# Patient Record
Sex: FEMALE | Race: BLACK OR AFRICAN AMERICAN | NOT HISPANIC OR LATINO | Employment: FULL TIME | URBAN - METROPOLITAN AREA
[De-identification: names, ages, dates, MRNs, and addresses within clinical notes are randomized per-mention and may not be internally consistent; named-entity substitution may affect disease eponyms.]

---

## 2017-01-04 ENCOUNTER — TELEPHONE (OUTPATIENT)
Dept: INFECTIOUS DISEASES | Facility: CLINIC | Age: 37
End: 2017-01-04

## 2017-01-05 ENCOUNTER — OFFICE VISIT (OUTPATIENT)
Dept: INFECTIOUS DISEASES | Facility: CLINIC | Age: 37
End: 2017-01-05
Attending: INTERNAL MEDICINE

## 2017-01-05 VITALS
OXYGEN SATURATION: 100 % | TEMPERATURE: 98 F | HEIGHT: 61 IN | WEIGHT: 102.5 LBS | DIASTOLIC BLOOD PRESSURE: 83 MMHG | BODY MASS INDEX: 19.35 KG/M2 | SYSTOLIC BLOOD PRESSURE: 131 MMHG | HEART RATE: 101 BPM

## 2017-01-05 DIAGNOSIS — F41.9 ANXIETY: ICD-10-CM

## 2017-01-05 DIAGNOSIS — B20 HUMAN IMMUNODEFICIENCY VIRUS (HIV) DISEASE (H): Primary | ICD-10-CM

## 2017-01-05 DIAGNOSIS — Z22.7 LTBI (LATENT TUBERCULOSIS INFECTION): ICD-10-CM

## 2017-01-05 DIAGNOSIS — B20 HUMAN IMMUNODEFICIENCY VIRUS (HIV) DISEASE (H): ICD-10-CM

## 2017-01-05 LAB
ALBUMIN SERPL-MCNC: 3.7 G/DL (ref 3.4–5)
ALP SERPL-CCNC: 82 U/L (ref 40–150)
ALT SERPL W P-5'-P-CCNC: 32 U/L (ref 0–50)
ANION GAP SERPL CALCULATED.3IONS-SCNC: 8 MMOL/L (ref 3–14)
AST SERPL W P-5'-P-CCNC: 31 U/L (ref 0–45)
BILIRUB SERPL-MCNC: 0.2 MG/DL (ref 0.2–1.3)
BUN SERPL-MCNC: 10 MG/DL (ref 7–30)
CALCIUM SERPL-MCNC: 9 MG/DL (ref 8.5–10.1)
CD3 CELLS # BLD: 1880 CELLS/UL (ref 603–2990)
CD3 CELLS NFR BLD: 71 % (ref 49–84)
CD3+CD4+ CELLS # BLD: 648 CELLS/UL (ref 441–2156)
CD3+CD4+ CELLS NFR BLD: 24 % (ref 28–63)
CD3+CD4+ CELLS/CD3+CD8+ CLL BLD: 0.55 % (ref 1.4–2.6)
CD3+CD8+ CELLS # BLD: 1161 CELLS/UL (ref 125–1312)
CD3+CD8+ CELLS NFR BLD: 44 % (ref 10–40)
CHLORIDE SERPL-SCNC: 105 MMOL/L (ref 94–109)
CO2 SERPL-SCNC: 26 MMOL/L (ref 20–32)
CREAT SERPL-MCNC: 0.73 MG/DL (ref 0.52–1.04)
ERYTHROCYTE [DISTWIDTH] IN BLOOD BY AUTOMATED COUNT: 14.6 % (ref 10–15)
GFR SERPL CREATININE-BSD FRML MDRD: ABNORMAL ML/MIN/1.7M2
GLUCOSE SERPL-MCNC: 103 MG/DL (ref 70–99)
HCT VFR BLD AUTO: 35.9 % (ref 35–47)
HGB BLD-MCNC: 11.4 G/DL (ref 11.7–15.7)
IFC SPECIMEN: ABNORMAL
IMMUNODEFICIENCY MARKERS SPEC-IMP: ABNORMAL
MCH RBC QN AUTO: 23.3 PG (ref 26.5–33)
MCHC RBC AUTO-ENTMCNC: 31.8 G/DL (ref 31.5–36.5)
MCV RBC AUTO: 73 FL (ref 78–100)
PLATELET # BLD AUTO: 298 10E9/L (ref 150–450)
POTASSIUM SERPL-SCNC: 4 MMOL/L (ref 3.4–5.3)
PROT SERPL-MCNC: 8.4 G/DL (ref 6.8–8.8)
RBC # BLD AUTO: 4.9 10E12/L (ref 3.8–5.2)
SODIUM SERPL-SCNC: 139 MMOL/L (ref 133–144)
WBC # BLD AUTO: 5.1 10E9/L (ref 4–11)

## 2017-01-05 PROCEDURE — 80053 COMPREHEN METABOLIC PANEL: CPT | Performed by: INTERNAL MEDICINE

## 2017-01-05 PROCEDURE — 86360 T CELL ABSOLUTE COUNT/RATIO: CPT | Performed by: INTERNAL MEDICINE

## 2017-01-05 PROCEDURE — 99212 OFFICE O/P EST SF 10 MIN: CPT | Mod: ZF

## 2017-01-05 PROCEDURE — 85027 COMPLETE CBC AUTOMATED: CPT | Performed by: INTERNAL MEDICINE

## 2017-01-05 PROCEDURE — 87536 HIV-1 QUANT&REVRSE TRNSCRPJ: CPT | Performed by: INTERNAL MEDICINE

## 2017-01-05 PROCEDURE — 36415 COLL VENOUS BLD VENIPUNCTURE: CPT | Performed by: INTERNAL MEDICINE

## 2017-01-05 PROCEDURE — 86359 T CELLS TOTAL COUNT: CPT | Performed by: INTERNAL MEDICINE

## 2017-01-05 ASSESSMENT — PAIN SCALES - GENERAL: PAINLEVEL: NO PAIN (0)

## 2017-01-05 NOTE — PROGRESS NOTES
[facility] - Progress Note  Dr. Claudia Cosme, [facility], [facility], [address] [place] [address], Sixth Floor, [address]  [place] [address]  Patient:  Marina Contreras, Date of birth 1980, Medical record number [id number]  Date of Visit:  Jan 5, 2017         Assessment and Recommendations:     Human immunodeficiency virus (HIV) disease (H)  Will check her safety and surrogate markers today.  We discussed ARV options going forward not that she is no longer pregnant.  She would prefer to be on a once a day regimen, but she is willing to take more than one pill.  We decided on a regiment of dolutegravir and tenofovir alafenamide/emtricitibine.  She understands that these drug are not approved yet in pregnancy, but they are very similar to her current regimen.  I will have her viral load checked in 1-2 months.  We will be doing a blood draw because of LTBI treatment and we will do it at that time.     LTBI (latent tuberculosis infection)  Ms. Ben Mock is interested in LTBI therapy.  I think that she is fine for INH therapy and a rifampin/rifabutin based regimen would interact with her ARVs.   We will contact the [facility] about have her pills covered.  She should be on Isoniazid 200mg orally once a day and Pyridoxine 25mg orally once a day.     Anxiety  We will coordinate a counselor for her.  She was encouraged to contact us with any issues.  I encouraged her to try to prioritize her own health as well as her daughters in this early phase of motherhood - setting up a schedule so that she can have regular meals and prioritizing sleep.     I spent more than 40 minutes today in face to face time managing the care of Marina Mock.  Over 50% of my time on the unit was spent counseling the patient and/or coordinating care regarding services listed in this note.    Claudia Cosme MD  Division of  "Infectious Diseases and International Medicine  [phone]         History of Infectious Disease Illness:   Ms. Gray is a 36 year old woman who I am following for HIV infection. She was diagnosed in pregnancy and delivered in September 2016.  She has been doing well since then.  The baby is negative so far and healthy.  She has one test left to take.  She is not breastfeeding.  She continues to take her ARVs without an issues. No diarrhea, nausea or vomiting.  No rashes.  At present she is on Truvada and Raltegravir.  She does not plan on getting pregnant again at this point, but she is not on hormonal contraception at present. Today we discussed LTBI treatment.  We had discussed this during her pregnancy, but we elected that in the setting of her hyperemesis gravidarum, the new ARV start and her CD4 count to address this after she delivered.  She reports no known exposure to TB.  She has a history of BCG.  She had a positive Quantiferon gold through [facility].  She is interested in getting treatment for LTBI.  She denies any sweats, weight loss (except with delivery).  No cough. No fevers.  She did have a negative chest X ray at [facility].  Of note, her  was recently on PrEP, but stopped taking it and she is concerned about this.  She reports a lot of ongoing stress about her diagnosis and she has sleep issues relating to this.  She also reports that she has not been eating well, but on further questioning, it sounds like she has a normal appetite and feels fine when eating.  She just has not been prioritizing eating as she is adjust to life with a young baby.  She denies suicidal ideation and she reports that she sometimes does feel \"depressed,\" but she does not feel so sad that she worries that she is at risk of harming herself or others.  I had Arcenio Wilson join us for part of this conversation to discuss mental health resources and she is interested in counseling which Arcenio will help arrange. "         Past Medical and Surgical History:     Past Medical History   Diagnosis Date     Miscarriage      Malaria      TB (pulmonary tuberculosis)      latent       Past Surgical History   Procedure Laterality Date     No history of surgery             Family History:     Family History   Problem Relation Age of Onset     Family History Negative       Hypertension Father      Hypertension Other      CANCER No family hx of      DIABETES No family hx of      Macular Degeneration No family hx of      Retinal detachment No family hx of            Social History:     Social History   Substance Use Topics     Smoking status: Never Smoker      Smokeless tobacco: Never Used     Alcohol Use: No     Social History     Social History Narrative          Review of Systems:   CONSTITUTIONAL:  No fevers or chills  EYES: negative for icterus  RESPIRATORY:  negative for cough, sputum or dyspnea  CARDIOVASCULAR:  negative for chest pain, palpitations  GASTROINTESTINAL:   Negative for nausea, diarrhea or constipation  GENITOURINARY:  Positive for dysuria  INTEGUMENT:  negative for rash or pruritus  NEURO:  Negative for headache         Current Medications:     Current Outpatient Prescriptions   Medication Sig Dispense Refill     acetaminophen (TYLENOL) 325 MG tablet Take 2 tablets (650 mg) by mouth every 4 hours as needed for mild pain or fever (greater than or equal to 38  C /100.4  F (oral) or 38.5  C/ 101.4  F (core).) 30 tablet 0     ibuprofen (ADVIL,MOTRIN) 400 MG tablet Take 1-2 tablets (400-800 mg) by mouth every 6 hours as needed for other (cramping) 30 tablet 0     senna-docusate (SENOKOT-S;PERICOLACE) 8.6-50 MG per tablet Take 1-2 tablets by mouth 2 times daily as needed for constipation 30 tablet 1     ferrous sulfate (IRON) 325 (65 FE) MG tablet Take 1 tablet (325 mg) by mouth daily (with breakfast) 30 tablet 2     blood glucose monitoring (TRU CONTOUR NEXT) test strip Use to test blood sugar 4 times daily or as  "directed.  Ok to substitute alternative if insurance prefers. 100 each 11     blood glucose monitoring (TRU MICROLET) lancets Use to test blood sugar 4 times daily or as directed.  Ok to substitute alternative if insurance prefers. 1 Box prn     acetone, Urine, test STRP 1 strip by In Vitro route daily 25 each 1     raltegravir (ISENTRESS) 400 MG tablet Take 1 tablet (400 mg) by mouth 2 times daily 60 tablet 11     emtricitabine-tenofovir (TRUVADA) 200-300 MG per tablet Take 1 tablet by mouth daily 30 tablet 11          Immunization History:     Immunization History   Administered Date(s) Administered     TDAP (BOOSTRIX AGES 10-64) 08/15/2016            Allergies:   No Known Allergies         Physical Exam:   Vital signs:  /83 mmHg  Pulse 101  Temp(Src) 98  F (36.7  C) (Oral)  Ht 1.549 m (5' 1\")  Wt 46.494 kg (102 lb 8 oz)  BMI 19.38 kg/m2  SpO2 100%    Physical Examination:  GENERAL:  well-developed, well-nourished.  HEENT:  Head is normocephalic, atraumatic   EYES:  Eyes have anicteric sclerae without conjunctival injection   LUNGS:  Clear to auscultation bilateral.   CARDIOVASCULAR:  Regular rate and rhythm with no murmurs, gallops or rubs.  ABDOMEN:  Normal bowel sounds, soft, nontender.   SKIN:  No acute rashes.    NEUROLOGIC:  Grossly nonfocal. Active x4 extremities         Laboratory Data:     Metabolic Studies       Recent Labs   Lab Test  09/21/16   1904  08/25/16   1127  06/30/16   1203  03/31/16   1027  03/03/16   1226   NA   --   136  133  135  136   POTASSIUM   --   3.8  4.0  3.8  3.5   CHLORIDE   --   105  104  102  103   CO2   --   21  23  19*  26   ANIONGAP   --   10  6  13  8   BUN   --   6*  6*  3*  6*   CR  0.73  0.50*  0.52  0.53  0.49*   GFRESTIMATED  >90  Non  GFR Calc    >90  Non  GFR Calc    >90  Non  GFR Calc    >90  Non  GFR Calc    >90  Non  GFR Calc     GLC   --   83  137*  86  86   JANICE   --   " 9.5  9.2  10.0  9.0       Hepatic Studies    Recent Labs   Lab Test  09/21/16   1904  08/25/16   1127  06/30/16   1203  03/31/16   1027  03/03/16   1226   BILITOTAL   --   0.2  0.2  0.2  0.2   ALKPHOS   --   92  64  56  54   ALBUMIN   --   3.0*  3.0*  3.1*  3.6   AST  22  15  21  26  20   ALT  13  16  20  27  32       Hematology Studies      Recent Labs   Lab Test  09/21/16   1904  09/19/16   1747  08/25/16   1127  06/30/16   1203  05/12/16   1224  03/31/16   1027  03/03/16   1226  08/14/15   2031   WBC  14.6*  5.6  5.0  5.6  6.5  6.2  8.2  5.7   ANEU   --   3.0   --    --    --    --   4.7  3.4   ALYM   --   2.0   --    --    --    --   2.7  1.8   CHUCKY   --   0.6   --    --    --    --   0.7  0.5   AEOS   --   0.1   --    --    --    --   0.0  0.0   HGB  9.3*  10.7*  9.9*  9.2*  10.0*  10.9*  10.7*  10.2*   HCT  28.7*  32.1*  30.7*  27.9*  30.8*  34.2*  34.4*  30.1*   PLT  174  181  233  295  319  308  308  251       Hepatitis B Testing     Recent Labs   Lab Test  03/03/16   1226   HBCAB  Nonreactive   HEPBANG  Nonreactive

## 2017-01-05 NOTE — NURSING NOTE
"Chief Complaint   Patient presents with     RECHECK     follow up B20       Initial /83 mmHg  Pulse 101  Temp(Src) 98  F (36.7  C) (Oral)  Ht 1.549 m (5' 1\")  Wt 46.494 kg (102 lb 8 oz)  BMI 19.38 kg/m2  SpO2 100% Estimated body mass index is 19.38 kg/(m^2) as calculated from the following:    Height as of this encounter: 1.549 m (5' 1\").    Weight as of this encounter: 46.494 kg (102 lb 8 oz).  BP completed using cuff size: anjali Oreilly CMA      "

## 2017-01-05 NOTE — Clinical Note
1/5/2017       RE: Marina Mock  [address]  [facility] [address]     Dear Colleague,    Thank you for referring your patient, Marina Mock, to the [facility] AND INFECTIOUS DISEASES at [facility]. Please see a copy of my visit note below.    [facility] - Progress Note  Dr. Claudia Cosme, [facility], [facility], [address] [place] [address], Sixth Floor, [address]  [place] [address]  Patient:  Marina Contreras, Date of birth 1980, Medical record number [id number]  Date of Visit:  Jan 5, 2017         Assessment and Recommendations:     Human immunodeficiency virus (HIV) disease (H)  Will check her safety and surrogate markers today.  We discussed ARV options going forward not that she is no longer pregnant.  She would prefer to be on a once a day regimen, but she is willing to take more than one pill.  We decided on a regiment of dolutegravir and tenofovir alafenamide/emtricitibine.  She understands that these drug are not approved yet in pregnancy, but they are very similar to her current regimen.  I will have her viral load checked in 1-2 months.  We will be doing a blood draw because of LTBI treatment and we will do it at that time.     LTBI (latent tuberculosis infection)  Ms. Ben Mock is interested in LTBI therapy.  I think that she is fine for INH therapy and a rifampin/rifabutin based regimen would interact with her ARVs.   We will contact the [facility] about have her pills covered.  She should be on Isoniazid 200mg orally once a day and Pyridoxine 25mg orally once a day.     Anxiety  We will coordinate a counselor for her.  She was encouraged to contact us with any issues.  I encouraged her to try to prioritize her own health as well as her daughters in this early phase of motherhood - setting up a schedule so that she  can have regular meals and prioritizing sleep.     I spent more than 40 minutes today in face to face time managing the care of Marina Mock.  Over 50% of my time on the unit was spent counseling the patient and/or coordinating care regarding services listed in this note.    Claudia Cosme MD  Division of Infectious Diseases and International Medicine  [phone]         History of Infectious Disease Illness:   Ms. Gray is a 36 year old woman who I am following for HIV infection. She was diagnosed in pregnancy and delivered in September 2016.  She has been doing well since then.  The baby is negative so far and healthy.  She has one test left to take.  She is not breastfeeding.  She continues to take her ARVs without an issues. No diarrhea, nausea or vomiting.  No rashes.  At present she is on Truvada and Raltegravir.  She does not plan on getting pregnant again at this point, but she is not on hormonal contraception at present. Today we discussed LTBI treatment.  We had discussed this during her pregnancy, but we elected that in the setting of her hyperemesis gravidarum, the new ARV start and her CD4 count to address this after she delivered.  She reports no known exposure to TB.  She has a history of BCG.  She had a positive Quantiferon gold through [facility].  She is interested in getting treatment for LTBI.  She denies any sweats, weight loss (except with delivery).  No cough. No fevers.  She did have a negative chest X ray at [facility].  Of note, her  was recently on PrEP, but stopped taking it and she is concerned about this.  She reports a lot of ongoing stress about her diagnosis and she has sleep issues relating to this.  She also reports that she has not been eating well, but on further questioning, it sounds like she has a normal appetite and feels fine when eating.  She just has not been prioritizing eating as she is adjust to life with a young baby.  She denies suicidal  "ideation and she reports that she sometimes does feel \"depressed,\" but she does not feel so sad that she worries that she is at risk of harming herself or others.  I had Arcenio Wilson join us for part of this conversation to discuss mental health resources and she is interested in counseling which Arcenio will help arrange.         Past Medical and Surgical History:     Past Medical History   Diagnosis Date     Miscarriage      Malaria      TB (pulmonary tuberculosis)      latent       Past Surgical History   Procedure Laterality Date     No history of surgery             Family History:     Family History   Problem Relation Age of Onset     Family History Negative       Hypertension Father      Hypertension Other      CANCER No family hx of      DIABETES No family hx of      Macular Degeneration No family hx of      Retinal detachment No family hx of            Social History:     Social History   Substance Use Topics     Smoking status: Never Smoker      Smokeless tobacco: Never Used     Alcohol Use: No     Social History     Social History Narrative          Review of Systems:   CONSTITUTIONAL:  No fevers or chills  EYES: negative for icterus  RESPIRATORY:  negative for cough, sputum or dyspnea  CARDIOVASCULAR:  negative for chest pain, palpitations  GASTROINTESTINAL:   Negative for nausea, diarrhea or constipation  GENITOURINARY:  Positive for dysuria  INTEGUMENT:  negative for rash or pruritus  NEURO:  Negative for headache         Current Medications:     Current Outpatient Prescriptions   Medication Sig Dispense Refill     acetaminophen (TYLENOL) 325 MG tablet Take 2 tablets (650 mg) by mouth every 4 hours as needed for mild pain or fever (greater than or equal to 38  C /100.4  F (oral) or 38.5  C/ 101.4  F (core).) 30 tablet 0     ibuprofen (ADVIL,MOTRIN) 400 MG tablet Take 1-2 tablets (400-800 mg) by mouth every 6 hours as needed for other (cramping) 30 tablet 0     senna-docusate (SENOKOT-S;PERICOLACE) " "8.6-50 MG per tablet Take 1-2 tablets by mouth 2 times daily as needed for constipation 30 tablet 1     ferrous sulfate (IRON) 325 (65 FE) MG tablet Take 1 tablet (325 mg) by mouth daily (with breakfast) 30 tablet 2     blood glucose monitoring (TRU CONTOUR NEXT) test strip Use to test blood sugar 4 times daily or as directed.  Ok to substitute alternative if insurance prefers. 100 each 11     blood glucose monitoring (TRU MICROLET) lancets Use to test blood sugar 4 times daily or as directed.  Ok to substitute alternative if insurance prefers. 1 Box prn     acetone, Urine, test STRP 1 strip by In Vitro route daily 25 each 1     raltegravir (ISENTRESS) 400 MG tablet Take 1 tablet (400 mg) by mouth 2 times daily 60 tablet 11     emtricitabine-tenofovir (TRUVADA) 200-300 MG per tablet Take 1 tablet by mouth daily 30 tablet 11          Immunization History:     Immunization History   Administered Date(s) Administered     TDAP (BOOSTRIX AGES 10-64) 08/15/2016            Allergies:   No Known Allergies         Physical Exam:   Vital signs:  /83 mmHg  Pulse 101  Temp(Src) 98  F (36.7  C) (Oral)  Ht 1.549 m (5' 1\")  Wt 46.494 kg (102 lb 8 oz)  BMI 19.38 kg/m2  SpO2 100%    Physical Examination:  GENERAL:  well-developed, well-nourished.  HEENT:  Head is normocephalic, atraumatic   EYES:  Eyes have anicteric sclerae without conjunctival injection   LUNGS:  Clear to auscultation bilateral.   CARDIOVASCULAR:  Regular rate and rhythm with no murmurs, gallops or rubs.  ABDOMEN:  Normal bowel sounds, soft, nontender.   SKIN:  No acute rashes.    NEUROLOGIC:  Grossly nonfocal. Active x4 extremities         Laboratory Data:     Metabolic Studies       Recent Labs   Lab Test  09/21/16   1904  08/25/16   1127  06/30/16   1203  03/31/16   1027  03/03/16   1226   NA   --   136  133  135  136   POTASSIUM   --   3.8  4.0  3.8  3.5   CHLORIDE   --   105  104  102  103   CO2   --   21  23  19*  26   ANIONGAP   --   10  6  " 13  8   BUN   --   6*  6*  3*  6*   CR  0.73  0.50*  0.52  0.53  0.49*   GFRESTIMATED  >90  Non  GFR Calc    >90  Non  GFR Calc    >90  Non  GFR Calc    >90  Non  GFR Calc    >90  Non  GFR Calc     GLC   --   83  137*  86  86   JANICE   --   9.5  9.2  10.0  9.0       Hepatic Studies    Recent Labs   Lab Test  09/21/16   1904  08/25/16   1127  06/30/16   1203  03/31/16   1027  03/03/16   1226   BILITOTAL   --   0.2  0.2  0.2  0.2   ALKPHOS   --   92  64  56  54   ALBUMIN   --   3.0*  3.0*  3.1*  3.6   AST  22  15  21  26  20   ALT  13  16  20  27  32       Hematology Studies      Recent Labs   Lab Test  09/21/16   1904  09/19/16   1747  08/25/16   1127  06/30/16   1203  05/12/16   1224  03/31/16   1027  03/03/16   1226  08/14/15   2031   WBC  14.6*  5.6  5.0  5.6  6.5  6.2  8.2  5.7   ANEU   --   3.0   --    --    --    --   4.7  3.4   ALYM   --   2.0   --    --    --    --   2.7  1.8   CHUCKY   --   0.6   --    --    --    --   0.7  0.5   AEOS   --   0.1   --    --    --    --   0.0  0.0   HGB  9.3*  10.7*  9.9*  9.2*  10.0*  10.9*  10.7*  10.2*   HCT  28.7*  32.1*  30.7*  27.9*  30.8*  34.2*  34.4*  30.1*   PLT  174  181  233  295  319  308  308  251       Hepatitis B Testing     Recent Labs   Lab Test  03/03/16 1226   HBCAB  Nonreactive   HEPBANG  Nonreactive           Again, thank you for allowing me to participate in the care of your patient.      Sincerely,    Claudia Cosme MD

## 2017-01-05 NOTE — MR AVS SNAPSHOT
After Visit Summary   1/5/2017    Marina Mock    MRN: [id number]           Patient Information     Date Of Birth          1980        Visit Information        Provider Department      1/5/2017 8:00 AM Claudia Cosme MD [facility] and Infectious Diseases        Today's Diagnoses     Human immunodeficiency virus (HIV) disease (H)    -  1        Follow-ups after your visit        Your next 10 appointments already scheduled     Jan 05, 2017 10:45 AM   Lab with  LAB   [facility] ([facility])    [address] [place] [address]  1st Floor  [facility] [address]   [phone]            Feb 09, 2017 10:30 AM   (Arrive by 10:15 AM)   Return Visit with Claudia Cosme MD   [facility] ([facility])    [address] [place] [address]  3rd [facility] [id number] [phone]              Future tests that were ordered for you today     Open Future Orders        Priority Expected Expires Ordered    CBC with platelets Routine  1/5/2018 1/5/2017    Comprehensive metabolic panel Routine  1/5/2018 1/5/2017    T cell subset profile Routine  1/5/2018 1/5/2017    HIV-1 RNA quantitative Routine  1/5/2018 1/5/2017            Who to contact     If you have questions or need follow up information about today's clinic visit or your schedule please contact [facility] AND INFECTIOUS DISEASES directly at [phone].  Normal or non-critical lab and imaging results will be communicated to you by MyChart, letter or phone within 4 business days after the clinic has received the results. If you do not hear from us within 7 days, please contact the clinic through MyChart or phone. If you have a critical or abnormal lab result, we will notify you by phone as soon as possible.  Submit refill requests through [email or web address] or call your pharmacy and they will forward the refill request  "to us. Please allow 3 business days for your refill to be completed.          Additional Information About Your Visit        [email or web address] Information     WhoGot[employer]uff gives you secure access to your electronic health record. If you see a primary care provider, you can also send messages to your care team and make appointments. If you have questions, please call your primary care clinic.  If you do not have a primary care provider, please call [phone] and they will assist you.        Care EveryWhere ID     This is your Care EveryWhere ID. This could be used by other organizations to access your [facility] medical records  [id number]        Your Vitals Were     Pulse Temperature Height BMI (Body Mass Index) Pulse Oximetry       101 98  F (36.7  C) (Oral) 1.549 m (5' 1\") 19.38 kg/m2 100%        Blood Pressure from Last 3 Encounters:   01/05/17 131/83   09/23/16 145/89   09/12/16 129/79    Weight from Last 3 Encounters:   01/05/17 46.494 kg (102 lb 8 oz)   09/19/16 54.885 kg (121 lb)   09/12/16 54.931 kg (121 lb 1.6 oz)                 Today's Medication Changes          These changes are accurate as of: 1/5/17 10:37 AM.  If you have any questions, ask your nurse or doctor.               Start taking these medicines.        Dose/Directions    dolutegravir 50 MG tablet   Commonly known as:  TIVICAY   Used for:  Human immunodeficiency virus (HIV) disease (H)   Started by:  Claudia Cosme MD        Dose:  50 mg   Take 1 tablet (50 mg) by mouth daily   Quantity:  30 tablet   Refills:  11       emtricitabine-tenofovir -25 MG per tablet   Commonly known as:  DESCOVY   Used for:  Human immunodeficiency virus (HIV) disease (H)   Started by:  Claudia Cosme MD        Dose:  1 tablet   Take 1 tablet by mouth daily   Quantity:  30 tablet   Refills:  11         Stop taking these medicines if you haven't already. Please contact your care team if you have questions.     acetaminophen 325 MG tablet "   Commonly known as:  TYLENOL   Stopped by:  Claudia Cosme MD           emtricitabine-tenofovir 200-300 MG per tablet   Commonly known as:  TRUVADA   Stopped by:  Claudia Cosme MD           ibuprofen 400 MG tablet   Commonly known as:  ADVIL/MOTRIN   Stopped by:  Claudia Cosme MD           raltegravir 400 MG tablet   Commonly known as:  ISENTRESS   Stopped by:  Claudia Cosme MD           senna-docusate 8.6-50 MG per tablet   Commonly known as:  SENOKOT-S;PERICOLACE   Stopped by:  Claudia Cosme MD                Where to get your medicines      These medications were sent to [facility] [address] [place] [address] [address]  [address] [place] [address] [address] [place] [id number]    Hours:  TRANSPLANT PHONE NUMBER [phone] Phone:  [phone]    - dolutegravir 50 MG tablet  - emtricitabine-tenofovir -25 MG per tablet             Primary Care Provider Office Phone # Fax #    Clinic [facility] [phone]       [address] [facility] [address]        Thank you!     Thank you for choosing [facility] AND INFECTIOUS DISEASES  for your care. Our goal is always to provide you with excellent care. Hearing back from our patients is one way we can continue to improve our services. Please take a few minutes to complete the written survey that you may receive in the mail after your visit with us. Thank you!             Your Updated Medication List - Protect others around you: Learn how to safely use, store and throw away your medicines at www.disposemymeds.org.          This list is accurate as of: 1/5/17 10:37 AM.  Always use your most recent med list.                   Brand Name Dispense Instructions for use    acetone (Urine) test Strp     25 each    1 strip by In Vitro route daily       blood glucose monitoring lancets     1 Box    Use to test blood sugar 4 times daily  or as directed.  Ok to substitute alternative if insurance prefers.       blood glucose monitoring test strip    TRU CONTOUR NEXT    100 each    Use to test blood sugar 4 times daily or as directed.  Ok to substitute alternative if insurance prefers.       dolutegravir 50 MG tablet    TIVICAY    30 tablet    Take 1 tablet (50 mg) by mouth daily       emtricitabine-tenofovir -25 MG per tablet    DESCOVY    30 tablet    Take 1 tablet by mouth daily       ferrous sulfate 325 (65 FE) MG tablet    IRON    30 tablet    Take 1 tablet (325 mg) by mouth daily (with breakfast)

## 2017-01-06 PROBLEM — F41.9 ANXIETY: Status: ACTIVE | Noted: 2017-01-06

## 2017-01-06 LAB
HIV1 RNA # PLAS NAA DL=20: NORMAL {COPIES}/ML
HIV1 RNA SERPL NAA+PROBE-LOG#: NORMAL {LOG_COPIES}/ML

## 2017-01-07 NOTE — ASSESSMENT & PLAN NOTE
Ms. Ben Thompsonangeli is interested in LTBI therapy.  I think that she is fine for INH therapy and a rifampin/rifabutin based regimen would interact with her ARVs.   We will contact the [facility] about have her pills covered.  She should be on Isoniazid 200mg orally once a day and Pyridoxine 25mg orally once a day.

## 2017-01-07 NOTE — ASSESSMENT & PLAN NOTE
Will check her safety and surrogate markers today.  We discussed ARV options going forward not that she is no longer pregnant.  She would prefer to be on a once a day regimen, but she is willing to take more than one pill.  We decided on a regiment of dolutegravir and tenofovir alafenamide/emtricitibine.  She understands that these drug are not approved yet in pregnancy, but they are very similar to her current regimen.  I will have her viral load checked in 1-2 months.  We will be doing a blood draw because of LTBI treatment and we will do it at that time.

## 2017-01-07 NOTE — ASSESSMENT & PLAN NOTE
We will coordinate a counselor for her.  She was encouraged to contact us with any issues.  I encouraged her to try to prioritize her own health as well as her daughters in this early phase of motherhood - setting up a schedule so that she can have regular meals and prioritizing sleep.

## 2017-01-12 DIAGNOSIS — Z22.7 LATENT TUBERCULOSIS INFECTION: Primary | ICD-10-CM

## 2017-01-12 RX ORDER — ISONIAZID 100 MG/1
200 TABLET ORAL DAILY
Qty: 90 TABLET | Refills: 8 | Status: SHIPPED | OUTPATIENT
Start: 2017-01-12 | End: 2017-03-16

## 2017-01-12 RX ORDER — PYRIDOXINE HCL (VITAMIN B6) 25 MG
25 TABLET ORAL DAILY
Qty: 30 TABLET | Refills: 8 | Status: SHIPPED | OUTPATIENT
Start: 2017-01-12 | End: 2017-03-16

## 2017-01-12 NOTE — TELEPHONE ENCOUNTER
Forms completed for TB treatment and Rx signed by Dr Cosme.  Faxed to [facility] and all forms placed it treatment folder. Yaneth Henderson RN...01/12/2017....11:29 AM

## 2017-02-08 ENCOUNTER — TELEPHONE (OUTPATIENT)
Dept: INFECTIOUS DISEASES | Facility: CLINIC | Age: 37
End: 2017-02-08

## 2017-03-15 ENCOUNTER — TELEPHONE (OUTPATIENT)
Dept: INFECTIOUS DISEASES | Facility: CLINIC | Age: 37
End: 2017-03-15

## 2017-03-16 ENCOUNTER — OFFICE VISIT (OUTPATIENT)
Dept: INFECTIOUS DISEASES | Facility: CLINIC | Age: 37
End: 2017-03-16
Attending: INTERNAL MEDICINE

## 2017-03-16 VITALS
HEIGHT: 61 IN | TEMPERATURE: 98.3 F | WEIGHT: 99.6 LBS | SYSTOLIC BLOOD PRESSURE: 119 MMHG | BODY MASS INDEX: 18.81 KG/M2 | DIASTOLIC BLOOD PRESSURE: 78 MMHG | HEART RATE: 71 BPM

## 2017-03-16 DIAGNOSIS — Z22.7 LTBI (LATENT TUBERCULOSIS INFECTION): ICD-10-CM

## 2017-03-16 DIAGNOSIS — B20 HUMAN IMMUNODEFICIENCY VIRUS (HIV) DISEASE (H): ICD-10-CM

## 2017-03-16 DIAGNOSIS — R07.82 INTERCOSTAL PAIN: ICD-10-CM

## 2017-03-16 DIAGNOSIS — B20 HUMAN IMMUNODEFICIENCY VIRUS (HIV) DISEASE (H): Primary | ICD-10-CM

## 2017-03-16 LAB
ALBUMIN SERPL-MCNC: 3.8 G/DL (ref 3.4–5)
ALP SERPL-CCNC: 72 U/L (ref 40–150)
ALT SERPL W P-5'-P-CCNC: 24 U/L (ref 0–50)
ANION GAP SERPL CALCULATED.3IONS-SCNC: 8 MMOL/L (ref 3–14)
AST SERPL W P-5'-P-CCNC: 22 U/L (ref 0–45)
BILIRUB SERPL-MCNC: 0.2 MG/DL (ref 0.2–1.3)
BUN SERPL-MCNC: 7 MG/DL (ref 7–30)
CALCIUM SERPL-MCNC: 8.8 MG/DL (ref 8.5–10.1)
CHLORIDE SERPL-SCNC: 106 MMOL/L (ref 94–109)
CO2 SERPL-SCNC: 27 MMOL/L (ref 20–32)
CREAT SERPL-MCNC: 0.73 MG/DL (ref 0.52–1.04)
ERYTHROCYTE [DISTWIDTH] IN BLOOD BY AUTOMATED COUNT: 16.3 % (ref 10–15)
FERRITIN SERPL-MCNC: 36 NG/ML (ref 12–150)
GFR SERPL CREATININE-BSD FRML MDRD: NORMAL ML/MIN/1.7M2
GLUCOSE SERPL-MCNC: 82 MG/DL (ref 70–99)
HCT VFR BLD AUTO: 32.2 % (ref 35–47)
HGB BLD-MCNC: 10.3 G/DL (ref 11.7–15.7)
MCH RBC QN AUTO: 23.6 PG (ref 26.5–33)
MCHC RBC AUTO-ENTMCNC: 32 G/DL (ref 31.5–36.5)
MCV RBC AUTO: 74 FL (ref 78–100)
PLATELET # BLD AUTO: 247 10E9/L (ref 150–450)
POTASSIUM SERPL-SCNC: 3.9 MMOL/L (ref 3.4–5.3)
PROT SERPL-MCNC: 8.7 G/DL (ref 6.8–8.8)
RBC # BLD AUTO: 4.37 10E12/L (ref 3.8–5.2)
SODIUM SERPL-SCNC: 141 MMOL/L (ref 133–144)
WBC # BLD AUTO: 4.3 10E9/L (ref 4–11)

## 2017-03-16 PROCEDURE — 99212 OFFICE O/P EST SF 10 MIN: CPT | Mod: 25,ZF

## 2017-03-16 PROCEDURE — 80053 COMPREHEN METABOLIC PANEL: CPT | Performed by: INTERNAL MEDICINE

## 2017-03-16 PROCEDURE — 25000128 H RX IP 250 OP 636: Mod: ZF

## 2017-03-16 PROCEDURE — 90670 PCV13 VACCINE IM: CPT | Mod: ZF

## 2017-03-16 PROCEDURE — 36415 COLL VENOUS BLD VENIPUNCTURE: CPT | Performed by: INTERNAL MEDICINE

## 2017-03-16 PROCEDURE — 85027 COMPLETE CBC AUTOMATED: CPT | Performed by: INTERNAL MEDICINE

## 2017-03-16 PROCEDURE — 82728 ASSAY OF FERRITIN: CPT | Performed by: INTERNAL MEDICINE

## 2017-03-16 PROCEDURE — 87536 HIV-1 QUANT&REVRSE TRNSCRPJ: CPT | Performed by: INTERNAL MEDICINE

## 2017-03-16 PROCEDURE — G0009 ADMIN PNEUMOCOCCAL VACCINE: HCPCS | Mod: ZF

## 2017-03-16 ASSESSMENT — PAIN SCALES - GENERAL: PAINLEVEL: NO PAIN (0)

## 2017-03-16 NOTE — LETTER
3/16/2017     RE: Marina Mock  [address]  [place] [facility] [facility] [address]     Dear Colleague,    Thank you for referring your patient, Marina Mock, to the [facility] AND INFECTIOUS DISEASES at [facility]. Please see a copy of my visit note below.    [facility] - Progress Note  Dr. Claudia Cosme, [facility], [facility], [address] [place] [address], Sixth Floor, Clinic [address]  [place] [address]  Patient:  Marina Contreras, Date of birth 1980, Medical record number [id number]  Date of Visit:  Mar 16, 2017         Assessment and Recommendations:     Human immunodeficiency virus (HIV) disease (H)  Continue Truvada and Dolutegravir.  Will discuss at her next appointment her longer terms plans for pregnancy and will make a decision at that point as to whether we should continue Truvada or switch to Descovy. I will recheck her viral load today since we have changed her regimen from Raltegravir to Dolutegravir. Follow up in 3 months.     LTBI (latent tuberculosis infection)  Medications have not come through [facility].  We will refax for those and hopefully will be able to start on this regimen soon.     Chest Pain  Her pain sounds most consistent with costochondritis.  I advised that she do a short trial of scheduled ibuprofen followed by as needed use.  She should contact us if the pain does not improve.    Claudia Cosme MD  Division of Infectious Diseases and International Medicine  [phone]         History of Infectious Disease Illness:   Ms. Gray is a 36 year old woman who I am following for HIV infection. She was diagnosed with HIV in pregnancy and delivered in September 2016.  Since that time, she has switched from Truvada and Raltegravir to Truvada and Dolutegravir for greater ease of dosing.  She has been tolerating this  well. No significant issues with nausea or vomiting.   She denies fever or chills.  No rashes. At this time she does not have any plans for another child. She continues to take oral iron supplementation.     She did note today that she has some pain in her chest.  It is located primarily over the lateral aspect of the sternum.  She reports that it is worse when she is laying down at night.  It is not associated with shortness of breath and it does not come on with activity.         Past Medical and Surgical History:     Past Medical History   Diagnosis Date     Malaria      Miscarriage      TB (pulmonary tuberculosis)      latent       Past Surgical History   Procedure Laterality Date     No history of surgery             Family History:     Family History   Problem Relation Age of Onset     Family History Negative       Hypertension Father      Hypertension Other      CANCER No family hx of      DIABETES No family hx of      Macular Degeneration No family hx of      Retinal detachment No family hx of            Social History:     Social History   Substance Use Topics     Smoking status: Never Smoker     Smokeless tobacco: Never Used     Alcohol use No     Social History     Social History Narrative          Review of Systems:   CONSTITUTIONAL:  No fevers or chills  EYES: negative for icterus  RESPIRATORY:  negative for cough, sputum or dyspnea  CARDIOVASCULAR:  Positive for chest pain, negaitve for palpitations  GASTROINTESTINAL:   Negative for nausea, diarrhea or constipation  GENITOURINARY:  Negative for dysuria  INTEGUMENT:  negative for rash or pruritus  NEURO:  Negative for headache         Current Medications:     Current Outpatient Prescriptions   Medication Sig Dispense Refill     emtricitabine-tenofovir AF (DESCOVY) 200-25 MG per tablet Take 1 tablet by mouth daily 30 tablet 11     dolutegravir (TIVICAY) 50 MG tablet Take 1 tablet (50 mg) by mouth daily 30 tablet 11     ferrous sulfate (IRON) 325 (65 FE)  "MG tablet Take 1 tablet (325 mg) by mouth daily (with breakfast) 30 tablet 2          Immunization History:     Immunization History   Administered Date(s) Administered     TDAP (BOOSTRIX AGES 10-64) 08/15/2016            Allergies:   No Known Allergies         Physical Exam:   Vital signs:  /78  Pulse 71  Temp 98.3  F (36.8  C) (Oral)  Ht 1.549 m (5' 1\")  Wt 45.2 kg (99 lb 9.6 oz)  BMI 18.82 kg/m2    Physical Examination:  GENERAL:  well-developed, well-nourished.  HEENT:  Head is normocephalic, atraumatic   EYES:  Eyes have anicteric sclerae without conjunctival injection   LUNGS:  Clear to auscultation bilateral.   CARDIOVASCULAR:  Regular rate and rhythm with no murmurs, gallops or rubs.  ABDOMEN:  Normal bowel sounds, soft, nontender.   SKIN:  No acute rashes.    NEUROLOGIC:  Grossly nonfocal. Active x4 extremities         Laboratory Data:     Metabolic Studies       Recent Labs   Lab Test  01/05/17 1127 09/21/16 1904 08/25/16 1127 06/30/16   1203  03/31/16   1027  03/03/16   1226   NA  139   --   136  133  135  136   POTASSIUM  4.0   --   3.8  4.0  3.8  3.5   CHLORIDE  105   --   105  104  102  103   CO2  26   --   21  23  19*  26   ANIONGAP  8   --   10  6  13  8   BUN  10   --   6*  6*  3*  6*   CR  0.73  0.73  0.50*  0.52  0.53  0.49*   GFRESTIMATED  >90  Non  GFR Calc    >90  Non  GFR Calc    >90  Non  GFR Calc    >90  Non  GFR Calc    >90  Non  GFR Calc    >90  Non  GFR Calc     GLC  103*   --   83  137*  86  86   JANICE  9.0   --   9.5  9.2  10.0  9.0       Hepatic Studies    Recent Labs   Lab Test  01/05/17 1127 09/21/16 1904 08/25/16 1127 06/30/16   1203  03/31/16   1027  03/03/16   1226   BILITOTAL  0.2   --   0.2  0.2  0.2  0.2   ALKPHOS  82   --   92  64  56  54   ALBUMIN  3.7   --   3.0*  3.0*  3.1*  3.6   AST  31  22  15  21  26  20   ALT  32  13  16  20  27  32       Hematology " Studies      Recent Labs   Lab Test  01/05/17   1127  09/21/16   1904  09/19/16   1747  08/25/16   1127  06/30/16   1203  05/12/16   1224   03/03/16   1226  08/14/15   2031   WBC  5.1  14.6*  5.6  5.0  5.6  6.5   < >  8.2  5.7   ANEU   --    --   3.0   --    --    --    --   4.7  3.4   ALYM   --    --   2.0   --    --    --    --   2.7  1.8   CHUCKY   --    --   0.6   --    --    --    --   0.7  0.5   AEOS   --    --   0.1   --    --    --    --   0.0  0.0   HGB  11.4*  9.3*  10.7*  9.9*  9.2*  10.0*   < >  10.7*  10.2*   HCT  35.9  28.7*  32.1*  30.7*  27.9*  30.8*   < >  34.4*  30.1*   PLT  298  174  181  233  295  319   < >  308  251    < > = values in this interval not displayed.       Hepatitis B Testing     Recent Labs   Lab Test  03/03/16   1226   HBCAB  Nonreactive   HEPBANG  Nonreactive     Imaging:  No results found for this or any previous visit (from the past 744 hour(s)).    Again, thank you for allowing me to participate in the care of your patient.      Sincerely,    Claudia Cosme MD

## 2017-03-16 NOTE — NURSING NOTE
"Chief Complaint   Patient presents with     RECHECK     follow up B20       Initial /78  Pulse 71  Temp 98.3  F (36.8  C) (Oral)  Ht 1.549 m (5' 1\")  Wt 45.2 kg (99 lb 9.6 oz)  BMI 18.82 kg/m2 Estimated body mass index is 18.82 kg/(m^2) as calculated from the following:    Height as of this encounter: 1.549 m (5' 1\").    Weight as of this encounter: 45.2 kg (99 lb 9.6 oz).  Medication Reconciliation: complete  "

## 2017-03-16 NOTE — PATIENT INSTRUCTIONS
For the chest pain, try ibuprofen 400mg orally.  Take 2 times a days for 3 days.  Then take as needed after that for pain.      You can also take acetaminophen as needed for pain as instructed on the bottle.

## 2017-03-16 NOTE — PROGRESS NOTES
[facility] - Progress Note  Dr. Claudia Cosme, [facility], [facility], [address] [place] [address], Sixth Floor, Clinic [address]  [place] [address]  Patient:  Marina Contreras, Date of birth 1980, Medical record number [id number]  Date of Visit:  Mar 16, 2017         Assessment and Recommendations:     Human immunodeficiency virus (HIV) disease (H)  Continue Truvada and Dolutegravir.  Will discuss at her next appointment her longer terms plans for pregnancy and will make a decision at that point as to whether we should continue Truvada or switch to Descovy. I will recheck her viral load today since we have changed her regimen from Raltegravir to Dolutegravir. Follow up in 3 months.     LTBI (latent tuberculosis infection)  Medications have not come through [facility].  We will refax for those and hopefully will be able to start on this regimen soon.     Chest Pain  Her pain sounds most consistent with costochondritis.  I advised that she do a short trial of scheduled ibuprofen followed by as needed use.  She should contact us if the pain does not improve.    Claudia Cosme MD  Division of Infectious Diseases and International Medicine  [phone]         History of Infectious Disease Illness:   Ms. Gray is a 36 year old woman who I am following for HIV infection. She was diagnosed with HIV in pregnancy and delivered in September 2016.  Since that time, she has switched from Truvada and Raltegravir to Truvada and Dolutegravir for greater ease of dosing.  She has been tolerating this well. No significant issues with nausea or vomiting.   She denies fever or chills.  No rashes. At this time she does not have any plans for another child. She continues to take oral iron supplementation.     She did note today that she has some pain in her chest.  It is located primarily over the lateral aspect of the sternum.   She reports that it is worse when she is laying down at night.  It is not associated with shortness of breath and it does not come on with activity.         Past Medical and Surgical History:     Past Medical History   Diagnosis Date     Malaria      Miscarriage      TB (pulmonary tuberculosis)      latent       Past Surgical History   Procedure Laterality Date     No history of surgery             Family History:     Family History   Problem Relation Age of Onset     Family History Negative       Hypertension Father      Hypertension Other      CANCER No family hx of      DIABETES No family hx of      Macular Degeneration No family hx of      Retinal detachment No family hx of            Social History:     Social History   Substance Use Topics     Smoking status: Never Smoker     Smokeless tobacco: Never Used     Alcohol use No     Social History     Social History Narrative          Review of Systems:   CONSTITUTIONAL:  No fevers or chills  EYES: negative for icterus  RESPIRATORY:  negative for cough, sputum or dyspnea  CARDIOVASCULAR:  Positive for chest pain, negaitve for palpitations  GASTROINTESTINAL:   Negative for nausea, diarrhea or constipation  GENITOURINARY:  Negative for dysuria  INTEGUMENT:  negative for rash or pruritus  NEURO:  Negative for headache         Current Medications:     Current Outpatient Prescriptions   Medication Sig Dispense Refill     emtricitabine-tenofovir AF (DESCOVY) 200-25 MG per tablet Take 1 tablet by mouth daily 30 tablet 11     dolutegravir (TIVICAY) 50 MG tablet Take 1 tablet (50 mg) by mouth daily 30 tablet 11     ferrous sulfate (IRON) 325 (65 FE) MG tablet Take 1 tablet (325 mg) by mouth daily (with breakfast) 30 tablet 2          Immunization History:     Immunization History   Administered Date(s) Administered     TDAP (BOOSTRIX AGES 10-64) 08/15/2016            Allergies:   No Known Allergies         Physical Exam:   Vital signs:  /78  Pulse 71  Temp 98.3  F  "(36.8  C) (Oral)  Ht 1.549 m (5' 1\")  Wt 45.2 kg (99 lb 9.6 oz)  BMI 18.82 kg/m2    Physical Examination:  GENERAL:  well-developed, well-nourished.  HEENT:  Head is normocephalic, atraumatic   EYES:  Eyes have anicteric sclerae without conjunctival injection   LUNGS:  Clear to auscultation bilateral.   CARDIOVASCULAR:  Regular rate and rhythm with no murmurs, gallops or rubs.  ABDOMEN:  Normal bowel sounds, soft, nontender.   SKIN:  No acute rashes.    NEUROLOGIC:  Grossly nonfocal. Active x4 extremities         Laboratory Data:     Metabolic Studies       Recent Labs   Lab Test  01/05/17 1127 09/21/16 1904 08/25/16 1127 06/30/16   1203  03/31/16   1027  03/03/16   1226   NA  139   --   136  133  135  136   POTASSIUM  4.0   --   3.8  4.0  3.8  3.5   CHLORIDE  105   --   105  104  102  103   CO2  26   --   21  23  19*  26   ANIONGAP  8   --   10  6  13  8   BUN  10   --   6*  6*  3*  6*   CR  0.73  0.73  0.50*  0.52  0.53  0.49*   GFRESTIMATED  >90  Non  GFR Calc    >90  Non  GFR Calc    >90  Non  GFR Calc    >90  Non  GFR Calc    >90  Non  GFR Calc    >90  Non  GFR Calc     GLC  103*   --   83  137*  86  86   JANICE  9.0   --   9.5  9.2  10.0  9.0       Hepatic Studies    Recent Labs   Lab Test  01/05/17 1127 09/21/16 1904 08/25/16   1127  06/30/16   1203  03/31/16   1027  03/03/16   1226   BILITOTAL  0.2   --   0.2  0.2  0.2  0.2   ALKPHOS  82   --   92  64  56  54   ALBUMIN  3.7   --   3.0*  3.0*  3.1*  3.6   AST  31  22  15  21  26  20   ALT  32  13  16  20  27  32       Hematology Studies      Recent Labs   Lab Test  01/05/17 1127 09/21/16 1904 09/19/16   1747  08/25/16   1127  06/30/16   1203  05/12/16   1224   03/03/16   1226  08/14/15   2031   WBC  5.1  14.6*  5.6  5.0  5.6  6.5   < >  8.2  5.7   ANEU   --    --   3.0   --    --    --    --   4.7  3.4   ALYM   --    --   2.0   --    --    --    " --   2.7  1.8   CHUCKY   --    --   0.6   --    --    --    --   0.7  0.5   AEOS   --    --   0.1   --    --    --    --   0.0  0.0   HGB  11.4*  9.3*  10.7*  9.9*  9.2*  10.0*   < >  10.7*  10.2*   HCT  35.9  28.7*  32.1*  30.7*  27.9*  30.8*   < >  34.4*  30.1*   PLT  298  174  181  233  295  319   < >  308  251    < > = values in this interval not displayed.       Hepatitis B Testing     Recent Labs   Lab Test  03/03/16   1226   HBCAB  Nonreactive   HEPBANG  Nonreactive     Imaging:  No results found for this or any previous visit (from the past 744 hour(s)).

## 2017-03-16 NOTE — MR AVS SNAPSHOT
After Visit Summary   3/16/2017    Marina Mock    MRN: [id number]           Patient Information     Date Of Birth          1980        Visit Information        Provider Department      3/16/2017 10:00 AM Claudia Cosme MD [facility]        Today's Diagnoses     Human immunodeficiency virus (HIV) disease (H)    -  1      Care Instructions    For the chest pain, try ibuprofen 400mg orally.  Take 2 times a days for 3 days.  Then take as needed after that for pain.      You can also take acetaminophen as needed for pain as instructed on the bottle.         Follow-ups after your visit        Your next 10 appointments already scheduled     Mar 16, 2017 11:00 AM CDT   Lab with  LAB   [facility])    [address] [place] [address] [id number] [phone]            Dain 15, 2017 10:30 AM CDT   (Arrive by 10:15 AM)   Return Visit with Claudia Cosme MD   [facility] ([facility])    [address] [place] [id number] [phone]              Future tests that were ordered for you today     Open Future Orders        Priority Expected Expires Ordered    HIV-1 RNA quantitative Routine  3/16/2018 3/16/2017    CBC with platelets Routine  3/16/2018 3/16/2017    Ferritin Routine  3/16/2018 3/16/2017    Comprehensive metabolic panel Routine  3/16/2018 3/16/2017            Who to contact     If you have questions or need follow up information about today's clinic visit or your schedule please contact [facility] INFECTIOUS DISEASES directly at [phone].  Normal or non-critical lab and imaging results will be communicated to you by MyChart, letter or phone within 4 business days after the clinic has received the results. If you do not hear from us within 7 days, please  "contact the clinic through [employer] or phone. If you have a critical or abnormal lab result, we will notify you by phone as soon as possible.  Submit refill requests through [employer] or call your pharmacy and they will forward the refill request to us. Please allow 3 business days for your refill to be completed.          Additional Information About Your Visit        [employer] [employer] Information     [employer] gives you secure access to your electronic health record. If you see a primary care provider, you can also send messages to your care team and make appointments. If you have questions, please call your primary care clinic.  If you do not have a primary care provider, please call [phone] and they will assist you.        Care EveryWhere ID     This is your Care EveryWhere ID. This could be used by other organizations to access your [place] medical records  [id number]        Your Vitals Were     Pulse Temperature Height BMI (Body Mass Index)          71 98.3  F (36.8  C) (Oral) 1.549 m (5' 1\") 18.82 kg/m2         Blood Pressure from Last 3 Encounters:   03/16/17 119/78   01/05/17 131/83   09/23/16 145/89    Weight from Last 3 Encounters:   03/16/17 45.2 kg (99 lb 9.6 oz)   01/05/17 46.5 kg (102 lb 8 oz)   09/19/16 54.9 kg (121 lb)              We Performed the Following     PNEUMOCOCCAL CONJ VACCINE 13 VALENT IM        Primary Care Provider Office Phone # Fax #    [facility] [phone]       [address] [facility] [address]        Thank you!     Thank you for choosing [facility] AND INFECTIOUS DISEASES  for your care. Our goal is always to provide you with excellent care. Hearing back from our patients is one way we can continue to improve our services. Please take a few minutes to complete the written survey that you may receive in the mail after your visit with us. Thank you!             Your Updated Medication List - Protect others around you: Learn how to safely use, " store and throw away your medicines at www.disposemymeds.org.          This list is accurate as of: 3/16/17 10:56 AM.  Always use your most recent med list.                   Brand Name Dispense Instructions for use    dolutegravir 50 MG tablet    TIVICAY    30 tablet    Take 1 tablet (50 mg) by mouth daily       emtricitabine-tenofovir -25 MG per tablet    DESCOVY    30 tablet    Take 1 tablet by mouth daily       ferrous sulfate 325 (65 FE) MG tablet    IRON    30 tablet    Take 1 tablet (325 mg) by mouth daily (with breakfast)

## 2017-03-17 LAB
HIV1 RNA # PLAS NAA DL=20: ABNORMAL {COPIES}/ML
HIV1 RNA SERPL NAA+PROBE-LOG#: <1.3 {LOG_COPIES}/ML

## 2017-03-27 NOTE — ASSESSMENT & PLAN NOTE
Continue Truvada and Dolutegravir.  Will discuss at her next appointment her longer terms plans for pregnancy and will make a decision at that point as to whether we should continue Truvada or switch to Descovy. I will recheck her viral load today since we have changed her regimen from Raltegravir to Dolutegravir. Follow up in 3 months.

## 2017-03-27 NOTE — ASSESSMENT & PLAN NOTE
Medications have not come through MD.  We will refax for those and hopefully will be able to start on this regimen soon.

## 2017-03-30 ENCOUNTER — MYC MEDICAL ADVICE (OUTPATIENT)
Dept: INFECTIOUS DISEASES | Facility: CLINIC | Age: 37
End: 2017-03-30

## 2017-03-30 DIAGNOSIS — Z22.7 LTBI (LATENT TUBERCULOSIS INFECTION): Primary | ICD-10-CM

## 2017-03-30 RX ORDER — ISONIAZID 100 MG/1
250 TABLET ORAL DAILY
Qty: 75 TABLET | Refills: 8 | Status: SHIPPED | OUTPATIENT
Start: 2017-03-30 | End: 2017-06-29

## 2017-03-30 NOTE — NURSING NOTE
Per Sarah at [facility], pt needs another CXR within 3 months of starting LTBI meds. Last CXR on 9/19/16. Per MIGUELITO Roman to put order in for new CXR. Will notify pt.  Liana Ibanez RN

## 2017-03-30 NOTE — NURSING NOTE
Per Dr. Cosme, OK to write new order for isoniazid 250mg. New script faxed to Sarah at [facility] LTBI program.  Liana Ibanez RN

## 2017-03-31 ENCOUNTER — TELEPHONE (OUTPATIENT)
Dept: PHARMACY | Facility: CLINIC | Age: 37
End: 2017-03-31

## 2017-03-31 NOTE — TELEPHONE ENCOUNTER
TUSHAR for pt to call me. (She is coming in on 04/06/17, at 10 am, for an X-Ray. I would like to know if she has time either before or after her X-Ray to review her medications).    Harmony Connor, [facility] Pharmacist.   [phone]

## 2017-04-04 DIAGNOSIS — B20 HUMAN IMMUNODEFICIENCY VIRUS (HIV) DISEASE (H): Primary | ICD-10-CM

## 2017-04-07 ENCOUNTER — TELEPHONE (OUTPATIENT)
Dept: INFECTIOUS DISEASES | Facility: CLINIC | Age: 37
End: 2017-04-07

## 2017-04-07 NOTE — TELEPHONE ENCOUNTER
Pt did not show up for CXR appt yesterday. Called pt to try and set up new appt for x ray, but had to leave message. Reminded her that we need CXR completed in order to get her LTBI meds.  Liana Ibanez RN

## 2017-04-26 ENCOUNTER — TELEPHONE (OUTPATIENT)
Dept: INFECTIOUS DISEASES | Facility: CLINIC | Age: 37
End: 2017-04-26

## 2017-04-26 NOTE — TELEPHONE ENCOUNTER
Social Work:  D/I: Request from Dr Cosme to coordinate with  HIV team re who is following Pt for case mgmt.   Per Juanis Hoff ( team), Pt delivered 19 and child being followed thru [facility]. They have now closed their case with her and she is being followed by a MAP  Fide [phone]. Fide indicates that she was connecting more regularly with the Pt until she started employment 2 months ago. She is trying to help get her back on insurance but it's been difficult due to Pt not getting income verifications in on time. Fide indicated she has had several conversations with Arcenio SALAMANCA re her insurance issues.  She and her husb have stable housing but are looking for an apt in the [place] area to be closer to husb's dtr's Mother who is involved in parenting his child with her.   Pt has access to [employer] transportation to appts while she is uninsured.   P: Will forward above info to Dr Pink. No further SW f/u planned at this time. MAP 's contact info is on the snapshot page.

## 2017-05-15 ENCOUNTER — MYC MEDICAL ADVICE (OUTPATIENT)
Dept: INFECTIOUS DISEASES | Facility: CLINIC | Age: 37
End: 2017-05-15

## 2017-06-14 ENCOUNTER — TELEPHONE (OUTPATIENT)
Dept: INFECTIOUS DISEASES | Facility: CLINIC | Age: 37
End: 2017-06-14

## 2017-06-28 ENCOUNTER — TELEPHONE (OUTPATIENT)
Dept: INFECTIOUS DISEASES | Facility: CLINIC | Age: 37
End: 2017-06-28

## 2017-06-29 ENCOUNTER — OFFICE VISIT (OUTPATIENT)
Dept: PHARMACY | Facility: CLINIC | Age: 37
End: 2017-06-29
Payer: COMMERCIAL

## 2017-06-29 ENCOUNTER — OFFICE VISIT (OUTPATIENT)
Dept: INFECTIOUS DISEASES | Facility: CLINIC | Age: 37
End: 2017-06-29
Attending: INTERNAL MEDICINE
Payer: COMMERCIAL

## 2017-06-29 VITALS
HEIGHT: 61 IN | WEIGHT: 102.4 LBS | BODY MASS INDEX: 19.33 KG/M2 | HEART RATE: 87 BPM | SYSTOLIC BLOOD PRESSURE: 134 MMHG | DIASTOLIC BLOOD PRESSURE: 76 MMHG | TEMPERATURE: 98.2 F

## 2017-06-29 DIAGNOSIS — B20 HUMAN IMMUNODEFICIENCY VIRUS (HIV) DISEASE (H): Primary | ICD-10-CM

## 2017-06-29 DIAGNOSIS — R63.4 LOSS OF WEIGHT: ICD-10-CM

## 2017-06-29 DIAGNOSIS — B20 HIV DISEASE (H): Primary | ICD-10-CM

## 2017-06-29 DIAGNOSIS — Z22.7 LTBI (LATENT TUBERCULOSIS INFECTION): ICD-10-CM

## 2017-06-29 DIAGNOSIS — B20 HIV DISEASE (H): ICD-10-CM

## 2017-06-29 DIAGNOSIS — Z34.90 PREGNANCY: ICD-10-CM

## 2017-06-29 DIAGNOSIS — E63.9 NUTRITIONAL DEFICIENCY: ICD-10-CM

## 2017-06-29 PROCEDURE — 99605 MTMS BY PHARM NP 15 MIN: CPT | Performed by: PHARMACIST

## 2017-06-29 PROCEDURE — 99212 OFFICE O/P EST SF 10 MIN: CPT | Mod: ZF

## 2017-06-29 PROCEDURE — 99607 MTMS BY PHARM ADDL 15 MIN: CPT | Performed by: PHARMACIST

## 2017-06-29 PROCEDURE — 87536 HIV-1 QUANT&REVRSE TRNSCRPJ: CPT | Performed by: INTERNAL MEDICINE

## 2017-06-29 RX ORDER — MULTIPLE VITAMINS W/ MINERALS TAB 9MG-400MCG
1 TAB ORAL DAILY
COMMUNITY
End: 2017-10-12

## 2017-06-29 RX ORDER — PYRIDOXINE HCL (VITAMIN B6) 50 MG
50 TABLET ORAL DAILY
COMMUNITY
Start: 2017-06-29 | End: 2017-10-12 | Stop reason: ALTCHOICE

## 2017-06-29 RX ORDER — LACTOSE-REDUCED FOOD
LIQUID (ML) ORAL
Qty: 9480 ML | Refills: 6 | Status: SHIPPED | OUTPATIENT
Start: 2017-06-29 | End: 2018-04-05

## 2017-06-29 RX ORDER — ISONIAZID 100 MG/1
250 TABLET ORAL DAILY
COMMUNITY
Start: 2017-06-29 | End: 2017-10-12 | Stop reason: ALTCHOICE

## 2017-06-29 ASSESSMENT — PAIN SCALES - GENERAL: PAINLEVEL: NO PAIN (0)

## 2017-06-29 NOTE — LETTER
6/29/2017       RE: Marina Mock  [address]  [place] [address]     Dear Colleague,    Thank you for referring your patient, Marina Mock, to the [facility] INFECTIOUS DISEASES at [facility]. Please see a copy of my visit note below.    [facility] - Progress Note  Dr. Claudia Cosme, [facility], [facility], [address] [place] [address], Sixth Floor, [address]  [place] [address]  Patient:  Marina Contreras, Date of birth 1980, Medical record number [id number]  Date of Visit:  Jun 29, 2017         Assessment and Recommendations:     Human immunodeficiency virus (HIV) disease (H)  Continue Descovy and Dolutegravir. We talked at length today that if she is interested in future pregnancies, she should discuss this with me first so that we can transition her to a regimen that is approved in pregnancy.  She is currently using barrier protection. We also discussed medicine adherence strategies at some length today. She has been having issues getting into a routine. Harmony Connor reviewed strategies and dosing with her today and provided her with a pill box.      LTBI (latent tuberculosis infection)  Today her medications were given to her through the [facility] and she met with our PharmD, Harmony Connor to discuss dosing.  We reviewed baseline symptoms (none). She did have a negative CXR on 4/27/17.    I spent more than 25 minutes today in face to face time managing the care of Marina Mock.  Over 50% of my time on the unit was spent counseling the patient and/or coordinating care regarding services listed in this note.    Claudia Cosme MD  Division of Infectious Diseases and International Medicine  [phone]         History of Infectious Disease Illness:   Ms. Gray is a 37 year old woman who I am following for  HIV infection. She was diagnosed with HIV in pregnancy and delivered in September 2016.  She is currently on Descovy and Dolutegravir.  She has been tolerating this without issues, but she does reports that she has some issues finding a regular time to take it.  She had a gap of a few days because she did not get her refill in time as well.  She has been on it regularly though with just a few missed doses.  Of note, she also is due to start treatment for LTBI today.         Past Medical and Surgical History:     Past Medical History:   Diagnosis Date     Malaria      Miscarriage      TB (pulmonary tuberculosis)     latent       Past Surgical History:   Procedure Laterality Date     NO HISTORY OF SURGERY             Family History:     Family History   Problem Relation Age of Onset     Family History Negative Other      Hypertension Father      Hypertension Other      CANCER No family hx of      DIABETES No family hx of      Macular Degeneration No family hx of      Retinal detachment No family hx of            Social History:     Social History   Substance Use Topics     Smoking status: Never Smoker     Smokeless tobacco: Never Used     Alcohol use No     Social History     Social History Narrative          Review of Systems:   CONSTITUTIONAL:  No fevers or chills  EYES: negative for icterus  ENT: No hearing or vision issues.   RESPIRATORY:  negative for cough, sputum or dyspnea  CARDIOVASCULAR:  Positive for chest pain, negaitve for palpitations  GASTROINTESTINAL:   Negative for nausea, diarrhea or constipation  GENITOURINARY:  Negative for dysuria  INTEGUMENT:  negative for rash or pruritus  NEURO:  Negative for headache         Current Medications:     Current Outpatient Prescriptions   Medication Sig Dispense Refill     multivitamin, therapeutic with minerals (MULTI-VITAMIN) TABS tablet Take 1 tablet by mouth daily       emtricitabine-tenofovir AF (DESCOVY) 200-25 MG per tablet Take 1 tablet by mouth daily 30  "tablet 11     dolutegravir (TIVICAY) 50 MG tablet Take 1 tablet (50 mg) by mouth daily 30 tablet 11     ferrous sulfate (IRON) 325 (65 FE) MG tablet Take 1 tablet (325 mg) by mouth daily (with breakfast) 30 tablet 2          Immunization History:     Immunization History   Administered Date(s) Administered     Pneumococcal (PCV 13) 03/16/2017     TDAP Vaccine (Boostrix) 08/15/2016            Allergies:   No Known Allergies         Physical Exam:   Vital signs:  /76  Pulse 87  Temp 98.2  F (36.8  C) (Oral)  Ht 1.549 m (5' 1\")  Wt 46.4 kg (102 lb 6.4 oz)  BMI 19.35 kg/m2    Physical Examination:  GENERAL:  well-developed, well-nourished.  HEENT:  Head is normocephalic, atraumatic   EYES:  Eyes have anicteric sclerae without conjunctival injection   LUNGS:  Clear to auscultation bilateral.   CARDIOVASCULAR:  Regular rate and rhythm with no murmurs, gallops or rubs.  ABDOMEN:  Normal bowel sounds, soft, nontender.   SKIN:  No acute rashes.    NEUROLOGIC:  Grossly nonfocal. Active x4 extremities         Laboratory Data:     Metabolic Studies       Recent Labs   Lab Test  03/16/17   1137  01/05/17   1127  09/21/16   1904  08/25/16   1127  06/30/16   1203  03/31/16   1027  03/03/16   1226   NA  141  139   --   136  133  135  136   POTASSIUM  3.9  4.0   --   3.8  4.0  3.8  3.5   CHLORIDE  106  105   --   105  104  102  103   CO2  27  26   --   21  23  19*  26   ANIONGAP  8  8   --   10  6  13  8   BUN  7  10   --   6*  6*  3*  6*   CR  0.73  0.73  0.73  0.50*  0.52  0.53  0.49*   GFRESTIMATED  >90  Non  GFR Calc    >90  Non  GFR Calc    >90  Non  GFR Calc    >90  Non  GFR Calc    >90  Non  GFR Calc    >90  Non  GFR Calc    >90  Non  GFR Calc     GLC  82  103*   --   83  137*  86  86   JANICE  8.8  9.0   --   9.5  9.2  10.0  9.0       Hepatic Studies    Recent Labs   Lab Test  03/16/17   1137  01/05/17   " 1127  09/21/16   1904  08/25/16   1127  06/30/16   1203  03/31/16   1027  03/03/16   1226   BILITOTAL  0.2  0.2   --   0.2  0.2  0.2  0.2   ALKPHOS  72  82   --   92  64  56  54   ALBUMIN  3.8  3.7   --   3.0*  3.0*  3.1*  3.6   AST  22  31  22  15  21  26  20   ALT  24  32  13  16  20  27  32       Hematology Studies      Recent Labs   Lab Test  03/16/17   1137  01/05/17   1127  09/21/16   1904  09/19/16   1747  08/25/16   1127  06/30/16   1203   03/03/16   1226  08/14/15   2031   WBC  4.3  5.1  14.6*  5.6  5.0  5.6   < >  8.2  5.7   ANEU   --    --    --   3.0   --    --    --   4.7  3.4   ALYM   --    --    --   2.0   --    --    --   2.7  1.8   CHUCKY   --    --    --   0.6   --    --    --   0.7  0.5   AEOS   --    --    --   0.1   --    --    --   0.0  0.0   HGB  10.3*  11.4*  9.3*  10.7*  9.9*  9.2*   < >  10.7*  10.2*   HCT  32.2*  35.9  28.7*  32.1*  30.7*  27.9*   < >  34.4*  30.1*   PLT  247  298  174  181  233  295   < >  308  251    < > = values in this interval not displayed.       Hepatitis B Testing     Recent Labs   Lab Test  03/03/16   1226   HBCAB  Nonreactive   HEPBANG  Nonreactive     Imaging:  No results found for this or any previous visit (from the past 744 hour(s)).        Again, thank you for allowing me to participate in the care of your patient.      Sincerely,    Claudia Cosme MD

## 2017-06-29 NOTE — PROGRESS NOTES
SUBJECTIVE/OBJECTIVE:                           Marina Mock is a 37 year old female coming in for an initial visit for Medication Therapy Management.  She was referred to me from Dr. Cosme.     Chief Complaint: Wants to know if she can take all medication together.  Personal Healthcare Goals: wants to eat more.     Allergies/ADRs: None  Tobacco: No tobacco use   Alcohol: one in a while will have wine.  Caffeine: no caffeine  Activity: will walk baby.  PMH: Reviewed in Epic    Medication Adherence: Some issues reported.     HIV: On 01/05/17 CD4 was 648 (24%), and on 03/16/17 viral load was <20 copies/ml. Patient reports takes, Descovy (emtricitabine-tenofovir alafenamide 200-25 mg), once daily and Tivicay (dolutegravir 50 mg) once daily. Patient reports tolerates, these well. Reports she sometimes takes in the morning and sometimes at night. Says she usually doesn't miss too many doses but does sometimes.    Supplements: On 03/16/17, hemoglobin was 10.3 g/dl. Pt reports takes Ferrous Sulfate 325 mg, once daily, and takes a Multivitamin with minerals, once per day. Also reports she drinks one or two Ensure per per day, all without difficulty.    Latent TB: Reports will start therapy with Isoniazid 100 mg, 2&1/2 tables once daily, and Vitamin B6 50 mg, once daily.       Current labs include:  BP Readings from Last 3 Encounters:   06/29/17 134/76   03/16/17 119/78   01/05/17 131/83       Liver Function Studies -   Recent Labs   Lab Test  03/16/17   1137   PROTTOTAL  8.7   ALBUMIN  3.8   BILITOTAL  0.2   ALKPHOS  72   AST  22   ALT  24       No results found for: UCRR, MICROL, UMALCR    Last Basic Metabolic Panel:  Lab Results   Component Value Date     03/16/2017      Lab Results   Component Value Date    POTASSIUM 3.9 03/16/2017     Lab Results   Component Value Date    CHLORIDE 106 03/16/2017     Lab Results   Component Value Date    BUN 7 03/16/2017     Lab Results   Component Value Date    CR 0.73  03/16/2017     GFR Estimate   Date Value Ref Range Status   03/16/2017 >90  Non  GFR Calc   >60 mL/min/1.7m2 Final   01/05/2017 >90  Non  GFR Calc   >60 mL/min/1.7m2 Final   09/21/2016 >90  Non  GFR Calc   >60 mL/min/1.7m2 Final     GFR Estimate If Black   Date Value Ref Range Status   03/16/2017 >90   GFR Calc   >60 mL/min/1.7m2 Final   01/05/2017 >90   GFR Calc   >60 mL/min/1.7m2 Final   09/21/2016 >90   GFR Calc   >60 mL/min/1.7m2 Final     No results found for: TSH]    Most Recent Immunizations   Administered Date(s) Administered     Pneumococcal (PCV 13) 03/16/2017     TDAP Vaccine (Boostrix) 08/15/2016       ASSESSMENT:                             Current medications were reviewed today.     Medication Adherence: needs improvement - see below. Pt received a twice daily med box today. Reviewed that the med box is not child resistant, and she is to keep it away from her daughter.     HIV: CD4 appears to be stable, and viral load is, undetectable. Discussed the importance of taking medications about the same time everyday, and the importance of strict medication adherence. Discussed eventhough her labs look stable, missing doses and having late doses may impact these labs. Also, there is are drug interactions between Dolutegravir and Ferrous Sulfate and her Multivitamin.  Pt is instructed to separate Ensure and Dolutegravir by a few hours. Her last Ensure will be at 5 pm and she will take her HIV meds at bedtime.  Per [employer]:  Dolutegravir and prescription or over-the-counter medications containing polyvalent cations (eg, antacids, laxatives, multivitamins or supplements containing oral calcium or oral iron, buffered medications) should not be coadministered. Administer dolutegravir 2 hours before or 6 hours after administration of these medications. However, dolutegravir may be coadministered with supplements  containing calcium or iron if taken together with food (Prod Info TIVICAY  oral tablets, 2014). Pt will start taking HIV meds at bedtime and will take other medication in the morning. Pt was given a twice daily medbox. Pt agreed to this plan.    Supplements: Hemoglobin low. Continue medication and continue to monitor.     Latent TB: Pt will start on treatment tomorrow morning. Reviewed dosing, possible side effects.       PLAN:                            Patient asked to separate medications as follows:    1. Take Multivitamin, Ferrous Sulfate, Vitamin B-6 and Isoniazid, in the morning. Take on an empty stomach at least 1 h before or 2 h after a meal.  If you get an upset stomach, please take your Multivitamin and Ferrous Sulfate in the afternoon, after some food.    2. Descovy and Tivicay at bedtime.    3. Last Ensure at 5 pm.       I spent 30 minutes with this patient today.  I offer these suggestions with the understanding that I don't fully understand Marina's past medical history and the complexity of her health conditions. Marina should make no changes without the approval of her physician. A copy of the visit note was provided to the patient's primary care provider.    Will follow up in 3 weeks with a phone call.    The patient was given a summary of these recommendations as an after visit summary.     Harmony Connor, [facility] Pharmacist.   [phone]

## 2017-06-29 NOTE — PROGRESS NOTES
[facility] - Progress Note  Dr. Claudia Cosme, [facility], [facility], [address] [place] [address], Sixth Floor, Clinic [address]  [place] [address]  Patient:  Marina Contreras, Date of birth 1980, Medical record number [id number]  Date of Visit:  Jun 29, 2017         Assessment and Recommendations:     Human immunodeficiency virus (HIV) disease (H)  Continue Descovy and Dolutegravir. We talked at length today that if she is interested in future pregnancies, she should discuss this with me first so that we can transition her to a regimen that is approved in pregnancy.  She is currently using barrier protection. We also discussed medicine adherence strategies at some length today. She has been having issues getting into a routine. Harmony Connor reviewed strategies and dosing with her today and provided her with a pill box.      LTBI (latent tuberculosis infection)  Today her medications were given to her through the [facility] and she met with our PharmD, Harmony Connor to discuss dosing.  We reviewed baseline symptoms (none). She did have a negative CXR on 4/27/17.    I spent more than 25 minutes today in face to face time managing the care of Marina Mock.  Over 50% of my time on the unit was spent counseling the patient and/or coordinating care regarding services listed in this note.    Claudia Cosme MD  Division of Infectious Diseases and International Medicine  [phone]         History of Infectious Disease Illness:   Ms. Gray is a 37 year old woman who I am following for HIV infection. She was diagnosed with HIV in pregnancy and delivered in September 2016.  She is currently on Descovy and Dolutegravir.  She has been tolerating this without issues, but she does reports that she has some issues finding a regular time to take it.  She had a gap of a few days because she did not get her refill in  time as well.  She has been on it regularly though with just a few missed doses.  Of note, she also is due to start treatment for LTBI today.         Past Medical and Surgical History:     Past Medical History:   Diagnosis Date     Malaria      Miscarriage      TB (pulmonary tuberculosis)     latent       Past Surgical History:   Procedure Laterality Date     NO HISTORY OF SURGERY             Family History:     Family History   Problem Relation Age of Onset     Family History Negative Other      Hypertension Father      Hypertension Other      CANCER No family hx of      DIABETES No family hx of      Macular Degeneration No family hx of      Retinal detachment No family hx of            Social History:     Social History   Substance Use Topics     Smoking status: Never Smoker     Smokeless tobacco: Never Used     Alcohol use No     Social History     Social History Narrative          Review of Systems:   CONSTITUTIONAL:  No fevers or chills  EYES: negative for icterus  ENT: No hearing or vision issues.   RESPIRATORY:  negative for cough, sputum or dyspnea  CARDIOVASCULAR:  Positive for chest pain, negaitve for palpitations  GASTROINTESTINAL:   Negative for nausea, diarrhea or constipation  GENITOURINARY:  Negative for dysuria  INTEGUMENT:  negative for rash or pruritus  NEURO:  Negative for headache         Current Medications:     Current Outpatient Prescriptions   Medication Sig Dispense Refill     multivitamin, therapeutic with minerals (MULTI-VITAMIN) TABS tablet Take 1 tablet by mouth daily       emtricitabine-tenofovir AF (DESCOVY) 200-25 MG per tablet Take 1 tablet by mouth daily 30 tablet 11     dolutegravir (TIVICAY) 50 MG tablet Take 1 tablet (50 mg) by mouth daily 30 tablet 11     ferrous sulfate (IRON) 325 (65 FE) MG tablet Take 1 tablet (325 mg) by mouth daily (with breakfast) 30 tablet 2          Immunization History:     Immunization History   Administered Date(s) Administered     Pneumococcal (PCV  "13) 03/16/2017     TDAP Vaccine (Boostrix) 08/15/2016            Allergies:   No Known Allergies         Physical Exam:   Vital signs:  /76  Pulse 87  Temp 98.2  F (36.8  C) (Oral)  Ht 1.549 m (5' 1\")  Wt 46.4 kg (102 lb 6.4 oz)  BMI 19.35 kg/m2    Physical Examination:  GENERAL:  well-developed, well-nourished.  HEENT:  Head is normocephalic, atraumatic   EYES:  Eyes have anicteric sclerae without conjunctival injection   LUNGS:  Clear to auscultation bilateral.   CARDIOVASCULAR:  Regular rate and rhythm with no murmurs, gallops or rubs.  ABDOMEN:  Normal bowel sounds, soft, nontender.   SKIN:  No acute rashes.    NEUROLOGIC:  Grossly nonfocal. Active x4 extremities         Laboratory Data:     Metabolic Studies       Recent Labs   Lab Test  03/16/17   1137  01/05/17   1127  09/21/16 1904 08/25/16   1127  06/30/16   1203  03/31/16   1027  03/03/16   1226   NA  141  139   --   136  133  135  136   POTASSIUM  3.9  4.0   --   3.8  4.0  3.8  3.5   CHLORIDE  106  105   --   105  104  102  103   CO2  27  26   --   21  23  19*  26   ANIONGAP  8  8   --   10  6  13  8   BUN  7  10   --   6*  6*  3*  6*   CR  0.73  0.73  0.73  0.50*  0.52  0.53  0.49*   GFRESTIMATED  >90  Non  GFR Calc    >90  Non  GFR Calc    >90  Non  GFR Calc    >90  Non  GFR Calc    >90  Non  GFR Calc    >90  Non  GFR Calc    >90  Non  GFR Calc     GLC  82  103*   --   83  137*  86  86   JANICE  8.8  9.0   --   9.5  9.2  10.0  9.0       Hepatic Studies    Recent Labs   Lab Test  03/16/17   1137  01/05/17   1127  09/21/16   1904  08/25/16   1127  06/30/16   1203  03/31/16   1027  03/03/16   1226   BILITOTAL  0.2  0.2   --   0.2  0.2  0.2  0.2   ALKPHOS  72  82   --   92  64  56  54   ALBUMIN  3.8  3.7   --   3.0*  3.0*  3.1*  3.6   AST  22  31  22  15  21  26  20   ALT  24  32  13  16  20  27  32       Hematology Studies      Recent " Labs   Lab Test  03/16/17   1137  01/05/17   1127  09/21/16   1904  09/19/16   1747  08/25/16   1127  06/30/16   1203   03/03/16   1226  08/14/15   2031   WBC  4.3  5.1  14.6*  5.6  5.0  5.6   < >  8.2  5.7   ANEU   --    --    --   3.0   --    --    --   4.7  3.4   ALYM   --    --    --   2.0   --    --    --   2.7  1.8   CHUCKY   --    --    --   0.6   --    --    --   0.7  0.5   AEOS   --    --    --   0.1   --    --    --   0.0  0.0   HGB  10.3*  11.4*  9.3*  10.7*  9.9*  9.2*   < >  10.7*  10.2*   HCT  32.2*  35.9  28.7*  32.1*  30.7*  27.9*   < >  34.4*  30.1*   PLT  247  298  174  181  233  295   < >  308  251    < > = values in this interval not displayed.       Hepatitis B Testing     Recent Labs   Lab Test  03/03/16   1226   HBCAB  Nonreactive   HEPBANG  Nonreactive     Imaging:  No results found for this or any previous visit (from the past 744 hour(s)).

## 2017-06-29 NOTE — NURSING NOTE
"Chief Complaint   Patient presents with     RECHECK     Follow up B20       Initial /76  Pulse 87  Temp 98.2  F (36.8  C) (Oral)  Ht 1.549 m (5' 1\")  Wt 46.4 kg (102 lb 6.4 oz)  BMI 19.35 kg/m2 Estimated body mass index is 19.35 kg/(m^2) as calculated from the following:    Height as of this encounter: 1.549 m (5' 1\").    Weight as of this encounter: 46.4 kg (102 lb 6.4 oz).  Medication Reconciliation: complete  "

## 2017-06-29 NOTE — MR AVS SNAPSHOT
After Visit Summary   6/29/2017    Marina Mock    MRN: [id number]           Patient Information     Date Of Birth          1980        Visit Information        Provider Department      6/29/2017 10:00 AM Harmony Connor, [facility] and Infectious Diseases MTM        Care Instructions    Recommendations from today's MTM visit:                                                    MTM (medication therapy management) is a service provided by a clinical pharmacist designed to help you get the most of out of your medicines.   Today we reviewed what your medicines are for, how to know if they are working, that your medicines are safe and how to make your medicine regimen as easy as possible.     1. Take Multivitamin, Ferrous Sulfate, Vitamin B-6 and Isoniazid, in the morning. Take on an empty stomach at least 1 h before or 2 h after a meal.  If you get an upset stomach, please take your Multivitamin and Ferrous Sulfate in the afternoon, after some food.    2. Descovy and Tivicay at bedtime.    3. The med box is not child resistant, so please keep it where your daughter will not get it.      Next MTM visit: I will call you in two weeks.    To schedule another MTM appointment, please call the clinic directly or you may call the MTM scheduling line at [phone] or toll-free at [phone].     My Clinical Pharmacist's contact information:                                                      It was a pleasure seeing you today!  Please feel free to contact me with any questions or concerns you have.      Harmony Connor, MTM Pharmacist.   [phone]      You may receive a survey about the MTM services you received.  I would appreciate your feedback to help me serve you better in the future. Please fill it out and return it when you can. Your comments will be anonymous.      My healthcare goals:                                                      Eat more healthy foods.                  Follow-ups after your visit        Who to contact     If you have questions or need follow up information about today's clinic visit or your schedule please contact [facility] AND INFECTIOUS DISEASES [facility] directly at No information on file..  Normal or non-critical lab and imaging results will be communicated to you by MyChart, letter or phone within 4 business days after the clinic has received the results. If you do not hear from us within 7 days, please contact the clinic through MyChart or phone. If you have a critical or abnormal lab result, we will notify you by phone as soon as possible.  Submit refill requests through Myrio or call your pharmacy and they will forward the refill request to us. Please allow 3 business days for your refill to be completed.          Additional Information About Your Visit        [employer] Information     Myrio gives you secure access to your electronic health record. If you see a primary care provider, you can also send messages to your care team and make appointments. If you have questions, please call your primary care clinic.  If you do not have a primary care provider, please call [phone] and they will assist you.        Care EveryWhere ID     This is your Care EveryWhere ID. This could be used by other organizations to access your [place] medical records  [id number]         Blood Pressure from Last 3 Encounters:   06/29/17 134/76   03/16/17 119/78   01/05/17 131/83    Weight from Last 3 Encounters:   06/29/17 102 lb 6.4 oz (46.4 kg)   03/16/17 99 lb 9.6 oz (45.2 kg)   01/05/17 102 lb 8 oz (46.5 kg)              Today, you had the following     No orders found for display       Primary Care Provider Office Phone # Fax #    Clinic [facility] [phone]       [address] [facility] [address]        Equal Access to Services     TED YATES AH: aubrey Anne qaybta kaalmada adeegyada,  mele romanflaca chaudhary'aan ah. So [facility] [phone].    ATENCIÓN: Si habla martin, tiene a bird disposición servicios gratuitos de asistencia lingüística. Pam al [phone].    We comply with applicable federal civil rights laws and [place] laws. We do not discriminate on the basis of race, color, national origin, age, disability sex, sexual orientation or gender identity.            Thank you!     Thank you for choosing [facility] AND INFECTIOUS DISEASES [facility]  for your care. Our goal is always to provide you with excellent care. Hearing back from our patients is one way we can continue to improve our services. Please take a few minutes to complete the written survey that you may receive in the mail after your visit with us. Thank you!             Your Updated Medication List - Protect others around you: Learn how to safely use, store and throw away your medicines at www.disposemymeds.org.          This list is accurate as of: 17 10:57 AM.  Always use your most recent med list.                   Brand Name Dispense Instructions for use Diagnosis    dolutegravir 50 MG tablet    TIVICAY    30 tablet    Take 1 tablet (50 mg) by mouth daily    Human immunodeficiency virus (HIV) disease (H)       emtricitabine-tenofovir -25 MG per tablet    DESCOVY    30 tablet    Take 1 tablet by mouth daily    Human immunodeficiency virus (HIV) disease (H)       ferrous sulfate 325 (65 FE) MG tablet    IRON    30 tablet    Take 1 tablet (325 mg) by mouth daily (with breakfast)     (spontaneous vaginal delivery)       Multi-vitamin Tabs tablet      Take 1 tablet by mouth daily

## 2017-06-29 NOTE — PATIENT INSTRUCTIONS
Recommendations from today's MTM visit:                                                    MTM (medication therapy management) is a service provided by a clinical pharmacist designed to help you get the most of out of your medicines.   Today we reviewed what your medicines are for, how to know if they are working, that your medicines are safe and how to make your medicine regimen as easy as possible.     1. Take Multivitamin, Ferrous Sulfate, Vitamin B-6 and Isoniazid, in the morning. Take on an empty stomach at least 1 h before or 2 h after a meal.  If you get an upset stomach, please take your Multivitamin and Ferrous Sulfate in the afternoon, after some food.    2. Descovy and Tivicay at bedtime.    3. The med box is not child resistant, so please keep it where your daughter will not get it.    4. Last Ensure at 5 pm.       Next MTM visit: I will call you in two weeks.    To schedule another MTM appointment, please call the clinic directly or you may call the MTM scheduling line at [phone] or toll-free at [phone].     My Clinical Pharmacist's contact information:                                                      It was a pleasure seeing you today!  Please feel free to contact me with any questions or concerns you have.      Harmony Connor, MTM Pharmacist.   [phone]      You may receive a survey about the MTM services you received.  I would appreciate your feedback to help me serve you better in the future. Please fill it out and return it when you can. Your comments will be anonymous.      My healthcare goals:                                                      Eat more healthy foods.

## 2017-06-29 NOTE — MR AVS SNAPSHOT
After Visit Summary   6/29/2017    Marina Mock    MRN: [id number]           Patient Information     Date Of Birth          1980        Visit Information        Provider Department      6/29/2017 9:30 AM Claudia Cosme MD [facility] and Infectious Diseases         Follow-ups after your visit        Your next 10 appointments already scheduled     Jun 29, 2017 11:15 AM CDT   Lab with  LAB   [facility] ([facility])    [address]  1st Floor  [place] [address] [phone]            Aug 03, 2017  8:30 AM CDT   (Arrive by 8:15 AM)   Return Visit with Claudia Cosme MD   [facility] and Infectious Diseases ([facility])    [address]  3rd Floor  [place] [address] [phone]              Who to contact     If you have questions or need follow up information about today's clinic visit or your schedule please contact [facility] AND INFECTIOUS DISEASES directly at [phone].  Normal or non-critical lab and imaging results will be communicated to you by Now In Storehart, letter or phone within 4 business days after the clinic has received the results. If you do not hear from us within 7 days, please contact the clinic through Now In Store[employer] or phone. If you have a critical or abnormal lab result, we will notify you by phone as soon as possible.  Submit refill requests through PayNearMe or call your pharmacy and they will forward the refill request to us. Please allow 3 business days for your refill to be completed.          Additional Information About Your Visit        Now In Storehart Information     PayNearMe gives you secure access to your electronic health record. If you see a primary care provider, you can also send messages to your care team and make appointments. If you have questions, please call your primary care clinic.  If you do not have a primary  "care provider, please call [phone] and they will assist you.        Care EveryWhere ID     This is your Care EveryWhere ID. This could be used by other organizations to access your [place] medical records  [id number]        Your Vitals Were     Pulse Temperature Height BMI (Body Mass Index)          87 98.2  F (36.8  C) (Oral) 1.549 m (5' 1\") 19.35 kg/m2         Blood Pressure from Last 3 Encounters:   06/29/17 134/76   03/16/17 119/78   01/05/17 131/83    Weight from Last 3 Encounters:   06/29/17 46.4 kg (102 lb 6.4 oz)   03/16/17 45.2 kg (99 lb 9.6 oz)   01/05/17 46.5 kg (102 lb 8 oz)              Today, you had the following     No orders found for display       Primary Care Provider Office Phone # Fax #    Clinic [facility] [phone]       [address] [address]        Equal Access to Services     TED YATES AH: Hadii aad ku hadasho Soomaali, waaxda luqadaha, qaybta kaalmada adeegyada, waxay idiin hayfabin treasure patricio . So [facility] [phone].    ATENCIÓN: Si habla español, tiene a bird disposición servicios gratuitos de asistencia lingüística. [facility] [phone].    We comply with applicable federal civil rights laws and [place] laws. We do not discriminate on the basis of race, color, national origin, age, disability sex, sexual orientation or gender identity.            Thank you!     Thank you for choosing [facility] AND INFECTIOUS DISEASES  for your care. Our goal is always to provide you with excellent care. Hearing back from our patients is one way we can continue to improve our services. Please take a few minutes to complete the written survey that you may receive in the mail after your visit with us. Thank you!             Your Updated Medication List - Protect others around you: Learn how to safely use, store and throw away your medicines at www.disposemymeds.org.          This list is accurate as of: 6/29/17 11:10 AM.  Always use your " most recent med list.                   Brand Name Dispense Instructions for use Diagnosis    dolutegravir 50 MG tablet    TIVICAY    30 tablet    Take 1 tablet (50 mg) by mouth daily    Human immunodeficiency virus (HIV) disease (H)       emtricitabine-tenofovir -25 MG per tablet    DESCOVY    30 tablet    Take 1 tablet by mouth daily    Human immunodeficiency virus (HIV) disease (H)       ferrous sulfate 325 (65 FE) MG tablet    IRON    30 tablet    Take 1 tablet (325 mg) by mouth daily (with breakfast)     (spontaneous vaginal delivery)       Multi-vitamin Tabs tablet      Take 1 tablet by mouth daily

## 2017-07-26 ENCOUNTER — MYC MEDICAL ADVICE (OUTPATIENT)
Dept: INFECTIOUS DISEASES | Facility: CLINIC | Age: 37
End: 2017-07-26

## 2017-08-04 NOTE — TELEPHONE ENCOUNTER
LVM for pt and let her know that TB meds are at the clinic, ready to be picked up.  Liana Ibanez RN

## 2017-08-07 ENCOUNTER — TELEPHONE (OUTPATIENT)
Dept: PHARMACY | Facility: CLINIC | Age: 37
End: 2017-08-07

## 2017-08-07 NOTE — TELEPHONE ENCOUNTER
Clinical Consult:                                                    Marina Mock is a 37 year old female called for a clinical pharmacist consult.     Reason for Consult: HIV/latent TB medication adherence.    Discussion: Marina reports she has been unable to  her medication for TB and HIV. Reports she has been out of medication for about 5 days or so. Says she will be in tomorrow about 4 pm to  all of her medication.    Plan:  1. Reviewed why it is important not to have any lapses in medication therapy.  2. Pt to  medication tomorrow.    I will call pt in one week to check on medication adherence.    Harmony Connor, [facility] Pharmacist.   [phone]

## 2017-08-09 ENCOUNTER — TRANSFERRED RECORDS (OUTPATIENT)
Dept: HEALTH INFORMATION MANAGEMENT | Facility: CLINIC | Age: 37
End: 2017-08-09

## 2017-08-15 ENCOUNTER — TELEPHONE (OUTPATIENT)
Dept: PHARMACY | Facility: CLINIC | Age: 37
End: 2017-08-15

## 2017-08-15 NOTE — TELEPHONE ENCOUNTER
Clinical Consult:                                                    Marina Mock is a 37 year old female called for a clinical pharmacist consult.     Reason for Consult: follow up on medication adherence.    Discussion: Marina reports she received her medications in the mail (Descovy, Tivicay, Pyridoxine and Isoniazid). Reports she is taking them as directed with no known side effect, and reports no missed doses. She reports she knows the pharmacy will call her every month for refills, and if they leave her a message, she knows she has to call them back before they will mail out her mediations.     Plan:  1. Encouraged continued medication adherence.   2. Discussed the importance of continuing with an undetectable viral load.    Pt is asked to call with any questions/concerns.    Harmony Connor, [facility] Pharmacist.   [phone]

## 2017-09-07 ENCOUNTER — TELEPHONE (OUTPATIENT)
Dept: PHARMACY | Facility: CLINIC | Age: 37
End: 2017-09-07

## 2017-09-07 NOTE — TELEPHONE ENCOUNTER
Pt called and left me a voice mail stating she is unable to tolerate Isoniazid. She said it is causing her to feel very nauseated and sick. Says she did not take it yesterday because she had to go to work.    I spoke to Dr. Cosme, who would like to see her. Of note, there is a drug interaction between Rifampin and tenofovir. Combo may decrease tenofovir levels. Dr. Cosme would like pt to come in so we can possibly change her to Rifampin, and also change her HIV meds to avoid any drug interactions. (of note, pt may be actively trying to become pregnant, so we can not use Truimeq).    Called pt and left her a message to call me to review above.    Harmony Connor, [facility] Pharmacist.   [phone]

## 2017-09-08 ENCOUNTER — TELEPHONE (OUTPATIENT)
Dept: PHARMACY | Facility: CLINIC | Age: 37
End: 2017-09-08

## 2017-09-08 NOTE — TELEPHONE ENCOUNTER
Left another message on cell and home phone for pt to call me. (want to discuss TB/HIV meds and coming in to do so).    Harmony Connor, [facility] Pharmacist.   [phone]

## 2017-10-02 ENCOUNTER — TELEPHONE (OUTPATIENT)
Dept: PHARMACY | Facility: CLINIC | Age: 37
End: 2017-10-02

## 2017-10-02 NOTE — TELEPHONE ENCOUNTER
Pt called back and will come in to see Dr. Cosme at 9:30 on Thursday, Oct, 5th.    Harmony Connor, [facility] Pharmacist.   [phone]

## 2017-10-02 NOTE — TELEPHONE ENCOUNTER
LM for pt to call me. She needs to come in to have latent TB meds changed. Also, may need B20 meds changed.    Harmony Connor, [facility] Pharmacist.   [phone]

## 2017-10-10 ENCOUNTER — TELEPHONE (OUTPATIENT)
Dept: PHARMACY | Facility: CLINIC | Age: 37
End: 2017-10-10

## 2017-10-10 NOTE — TELEPHONE ENCOUNTER
"Pt called to report she was unable to come in last Thursday to see Dr. Cosme because she could not get a ride. Pt requesting to see MD this Thursday.     Pt put on md schedule this Thursday at 10:30. I explained to her she is an \"add on\" and she may have to wait when she comes in. She is in agreement.    Harmony Connor, [facility] Pharmacist.   [phone]    "

## 2017-10-12 ENCOUNTER — OFFICE VISIT (OUTPATIENT)
Dept: PHARMACY | Facility: CLINIC | Age: 37
End: 2017-10-12
Payer: COMMERCIAL

## 2017-10-12 ENCOUNTER — OFFICE VISIT (OUTPATIENT)
Dept: INFECTIOUS DISEASES | Facility: CLINIC | Age: 37
End: 2017-10-12
Attending: INTERNAL MEDICINE
Payer: COMMERCIAL

## 2017-10-12 ENCOUNTER — RESULTS ONLY (OUTPATIENT)
Dept: OTHER | Facility: CLINIC | Age: 37
End: 2017-10-12

## 2017-10-12 VITALS
WEIGHT: 110.2 LBS | TEMPERATURE: 98.7 F | DIASTOLIC BLOOD PRESSURE: 84 MMHG | BODY MASS INDEX: 20.81 KG/M2 | HEART RATE: 114 BPM | HEIGHT: 61 IN | SYSTOLIC BLOOD PRESSURE: 128 MMHG

## 2017-10-12 DIAGNOSIS — Z00.00 PREVENTATIVE HEALTH CARE: Primary | ICD-10-CM

## 2017-10-12 DIAGNOSIS — B20 HUMAN IMMUNODEFICIENCY VIRUS (HIV) DISEASE (H): ICD-10-CM

## 2017-10-12 DIAGNOSIS — B20 HIV DISEASE (H): ICD-10-CM

## 2017-10-12 DIAGNOSIS — E63.9 NUTRITIONAL DEFICIENCY: ICD-10-CM

## 2017-10-12 DIAGNOSIS — Z22.7 LTBI (LATENT TUBERCULOSIS INFECTION): Primary | ICD-10-CM

## 2017-10-12 DIAGNOSIS — Z22.7 LTBI (LATENT TUBERCULOSIS INFECTION): ICD-10-CM

## 2017-10-12 PROCEDURE — 99606 MTMS BY PHARM EST 15 MIN: CPT | Performed by: PHARMACIST

## 2017-10-12 PROCEDURE — 90686 IIV4 VACC NO PRSV 0.5 ML IM: CPT | Mod: ZF | Performed by: INTERNAL MEDICINE

## 2017-10-12 PROCEDURE — 81383 HLA II TYPING 1 ALLELE HR: CPT | Performed by: INTERNAL MEDICINE

## 2017-10-12 PROCEDURE — 99607 MTMS BY PHARM ADDL 15 MIN: CPT | Performed by: PHARMACIST

## 2017-10-12 PROCEDURE — 99213 OFFICE O/P EST LOW 20 MIN: CPT | Mod: 25,ZF

## 2017-10-12 PROCEDURE — 25000128 H RX IP 250 OP 636: Mod: ZF | Performed by: INTERNAL MEDICINE

## 2017-10-12 PROCEDURE — 36415 COLL VENOUS BLD VENIPUNCTURE: CPT | Performed by: INTERNAL MEDICINE

## 2017-10-12 PROCEDURE — G0008 ADMIN INFLUENZA VIRUS VAC: HCPCS | Mod: ZF

## 2017-10-12 RX ADMIN — INFLUENZA A VIRUS A/MICHIGAN/45/2015 X-275 (H1N1) ANTIGEN (FORMALDEHYDE INACTIVATED), INFLUENZA A VIRUS A/HONG KONG/4801/2014 X-263B (H3N2) ANTIGEN (FORMALDEHYDE INACTIVATED), INFLUENZA B VIRUS B/PHUKET/3073/2013 ANTIGEN (FORMALDEHYDE INACTIVATED), AND INFLUENZA B VIRUS B/BRISBANE/60/2008 ANTIGEN (FORMALDEHYDE INACTIVATED) 0.5 ML: 15; 15; 15; 15 INJECTION, SUSPENSION INTRAMUSCULAR at 12:24

## 2017-10-12 ASSESSMENT — PAIN SCALES - GENERAL: PAINLEVEL: NO PAIN (0)

## 2017-10-12 NOTE — NURSING NOTE
Marina Mock      1.  Has the patient received the information for the influenza vaccine? YES    2.  Does the patient have any of the following contraindications?     Allergy to eggs? No     Allergic reaction to previous influenza vaccines? No     Any other problems to previous influenza vaccines? No     Paralyzed by Guillain-Turkey syndrome? No     Currently pregnant? NO     Current moderate or severe illness? No     Allergy to contact lens solution? No    3.  The vaccine has been administered in the usual fashion and the patient was instructed to wait 20 minutes before leaving the building in the event of an allergic reaction: YES    Vaccination given by tucker hawkins.  Recorded by Susannah Bales

## 2017-10-12 NOTE — MR AVS SNAPSHOT
After Visit Summary   10/12/2017    Marina Mock    MRN: [id number]           Patient Information     Date Of Birth          1980        Visit Information        Provider Department      10/12/2017 11:30 AM Claudia Cosme MD [facility] and Infectious Diseases        Today's Diagnoses     Preventative health care    -  1    HIV disease (H)           Follow-ups after your visit        Your next 10 appointments already scheduled     Oct 12, 2017 12:45 PM CDT   Lab with  LAB   [facility] ([facility])    [address]  1st Floor  [facility] [address]   [phone]            Nov 30, 2017 10:30 AM CST   (Arrive by 10:15 AM)   Return Visit with Claudia Cosme MD   [facility] and Infectious Diseases ([facility])    [address] [place] [address]  3rd Floor  [facility] [id number] [phone]              Future tests that were ordered for you today     Open Future Orders        Priority Expected Expires Ordered    HLA Single Antigen Allele Type Routine  10/12/2018 10/12/2017            Who to contact     If you have questions or need follow up information about today's clinic visit or your schedule please contact [facility] AND INFECTIOUS DISEASES directly at [phone].  Normal or non-critical lab and imaging results will be communicated to you by MyChart, letter or phone within 4 business days after the clinic has received the results. If you do not hear from us within 7 days, please contact the clinic through MyChart or phone. If you have a critical or abnormal lab result, we will notify you by phone as soon as possible.  Submit refill requests through [employer] or call your pharmacy and they will forward the refill request to us. Please allow 3 business days for your refill to be completed.          Additional Information About Your Visit        [facility]  "Information     [employer] gives you secure access to your electronic health record. If you see a primary care provider, you can also send messages to your care team and make appointments. If you have questions, please call your primary care clinic.  If you do not have a primary care provider, please call [phone] and they will assist you.        Care EveryWhere ID     This is your Care EveryWhere ID. This could be used by other organizations to access your [place] medical records  [id number]        Your Vitals Were     Pulse Temperature Height BMI (Body Mass Index)          114 98.7  F (37.1  C) (Oral) 1.549 m (5' 1\") 20.82 kg/m2         Blood Pressure from Last 3 Encounters:   10/12/17 128/84   06/29/17 134/76   03/16/17 119/78    Weight from Last 3 Encounters:   10/12/17 50 kg (110 lb 3.2 oz)   06/29/17 46.4 kg (102 lb 6.4 oz)   03/16/17 45.2 kg (99 lb 9.6 oz)                 Today's Medication Changes          These changes are accurate as of: 10/12/17 12:24 PM.  If you have any questions, ask your nurse or doctor.               Stop taking these medicines if you haven't already. Please contact your care team if you have questions.     ferrous sulfate 325 (65 FE) MG tablet   Commonly known as:  IRON   Stopped by:  Claudia Cosme MD                    Primary Care Provider Office Phone # Fax #    [facility] [phone]       [address] [facility] [address]        Equal Access to Services     TED YATES AH: Hadii amanda shoemakero Sojero, waaxda luqadaha, qaybta kaalmada ventura, mele ma. So [facility] [phone].    ATENCIÓN: Si habla español, tiene a bird disposición servicios gratuitos de asistencia lingüística. Llame al [phone].    We comply with applicable federal civil rights laws and [place] laws. We do not discriminate on the basis of race, color, national origin, age, disability, sex, sexual orientation, or gender " identity.            Thank you!     Thank you for choosing [facility] AND INFECTIOUS DISEASES  for your care. Our goal is always to provide you with excellent care. Hearing back from our patients is one way we can continue to improve our services. Please take a few minutes to complete the written survey that you may receive in the mail after your visit with us. Thank you!             Your Updated Medication List - Protect others around you: Learn how to safely use, store and throw away your medicines at www.disposemymeds.org.          This list is accurate as of: 10/12/17 12:24 PM.  Always use your most recent med list.                   Brand Name Dispense Instructions for use Diagnosis    dolutegravir 50 MG tablet    TIVICAY    30 tablet    Take 1 tablet (50 mg) by mouth daily    Human immunodeficiency virus (HIV) disease       emtricitabine-tenofovir -25 MG per tablet    DESCOVY    30 tablet    Take 1 tablet by mouth daily    Human immunodeficiency virus (HIV) disease       ENSURE PLUS Liqd     9480 mL    TAKE ONE TO TWO BOTTLES BY MOUTH ONCE EVERY DAY    Loss of weight, Pregnancy       isoniazid 100 MG tablet    NYDRAZID     Take 2.5 tablets (250 mg) by mouth daily        Multi-vitamin Tabs tablet      Take 1 tablet by mouth daily        RA VITAMIN B-6 50 MG tablet   Generic drug:  pyridOXINE      Take 1 tablet (50 mg) by mouth daily

## 2017-10-12 NOTE — MR AVS SNAPSHOT
After Visit Summary   10/12/2017    Marina Mock    MRN: [id number]           Patient Information     Date Of Birth          1980        Visit Information        Provider Department      10/12/2017 11:00 AM Harmony Connor [facility] and Infectious Diseases [facility]        Care Instructions    Recommendations from today's MTM visit:                                                      1) HLA- test today.    2) If HLA is negative, you will start Triumeq in the morning, and take for one week. Then you will start taking Rifampin. You will continue to take Triumeq in the morning and will start Tivicay at bedtime after Rifampin start.    3) Separate Ensure and Triumeq by a few hours. Only drink Ensure in the afternoon when you start Tivicay in the evening.      To schedule another MTM appointment, please call the clinic directly or you may call the MTM scheduling line at [phone] or toll-free at [phone].     My Clinical Pharmacist's contact information:                                                      It was a pleasure seeing you today!  Please feel free to contact me with any questions or concerns you have.      Harmony Connor, [facility] Pharmacist.   [phone]      You may receive a survey about the MTM services you received.  I would appreciate your feedback to help me serve you better in the future. Please fill it out and return it when you can. Your comments will be anonymous.      My healthcare goals:                                                      Stay healthy and have more babies.                Follow-ups after your visit        Your next 10 appointments already scheduled     Nov 30, 2017 10:30 AM CST   (Arrive by 10:15 AM)   Return Visit with Claudia Cosme MD   [facility] and Infectious Diseases ([facility])    [address] [place] [id number] [phone]               Who to contact     If you have questions or need follow up information about today's clinic visit or your schedule please contact [facility] AND INFECTIOUS DISEASES [facility] directly at No information on file..  Normal or non-critical lab and imaging results will be communicated to you by MyChart, letter or phone within 4 business days after the clinic has received the results. If you do not hear from us within 7 days, please contact the clinic through [employer] or phone. If you have a critical or abnormal lab result, we will notify you by phone as soon as possible.  Submit refill requests through [employer] or call your pharmacy and they will forward the refill request to us. Please allow 3 business days for your refill to be completed.          Additional Information About Your Visit        [employer] Information     [employer] gives you secure access to your electronic health record. If you see a primary care provider, you can also send messages to your care team and make appointments. If you have questions, please call your primary care clinic.  If you do not have a primary care provider, please call [phone] and they will assist you.        Care EveryWhere ID     This is your Care EveryWhere ID. This could be used by other organizations to access your [place] medical records  [id number]         Blood Pressure from Last 3 Encounters:   10/12/17 128/84   06/29/17 134/76   03/16/17 119/78    Weight from Last 3 Encounters:   10/12/17 110 lb 3.2 oz (50 kg)   06/29/17 102 lb 6.4 oz (46.4 kg)   03/16/17 99 lb 9.6 oz (45.2 kg)              Today, you had the following     No orders found for display         Today's Medication Changes          These changes are accurate as of: 10/12/17 11:59 PM.  If you have any questions, ask your nurse or doctor.               Stop taking these medicines if you haven't already. Please contact your care team if you have questions.     ferrous sulfate 325 (65 FE) MG tablet    Commonly known as:  IRON   Stopped by:  Claudia Cosme MD           isoniazid 100 MG tablet   Commonly known as:  NYDRAZID   Stopped by:  Harmony Connor RP           RA VITAMIN B-6 50 MG tablet   Generic drug:  pyridOXINE   Stopped by:  Harmony Connor RPH                    Primary Care Provider Office Phone # Fax #    Clinic [facility] [phone]       [address] [facility] [address]        Equal Access to Services     TED YATES AH: Hadii aad ku hadasho Soomaali, waaxda luqadaha, qaybta kaalmada adeegyada, waxay idiin hayaan adeeg kharash la'aan ah. So [facility] [phone].    ATENCIÓN: Si habla español, tiene a bird disposición servicios gratuitos de asistencia lingüística. [facility] [phone].    We comply with applicable federal civil rights laws and [place] laws. We do not discriminate on the basis of race, color, national origin, age, disability, sex, sexual orientation, or gender identity.            Thank you!     Thank you for choosing [facility] AND INFECTIOUS DISEASES [facility]  for your care. Our goal is always to provide you with excellent care. Hearing back from our patients is one way we can continue to improve our services. Please take a few minutes to complete the written survey that you may receive in the mail after your visit with us. Thank you!             Your Updated Medication List - Protect others around you: Learn how to safely use, store and throw away your medicines at www.disposemymeds.org.          This list is accurate as of: 10/12/17 11:59 PM.  Always use your most recent med list.                   Brand Name Dispense Instructions for use Diagnosis    dolutegravir 50 MG tablet    TIVICAY    30 tablet    Take 1 tablet (50 mg) by mouth daily    Human immunodeficiency virus (HIV) disease       emtricitabine-tenofovir -25 MG per tablet    DESCOVY    30 tablet    Take 1 tablet by mouth daily    Human immunodeficiency virus (HIV)  disease       ENSURE PLUS Liqd     9480 mL    TAKE ONE TO TWO BOTTLES BY MOUTH ONCE EVERY DAY    Loss of weight, Pregnancy

## 2017-10-12 NOTE — LETTER
10/12/2017       RE: Marina Mock  [address]  [facility] [address]     Dear Colleague,    Thank you for referring your patient, Marina Mock, to the [facility] AND INFECTIOUS DISEASES at [facility]. Please see a copy of my visit note below.    -    [facility] - Progress Note  Dr. Claudia Cosme, [facility], [facility], [address] [place] [address], Sixth Floor, [address]  [place] [address]  Patient:  Marina Contreras, Date of birth 1980, Medical record number [id number]  Date of Visit:  Oct 12, 2017         Assessment and Recommendations:     LTBI (latent tuberculosis infection)  Ms. Ben Mock has been unable to tolerate Isoniazid secondary to nausea. She continue to not have any pulmonary symptoms or systemic symptoms or lymphadenopathy concerning for active tuberculosis.  She has no signs of tuberculosis on examination. We met with our PharmDHarmony to discuss rifampin interactions with her ART (see plan below).  She had a negative CXR on 4/27/17. We will start Rifampin 10 mg/kg daily × 4 months. We discussed the toxicities of Rifampin therapy included hypersensitivity syndrome and interstitial nephritis. Marina was advises to contact us with flu-like symptoms and/or brusing/bleeding (thrombocytopenia). The patient was also counseled that Rifampin has significant drug interactions. She was advised to contact us and discuss with any other relevant healthcare provider and pharmacist with the addition of any new drugs. I will check baseline LFTs and creatinine today and monthly creatinine thereafter.  I will have the patient follow up with me in 1 month after starting to monitor how she is tolerating therapy.     Human immunodeficiency virus (HIV) disease (H)  Ms. Ben Mock is currently on Descovy and  Dolutegravir. Because she has been unable to tolerate Isoniazid, we will need to do Rifampin instead. However, this will require us to change the dosing of her Dolutegravir to twice a day.  We will plan to change her regimen to Triumeq for now and then Triumeq in the AM and Dolutegravir along in the PM when the Rifampin has started. I counseled her again today that this regimen is not considered safe in pregnancy.  She needs to be using a form of contraception and if she does decide to have a child, she needs to contact us prior to trying to get pregnant.  I also counseled her today that oral contraceptives are not reliable with rifampin.     Preventative Medicine  Cardiovascular Sal -    Lipids - We had deferred during pregnancy.  Will check at her next visit   Blood Pressure - /84   Cancer Screening   Colon - No indication for screening at present   Cervical - Up to date  Immunizations   Hepatitis A - Ab positive 3/2016   Hepatitis B - Surface Ab/Ag and core Ab negative, Will start vaccination series.    Influenza - Will do today   Pneumovax/Prevnar - Had PCV on 3/16/2017, Will administer PSV23 at next visit   Tdap - 8/15/2016  Bone Health - No screening indicated at this time  Renal Health - 8/2016 - no proteinuria, Creatinine has been normal  Sexually Transmitted Infection Risk and Screening   Gonorrhea and Chlamydia - Negative 2/2016   Syphilis - Negative T pallidum Ab 9/2016    Claudia Cosme MD  Division of Infectious Diseases and International Medicine  [phone]         History of Infectious Disease Illness:   Ms. Gray is a 37 year old woman who I am following for HIV infection. She recently had a baby who is healthy and doing well. She has been taking Dolutegravir and Descovy and is doing well on this. She was interested in reducing her pill burden.  We had discussed Triumeq before which she was interested in and she has no current plans for pregnancy.  She and her  are using  condoms for contraception. We have been following her as well for LTBI.  Unfortunately, she had significant nausea and stopped taking it after about a week. She reports that she she feels okay since stopping it.  She denies any fevers or chills, no sweats, no lymphadeopathy/gland swelling.  No cough or shortness of breath.  No weight changes.          Past Medical and Surgical History:     Past Medical History:   Diagnosis Date     Malaria      Miscarriage      TB (pulmonary tuberculosis)     latent       Past Surgical History:   Procedure Laterality Date     NO HISTORY OF SURGERY             Family History:     Family History   Problem Relation Age of Onset     Family History Negative Other      Hypertension Father      Hypertension Other      CANCER No family hx of      DIABETES No family hx of      Macular Degeneration No family hx of      Retinal detachment No family hx of            Social History:     Social History   Substance Use Topics     Smoking status: Never Smoker     Smokeless tobacco: Never Used     Alcohol use No     Social History     Social History Narrative          Review of Systems:   CONSTITUTIONAL:  No fevers or chills  EYES: negative for icterus  ENT: No hearing or vision issues.   RESPIRATORY:  negative for cough, sputum or dyspnea  CARDIOVASCULAR:  Positive for chest pain, negaitve for palpitations  GASTROINTESTINAL:   Negative for nausea, diarrhea or constipation  GENITOURINARY:  Negative for dysuria. No discharge  INTEGUMENT:  negative for rash or pruritus  NEURO:  Negative for headache  HEME: No easy bruising. She has had borderline anemia         Current Medications:     Current Outpatient Prescriptions   Medication Sig Dispense Refill     multivitamin, therapeutic with minerals (MULTI-VITAMIN) TABS tablet Take 1 tablet by mouth daily       Nutritional Supplements (ENSURE PLUS) LIQD TAKE ONE TO TWO BOTTLES BY MOUTH ONCE EVERY DAY 9480 mL 6     pyridOXINE (RA VITAMIN B-6) 50 MG tablet  "Take 1 tablet (50 mg) by mouth daily       isoniazid (NYDRAZID) 100 MG tablet Take 2.5 tablets (250 mg) by mouth daily       emtricitabine-tenofovir AF (DESCOVY) 200-25 MG per tablet Take 1 tablet by mouth daily 30 tablet 11     dolutegravir (TIVICAY) 50 MG tablet Take 1 tablet (50 mg) by mouth daily 30 tablet 11     ferrous sulfate (IRON) 325 (65 FE) MG tablet Take 1 tablet (325 mg) by mouth daily (with breakfast) 30 tablet 2          Immunization History:     Immunization History   Administered Date(s) Administered     Pneumococcal (PCV 13) 03/16/2017     TDAP Vaccine (Boostrix) 08/15/2016            Allergies:   No Known Allergies         Physical Exam:   Vital signs:  /84  Pulse 114  Temp 98.7  F (37.1  C) (Oral)  Ht 1.549 m (5' 1\")  Wt 50 kg (110 lb 3.2 oz)  BMI 20.82 kg/m2  Physical Examination:  GENERAL:  well-developed, well-nourished.  HEENT:  Head is normocephalic, atraumatic   EYES:  Eyes have anicteric sclerae without conjunctival injection   NECK: Supple, No lymphadenopathy  LYMPH: No axillary LAD  LUNGS:  Clear to auscultation bilateral.   CARDIOVASCULAR:  Regular rate and rhythm with no murmurs, gallops or rubs.  ABDOMEN:  Normal bowel sounds, soft, nontender.   SKIN:  No acute rashes.    NEUROLOGIC:  Grossly nonfocal. Active x4 extremities         Laboratory Data:     Metabolic Studies       Recent Labs   Lab Test  03/16/17   1137  01/05/17   1127  09/21/16   1904  08/25/16   1127  06/30/16   1203  03/31/16   1027  03/03/16   1226   NA  141  139   --   136  133  135  136   POTASSIUM  3.9  4.0   --   3.8  4.0  3.8  3.5   CHLORIDE  106  105   --   105  104  102  103   CO2  27  26   --   21  23  19*  26   ANIONGAP  8  8   --   10  6  13  8   BUN  7  10   --   6*  6*  3*  6*   CR  0.73  0.73  0.73  0.50*  0.52  0.53  0.49*   GFRESTIMATED  >90  Non  GFR Calc    >90  Non  GFR Calc    >90  Non  GFR Calc    >90  Non  GFR Calc    " >90  Non  GFR Calc    >90  Non  GFR Calc    >90  Non  GFR Calc     GLC  82  103*   --   83  137*  86  86   JANICE  8.8  9.0   --   9.5  9.2  10.0  9.0       Hepatic Studies    Recent Labs   Lab Test  03/16/17 1137  01/05/17   1127  09/21/16   1904  08/25/16   1127  06/30/16   1203  03/31/16   1027  03/03/16   1226   BILITOTAL  0.2  0.2   --   0.2  0.2  0.2  0.2   ALKPHOS  72  82   --   92  64  56  54   ALBUMIN  3.8  3.7   --   3.0*  3.0*  3.1*  3.6   AST  22  31  22  15  21  26  20   ALT  24  32  13  16  20  27  32       Hematology Studies      Recent Labs   Lab Test  03/16/17 1137 01/05/17 1127 09/21/16 1904 09/19/16   1747  08/25/16 1127 06/30/16   1203   03/03/16   1226  08/14/15   2031   WBC  4.3  5.1  14.6*  5.6  5.0  5.6   < >  8.2  5.7   ANEU   --    --    --   3.0   --    --    --   4.7  3.4   ALYM   --    --    --   2.0   --    --    --   2.7  1.8   CHUCKY   --    --    --   0.6   --    --    --   0.7  0.5   AEOS   --    --    --   0.1   --    --    --   0.0  0.0   HGB  10.3*  11.4*  9.3*  10.7*  9.9*  9.2*   < >  10.7*  10.2*   HCT  32.2*  35.9  28.7*  32.1*  30.7*  27.9*   < >  34.4*  30.1*   PLT  247  298  174  181  233  295   < >  308  251    < > = values in this interval not displayed.       Hepatitis B Testing     Recent Labs   Lab Test  03/03/16   1226   HBCAB  Nonreactive   HEPBANG  Nonreactive     Imaging:  No results found for this or any previous visit (from the past 744 hour(s)).      Sincerely,    Claudia Cosme MD

## 2017-10-12 NOTE — NURSING NOTE
"Chief Complaint   Patient presents with     RECHECK     follow up with B20, tzimmer cma       Initial /84  Pulse 114  Temp 98.7  F (37.1  C) (Oral)  Ht 1.549 m (5' 1\")  Wt 50 kg (110 lb 3.2 oz)  BMI 20.82 kg/m2 Estimated body mass index is 20.82 kg/(m^2) as calculated from the following:    Height as of this encounter: 1.549 m (5' 1\").    Weight as of this encounter: 50 kg (110 lb 3.2 oz).  Medication Reconciliation: complete    "

## 2017-10-13 LAB — HLA SINGLE ANTIGEN ALLELE TYPE: NORMAL

## 2017-10-13 NOTE — PROGRESS NOTES
SUBJECTIVE/OBJECTIVE:                Marina Mock is a 37 year old female coming in for a follow-up visit for Medication Therapy Management.  She was referred to me from Dr. Cosme.     Chief Complaint: Follow up from our visit on 06/29/17.  Reports she stopped taking Isoniazid because she can not tolerate it. She is wondering if there is a different medication.  Personal Healthcare Goals: Stay healthy and have more children.  Tobacco: No tobacco use    Alcohol: once in a while will have wine.    Medication Adherence: no issues reported    Latent TB: Reports could not tolerate taking Isoniazid, because it caused upset stomach and nausea. Reports the nausea got so bad she could not go to work, so she stopped taking it.     HIV: On 01/05/17 CD4 was 648 (24%), and on 06/29/17 viral load was <20 copies/ml. Patient reports takes, Descovy (emticitabine-tenofovir alafenamide 200-25 mg) once daily with Tivicay (dolutregravir 50 mg). Patient reports tolerates, medication very well and does not miss any doses. .    Supplements: Reports drinks Ensure, once to three time daily, when she feels she needs it.      Current labs include:BP Readings from Last 3 Encounters:   10/12/17 128/84   06/29/17 134/76   03/16/17 119/78       Liver Function Studies -   Recent Labs   Lab Test  03/16/17   1137   PROTTOTAL  8.7   ALBUMIN  3.8   BILITOTAL  0.2   ALKPHOS  72   AST  22   ALT  24       No results found for: UCRR, MICROL, UMALCR    Last Basic Metabolic Panel:  Lab Results   Component Value Date     03/16/2017      Lab Results   Component Value Date    POTASSIUM 3.9 03/16/2017     Lab Results   Component Value Date    CHLORIDE 106 03/16/2017     Lab Results   Component Value Date    BUN 7 03/16/2017     Lab Results   Component Value Date    CR 0.73 03/16/2017     GFR Estimate   Date Value Ref Range Status   03/16/2017 >90  Non  GFR Calc   >60 mL/min/1.7m2 Final   01/05/2017 >90  Non African American GFR  Calc   >60 mL/min/1.7m2 Final   09/21/2016 >90  Non  GFR Calc   >60 mL/min/1.7m2 Final     GFR Estimate If Black   Date Value Ref Range Status   03/16/2017 >90   GFR Calc   >60 mL/min/1.7m2 Final   01/05/2017 >90   GFR Calc   >60 mL/min/1.7m2 Final   09/21/2016 >90   GFR Calc   >60 mL/min/1.7m2 Final     No results found for: TSH]    Most Recent Immunizations   Administered Date(s) Administered     Influenza Vaccine IM 3yrs+ 4 Valent IIV4 10/12/2017     Pneumococcal (PCV 13) 03/16/2017     TDAP Vaccine (Boostrix) 08/15/2016       ASSESSMENT:              Current medications were reviewed today as discussed above.        Medication Adherence: no issues identified    TB: Per Dr Carlos, latent TB therapy will be changed to Rifampin. There are drug interactions with current HIV medication, therefore we will change HIV medication to Triumeq. Pt will have to take an additional Tivicay (dolutegravir) 50 mg, daily as Rifampin can decrease its therapeutic level.     HIV: CD4 appears to be stable, and viral load is, undetectable. HLA testing today, If negative, will switch pt to Triumeq once daily in the morning and Tivicay in the evening. Pt will start Triumeq once daily, then will start therapy with Rifampin. As soon as Rifampin in started, she will start taking an additional dose of Tivicay, as Rifampin can decrease its therapeutic level. (ok per Dr. Cosme). Pt is asked to separate Ensure from Tivicay by a few hours.      PLAN:                  1) HLA- test today.    2) If HLA is negative, she will start Triumeq in the morning, and take for one week. Then she will start taking Rifampin. She will continue to take Triumeq in the morning and will start Tivicay at bedtime after Rifampin start.    3) Pt is asked to separate Ensure and Triumeq by a few hours. She is instructed to only drink Ensure in the afternoon when she starts Tivicay in the evening.       I spent 30 minutes with this patient today. I offer these suggestions for consideration by the ID doctor. A copy of the visit note was provided to the patient's ID doctor.     Will follow up in one week to see if HLA is back.    The patient was given a summary of these recommendations as an after visit summary.    Harmony Connor, [facility] Pharmacist.   [phone]

## 2017-10-13 NOTE — PATIENT INSTRUCTIONS
Recommendations from today's MTM visit:                                                      1) HLA- test today.    2) If HLA is negative, you will start Triumeq in the morning, and take for one week. Then you will start taking Rifampin. You will continue to take Triumeq in the morning and will start Tivicay at bedtime after Rifampin start.    3) Separate Ensure and Triumeq by a few hours. Only drink Ensure in the afternoon when you start Tivicay in the evening.      To schedule another MTM appointment, please call the clinic directly or you may call the MTM scheduling line at [phone] or toll-free at [phone].     My Clinical Pharmacist's contact information:                                                      It was a pleasure seeing you today!  Please feel free to contact me with any questions or concerns you have.      Harmony Connor, [facility] Pharmacist.   [phone]      You may receive a survey about the [facility] services you received.  I would appreciate your feedback to help me serve you better in the future. Please fill it out and return it when you can. Your comments will be anonymous.      My healthcare goals:                                                      Stay healthy and have more babies.

## 2017-10-17 LAB
HLA II RESULT: NORMAL
HLA II TEST METHOD: NORMAL

## 2017-10-30 NOTE — PROGRESS NOTES
[facility] - Progress Note  Dr. Claudia Cosme, [facility], [facility], [address] [place] [address], Sixth Floor, [address]  [place] [address]  Patient:  Marina Contreras, Date of birth 1980, Medical record number [id number]  Date of Visit:  Oct 12, 2017         Assessment and Recommendations:     LTBI (latent tuberculosis infection)  Ms. Ben Mock has been unable to tolerate Isoniazid secondary to nausea. She continue to not have any pulmonary symptoms or systemic symptoms or lymphadenopathy concerning for active tuberculosis.  She has no signs of tuberculosis on examination. We met with our PharmD, Harmony Connor to discuss rifampin interactions with her ART (see plan below).  She had a negative CXR on 4/27/17. We will start Rifampin 10 mg/kg daily × 4 months. We discussed the toxicities of Rifampin therapy included hypersensitivity syndrome and interstitial nephritis. Marina was advises to contact us with flu-like symptoms and/or brusing/bleeding (thrombocytopenia). The patient was also counseled that Rifampin has significant drug interactions. She was advised to contact us and discuss with any other relevant healthcare provider and pharmacist with the addition of any new drugs. I will check baseline LFTs and creatinine today and monthly creatinine thereafter.  I will have the patient follow up with me in 1 month after starting to monitor how she is tolerating therapy.     Human immunodeficiency virus (HIV) disease (H)  Ms. Ben Mock is currently on Descovy and Dolutegravir. Because she has been unable to tolerate Isoniazid, we will need to do Rifampin instead. However, this will require us to change the dosing of her Dolutegravir to twice a day.  We will plan to change her regimen to Triumeq for now and then Triumeq in the AM and Dolutegravir along in the PM when the Rifampin has started. I  counseled her again today that this regimen is not considered safe in pregnancy.  She needs to be using a form of contraception and if she does decide to have a child, she needs to contact us prior to trying to get pregnant.  I also counseled her today that oral contraceptives are not reliable with rifampin.     Preventative Medicine  Cardiovascular Sal -    Lipids - We had deferred during pregnancy.  Will check at her next visit   Blood Pressure - /84   Cancer Screening   Colon - No indication for screening at present   Cervical - Up to date  Immunizations   Hepatitis A - Ab positive 3/2016   Hepatitis B - Surface Ab/Ag and core Ab negative, Will start vaccination series.    Influenza - Will do today   Pneumovax/Prevnar - Had PCV on 3/16/2017, Will administer PSV23 at next visit   Tdap - 8/15/2016  Bone Health - No screening indicated at this time  Renal Health - 8/2016 - no proteinuria, Creatinine has been normal  Sexually Transmitted Infection Risk and Screening   Gonorrhea and Chlamydia - Negative 2/2016   Syphilis - Negative T pallidum Ab 9/2016    Claudia Cosme MD  Division of Infectious Diseases and International Medicine  [phone]         History of Infectious Disease Illness:   Ms. Gray is a 37 year old woman who I am following for HIV infection. She recently had a baby who is healthy and doing well. She has been taking Dolutegravir and Descovy and is doing well on this. She was interested in reducing her pill burden.  We had discussed Triumeq before which she was interested in and she has no current plans for pregnancy.  She and her  are using condoms for contraception. We have been following her as well for LTBI.  Unfortunately, she had significant nausea and stopped taking it after about a week. She reports that she she feels okay since stopping it.  She denies any fevers or chills, no sweats, no lymphadeopathy/gland swelling.  No cough or shortness of breath.  No weight  changes.          Past Medical and Surgical History:     Past Medical History:   Diagnosis Date     Malaria      Miscarriage      TB (pulmonary tuberculosis)     latent       Past Surgical History:   Procedure Laterality Date     NO HISTORY OF SURGERY             Family History:     Family History   Problem Relation Age of Onset     Family History Negative Other      Hypertension Father      Hypertension Other      CANCER No family hx of      DIABETES No family hx of      Macular Degeneration No family hx of      Retinal detachment No family hx of            Social History:     Social History   Substance Use Topics     Smoking status: Never Smoker     Smokeless tobacco: Never Used     Alcohol use No     Social History     Social History Narrative          Review of Systems:   CONSTITUTIONAL:  No fevers or chills  EYES: negative for icterus  ENT: No hearing or vision issues.   RESPIRATORY:  negative for cough, sputum or dyspnea  CARDIOVASCULAR:  Positive for chest pain, negaitve for palpitations  GASTROINTESTINAL:   Negative for nausea, diarrhea or constipation  GENITOURINARY:  Negative for dysuria. No discharge  INTEGUMENT:  negative for rash or pruritus  NEURO:  Negative for headache  HEME: No easy bruising. She has had borderline anemia         Current Medications:     Current Outpatient Prescriptions   Medication Sig Dispense Refill     multivitamin, therapeutic with minerals (MULTI-VITAMIN) TABS tablet Take 1 tablet by mouth daily       Nutritional Supplements (ENSURE PLUS) LIQD TAKE ONE TO TWO BOTTLES BY MOUTH ONCE EVERY DAY 9480 mL 6     pyridOXINE (RA VITAMIN B-6) 50 MG tablet Take 1 tablet (50 mg) by mouth daily       isoniazid (NYDRAZID) 100 MG tablet Take 2.5 tablets (250 mg) by mouth daily       emtricitabine-tenofovir AF (DESCOVY) 200-25 MG per tablet Take 1 tablet by mouth daily 30 tablet 11     dolutegravir (TIVICAY) 50 MG tablet Take 1 tablet (50 mg) by mouth daily 30 tablet 11     ferrous sulfate  "(IRON) 325 (65 FE) MG tablet Take 1 tablet (325 mg) by mouth daily (with breakfast) 30 tablet 2          Immunization History:     Immunization History   Administered Date(s) Administered     Pneumococcal (PCV 13) 03/16/2017     TDAP Vaccine (Boostrix) 08/15/2016            Allergies:   No Known Allergies         Physical Exam:   Vital signs:  /84  Pulse 114  Temp 98.7  F (37.1  C) (Oral)  Ht 1.549 m (5' 1\")  Wt 50 kg (110 lb 3.2 oz)  BMI 20.82 kg/m2  Physical Examination:  GENERAL:  well-developed, well-nourished.  HEENT:  Head is normocephalic, atraumatic   EYES:  Eyes have anicteric sclerae without conjunctival injection   NECK: Supple, No lymphadenopathy  LYMPH: No axillary LAD  LUNGS:  Clear to auscultation bilateral.   CARDIOVASCULAR:  Regular rate and rhythm with no murmurs, gallops or rubs.  ABDOMEN:  Normal bowel sounds, soft, nontender.   SKIN:  No acute rashes.    NEUROLOGIC:  Grossly nonfocal. Active x4 extremities         Laboratory Data:     Metabolic Studies       Recent Labs   Lab Test  03/16/17   1137  01/05/17   1127  09/21/16   1904  08/25/16   1127  06/30/16   1203  03/31/16   1027  03/03/16   1226   NA  141  139   --   136  133  135  136   POTASSIUM  3.9  4.0   --   3.8  4.0  3.8  3.5   CHLORIDE  106  105   --   105  104  102  103   CO2  27  26   --   21  23  19*  26   ANIONGAP  8  8   --   10  6  13  8   BUN  7  10   --   6*  6*  3*  6*   CR  0.73  0.73  0.73  0.50*  0.52  0.53  0.49*   GFRESTIMATED  >90  Non  GFR Calc    >90  Non  GFR Calc    >90  Non  GFR Calc    >90  Non  GFR Calc    >90  Non  GFR Calc    >90  Non  GFR Calc    >90  Non  GFR Calc     GLC  82  103*   --   83  137*  86  86   JANICE  8.8  9.0   --   9.5  9.2  10.0  9.0       Hepatic Studies    Recent Labs   Lab Test  03/16/17   1137  01/05/17   1127  09/21/16   1904  08/25/16   1127  06/30/16   1203  03/31/16   " 1027  03/03/16   1226   BILITOTAL  0.2  0.2   --   0.2  0.2  0.2  0.2   ALKPHOS  72  82   --   92  64  56  54   ALBUMIN  3.8  3.7   --   3.0*  3.0*  3.1*  3.6   AST  22  31  22  15  21  26  20   ALT  24  32  13  16  20  27  32       Hematology Studies      Recent Labs   Lab Test  03/16/17   1137  01/05/17   1127  09/21/16   1904  09/19/16   1747  08/25/16   1127  06/30/16   1203   03/03/16   1226  08/14/15   2031   WBC  4.3  5.1  14.6*  5.6  5.0  5.6   < >  8.2  5.7   ANEU   --    --    --   3.0   --    --    --   4.7  3.4   ALYM   --    --    --   2.0   --    --    --   2.7  1.8   CHUCKY   --    --    --   0.6   --    --    --   0.7  0.5   AEOS   --    --    --   0.1   --    --    --   0.0  0.0   HGB  10.3*  11.4*  9.3*  10.7*  9.9*  9.2*   < >  10.7*  10.2*   HCT  32.2*  35.9  28.7*  32.1*  30.7*  27.9*   < >  34.4*  30.1*   PLT  247  298  174  181  233  295   < >  308  251    < > = values in this interval not displayed.       Hepatitis B Testing     Recent Labs   Lab Test  03/03/16   1226   HBCAB  Nonreactive   HEPBANG  Nonreactive     Imaging:  No results found for this or any previous visit (from the past 744 hour(s)).

## 2017-10-31 ENCOUNTER — TELEPHONE (OUTPATIENT)
Dept: PHARMACY | Facility: CLINIC | Age: 37
End: 2017-10-31

## 2017-10-31 NOTE — TELEPHONE ENCOUNTER
Clinical Consult:                                                    Marina Mock is a 37 year old female called for a clinical pharmacist consult.  She was referred to me from Dr. Cosme.     Reason for Consult: Change in B20 medication.    I called Marina to let her know our pharmacy will mail her new HIV medicaiton, Triumeq. She she is instructed to take one pill, once per day in the morning. She was instructed to discontinue her current HIV meds. We reviewed the importance of avoiding pregnancy while on this medication, and also through her LTBI treatment. She agreed to this, although she said she is not using birth control at this time, no condoms. She reports she will talk to her  about birthcontrol. (she seemed reluctant to discuss this further with me. She seemed shy about the subject). We reviewed she will start LTBI treatment soon, and when she does, she will have to take an additional medication (Tivicay) at bedtime. She verbally understood these instructions.    Marina reports she is starting a new job and she needs a copy of her chest X-ray. I will ask about this for her.    Plan:  1. Pharmacy to mail out Triumeq.  2. Marina to call me when she receives Triumeq, so we can review again.  3. Ask if she is able to get a copy of her chest X-ray for her new job.    Harmony Connor, [facility] Pharmacist.   [phone]

## 2017-11-07 ENCOUNTER — TELEPHONE (OUTPATIENT)
Dept: PHARMACY | Facility: CLINIC | Age: 37
End: 2017-11-07

## 2017-11-07 DIAGNOSIS — Z22.7 LTBI (LATENT TUBERCULOSIS INFECTION): Primary | ICD-10-CM

## 2017-11-07 DIAGNOSIS — Z21 HUMAN IMMUNODEFICIENCY VIRUS I INFECTION (H): Primary | ICD-10-CM

## 2017-11-07 NOTE — TELEPHONE ENCOUNTER
Clinical Consult:                                                    Marina Mock is a 37 year old female called for a clinical pharmacist consult.  She was referred to me from Dr. Cosme.     Reason for Consult: Review Rifampin    Discussion: Marina reports she will call the [facility] pharmacy ([phone]) today to give them permission to make and mail out Rifampin suspension. She says she will be able to call them again in 8 days to reorder it, and then she will call them every 14 days to reorder it, (as it only has a shelf life of 14 days). Marina was able to repeat directions for use: keep refrigerated, take 20 ml once daily. When she starts Rifampin, she will also take Tivicay, once daily at bedtime. She will continue to take Triumeq in the morning.     Plan:  1. Reviewed above directions with patient.  2. I will put a reminder on my calendar and will call pt to make sure she is able to call the [facility] pharmacy when needed.   3. Order for Tivicay 50mg, one tablet at bedtime, sent to pharmacy (ok per Dr. Cosme). Pharmacy will mail.     MUADE Mccormick Pharmacist.   [phone]\

## 2017-11-30 ENCOUNTER — TELEPHONE (OUTPATIENT)
Dept: PHARMACY | Facility: CLINIC | Age: 37
End: 2017-11-30

## 2017-11-30 NOTE — TELEPHONE ENCOUNTER
TUSHAR reminding pt about MD/MTM appt tomorrow.    Harmony Connor, [facility] Pharmacist.   [phone]

## 2017-12-21 ENCOUNTER — OFFICE VISIT (OUTPATIENT)
Dept: INFECTIOUS DISEASES | Facility: CLINIC | Age: 37
End: 2017-12-21
Attending: INTERNAL MEDICINE
Payer: COMMERCIAL

## 2017-12-21 VITALS
DIASTOLIC BLOOD PRESSURE: 82 MMHG | OXYGEN SATURATION: 100 % | WEIGHT: 111.3 LBS | TEMPERATURE: 97.5 F | SYSTOLIC BLOOD PRESSURE: 123 MMHG | HEART RATE: 74 BPM | BODY MASS INDEX: 21.01 KG/M2 | HEIGHT: 61 IN

## 2017-12-21 DIAGNOSIS — Z00.00 PREVENTATIVE HEALTH CARE: ICD-10-CM

## 2017-12-21 DIAGNOSIS — D64.9 ANEMIA, UNSPECIFIED TYPE: ICD-10-CM

## 2017-12-21 DIAGNOSIS — Z22.7 LTBI (LATENT TUBERCULOSIS INFECTION): ICD-10-CM

## 2017-12-21 DIAGNOSIS — B20 HUMAN IMMUNODEFICIENCY VIRUS (HIV) DISEASE (H): Primary | ICD-10-CM

## 2017-12-21 DIAGNOSIS — B20 HUMAN IMMUNODEFICIENCY VIRUS (HIV) DISEASE (H): ICD-10-CM

## 2017-12-21 LAB
ALBUMIN SERPL-MCNC: 3.9 G/DL (ref 3.4–5)
ALBUMIN UR-MCNC: NEGATIVE MG/DL
ALP SERPL-CCNC: 58 U/L (ref 40–150)
ALT SERPL W P-5'-P-CCNC: 20 U/L (ref 0–50)
ANION GAP SERPL CALCULATED.3IONS-SCNC: 7 MMOL/L (ref 3–14)
APPEARANCE UR: CLEAR
AST SERPL W P-5'-P-CCNC: 20 U/L (ref 0–45)
BILIRUB SERPL-MCNC: 0.2 MG/DL (ref 0.2–1.3)
BILIRUB UR QL STRIP: NEGATIVE
BUN SERPL-MCNC: 9 MG/DL (ref 7–30)
CALCIUM SERPL-MCNC: 8.7 MG/DL (ref 8.5–10.1)
CD3 CELLS # BLD: 1655 CELLS/UL (ref 603–2990)
CD3 CELLS NFR BLD: 65 % (ref 49–84)
CD3+CD4+ CELLS # BLD: 642 CELLS/UL (ref 441–2156)
CD3+CD4+ CELLS NFR BLD: 25 % (ref 28–63)
CD3+CD4+ CELLS/CD3+CD8+ CLL BLD: 0.66 % (ref 1.4–2.6)
CD3+CD8+ CELLS # BLD: 960 CELLS/UL (ref 125–1312)
CD3+CD8+ CELLS NFR BLD: 38 % (ref 10–40)
CHLORIDE SERPL-SCNC: 106 MMOL/L (ref 94–109)
CHOLEST SERPL-MCNC: 212 MG/DL
CO2 SERPL-SCNC: 26 MMOL/L (ref 20–32)
COLOR UR AUTO: YELLOW
CREAT SERPL-MCNC: 0.79 MG/DL (ref 0.52–1.04)
ERYTHROCYTE [DISTWIDTH] IN BLOOD BY AUTOMATED COUNT: 16.7 % (ref 10–15)
FERRITIN SERPL-MCNC: 9 NG/ML (ref 12–150)
GFR SERPL CREATININE-BSD FRML MDRD: 83 ML/MIN/1.7M2
GLUCOSE SERPL-MCNC: 96 MG/DL (ref 70–99)
GLUCOSE UR STRIP-MCNC: NEGATIVE MG/DL
HBA1C MFR BLD: 5.9 % (ref 4.3–6)
HCT VFR BLD AUTO: 32.6 % (ref 35–47)
HDLC SERPL-MCNC: 51 MG/DL
HGB BLD-MCNC: 10.1 G/DL (ref 11.7–15.7)
HGB UR QL STRIP: NEGATIVE
IFC SPECIMEN: ABNORMAL
KETONES UR STRIP-MCNC: NEGATIVE MG/DL
LDLC SERPL CALC-MCNC: 139 MG/DL
LEUKOCYTE ESTERASE UR QL STRIP: ABNORMAL
MCH RBC QN AUTO: 22.3 PG (ref 26.5–33)
MCHC RBC AUTO-ENTMCNC: 31 G/DL (ref 31.5–36.5)
MCV RBC AUTO: 72 FL (ref 78–100)
MUCOUS THREADS #/AREA URNS LPF: PRESENT /LPF
NITRATE UR QL: NEGATIVE
NONHDLC SERPL-MCNC: 162 MG/DL
PH UR STRIP: 5 PH (ref 5–7)
PLATELET # BLD AUTO: 345 10E9/L (ref 150–450)
POTASSIUM SERPL-SCNC: 4.2 MMOL/L (ref 3.4–5.3)
PROT SERPL-MCNC: 8.6 G/DL (ref 6.8–8.8)
RBC # BLD AUTO: 4.52 10E12/L (ref 3.8–5.2)
RBC #/AREA URNS AUTO: <1 /HPF (ref 0–2)
SODIUM SERPL-SCNC: 138 MMOL/L (ref 133–144)
SOURCE: ABNORMAL
SP GR UR STRIP: 1.01 (ref 1–1.03)
SQUAMOUS #/AREA URNS AUTO: 1 /HPF (ref 0–1)
TRIGL SERPL-MCNC: 111 MG/DL
UROBILINOGEN UR STRIP-MCNC: 0 MG/DL (ref 0–2)
WBC # BLD AUTO: 4.2 10E9/L (ref 4–11)
WBC #/AREA URNS AUTO: 1 /HPF (ref 0–2)

## 2017-12-21 PROCEDURE — 83036 HEMOGLOBIN GLYCOSYLATED A1C: CPT | Performed by: INTERNAL MEDICINE

## 2017-12-21 PROCEDURE — 87591 N.GONORRHOEAE DNA AMP PROB: CPT | Performed by: INTERNAL MEDICINE

## 2017-12-21 PROCEDURE — 99213 OFFICE O/P EST LOW 20 MIN: CPT | Mod: ZF

## 2017-12-21 PROCEDURE — 80061 LIPID PANEL: CPT | Performed by: INTERNAL MEDICINE

## 2017-12-21 PROCEDURE — 82728 ASSAY OF FERRITIN: CPT | Performed by: INTERNAL MEDICINE

## 2017-12-21 PROCEDURE — 81001 URINALYSIS AUTO W/SCOPE: CPT | Performed by: INTERNAL MEDICINE

## 2017-12-21 PROCEDURE — 85027 COMPLETE CBC AUTOMATED: CPT | Performed by: INTERNAL MEDICINE

## 2017-12-21 PROCEDURE — 80053 COMPREHEN METABOLIC PANEL: CPT | Performed by: INTERNAL MEDICINE

## 2017-12-21 PROCEDURE — 86360 T CELL ABSOLUTE COUNT/RATIO: CPT | Performed by: INTERNAL MEDICINE

## 2017-12-21 PROCEDURE — 87536 HIV-1 QUANT&REVRSE TRNSCRPJ: CPT | Performed by: INTERNAL MEDICINE

## 2017-12-21 PROCEDURE — 86780 TREPONEMA PALLIDUM: CPT | Performed by: INTERNAL MEDICINE

## 2017-12-21 PROCEDURE — 84238 ASSAY NONENDOCRINE RECEPTOR: CPT | Performed by: INTERNAL MEDICINE

## 2017-12-21 PROCEDURE — 87491 CHLMYD TRACH DNA AMP PROBE: CPT | Performed by: INTERNAL MEDICINE

## 2017-12-21 PROCEDURE — 86359 T CELLS TOTAL COUNT: CPT | Performed by: INTERNAL MEDICINE

## 2017-12-21 PROCEDURE — 36415 COLL VENOUS BLD VENIPUNCTURE: CPT | Performed by: INTERNAL MEDICINE

## 2017-12-21 ASSESSMENT — PAIN SCALES - GENERAL: PAINLEVEL: NO PAIN (0)

## 2017-12-21 NOTE — LETTER
"12/21/2017      RE: Marina Mock  [address]  [facility] [address]         Infectious Disease Clinic  HIV Follow Up Visit    Chief Complaint:  RECHECK ( follow up HIV)    HPI:  Marina Mock is a 37 year old female with history of HIV and latent TB that presents for routine follow-up.  Patient last seen in October 2017.  At that visit, her isoniazid was switched to rifampin for treatment of latent TB as the patient reported intolerable GI side effects related to isoniazid.  She was also started on Triumeq, with plans to continue on Tivicay in the evenings with rifampin.  Patient reports that she obtained a prescription for rifampin approximately 2 weeks after her visit in October, and took the medication for only 4-5 days.  She reports that she felt \"disorganized\", fatigued, and had poor p.o. intake while on rifampin.  She is discouraged because she feels that she cannot tolerate any TB medications.  She reports strict compliance with her Triumeq therapy.  She denies any missed doses.  The only noticeable side effect she reports his nausea.  She has not been taking Tivicay, as it was her understanding that she should take this with rifampin, which she has not been taking.  It was reiterated that this is correct.  Patient reports overall feeling well since her last visit.  She denies fevers, chills, night sweats, weight changes.  No shortness of breath, cough, hemoptysis, pleuritic pain, or chest pain.  She denies vomiting, abdominal pain, dysphagia/odynophagia, oral ulcers, or rashes.  She understands the risks of pregnancy while on Triumeq, and has not engaged in any sexual activity.  He has no other complaints or concerns today.    Of note, patient reports that she had a recent blood TB test 1 month ago that was negative.  We do not have records of this in our system.    ROS: Complete 12-point ROS is negative except as noted above.    Past Medical History:  Past Medical History: "   Diagnosis Date     Malaria      Miscarriage      TB (pulmonary tuberculosis)     latent       Social History:  Social History     Social History     Marital status:      Spouse name: Chris Roldan     Number of children: N/A     Years of education: N/A     Occupational History     Not on file.     Social History Main Topics     Smoking status: Never Smoker     Smokeless tobacco: Never Used     Alcohol use No     Drug use: No     Sexual activity: Yes     Partners: Male     Other Topics Concern     Not on file     Social History Narrative       Family Medical History:  Reviewed and unchanged from prior.    Allergies:   No Known Allergies    Medications:  Current Outpatient Prescriptions   Medication Sig Dispense Refill     COMPOUNDED NON-CONTROLLED SUBSTANCE (CMPD RX) - PHARMACY TO MIX COMPOUNDED MEDICATION Rifampin 25mg/ml suspension, 500 mg (20ml), once daily 600 mL 3     dolutegravir (TIVICAY) 50 MG tablet Take 1 tablet (50 mg) by mouth At Bedtime 30 tablet 5     abacavir-dolutegravir-LamiVUDine (TRIUMEQ) 600- MG per tablet Take 1 tablet by mouth daily 30 tablet 11     Nutritional Supplements (ENSURE PLUS) LIQD TAKE ONE TO TWO BOTTLES BY MOUTH ONCE EVERY DAY 9480 mL 6       Immunizations:  Immunization History   Administered Date(s) Administered     Influenza Vaccine IM 3yrs+ 4 Valent IIV4 10/12/2017     Pneumo Conj 13-V (2010&after) 03/16/2017     TDAP Vaccine (Boostrix) 08/15/2016       Exam:  B/P: 123/82, T: 97.5, P: 74, R: Data Unavailable, Weight:   Wt Readings from Last 2 Encounters:   12/21/17 50.5 kg (111 lb 4.8 oz)   10/12/17 50 kg (110 lb 3.2 oz)   General: Petite female in no acute distress, nontoxic appearing, pleasant and cooperative.  She is well groomed and dressed.  HEENT: Normocephalic, atraumatic.  Moist mucous membranes.  No oral ulcers or thrush.  Sclera anicteric.  Neck: Supple, no cervical lymphadenopathy.  Cardiac: Regular rate and rhythm, normal S1 and S2, no  murmurs.  Respiratory: Nonlabored, clear to auscultation bilaterally.  Abdomen: Soft, nontender, nondistended, bowel sounds present.  : Deferred.  Extremities: Warm and well perfused.  No edema.  Skin: No obvious rash or jaundice.  Neuro: Alert and interacting appropriately, moving all extremities spontaneously.  Psych: Appropriate affect.      Labs:  T Cell Subset:  Absolute CD4   Date Value Ref Range Status   01/05/2017 648 441 - 2156 cells/uL Final     CD4 Alton T   Date Value Ref Range Status   01/05/2017 24 (L) 28 - 63 % Final     CD8 Suppressor T   Date Value Ref Range Status   01/05/2017 44 (H) 10 - 40 % Final     CD3 Mature T   Date Value Ref Range Status   01/05/2017 71 49 - 84 % Final     CD4:CD8 Ratio   Date Value Ref Range Status   01/05/2017 0.55 (L) 1.40 - 2.60 Final     WBC   Date Value Ref Range Status   03/16/2017 4.3 4.0 - 11.0 10e9/L Final     % Lymphocytes   Date Value Ref Range Status   09/19/2016 35.9 % Final     Absolute CD3   Date Value Ref Range Status   01/05/2017 1880 603 - 2990 cells/uL Final     Absolute CD8   Date Value Ref Range Status   01/05/2017 1161 125 - 1312 cells/uL Final       HIV-1 RNA Quantitative:  HIV-1 RNA Quant Result   Date Value Ref Range Status   06/29/2017 (A) HIVND [Copies]/mL Final    <20  HIV-1 RNA Detected, less than 20 HIV-1 RNA copies/mL   The NATASHA AmpliPrep/NATASHA TaqMan HIV-1 test is an FDA-approved in vitro nucleic   acid amplification test for the quantitation of HIV-1 RNA in human plasma (EDTA   plasma) using the NATASHA AmpliPrep instrument for automated viral nucleic acid   extraction and the NATASHA TaqMan Analyzer or NATASHA TaqMan for automated Real   Time PCR amplification and detection of the viral nucleic acid target.   Titer results are reported in copies/ml. This assay is intended for use in   conjunction with clinical presentation and other laboratory markers of disease   prognosis and for use as an aid in assessing viral response to  antiretroviral   treatment as measured by changes in plasma HIV-1 RNA levels. This test should   not be used as a donor screening test to confirm the presence of HIV-1   infection.       Assessment and Plan:  Marina Mock is a 37 year old female with history of HIV and latent TB that presents for routine follow-up.     1. HIV: Most recent viral load in June 2017 was less than 20.  Last CD4 count was in January 2017 that showed absolute CD4 count of 648, CD4 percentage of 24%.  She was previously on Descovy and Dolutegravir. This was changed to Triumeq at last visit given.  Patient reports compliance with this regimen.  She denies any missed doses.  Only noticeable side effect is nausea.  Today, we will obtain complete blood count with differential, complete metabolic panel, HIV viral load, CD4 count.  She understands the risks of Triumeq therapy with pregnancy, and and reports that she is currently not sexually active.  Will perform routine STI testing today.    2. Latent TB infection: Previously was on isoniazid therapy, which was discontinued due to GI side effects.  Decision was made to initiate rifampin therapy at last visit in October 2017, but patient reports that she is also not able to tolerate this medication due to poor appetite, fatigue, and feeling disorganized.  She continues to deny any symptoms of active TB infection.    3. Chronic microcytic anemia: Hemoglobin appears to be within the 9-10 range, with MCV in the mid 70s.  Previous iron studies consistent with iron deficiency anemia.  Patient currently not on any iron replacement.  We will recheck iron studies today.    4. Preventative health:  Cardiovascular Sal -                          Lipids - We had deferred during pregnancy.  Will check today                         Blood Pressure - /82    Diabetes screen- we will check hemoglobin A1c today.  Cancer Screening                         Colon - No indication for screening at  present                         Cervical - Up to date  Immunizations                         Hepatitis A - Ab positive 3/2016                         Hepatitis B - Surface Ab/Ag and core Ab negative, Will start vaccination series.                          Influenza - Will do today                         Pneumovax/Prevnar - Had PCV on 3/16/2017, Will administer PSV23 at next visit                         Tdap - 8/15/2016  Bone Health - No screening indicated at this time  Renal Health - 8/2016 - no proteinuria, Creatinine has been normal  Sexually Transmitted Infection Risk and Screening                         Gonorrhea and Chlamydia - Negative 2/2016                         Syphilis - Negative T pallidum Ab 9/2016      Follow up will be in 3 months    Patient seen and discussed with Dr. Cosme, who agrees with assessment and plan above.    Ulices Rankin, DO  PGY-3 Internal Medicine  [phone]    Attestation:  This patient has been seen and evaluated by me, Claudia Bingham MD with the resident. This note reflects my history, examination and assessment. The plan was discussed with the patient. Briefly, Ms. Ben Mock is a patient I am managing with HIV. She is doing well from the standpoint of her HIV, but she has been unable to tolerate INH and now Rifampin therapy for LTBI.  I counseled Ms. Ben Mock about the long term risk of LTBI in the setting of HIV. This includes a very high annual rate conversion to active TB including a lifetime risk near 100%.  I also counseled her on the risk of infection of a young child if she develops active disease.  She currently has a child under the age of 1 year old.  We will have her follow up in a few weeks to reevaluate starting therapy including trying INH again which was initially started when she was pregnant. I will plan to do this in coordination with Haromny Connor who has been helping manage medication side effects and dosing. I have reviewed today's vital  signs, medications, labs and imaging.     Claudia Cosme MD MPH  Infectious Diseases  Pager [phone]

## 2017-12-21 NOTE — NURSING NOTE
"Chief Complaint   Patient presents with     RECHECK      follow up HIV       Initial /82 (BP Location: Right arm, Patient Position: Sitting, Cuff Size: Adult Small)  Pulse 74  Temp 97.5  F (36.4  C) (Oral)  Ht 1.549 m (5' 1\")  Wt 50.5 kg (111 lb 4.8 oz)  SpO2 100%  BMI 21.03 kg/m2 Estimated body mass index is 21.03 kg/(m^2) as calculated from the following:    Height as of this encounter: 1.549 m (5' 1\").    Weight as of this encounter: 50.5 kg (111 lb 4.8 oz).  Medication Reconciliation: complete    "

## 2017-12-21 NOTE — MR AVS SNAPSHOT
After Visit Summary   12/21/2017    Marina Mock    MRN: [id number]           Patient Information     Date Of Birth          1980        Visit Information        Provider Department      12/21/2017 9:30 AM Claudia Cosme MD [facility] and Infectious Diseases        Today's Diagnoses     Human immunodeficiency virus (HIV) disease (H)    -  1    Anemia, unspecified type        Preventative health care           Follow-ups after your visit        Follow-up notes from your care team     Return in about 3 weeks (around 1/11/2018).      Your next 10 appointments already scheduled     Dec 21, 2017 12:15 PM CST   Lab with  LAB   [facility] ([facility])    [address] [place] [address]  1st Floor  [facility] [address]   [phone]            Jan 25, 2018 10:30 AM CST   (Arrive by 10:15 AM)   Return Visit with Claudia Cosme MD   [facility] and Infectious Diseases ([facility])    [address] [place] [address]  3rd Floor  [facility] [id number] [phone]              Future tests that were ordered for you today     Open Future Orders        Priority Expected Expires Ordered    CBC with platelets Routine  12/21/2018 12/21/2017    Comprehensive metabolic panel Routine  12/21/2018 12/21/2017    Lipid Profile Routine  12/21/2018 12/21/2017    HIV-1 RNA quantitative Routine  12/21/2018 12/21/2017    T cell subset profile Routine  12/21/2018 12/21/2017    Anti Treponema Routine  12/21/2018 12/21/2017    Ferritin Routine  12/21/2018 12/21/2017    Soluble Transferrin Receptor ([facility]) Routine  12/21/2018 12/21/2017    Hemoglobin A1c Routine  12/21/2018 12/21/2017    Routine UA with micro - No culture Routine  12/21/2018 12/21/2017            Who to contact     If you have questions or need follow up information about today's clinic visit or your schedule please contact [facility] AND  "INFECTIOUS DISEASES directly at [phone].  Normal or non-critical lab and imaging results will be communicated to you by MyChart, letter or phone within 4 business days after the clinic has received the results. If you do not hear from us within 7 days, please contact the clinic through Inter[employer] or phone. If you have a critical or abnormal lab result, we will notify you by phone as soon as possible.  Submit refill requests through tadoÂ° or call your pharmacy and they will forward the refill request to us. Please allow 3 business days for your refill to be completed.          Additional Information About Your Visit        [employer] Met[employer] Information     tadoÂ° gives you secure access to your electronic health record. If you see a primary care provider, you can also send messages to your care team and make appointments. If you have questions, please call your primary care clinic.  If you do not have a primary care provider, please call [phone] and they will assist you.        Care EveryWhere ID     This is your Care EveryWhere ID. This could be used by other organizations to access your [facility] medical records  [id number]        Your Vitals Were     Pulse Temperature Height Last Period Pulse Oximetry Breastfeeding?    74 97.5  F (36.4  C) (Oral) 1.549 m (5' 1\") 11/13/2017 (Approximate) 100% No    BMI (Body Mass Index)                   21.03 kg/m2            Blood Pressure from Last 3 Encounters:   12/21/17 123/82   10/12/17 128/84   06/29/17 134/76    Weight from Last 3 Encounters:   12/21/17 50.5 kg (111 lb 4.8 oz)   10/12/17 50 kg (110 lb 3.2 oz)   06/29/17 46.4 kg (102 lb 6.4 oz)              We Performed the Following     Chlamydia PCR (set at Rectal) (RND951)     Gonorrhea PCR (set at Rectal) (RCS0346)        Primary Care Provider Office Phone # Fax #    [facility] [phone]       [address] [address]        Equal Access to Services     TED YATES AH: Gumaroii amanda tidwell " rafael Oliveros, wagriseldada luqadaha, qaybta kaalmada abelcheli, mele rejiin hayaaesteban romanflaca tatojordan larissaesteban anil. So [facility] [phone].    ATENCIÓN: Si habla español, tiene a bird disposición servicios gratuitos de asistencia lingüística. Pam al [phone].    We comply with applicable federal civil rights laws and [place] laws. We do not discriminate on the basis of race, color, national origin, age, disability, sex, sexual orientation, or gender identity.            Thank you!     Thank you for choosing [facility] AND INFECTIOUS DISEASES  for your care. Our goal is always to provide you with excellent care. Hearing back from our patients is one way we can continue to improve our services. Please take a few minutes to complete the written survey that you may receive in the mail after your visit with us. Thank you!             Your Updated Medication List - Protect others around you: Learn how to safely use, store and throw away your medicines at www.disposemymeds.org.          This list is accurate as of: 12/21/17 11:57 AM.  Always use your most recent med list.                   Brand Name Dispense Instructions for use Diagnosis    abacavir-dolutegravir-LamiVUDine 600- MG per tablet    TRIUMEQ    30 tablet    Take 1 tablet by mouth daily    HIV disease (H)       COMPOUNDED NON-CONTROLLED SUBSTANCE - PHARMACY TO MIX COMPOUNDED MEDICATION    CMPD RX    600 mL    Rifampin 25mg/ml suspension, 500 mg (20ml), once daily    LTBI (latent tuberculosis infection)       dolutegravir 50 MG tablet    TIVICAY    30 tablet    Take 1 tablet (50 mg) by mouth At Bedtime    Human immunodeficiency virus I infection (H)       ENSURE PLUS Liqd     9480 mL    TAKE ONE TO TWO BOTTLES BY MOUTH ONCE EVERY DAY    Loss of weight, Pregnancy

## 2017-12-21 NOTE — PROGRESS NOTES
"  Infectious Disease Clinic  HIV Follow Up Visit    Chief Complaint:  RECHECK ( follow up HIV)    HPI:  Marina Mock is a 37 year old female with history of HIV and latent TB that presents for routine follow-up.  Patient last seen in October 2017.  At that visit, her isoniazid was switched to rifampin for treatment of latent TB as the patient reported intolerable GI side effects related to isoniazid.  She was also started on Triumeq, with plans to continue on Tivicay in the evenings with rifampin.  Patient reports that she obtained a prescription for rifampin approximately 2 weeks after her visit in October, and took the medication for only 4-5 days.  She reports that she felt \"disorganized\", fatigued, and had poor p.o. intake while on rifampin.  She is discouraged because she feels that she cannot tolerate any TB medications.  She reports strict compliance with her Triumeq therapy.  She denies any missed doses.  The only noticeable side effect she reports his nausea.  She has not been taking Tivicay, as it was her understanding that she should take this with rifampin, which she has not been taking.  It was reiterated that this is correct.  Patient reports overall feeling well since her last visit.  She denies fevers, chills, night sweats, weight changes.  No shortness of breath, cough, hemoptysis, pleuritic pain, or chest pain.  She denies vomiting, abdominal pain, dysphagia/odynophagia, oral ulcers, or rashes.  She understands the risks of pregnancy while on Triumeq, and has not engaged in any sexual activity.  He has no other complaints or concerns today.    Of note, patient reports that she had a recent blood TB test 1 month ago that was negative.  We do not have records of this in our system.    ROS: Complete 12-point ROS is negative except as noted above.    Past Medical History:  Past Medical History:   Diagnosis Date     Malaria      Miscarriage      TB (pulmonary tuberculosis)     latent "       Social History:  Social History     Social History     Marital status:      Spouse name: Chris Roldan     Number of children: N/A     Years of education: N/A     Occupational History     Not on file.     Social History Main Topics     Smoking status: Never Smoker     Smokeless tobacco: Never Used     Alcohol use No     Drug use: No     Sexual activity: Yes     Partners: Male     Other Topics Concern     Not on file     Social History Narrative       Family Medical History:  Reviewed and unchanged from prior.    Allergies:   No Known Allergies    Medications:  Current Outpatient Prescriptions   Medication Sig Dispense Refill     COMPOUNDED NON-CONTROLLED SUBSTANCE (CMPD RX) - PHARMACY TO MIX COMPOUNDED MEDICATION Rifampin 25mg/ml suspension, 500 mg (20ml), once daily 600 mL 3     dolutegravir (TIVICAY) 50 MG tablet Take 1 tablet (50 mg) by mouth At Bedtime 30 tablet 5     abacavir-dolutegravir-LamiVUDine (TRIUMEQ) 600- MG per tablet Take 1 tablet by mouth daily 30 tablet 11     Nutritional Supplements (ENSURE PLUS) LIQD TAKE ONE TO TWO BOTTLES BY MOUTH ONCE EVERY DAY 9480 mL 6       Immunizations:  Immunization History   Administered Date(s) Administered     Influenza Vaccine IM 3yrs+ 4 Valent IIV4 10/12/2017     Pneumo Conj 13-V (2010&after) 03/16/2017     TDAP Vaccine (Boostrix) 08/15/2016       Exam:  B/P: 123/82, T: 97.5, P: 74, R: Data Unavailable, Weight:   Wt Readings from Last 2 Encounters:   12/21/17 50.5 kg (111 lb 4.8 oz)   10/12/17 50 kg (110 lb 3.2 oz)   General: Petite female in no acute distress, nontoxic appearing, pleasant and cooperative.  She is well groomed and dressed.  HEENT: Normocephalic, atraumatic.  Moist mucous membranes.  No oral ulcers or thrush.  Sclera anicteric.  Neck: Supple, no cervical lymphadenopathy.  Cardiac: Regular rate and rhythm, normal S1 and S2, no murmurs.  Respiratory: Nonlabored, clear to auscultation bilaterally.  Abdomen: Soft, nontender,  nondistended, bowel sounds present.  : Deferred.  Extremities: Warm and well perfused.  No edema.  Skin: No obvious rash or jaundice.  Neuro: Alert and interacting appropriately, moving all extremities spontaneously.  Psych: Appropriate affect.      Labs:  T Cell Subset:  Absolute CD4   Date Value Ref Range Status   01/05/2017 648 441 - 2156 cells/uL Final     CD4 [place] T   Date Value Ref Range Status   01/05/2017 24 (L) 28 - 63 % Final     CD8 Suppressor T   Date Value Ref Range Status   01/05/2017 44 (H) 10 - 40 % Final     CD3 Mature T   Date Value Ref Range Status   01/05/2017 71 49 - 84 % Final     CD4:CD8 Ratio   Date Value Ref Range Status   01/05/2017 0.55 (L) 1.40 - 2.60 Final     WBC   Date Value Ref Range Status   03/16/2017 4.3 4.0 - 11.0 10e9/L Final     % Lymphocytes   Date Value Ref Range Status   09/19/2016 35.9 % Final     Absolute CD3   Date Value Ref Range Status   01/05/2017 1880 603 - 2990 cells/uL Final     Absolute CD8   Date Value Ref Range Status   01/05/2017 1161 125 - 1312 cells/uL Final       HIV-1 RNA Quantitative:  HIV-1 RNA Quant Result   Date Value Ref Range Status   06/29/2017 (A) HIVND [Copies]/mL Final    <20  HIV-1 RNA Detected, less than 20 HIV-1 RNA copies/mL   The NATASHA AmpliPrep/NATASHA TaqMan HIV-1 test is an FDA-approved in vitro nucleic   acid amplification test for the quantitation of HIV-1 RNA in human plasma (EDTA   plasma) using the ANTASHA AmpliPrep instrument for automated viral nucleic acid   extraction and the NATASHA TaqMan Analyzer or NATASHA TaqMan for automated Real   Time PCR amplification and detection of the viral nucleic acid target.   Titer results are reported in copies/ml. This assay is intended for use in   conjunction with clinical presentation and other laboratory markers of disease   prognosis and for use as an aid in assessing viral response to antiretroviral   treatment as measured by changes in plasma HIV-1 RNA levels. This test should   not be used as  a donor screening test to confirm the presence of HIV-1   infection.         Assessment and Plan:  Marina Mock is a 37 year old female with history of HIV and latent TB that presents for routine follow-up.     1. HIV: Most recent viral load in June 2017 was less than 20.  Last CD4 count was in January 2017 that showed absolute CD4 count of 648, CD4 percentage of 24%.  She was previously on Descovy and Dolutegravir. This was changed to Triumeq at last visit given.  Patient reports compliance with this regimen.  She denies any missed doses.  Only noticeable side effect is nausea.  Today, we will obtain complete blood count with differential, complete metabolic panel, HIV viral load, CD4 count.  She understands the risks of Triumeq therapy with pregnancy, and and reports that she is currently not sexually active.  Will perform routine STI testing today.    2. Latent TB infection: Previously was on isoniazid therapy, which was discontinued due to GI side effects.  Decision was made to initiate rifampin therapy at last visit in October 2017, but patient reports that she is also not able to tolerate this medication due to poor appetite, fatigue, and feeling disorganized.  She continues to deny any symptoms of active TB infection.    3. Chronic microcytic anemia: Hemoglobin appears to be within the 9-10 range, with MCV in the mid 70s.  Previous iron studies consistent with iron deficiency anemia.  Patient currently not on any iron replacement.  We will recheck iron studies today.    4. Preventative health:  Cardiovascular Sal -                          Lipids - We had deferred during pregnancy.  Will check today                         Blood Pressure - /82    Diabetes screen- we will check hemoglobin A1c today.  Cancer Screening                         Colon - No indication for screening at present                         Cervical - Up to date  Immunizations                         Hepatitis A - Ab  positive 3/2016                         Hepatitis B - Surface Ab/Ag and core Ab negative, Will start vaccination series.                          Influenza - Will do today                         Pneumovax/Prevnar - Had PCV on 3/16/2017, Will administer PSV23 at next visit                         Tdap - 8/15/2016  Bone Health - No screening indicated at this time  Renal Health - 8/2016 - no proteinuria, Creatinine has been normal  Sexually Transmitted Infection Risk and Screening                         Gonorrhea and Chlamydia - Negative 2/2016                         Syphilis - Negative T pallidum Ab 9/2016      Follow up will be in 3 months    Patient seen and discussed with Dr. Cosme, who agrees with assessment and plan above.    Ulices Rankin, DO  PGY-3 Internal Medicine  [phone]

## 2017-12-22 LAB
C TRACH DNA SPEC QL NAA+PROBE: NEGATIVE
HIV1 RNA # PLAS NAA DL=20: NORMAL {COPIES}/ML
HIV1 RNA SERPL NAA+PROBE-LOG#: NORMAL {LOG_COPIES}/ML
N GONORRHOEA DNA SPEC QL NAA+PROBE: NEGATIVE
SPECIMEN SOURCE: NORMAL
SPECIMEN SOURCE: NORMAL
STFR SERPL-SCNC: 9.9 MG/L (ref 1.9–4.4)
T PALLIDUM IGG+IGM SER QL: NEGATIVE

## 2017-12-27 NOTE — PROGRESS NOTES
Attestation:  This patient has been seen and evaluated by me, Claudia Bingham MD with the resident. This note reflects my history, examination and assessment. The plan was discussed with the patient. Briefly, Ms. Ben Mock is a patient I am managing with HIV. She is doing well from the standpoint of her HIV, but she has been unable to tolerate INH and now Rifampin therapy for LTBI.  I counseled Ms. Ben Mock about the long term risk of LTBI in the setting of HIV. This includes a very high annual rate conversion to active TB including a lifetime risk near 100%.  I also counseled her on the risk of infection of a young child if she develops active disease.  She currently has a child under the age of 1 year old.  We will have her follow up in a few weeks to reevaluate starting therapy including trying INH again which was initially started when she was pregnant. I will plan to do this in coordination with Harmony Connor who has been helping manage medication side effects and dosing. I have reviewed today's vital signs, medications, labs and imaging.     Claudia Cosme MD MPH  Infectious Diseases  Pager [phone]

## 2018-01-24 ENCOUNTER — TELEPHONE (OUTPATIENT)
Dept: INFECTIOUS DISEASES | Facility: CLINIC | Age: 38
End: 2018-01-24

## 2018-01-25 ENCOUNTER — TELEPHONE (OUTPATIENT)
Dept: PHARMACY | Facility: CLINIC | Age: 38
End: 2018-01-25

## 2018-01-26 ENCOUNTER — TELEPHONE (OUTPATIENT)
Dept: PHARMACY | Facility: CLINIC | Age: 38
End: 2018-01-26

## 2018-01-26 NOTE — TELEPHONE ENCOUNTER
Tenzin for Marina to call me. She met with her MAP  yesterday to get her insurance reinstated and I wanted an update.

## 2018-02-05 ENCOUNTER — TELEPHONE (OUTPATIENT)
Dept: PHARMACY | Facility: CLINIC | Age: 38
End: 2018-02-05

## 2018-02-05 NOTE — TELEPHONE ENCOUNTER
Pt called to order refills and Ensure. Only has 2 doses left.  Will  2 prescriptions address has been verified.     Follow up: 02/27/18    Michelle Ricci, [facility]  [phone]

## 2018-03-01 ENCOUNTER — TELEPHONE (OUTPATIENT)
Dept: PHARMACY | Facility: CLINIC | Age: 38
End: 2018-03-01

## 2018-03-01 NOTE — TELEPHONE ENCOUNTER
Called pt's CM at [facility] to talk about ongoing insurance issues. Pt is due for refills.     Follow up: 03/02/18      Michelle Ricci [facility]  [phone]

## 2018-03-06 ENCOUNTER — TELEPHONE (OUTPATIENT)
Dept: PHARMACY | Facility: CLINIC | Age: 38
End: 2018-03-06

## 2018-03-06 NOTE — TELEPHONE ENCOUNTER
Attempted to contact the patient to for refill reminder call,  left message on voicemail    Follow up date:  3/8/1/8    Jimbo

## 2018-04-02 ENCOUNTER — TELEPHONE (OUTPATIENT)
Dept: PHARMACY | Facility: CLINIC | Age: 38
End: 2018-04-02

## 2018-04-02 NOTE — TELEPHONE ENCOUNTER
Attempted to contact the patient to for refill reminder call,  left message on voicemail    Follow up date:  4/5/18    gareth

## 2018-04-04 ENCOUNTER — TELEPHONE (OUTPATIENT)
Dept: INFECTIOUS DISEASES | Facility: CLINIC | Age: 38
End: 2018-04-04

## 2018-04-05 ENCOUNTER — OFFICE VISIT (OUTPATIENT)
Dept: INFECTIOUS DISEASES | Facility: CLINIC | Age: 38
End: 2018-04-05
Attending: INTERNAL MEDICINE
Payer: MEDICAID

## 2018-04-05 ENCOUNTER — TELEPHONE (OUTPATIENT)
Dept: PHARMACY | Facility: CLINIC | Age: 38
End: 2018-04-05

## 2018-04-05 ENCOUNTER — OFFICE VISIT (OUTPATIENT)
Dept: PHARMACY | Facility: CLINIC | Age: 38
End: 2018-04-05
Payer: MEDICAID

## 2018-04-05 VITALS
WEIGHT: 115.8 LBS | SYSTOLIC BLOOD PRESSURE: 128 MMHG | HEIGHT: 61 IN | TEMPERATURE: 98 F | BODY MASS INDEX: 21.86 KG/M2 | OXYGEN SATURATION: 100 % | HEART RATE: 102 BPM | DIASTOLIC BLOOD PRESSURE: 71 MMHG

## 2018-04-05 DIAGNOSIS — Z21 HUMAN IMMUNODEFICIENCY VIRUS I INFECTION (H): Primary | ICD-10-CM

## 2018-04-05 DIAGNOSIS — D50.9 IRON DEFICIENCY ANEMIA, UNSPECIFIED IRON DEFICIENCY ANEMIA TYPE: ICD-10-CM

## 2018-04-05 DIAGNOSIS — Z21 HUMAN IMMUNODEFICIENCY VIRUS I INFECTION (H): ICD-10-CM

## 2018-04-05 DIAGNOSIS — Z00.00 PREVENTATIVE HEALTH CARE: Primary | ICD-10-CM

## 2018-04-05 DIAGNOSIS — E63.9 NUTRITIONAL DEFICIENCY: ICD-10-CM

## 2018-04-05 DIAGNOSIS — Z22.7 LTBI (LATENT TUBERCULOSIS INFECTION): ICD-10-CM

## 2018-04-05 PROCEDURE — 25000128 H RX IP 250 OP 636: Mod: ZF | Performed by: INTERNAL MEDICINE

## 2018-04-05 PROCEDURE — G0009 ADMIN PNEUMOCOCCAL VACCINE: HCPCS | Mod: ZF

## 2018-04-05 PROCEDURE — G0463 HOSPITAL OUTPT CLINIC VISIT: HCPCS | Mod: 25,ZF

## 2018-04-05 PROCEDURE — 90732 PPSV23 VACC 2 YRS+ SUBQ/IM: CPT | Mod: ZF | Performed by: INTERNAL MEDICINE

## 2018-04-05 PROCEDURE — 99607 MTMS BY PHARM ADDL 15 MIN: CPT | Performed by: PHARMACIST

## 2018-04-05 PROCEDURE — 99606 MTMS BY PHARM EST 15 MIN: CPT | Performed by: PHARMACIST

## 2018-04-05 RX ORDER — VITAMIN A, ASCORBIC ACID, CHOLECALCIFEROL, .ALPHA.-TOCOPHEROL ACETATE, DL-, THIAMINE MONONITRATE, RIBOFLAVIN, NIACINAMIDE, PYRIDOXINE HYDROCHLORIDE, FOLIC ACID, CYANOCOBALAMIN, CALCIUM CARBONATE, IRON, ZINC OXIDE, AND CUPRIC OXIDE 4000; 120; 400; 22; 1.84; 3; 20; 10; 1; 12; 200; 29; 25; 2 [IU]/1; MG/1; [IU]/1; [IU]/1; MG/1; MG/1; MG/1; MG/1; MG/1; UG/1; MG/1; MG/1; MG/1; MG/1
1 TABLET ORAL DAILY
Qty: 30 TABLET | Refills: 11 | Status: SHIPPED | OUTPATIENT
Start: 2018-04-05 | End: 2018-08-09

## 2018-04-05 RX ADMIN — PNEUMOCOCCAL VACCINE POLYVALENT 0.5 ML
25; 25; 25; 25; 25; 25; 25; 25; 25; 25; 25; 25; 25; 25; 25; 25; 25; 25; 25; 25; 25; 25; 25 INJECTION, SOLUTION INTRAMUSCULAR; SUBCUTANEOUS at 11:38

## 2018-04-05 ASSESSMENT — PAIN SCALES - GENERAL: PAINLEVEL: NO PAIN (0)

## 2018-04-05 NOTE — NURSING NOTE
Chief Complaint   Patient presents with     RECHECK     B20   Pt roomed, vitals, meds, and allergies reviewed with pt. Pt ready for provider.  Dino Betts, CMA

## 2018-04-05 NOTE — MR AVS SNAPSHOT
After Visit Summary   4/5/2018    Marina Mock    MRN: [id number]           Patient Information     Date Of Birth          1980        Visit Information        Provider Department      4/5/2018 11:00 AM Harmony Connor RPH [facility] and Infectious Diseases [facility]        Care Instructions    Recommendations from today's MTM visit:                                                        1. Start Descovy and Tivicay today, then tomorrow, start taking them in the morning.    2. Prenatal vitamin at bedtime with some food.         Next MTM visit: May 16 th at 12:00    To schedule another MTM appointment, please call the clinic directly or you may call the MTM scheduling line at [phone] or toll-free at [phone].     My Clinical Pharmacist's contact information:                                                      It was a pleasure seeing you today!  Please feel free to contact me with any questions or concerns you have.      MAUDE Mccormick Pharmacist.   [phone]      You may receive a survey about the MTM services you received.  I would appreciate your feedback to help me serve you better in the future. Please fill it out and return it when you can. Your comments will be anonymous.                      Follow-ups after your visit        Your next 10 appointments already scheduled     May 16, 2018 12:00 PM CDT   (Arrive by 11:45 AM)   SHORT with Harmony Connor RPH   [facility] and Infectious Diseases [facility] ([email or web address] and Surgery [place])    [address] [place] [address]  [address]  [facility] [address] [phone]              Who to contact     If you have questions or need follow up information about today's clinic visit or your schedule please contact [facility] AND INFECTIOUS DISEASES [facility] directly at [phone].  Normal or non-critical lab and imaging results will be communicated to you by MyChart, letter or phone within 4  business days after the clinic has received the results. If you do not hear from us within 7 days, please contact the clinic through [employer] or phone. If you have a critical or abnormal lab result, we will notify you by phone as soon as possible.  Submit refill requests through [employer] or call your pharmacy and they will forward the refill request to us. Please allow 3 business days for your refill to be completed.          Additional Information About Your Visit        [employer] Information     [employer] gives you secure access to your electronic health record. If you see a primary care provider, you can also send messages to your care team and make appointments. If you have questions, please call your primary care clinic.  If you do not have a primary care provider, please call [phone] and they will assist you.        Care EveryWhere ID     This is your Care EveryWhere ID. This could be used by other organizations to access your [place] medical records  [id number]         Blood Pressure from Last 3 Encounters:   04/05/18 128/71   12/21/17 123/82   10/12/17 128/84    Weight from Last 3 Encounters:   04/05/18 115 lb 12.8 oz (52.5 kg)   12/21/17 111 lb 4.8 oz (50.5 kg)   10/12/17 110 lb 3.2 oz (50 kg)              Today, you had the following     No orders found for display         Today's Medication Changes          These changes are accurate as of 4/5/18 11:47 AM.  If you have any questions, ask your nurse or doctor.               Start taking these medicines.        Dose/Directions    emtricitabine-tenofovir -25 MG per tablet   Commonly known as:  DESCOVY   Used for:  Human immunodeficiency virus I infection (H)   Started by:  Claudia Cosme MD        Dose:  1 tablet   Take 1 tablet by mouth daily   Quantity:  30 tablet   Refills:  11       PRENATAL PLUS IRON 29-1 MG Tabs   Used for:  Iron deficiency anemia, unspecified iron deficiency anemia type   Started by:  Claudia Cosme  MD Oskar        Dose:  1 tablet   Take 1 tablet by mouth daily   Quantity:  30 tablet   Refills:  11         Stop taking these medicines if you haven't already. Please contact your care team if you have questions.     abacavir-dolutegravir-LamiVUDine 600- MG per tablet   Commonly known as:  TRIUMEQ   Stopped by:  Claudia Cosme MD                Where to get your medicines      These medications were sent to [facility] [address]  [address], [facility] [address]    Hours:  TRANSPLANT PHONE NUMBER [phone] Phone:  [phone]     dolutegravir 50 MG tablet    emtricitabine-tenofovir -25 MG per tablet    PRENATAL PLUS IRON 29-1 MG Tabs                Primary Care Provider Office Phone # Fax #    Clinic [facility] [phone]       [address] [facility] [address]        Equal Access to Services     TED YATES AH: Hadii aad ku hadasho Soomaali, waaxda luqadaha, qaybta kaalmada adeegyada, waxay idiin hayaan treasure kharaeliana patricio . So [facility] [phone].    ATENCIÓN: Si habla español, tiene a bird disposición servicios gratuitos de asistencia lingüística. Llame al [phone].    We comply with applicable federal civil rights laws and [place] laws. We do not discriminate on the basis of race, color, national origin, age, disability, sex, sexual orientation, or gender identity.            Thank you!     Thank you for choosing [facility] AND INFECTIOUS DISEASES [facility]  for your care. Our goal is always to provide you with excellent care. Hearing back from our patients is one way we can continue to improve our services. Please take a few minutes to complete the written survey that you may receive in the mail after your visit with us. Thank you!             Your Updated Medication List - Protect others around you: Learn how to safely use, store and throw away your medicines at  www.disposemymeds.org.          This list is accurate as of 4/5/18 11:47 AM.  Always use your most recent med list.                   Brand Name Dispense Instructions for use Diagnosis    dolutegravir 50 MG tablet    TIVICAY    30 tablet    Take 1 tablet (50 mg) by mouth At Bedtime    Human immunodeficiency virus I infection (H)       emtricitabine-tenofovir -25 MG per tablet    DESCOVY    30 tablet    Take 1 tablet by mouth daily    Human immunodeficiency virus I infection (H)       ENSURE PLUS Liqd     9480 mL    TAKE ONE TO TWO BOTTLES BY MOUTH ONCE EVERY DAY    Loss of weight, Pregnancy       PRENATAL PLUS IRON 29-1 MG Tabs     30 tablet    Take 1 tablet by mouth daily    Iron deficiency anemia, unspecified iron deficiency anemia type

## 2018-04-05 NOTE — PROGRESS NOTES
[facility] - Progress Note  Dr. Claudia Cosme, [facility], [facility], [address] [place] [address], Sixth Floor, [address]  [place] [address]  Patient:  Marina Contreras, Date of birth 1980, Medical record number [id number]  Date of Visit:  2018         Assessment and Recommendations:     Human immunodeficiency virus (HIV) disease (H)  Ms. Ben Mock is currently on Triumeq. This switch was just made for the purpose of ease of dosing.  At the time of the switch, we discussed that this was not a pregnancy friendly regimen.  She has been thinking about this and today she said that she is interested in having further children although she does not yet have immediate plans to do so.  I am going to switch her back to Descovy and Dolutegravir, so that she is off Abacavir.  When she would like to start trying for children, I will plan to change her to an approved regimen for pregnancy.     Anemia, microcytic  Reviewing dosing and medication interactions, we will start the patient on an iron supplemented  vitamin.  Unfortunately more frequent dosing will interact with her dolutegravir, so we will start with this and increase her iron if she does not have a rise in her hemoglobin.     LTBI (latent tuberculosis infection)  Ms. Ben Mock is not willing to undergo therapy for LTBI at this time based on the side effects that she has experienced with both rifampin and isoniazid. We discussed this again today. She is aware that the risk this poses to herself given her HIV.  She also understand that active tuberculosis exposure to children under 5 is high risk.      Preventative Medicine  Cardiovascular Sal -    Lipids - 17 - Total Chol 212, HDL 51   Blood Pressure - /71 today.  Will continue to follow.   Cancer Screening   Colon - No indication for screening at present   Cervical -  Up to date  Immunizations   Hepatitis A - Ab positive 3/2016   Hepatitis B - Surface Ab/Ag and core Ab negative, Will start vaccination series.    Influenza - Will do today   Pneumovax/Prevnar - Had PCV on 3/16/2017, Will administer PSV23 at next visit   Tdap - 8/15/2016  Bone Health - No screening indicated at this time  Renal Health - 8/2016 - no proteinuria, Creatinine has been normal  Sexually Transmitted Infection Risk and Screening   Gonorrhea and Chlamydia - Negative 2/2016   Syphilis - Negative T pallidum Ab 9/2016    Claudia Cosme MD  Division of Infectious Diseases and International Medicine  [phone]         History of Infectious Disease Illness:   Ms. Gray is a 37 year old woman who I am following for HIV infection. Today, she said that things are going really well.  She is feeling healthy.  She has been taking her medications without significant side effects including nausea, vomiting, diarrhea or rash.  Today we discussed her future plans for pregnancy.  She does express interest in eventually having another child.  At one point, I was providing PrEP to her , but he has since stopped taking it.  She had reported that they did have one episode of unprotected sex, but she had been taking all of her medications and she has been virally suppressed.  I counseled her that this was likely a low risk encounter, but that regardless of their practices, if they are sexually active, he should be getting regular HIV tests.  We can help him with this or he can get this testing with his primary care physician.         Past Medical and Surgical History:     Past Medical History:   Diagnosis Date     Malaria      Miscarriage      TB (pulmonary tuberculosis)     latent     Past Surgical History:   Procedure Laterality Date     NO HISTORY OF SURGERY           Family History:     Family History   Problem Relation Age of Onset     Family History Negative Other      Hypertension Father       "Hypertension Other      CANCER No family hx of      DIABETES No family hx of      Macular Degeneration No family hx of      Retinal detachment No family hx of            Social History:     Social History   Substance Use Topics     Smoking status: Never Smoker     Smokeless tobacco: Never Used     Alcohol use No     Social History     Social History Narrative          Review of Systems:   CONSTITUTIONAL:  No fevers or chills  EYES: negative for icterus  ENT: No hearing or vision issues.   RESPIRATORY:  negative for cough, sputum or dyspnea  CARDIOVASCULAR:  Positive for chest pain, negaitve for palpitations  GASTROINTESTINAL:   Negative for nausea, diarrhea or constipation  GENITOURINARY:  Negative for dysuria.   INTEGUMENT:  negative for rash or pruritus  NEURO:  Negative for headache  HEME: No easy bruising. + history of anemia, + fatigue.          Current Medications:     Current Outpatient Prescriptions   Medication Sig Dispense Refill     dolutegravir (TIVICAY) 50 MG tablet Take 1 tablet (50 mg) by mouth At Bedtime 30 tablet 11     Prenatal Vit-Iron Carbonyl-FA (PRENATAL PLUS IRON) 29-1 MG TABS Take 1 tablet by mouth daily 30 tablet 11     emtricitabine-tenofovir AF (DESCOVY) 200-25 MG per tablet Take 1 tablet by mouth daily 30 tablet           Immunization History:     Immunization History   Administered Date(s) Administered     Influenza Vaccine IM 3yrs+ 4 Valent IIV4 10/12/2017     Pneumo Conj 13-V (2010&after) 03/16/2017     Pneumococcal 23 valent 04/05/2018     TDAP Vaccine (Boostrix) 08/15/2016            Allergies:   No Known Allergies         Physical Exam:   Vital signs:  /71  Pulse 102  Temp 98  F (36.7  C) (Oral)  Ht 1.549 m (5' 1\")  Wt 52.5 kg (115 lb 12.8 oz)  SpO2 100%  BMI 21.88 kg/m2  Physical Examination:  GENERAL:  well-developed, well-nourished.  HEENT:  Head is normocephalic, atraumatic   EYES:  Eyes have anicteric sclerae without conjunctival injection   NECK: Supple, No " lymphadenopathy  LYMPH: No axillary LAD  LUNGS:  Clear to auscultation bilateral.   CARDIOVASCULAR:  Regular rate and rhythm with no murmurs, gallops or rubs.  ABDOMEN:  Normal bowel sounds, soft, nontender.   SKIN:  No acute rashes.    NEUROLOGIC:  Grossly nonfocal. Active x4 extremities         Laboratory Data:     Metabolic Studies       Recent Labs   Lab Test  12/21/17   1223  03/16/17   1137  01/05/17   1127  09/21/16 1904  08/25/16   1127  06/30/16   1203  03/31/16   1027   NA  138  141  139   --   136  133  135   POTASSIUM  4.2  3.9  4.0   --   3.8  4.0  3.8   CHLORIDE  106  106  105   --   105  104  102   CO2  26  27  26   --   21  23  19*   ANIONGAP  7  8  8   --   10  6  13   BUN  9  7  10   --   6*  6*  3*   CR  0.79  0.73  0.73  0.73  0.50*  0.52  0.53   GFRESTIMATED  83  >90  Non  GFR Calc    >90  Non  GFR Calc    >90  Non  GFR Calc    >90  Non  GFR Calc    >90  Non  GFR Calc    >90  Non  GFR Calc     GLC  96  82  103*   --   83  137*  86   A1C  5.9   --    --    --    --    --    --    JANICE  8.7  8.8  9.0   --   9.5  9.2  10.0       Hepatic Studies    Recent Labs   Lab Test  12/21/17   1223  03/16/17   1137  01/05/17   1127  09/21/16   1904  08/25/16   1127  06/30/16   1203  03/31/16   1027   BILITOTAL  0.2  0.2  0.2   --   0.2  0.2  0.2   ALKPHOS  58  72  82   --   92  64  56   ALBUMIN  3.9  3.8  3.7   --   3.0*  3.0*  3.1*   AST  20  22  31  22  15  21  26   ALT  20  24  32  13  16  20  27       Hematology Studies      Recent Labs   Lab Test  12/21/17   1223  03/16/17   1137  01/05/17   1127  09/21/16   1904  09/19/16   1747  08/25/16   1127   03/03/16   1226  08/14/15   2031   WBC  4.2  4.3  5.1  14.6*  5.6  5.0   < >  8.2  5.7   ANEU   --    --    --    --   3.0   --    --   4.7  3.4   ALYM   --    --    --    --   2.0   --    --   2.7  1.8   CHUCKY   --    --    --    --   0.6   --    --   0.7  0.5    AEOS   --    --    --    --   0.1   --    --   0.0  0.0   HGB  10.1*  10.3*  11.4*  9.3*  10.7*  9.9*   < >  10.7*  10.2*   HCT  32.6*  32.2*  35.9  28.7*  32.1*  30.7*   < >  34.4*  30.1*   PLT  345  247  298  174  181  233   < >  308  251    < > = values in this interval not displayed.       Hepatitis B Testing     Recent Labs   Lab Test  03/03/16   1226   HBCAB  Nonreactive   HEPBANG  Nonreactive     Imaging:  No results found for this or any previous visit (from the past 744 hour(s)).

## 2018-04-05 NOTE — MR AVS SNAPSHOT
After Visit Summary   4/5/2018    Marina Mock    MRN: [id number]           Patient Information     Date Of Birth          1980        Visit Information        Provider Department      4/5/2018 10:30 AM Claudia Cosme MD [facility] and Infectious Diseases        Today's Diagnoses     Preventative health care    -  1    Human immunodeficiency virus I infection (H)        Iron deficiency anemia, unspecified iron deficiency anemia type        LTBI (latent tuberculosis infection)           Follow-ups after your visit        Your next 10 appointments already scheduled     May 16, 2018 11:30 AM CDT   Lab with  LAB   [facility] ([facility])    [address]  1st Floor  [facility] [address] [phone]            May 16, 2018 12:00 PM CDT   (Arrive by 11:45 AM)   [facility]    [address]  [address]  [facility] [address] [phone]              Who to contact     If you have questions or need follow up information about today's clinic visit or your schedule please contact [facility] AND INFECTIOUS DISEASES directly at [phone].  Normal or non-critical lab and imaging results will be communicated to you by MyChart, letter or phone within 4 business days after the clinic has received the results. If you do not hear from us within 7 days, please contact the clinic through MyChart or phone. If you have a critical or abnormal lab result, we will notify you by phone as soon as possible.  Submit refill requests through [employer] or call your pharmacy and they will forward the refill request to us. Please allow 3 business days for your refill to be completed.          Additional Information About Your Visit        dot429harThe [employer] Information     [employer] gives you secure access to your electronic  "health record. If you see a primary care provider, you can also send messages to your care team and make appointments. If you have questions, please call your primary care clinic.  If you do not have a primary care provider, please call [phone] and they will assist you.        Care EveryWhere ID     This is your Care EveryWhere ID. This could be used by other organizations to access your [facility] medical records  [id number]        Your Vitals Were     Pulse Temperature Height Pulse Oximetry BMI (Body Mass Index)       102 98  F (36.7  C) (Oral) 1.549 m (5' 1\") 100% 21.88 kg/m2        Blood Pressure from Last 3 Encounters:   04/05/18 128/71   12/21/17 123/82   10/12/17 128/84    Weight from Last 3 Encounters:   04/05/18 52.5 kg (115 lb 12.8 oz)   12/21/17 50.5 kg (111 lb 4.8 oz)   10/12/17 50 kg (110 lb 3.2 oz)              Today, you had the following     No orders found for display         Today's Medication Changes          These changes are accurate as of 4/5/18 11:59 PM.  If you have any questions, ask your nurse or doctor.               Start taking these medicines.        Dose/Directions    PRENATAL PLUS IRON 29-1 MG Tabs   Used for:  Iron deficiency anemia, unspecified iron deficiency anemia type   Started by:  Claudia Cosme MD        Dose:  1 tablet   Take 1 tablet by mouth daily   Quantity:  30 tablet   Refills:  11         Stop taking these medicines if you haven't already. Please contact your care team if you have questions.     abacavir-dolutegravir-LamiVUDine 600- MG per tablet   Commonly known as:  TRIUMEQ   Stopped by:  Claudia Cosme MD                Where to get your medicines      These medications were sent to [facility] [place], [place] - [address] [place] [address] [address] [address]    Hours:  TRANSPLANT PHONE NUMBER [phone] Phone:  [phone]     dolutegravir 50 MG tablet    PRENATAL " PLUS IRON 29-1 MG Tabs                Primary Care Provider Office Phone # Fax #    Clinic [facility] [phone]       [address] [facility] [facility] [address]        Equal Access to Services     TED YATES : [place] tidwell navido Soomaali, waaxda luqadaha, qaybta kaalmada adeisakda, mele acosta laEricaanu ma. So [facility] [phone].    ATENCIÓN: Si habla español, tiene a bird disposición servicios gratuitos de asistencia lingüística. Llame al [phone].    We comply with applicable federal civil rights laws and [place] laws. We do not discriminate on the basis of race, color, national origin, age, disability, sex, sexual orientation, or gender identity.            Thank you!     Thank you for choosing [facility] AND INFECTIOUS DISEASES  for your care. Our goal is always to provide you with excellent care. Hearing back from our patients is one way we can continue to improve our services. Please take a few minutes to complete the written survey that you may receive in the mail after your visit with us. Thank you!             Your Updated Medication List - Protect others around you: Learn how to safely use, store and throw away your medicines at www.disposemymeds.org.          This list is accurate as of 4/5/18 11:59 PM.  Always use your most recent med list.                   Brand Name Dispense Instructions for use Diagnosis    dolutegravir 50 MG tablet    TIVICAY    30 tablet    Take 1 tablet (50 mg) by mouth At Bedtime    Human immunodeficiency virus I infection (H)       emtricitabine-tenofovir -25 MG per tablet    DESCOVY    30 tablet    Take 1 tablet by mouth daily        PRENATAL PLUS IRON 29-1 MG Tabs     30 tablet    Take 1 tablet by mouth daily    Iron deficiency anemia, unspecified iron deficiency anemia type

## 2018-04-05 NOTE — PROGRESS NOTES
"SUBJECTIVE/OBJECTIVE:                Marina Mock is a 37 year old female coming in for a follow-up visit for Medication Therapy Management.  She was referred to me from Dr. Cosme     Chief Complaint: Follow up from our visit on 10/12/17.  No complaints.   Personal Healthcare Goals: Stay healthy and have more children.  Tobacco: No tobacco use  Alcohol: none    Medication Adherence/Access:  no issues reported    HIV: On 12/21/17 CD4 was 642 (25%), and viral load was \"not detected\". Patient reports takes, Triumeq once daily and takes Tivicay at bedtime. Patient reports tolerates Reports tolerates these well, and reports she would like to become pregnant in the near future (not sure how long). Reports Dr. Cosme will be switching her medication to Descovy and Tivicay and w    Latent TB: Reports he is unable to tolerate any TB medications, (isoniazide and rifampin)so she stopped and does not want to take them again.    Supplements: On 12/21/18, Hemoglobin was 10.1 g/dl. Reports is not drinking Ensure any longer, but is wondering if she should take a vitamin supplement. Reports in her culture, women like to look \"fluffier\", and have more weight on their body's.     Current labs include:  Today's Vitals: There were no vitals taken for this visit.  BP Readings from Last 3 Encounters:   04/05/18 128/71   12/21/17 123/82   10/12/17 128/84     Lab Results   Component Value Date    A1C 5.9 12/21/2017   .  Lab Results   Component Value Date    CHOL 212 12/21/2017     Lab Results   Component Value Date    TRIG 111 12/21/2017     Lab Results   Component Value Date    HDL 51 12/21/2017     Lab Results   Component Value Date     12/21/2017       Liver Function Studies -   Recent Labs   Lab Test  12/21/17   1223   PROTTOTAL  8.6   ALBUMIN  3.9   BILITOTAL  0.2   ALKPHOS  58   AST  20   ALT  20         Last Basic Metabolic Panel:  Lab Results   Component Value Date     12/21/2017      Lab Results   Component " "Value Date    POTASSIUM 4.2 12/21/2017     Lab Results   Component Value Date    CHLORIDE 106 12/21/2017     Lab Results   Component Value Date    BUN 9 12/21/2017     Lab Results   Component Value Date    CR 0.79 12/21/2017     GFR Estimate   Date Value Ref Range Status   12/21/2017 83 >60 mL/min/1.7m2 Final     Comment:     Non  GFR Calc   03/16/2017 >90  Non  GFR Calc   >60 mL/min/1.7m2 Final   01/05/2017 >90  Non  GFR Calc   >60 mL/min/1.7m2 Final     GFR Estimate If Black   Date Value Ref Range Status   12/21/2017 >90 >60 mL/min/1.7m2 Final     Comment:      GFR Calc   03/16/2017 >90   GFR Calc   >60 mL/min/1.7m2 Final   01/05/2017 >90   GFR Calc   >60 mL/min/1.7m2 Final     No results found for: TSH]    Most Recent Immunizations   Administered Date(s) Administered     Influenza Vaccine IM 3yrs+ 4 Valent IIV4 10/12/2017     Pneumo Conj 13-V (2010&after) 03/16/2017     Pneumococcal 23 valent 04/05/2018     TDAP Vaccine (Boostrix) 08/15/2016       ASSESSMENT:              Current medications were reviewed today as discussed above.      Medication Adherence: excellent, no issues identified    HIV: CD4 appears to be stable, and viral load is \"not detected\". Per Dr. Cosme, regimen will be switched to Descovy and Tivicay, with the thought Marina will become pregnant, and this is a safer regimen for her. We discussed dosing, possible side effects, and again, the importance of strict medication adherence.    Latent TB: No medication therapy at this time.      Supplements: Hemoglobin low. Pt will start a prenatal vitamin with iron (ok per Dr. Cosme). She is asked to take this at bedtime, apart from her HIV medication, because of a drug interaction between Tivicay ad the iron, Ca+ in the prenatal vitamin. Reviewed a healthy weight/ideal wt for her.      PLAN:                  1) Stop taking Triumeq.    2) Start taking " Tivicay and Descovy.    3) Start taking Pre Humberto vitamin, once daily at opposite time you take Tivicay and Descovy.     I spent 15 minutes with this patient today. I offer these suggestions for consideration by the ID doctor. A copy of the visit note was provided to the patient's ID provider.     Will follow up in May.    The patient was given a summary of these recommendations as an after visit summary.    Harmony Connor, [place] Pharmacist.   [phone]

## 2018-04-05 NOTE — LETTER
2018      RE: Marina Mock  [address]  [facility] [address]       [facility] - Progress Note  Dr. Claudia Cosme, [facility], [facility], [address] [place] [address], Sixth Floor, [address]  [place] [address]  Patient:  Marina Contreras, Date of birth 1980, Medical record number [id number]  Date of Visit:  2018         Assessment and Recommendations:     Human immunodeficiency virus (HIV) disease (H)  Ms. Ben Mock is currently on Triumeq. This switch was just made for the purpose of ease of dosing.  At the time of the switch, we discussed that this was not a pregnancy friendly regimen.  She has been thinking about this and today she said that she is interested in having further children although she does not yet have immediate plans to do so.  I am going to switch her back to Descovy and Dolutegravir, so that she is off Abacavir.  When she would like to start trying for children, I will plan to change her to an approved regimen for pregnancy.     Anemia, microcytic  Reviewing dosing and medication interactions, we will start the patient on an iron supplemented  vitamin.  Unfortunately more frequent dosing will interact with her dolutegravir, so we will start with this and increase her iron if she does not have a rise in her hemoglobin.     LTBI (latent tuberculosis infection)  Ms. Ben oMck is not willing to undergo therapy for LTBI at this time based on the side effects that she has experienced with both rifampin and isoniazid. We discussed this again today. She is aware that the risk this poses to herself given her HIV.  She also understand that active tuberculosis exposure to children under 5 is high risk.      Preventative Medicine  Cardiovascular Sal -    Lipids - 17 - Total Chol 212, HDL 51   Blood Pressure - /71 today.  Will continue  to follow.   Cancer Screening   Colon - No indication for screening at present   Cervical - Up to date  Immunizations   Hepatitis A - Ab positive 3/2016   Hepatitis B - Surface Ab/Ag and core Ab negative, Will start vaccination series.    Influenza - Will do today   Pneumovax/Prevnar - Had PCV on 3/16/2017, Will administer PSV23 at next visit   Tdap - 8/15/2016  Bone Health - No screening indicated at this time  Renal Health - 8/2016 - no proteinuria, Creatinine has been normal  Sexually Transmitted Infection Risk and Screening   Gonorrhea and Chlamydia - Negative 2/2016   Syphilis - Negative T pallidum Ab 9/2016    Claudia Cosme MD  Division of Infectious Diseases and International Medicine  [phone]         History of Infectious Disease Illness:   Ms. Gray is a 37 year old woman who I am following for HIV infection. Today, she said that things are going really well.  She is feeling healthy.  She has been taking her medications without significant side effects including nausea, vomiting, diarrhea or rash.  Today we discussed her future plans for pregnancy.  She does express interest in eventually having another child.  At one point, I was providing PrEP to her , but he has since stopped taking it.  She had reported that they did have one episode of unprotected sex, but she had been taking all of her medications and she has been virally suppressed.  I counseled her that this was likely a low risk encounter, but that regardless of their practices, if they are sexually active, he should be getting regular HIV tests.  We can help him with this or he can get this testing with his primary care physician.         Past Medical and Surgical History:     Past Medical History:   Diagnosis Date     Malaria      Miscarriage      TB (pulmonary tuberculosis)     latent     Past Surgical History:   Procedure Laterality Date     NO HISTORY OF SURGERY           Family History:     Family History   Problem  "Relation Age of Onset     Family History Negative Other      Hypertension Father      Hypertension Other      CANCER No family hx of      DIABETES No family hx of      Macular Degeneration No family hx of      Retinal detachment No family hx of            Social History:     Social History   Substance Use Topics     Smoking status: Never Smoker     Smokeless tobacco: Never Used     Alcohol use No     Social History     Social History Narrative          Review of Systems:   CONSTITUTIONAL:  No fevers or chills  EYES: negative for icterus  ENT: No hearing or vision issues.   RESPIRATORY:  negative for cough, sputum or dyspnea  CARDIOVASCULAR:  Positive for chest pain, negaitve for palpitations  GASTROINTESTINAL:   Negative for nausea, diarrhea or constipation  GENITOURINARY:  Negative for dysuria.   INTEGUMENT:  negative for rash or pruritus  NEURO:  Negative for headache  HEME: No easy bruising. + history of anemia, + fatigue.          Current Medications:     Current Outpatient Prescriptions   Medication Sig Dispense Refill     dolutegravir (TIVICAY) 50 MG tablet Take 1 tablet (50 mg) by mouth At Bedtime 30 tablet 11     Prenatal Vit-Iron Carbonyl-FA (PRENATAL PLUS IRON) 29-1 MG TABS Take 1 tablet by mouth daily 30 tablet 11     emtricitabine-tenofovir AF (DESCOVY) 200-25 MG per tablet Take 1 tablet by mouth daily 30 tablet           Immunization History:     Immunization History   Administered Date(s) Administered     Influenza Vaccine IM 3yrs+ 4 Valent IIV4 10/12/2017     Pneumo Conj 13-V (2010&after) 03/16/2017     Pneumococcal 23 valent 04/05/2018     TDAP Vaccine (Boostrix) 08/15/2016            Allergies:   No Known Allergies         Physical Exam:   Vital signs:  /71  Pulse 102  Temp 98  F (36.7  C) (Oral)  Ht 1.549 m (5' 1\")  Wt 52.5 kg (115 lb 12.8 oz)  SpO2 100%  BMI 21.88 kg/m2  Physical Examination:  GENERAL:  well-developed, well-nourished.  HEENT:  Head is normocephalic, atraumatic   EYES:  " Eyes have anicteric sclerae without conjunctival injection   NECK: Supple, No lymphadenopathy  LYMPH: No axillary LAD  LUNGS:  Clear to auscultation bilateral.   CARDIOVASCULAR:  Regular rate and rhythm with no murmurs, gallops or rubs.  ABDOMEN:  Normal bowel sounds, soft, nontender.   SKIN:  No acute rashes.    NEUROLOGIC:  Grossly nonfocal. Active x4 extremities         Laboratory Data:     Metabolic Studies       Recent Labs   Lab Test  12/21/17   1223  03/16/17   1137  01/05/17   1127  09/21/16 1904 08/25/16   1127  06/30/16   1203  03/31/16   1027   NA  138  141  139   --   136  133  135   POTASSIUM  4.2  3.9  4.0   --   3.8  4.0  3.8   CHLORIDE  106  106  105   --   105  104  102   CO2  26  27  26   --   21  23  19*   ANIONGAP  7  8  8   --   10  6  13   BUN  9  7  10   --   6*  6*  3*   CR  0.79  0.73  0.73  0.73  0.50*  0.52  0.53   GFRESTIMATED  83  >90  Non  GFR Calc    >90  Non  GFR Calc    >90  Non  GFR Calc    >90  Non  GFR Calc    >90  Non  GFR Calc    >90  Non  GFR Calc     GLC  96  82  103*   --   83  137*  86   A1C  5.9   --    --    --    --    --    --    JANICE  8.7  8.8  9.0   --   9.5  9.2  10.0       Hepatic Studies    Recent Labs   Lab Test  12/21/17   1223  03/16/17   1137  01/05/17   1127  09/21/16 1904 08/25/16   1127  06/30/16   1203  03/31/16   1027   BILITOTAL  0.2  0.2  0.2   --   0.2  0.2  0.2   ALKPHOS  58  72  82   --   92  64  56   ALBUMIN  3.9  3.8  3.7   --   3.0*  3.0*  3.1*   AST  20  22  31  22  15  21  26   ALT  20  24  32  13  16  20  27       Hematology Studies      Recent Labs   Lab Test  12/21/17   1223  03/16/17   1137  01/05/17   1127  09/21/16   1904  09/19/16   1747  08/25/16   1127 03/03/16   1226  08/14/15   2031   WBC  4.2  4.3  5.1  14.6*  5.6  5.0   < >  8.2  5.7   ANEU   --    --    --    --   3.0   --    --   4.7  3.4   ALYM   --    --    --    --   2.0   --     --   2.7  1.8   CHUCKY   --    --    --    --   0.6   --    --   0.7  0.5   AEOS   --    --    --    --   0.1   --    --   0.0  0.0   HGB  10.1*  10.3*  11.4*  9.3*  10.7*  9.9*   < >  10.7*  10.2*   HCT  32.6*  32.2*  35.9  28.7*  32.1*  30.7*   < >  34.4*  30.1*   PLT  345  247  298  174  181  233   < >  308  251    < > = values in this interval not displayed.       Hepatitis B Testing     Recent Labs   Lab Test  03/03/16   1226   HBCAB  Nonreactive   HEPBANG  Nonreactive     Imaging:  No results found for this or any previous visit (from the past 744 hour(s)).        Claudia Cosme MD

## 2018-04-05 NOTE — LETTER
2018       RE: Marina Mock  [address]  [facility] [address]     Dear Colleague,    Thank you for referring your patient, Marina Mock, to the [facility] AND INFECTIOUS DISEASES at [facility]. Please see a copy of my visit note below.    [facility] - Progress Note  Dr. Claudia Cosme, [facility], [facility], [address] [place] [address], Sixth Floor, [address]  [place] [address]  Patient:  Marina Contreras, Date of birth 1980, Medical record number [id number]  Date of Visit:  2018         Assessment and Recommendations:     Human immunodeficiency virus (HIV) disease (H)  Ms. Ben Mock is currently on Triumeq. This switch was just made for the purpose of ease of dosing.  At the time of the switch, we discussed that this was not a pregnancy friendly regimen.  She has been thinking about this and today she said that she is interested in having further children although she does not yet have immediate plans to do so.  I am going to switch her back to Descovy and Dolutegravir, so that she is off Abacavir.  When she would like to start trying for children, I will plan to change her to an approved regimen for pregnancy.     Anemia, microcytic  Reviewing dosing and medication interactions, we will start the patient on an iron supplemented  vitamin.  Unfortunately more frequent dosing will interact with her dolutegravir, so we will start with this and increase her iron if she does not have a rise in her hemoglobin.     LTBI (latent tuberculosis infection)  Ms. Ben Mock is not willing to undergo therapy for LTBI at this time based on the side effects that she has experienced with both rifampin and isoniazid. We discussed this again today. She is aware that the risk this poses to herself given her HIV.  She  also understand that active tuberculosis exposure to children under 5 is high risk.      Preventative Medicine  Cardiovascular Sal -    Lipids - 12/21/17 - Total Chol 212, HDL 51   Blood Pressure - /71 today.  Will continue to follow.   Cancer Screening   Colon - No indication for screening at present   Cervical - Up to date  Immunizations   Hepatitis A - Ab positive 3/2016   Hepatitis B - Surface Ab/Ag and core Ab negative, Will start vaccination series.    Influenza - Will do today   Pneumovax/Prevnar - Had PCV on 3/16/2017, Will administer PSV23 at next visit   Tdap - 8/15/2016  Bone Health - No screening indicated at this time  Renal Health - 8/2016 - no proteinuria, Creatinine has been normal  Sexually Transmitted Infection Risk and Screening   Gonorrhea and Chlamydia - Negative 2/2016   Syphilis - Negative T pallidum Ab 9/2016    Claudia Cosme MD  Division of Infectious Diseases and International Medicine  [phone]         History of Infectious Disease Illness:   Ms. Gray is a 37 year old woman who I am following for HIV infection. Today, she said that things are going really well.  She is feeling healthy.  She has been taking her medications without significant side effects including nausea, vomiting, diarrhea or rash.  Today we discussed her future plans for pregnancy.  She does express interest in eventually having another child.  At one point, I was providing PrEP to her , but he has since stopped taking it.  She had reported that they did have one episode of unprotected sex, but she had been taking all of her medications and she has been virally suppressed.  I counseled her that this was likely a low risk encounter, but that regardless of their practices, if they are sexually active, he should be getting regular HIV tests.  We can help him with this or he can get this testing with his primary care physician.         Past Medical and Surgical History:     Past Medical  History:   Diagnosis Date     Malaria      Miscarriage      TB (pulmonary tuberculosis)     latent     Past Surgical History:   Procedure Laterality Date     NO HISTORY OF SURGERY           Family History:     Family History   Problem Relation Age of Onset     Family History Negative Other      Hypertension Father      Hypertension Other      CANCER No family hx of      DIABETES No family hx of      Macular Degeneration No family hx of      Retinal detachment No family hx of            Social History:     Social History   Substance Use Topics     Smoking status: Never Smoker     Smokeless tobacco: Never Used     Alcohol use No     Social History     Social History Narrative          Review of Systems:   CONSTITUTIONAL:  No fevers or chills  EYES: negative for icterus  ENT: No hearing or vision issues.   RESPIRATORY:  negative for cough, sputum or dyspnea  CARDIOVASCULAR:  Positive for chest pain, negaitve for palpitations  GASTROINTESTINAL:   Negative for nausea, diarrhea or constipation  GENITOURINARY:  Negative for dysuria.   INTEGUMENT:  negative for rash or pruritus  NEURO:  Negative for headache  HEME: No easy bruising. + history of anemia, + fatigue.          Current Medications:     Current Outpatient Prescriptions   Medication Sig Dispense Refill     dolutegravir (TIVICAY) 50 MG tablet Take 1 tablet (50 mg) by mouth At Bedtime 30 tablet 11     Prenatal Vit-Iron Carbonyl-FA (PRENATAL PLUS IRON) 29-1 MG TABS Take 1 tablet by mouth daily 30 tablet 11     emtricitabine-tenofovir AF (DESCOVY) 200-25 MG per tablet Take 1 tablet by mouth daily 30 tablet           Immunization History:     Immunization History   Administered Date(s) Administered     Influenza Vaccine IM 3yrs+ 4 Valent IIV4 10/12/2017     Pneumo Conj 13-V (2010&after) 03/16/2017     Pneumococcal 23 valent 04/05/2018     TDAP Vaccine (Boostrix) 08/15/2016            Allergies:   No Known Allergies         Physical Exam:   Vital signs:  /71   "Pulse 102  Temp 98  F (36.7  C) (Oral)  Ht 1.549 m (5' 1\")  Wt 52.5 kg (115 lb 12.8 oz)  SpO2 100%  BMI 21.88 kg/m2  Physical Examination:  GENERAL:  well-developed, well-nourished.  HEENT:  Head is normocephalic, atraumatic   EYES:  Eyes have anicteric sclerae without conjunctival injection   NECK: Supple, No lymphadenopathy  LYMPH: No axillary LAD  LUNGS:  Clear to auscultation bilateral.   CARDIOVASCULAR:  Regular rate and rhythm with no murmurs, gallops or rubs.  ABDOMEN:  Normal bowel sounds, soft, nontender.   SKIN:  No acute rashes.    NEUROLOGIC:  Grossly nonfocal. Active x4 extremities         Laboratory Data:     Metabolic Studies       Recent Labs   Lab Test  12/21/17   1223  03/16/17   1137  01/05/17 1127 09/21/16 1904 08/25/16   1127  06/30/16   1203  03/31/16   1027   NA  138  141  139   --   136  133  135   POTASSIUM  4.2  3.9  4.0   --   3.8  4.0  3.8   CHLORIDE  106  106  105   --   105  104  102   CO2  26  27  26   --   21  23  19*   ANIONGAP  7  8  8   --   10  6  13   BUN  9  7  10   --   6*  6*  3*   CR  0.79  0.73  0.73  0.73  0.50*  0.52  0.53   GFRESTIMATED  83  >90  Non  GFR Calc    >90  Non  GFR Calc    >90  Non  GFR Calc    >90  Non  GFR Calc    >90  Non  GFR Calc    >90  Non  GFR Calc     GLC  96  82  103*   --   83  137*  86   A1C  5.9   --    --    --    --    --    --    JANICE  8.7  8.8  9.0   --   9.5  9.2  10.0       Hepatic Studies    Recent Labs   Lab Test  12/21/17   1223  03/16/17   1137  01/05/17   1127 09/21/16   1904 08/25/16   1127  06/30/16   1203  03/31/16   1027   BILITOTAL  0.2  0.2  0.2   --   0.2  0.2  0.2   ALKPHOS  58  72  82   --   92  64  56   ALBUMIN  3.9  3.8  3.7   --   3.0*  3.0*  3.1*   AST  20  22  31  22  15  21  26   ALT  20  24  32  13  16  20  27       Hematology Studies      Recent Labs   Lab Test  12/21/17   1223  03/16/17   1137  01/05/17   1127  " 09/21/16   1904  09/19/16   1747  08/25/16   1127   03/03/16   1226  08/14/15   2031   WBC  4.2  4.3  5.1  14.6*  5.6  5.0   < >  8.2  5.7   ANEU   --    --    --    --   3.0   --    --   4.7  3.4   ALYM   --    --    --    --   2.0   --    --   2.7  1.8   CHUCKY   --    --    --    --   0.6   --    --   0.7  0.5   AEOS   --    --    --    --   0.1   --    --   0.0  0.0   HGB  10.1*  10.3*  11.4*  9.3*  10.7*  9.9*   < >  10.7*  10.2*   HCT  32.6*  32.2*  35.9  28.7*  32.1*  30.7*   < >  34.4*  30.1*   PLT  345  247  298  174  181  233   < >  308  251    < > = values in this interval not displayed.       Hepatitis B Testing     Recent Labs   Lab Test  03/03/16   1226   HBCAB  Nonreactive   HEPBANG  Nonreactive     Imaging:  No results found for this or any previous visit (from the past 744 hour(s)).        Sincerely,    Claudia Cosme MD

## 2018-04-05 NOTE — TELEPHONE ENCOUNTER
Called patient for refill reminder.    Patient will   2 prescriptions on 04/05/18. She is starting a new regimen today. Will call in 3 weeks.     Follow up: 04/26/18    Michelle Ricci [facility]  [phone]

## 2018-04-05 NOTE — PATIENT INSTRUCTIONS
Recommendations from today's MTM visit:                                                        1. Start Descovy and Tivicay today, then tomorrow, start taking them in the morning.    2. Prenatal vitamin at bedtime with some food.         Next MTM visit: May 16 th at 12:00    To schedule another MTM appointment, please call the clinic directly or you may call the MTM scheduling line at [phone] or toll-free at [phone].     My Clinical Pharmacist's contact information:                                                      It was a pleasure seeing you today!  Please feel free to contact me with any questions or concerns you have.      Harmony Connor, MTM Pharmacist.   [phone]      You may receive a survey about the MTM services you received.  I would appreciate your feedback to help me serve you better in the future. Please fill it out and return it when you can. Your comments will be anonymous.

## 2018-04-27 ENCOUNTER — TELEPHONE (OUTPATIENT)
Dept: PHARMACY | Facility: CLINIC | Age: 38
End: 2018-04-27

## 2018-04-27 NOTE — TELEPHONE ENCOUNTER
Called patient for refill reminder.    Will send out 2 prescriptions address has been verified.   Patient wanted her Ensure (vanilla and strawberry flavors) however she does not have coverage for it.  Michelle will contact the patient about this    3 month of on time refill.    Follow-up Date:  5/21/18    Amber Barreto, [facility]  [phone]

## 2018-05-21 ENCOUNTER — TELEPHONE (OUTPATIENT)
Dept: PHARMACY | Facility: CLINIC | Age: 38
End: 2018-05-21

## 2018-05-21 NOTE — TELEPHONE ENCOUNTER
Marina returned my call - ok to send out 2 RX's when ok to fill.  .REFILL TOO SOON. ALL PHARMACIES INCLUDED. EARLIEST REFILL IS: 05/24/2018.  Auto fill on 5/24      Attempted to contact the patient to for refill reminder call,  left message on cell phone - 1st attempt    Follow-up Date:6/20/18    Amber Barreto, [facility]  [phone]

## 2018-05-25 ENCOUNTER — TELEPHONE (OUTPATIENT)
Dept: PHARMACY | Facility: CLINIC | Age: 38
End: 2018-05-25

## 2018-05-25 NOTE — TELEPHONE ENCOUNTER
LM for pt to call me today or Tuesday. If I do not hear from her on Tuesday, I will try her again. (Pt is on Dolutegravir and if she is trying to become pregnant, she should be switched to another medication).    Harmony Connor, [facility] Pharmacist.   [phone]

## 2018-05-29 ENCOUNTER — TELEPHONE (OUTPATIENT)
Dept: PHARMACY | Facility: CLINIC | Age: 38
End: 2018-05-29

## 2018-05-29 DIAGNOSIS — B20 HIV DISEASE (H): Primary | ICD-10-CM

## 2018-05-29 DIAGNOSIS — Z21 HIV (HUMAN IMMUNODEFICIENCY VIRUS INFECTION) (H): Primary | ICD-10-CM

## 2018-05-29 NOTE — TELEPHONE ENCOUNTER
Clinical Consult:                                                    Marina Mock is a 37 year old female called for a clinical pharmacist consult.  She was referred to me from Dr. Cosme.     Reason for Consult: Change in patients medication.    Discussion: Marina reports she understands that she will be switching back to Isentress, 400 mg, twice daily, from Tivicay 50 mg, once daily, as this is a safer medication while she tries to become pregnant.     Plan:  1. Pt to stop taking Tivicay. (OK per Dr. Cosme)  2. Start taking Isentress 400 mg, twice daily. (OK per Dr. Cosme)      Pt is asked to call with any questions/concerns.    Harmony Connor, [facility] Pharmacist.   [phone]

## 2018-05-29 NOTE — PROGRESS NOTES
Per [facility] Protocol, Pt is due for routine labs based on disease state or monitoring of medications. Lab orders entered.  Liana Ibanez RN

## 2018-05-30 ENCOUNTER — TELEPHONE (OUTPATIENT)
Dept: PHARMACY | Facility: CLINIC | Age: 38
End: 2018-05-30

## 2018-05-30 NOTE — TELEPHONE ENCOUNTER
Tenzin for Marina to call me (will explain for her to d/c Tivicay and start taking Isentress 400 mg, twice daily, remain on Descovy, once per day either in the morning or evening, but stay consistent with timing)    Harmony Connor, [facility] Pharmacist.   [phone]

## 2018-06-05 ENCOUNTER — TELEPHONE (OUTPATIENT)
Dept: PHARMACY | Facility: CLINIC | Age: 38
End: 2018-06-05

## 2018-07-06 ENCOUNTER — TELEPHONE (OUTPATIENT)
Dept: PHARMACY | Facility: CLINIC | Age: 38
End: 2018-07-06

## 2018-07-06 NOTE — TELEPHONE ENCOUNTER
Called patient for refill reminder.    Will mail 1 prescriptions address has been verified. Pt just started the Isentress yesterday.     Follow-up Date: 07/31/18    Michelle Ricci [facility]  [phone]

## 2018-07-30 ENCOUNTER — TELEPHONE (OUTPATIENT)
Dept: PHARMACY | Facility: CLINIC | Age: 38
End: 2018-07-30

## 2018-07-30 NOTE — TELEPHONE ENCOUNTER
Attempted to contact the patient to for refill reminder call,  left message on voicemail    Follow-up Date: 08/03

## 2018-07-30 NOTE — TELEPHONE ENCOUNTER
Called patient for refill reminder.  Marina reports she finished up her supply of dolutegravir, so she does not need a refill today.    REPORTS SHE HAS BEEN TAKING ISENTRESS 400 MG ONLY IN THE MORNING for about  2 weeks, AND is  TAKING  DESCOVY AT NIGHT. She reports she did not remember being told to take Isentress twice daily, and did not read the bottle.     Reports she will take Isentress twice daily, and will take Descovy with her evening dose of Isentress.      Follow-up Date: 08/06

## 2018-08-07 ENCOUNTER — TELEPHONE (OUTPATIENT)
Dept: PHARMACY | Facility: CLINIC | Age: 38
End: 2018-08-07

## 2018-08-07 NOTE — TELEPHONE ENCOUNTER
Attempted to contact the patient to for refill reminder call,  left message on voicemail    Follow-up Date: 08/09/18 2nd attempt    Michelle Ricci, [facility]  [phone]

## 2018-08-08 ENCOUNTER — MYC MEDICAL ADVICE (OUTPATIENT)
Dept: INFECTIOUS DISEASES | Facility: CLINIC | Age: 38
End: 2018-08-08

## 2018-08-08 NOTE — TELEPHONE ENCOUNTER
Pt called to order refill. Has 18 isentress'  Will mail 1 prescriptions address has been verified.   Not a candidate for [place] to [place].   Follow-up Date: 09/06/18    Michelle Ricci [facility]  [phone]

## 2018-08-09 ENCOUNTER — OFFICE VISIT (OUTPATIENT)
Dept: INFECTIOUS DISEASES | Facility: CLINIC | Age: 38
End: 2018-08-09
Attending: INTERNAL MEDICINE
Payer: COMMERCIAL

## 2018-08-09 VITALS
BODY MASS INDEX: 21.52 KG/M2 | SYSTOLIC BLOOD PRESSURE: 112 MMHG | WEIGHT: 114 LBS | HEART RATE: 66 BPM | HEIGHT: 61 IN | TEMPERATURE: 98.6 F | OXYGEN SATURATION: 97 % | DIASTOLIC BLOOD PRESSURE: 72 MMHG

## 2018-08-09 DIAGNOSIS — B20 HUMAN IMMUNODEFICIENCY VIRUS (HIV) DISEASE (H): ICD-10-CM

## 2018-08-09 DIAGNOSIS — Z00.00 PREVENTATIVE HEALTH CARE: ICD-10-CM

## 2018-08-09 DIAGNOSIS — D50.9 IRON DEFICIENCY ANEMIA, UNSPECIFIED IRON DEFICIENCY ANEMIA TYPE: Primary | ICD-10-CM

## 2018-08-09 DIAGNOSIS — D50.9 IRON DEFICIENCY ANEMIA, UNSPECIFIED IRON DEFICIENCY ANEMIA TYPE: ICD-10-CM

## 2018-08-09 LAB
ALBUMIN SERPL-MCNC: 3.9 G/DL (ref 3.4–5)
ALBUMIN UR-MCNC: NEGATIVE MG/DL
ALP SERPL-CCNC: 72 U/L (ref 40–150)
ALT SERPL W P-5'-P-CCNC: 25 U/L (ref 0–50)
ANION GAP SERPL CALCULATED.3IONS-SCNC: 9 MMOL/L (ref 3–14)
APPEARANCE UR: ABNORMAL
AST SERPL W P-5'-P-CCNC: 26 U/L (ref 0–45)
BACTERIA #/AREA URNS HPF: ABNORMAL /HPF
BILIRUB SERPL-MCNC: 0.3 MG/DL (ref 0.2–1.3)
BILIRUB UR QL STRIP: NEGATIVE
BUN SERPL-MCNC: 10 MG/DL (ref 7–30)
CALCIUM SERPL-MCNC: 9 MG/DL (ref 8.5–10.1)
CD3 CELLS # BLD: 1264 CELLS/UL (ref 603–2990)
CD3 CELLS NFR BLD: 60 % (ref 49–84)
CD3+CD4+ CELLS # BLD: 542 CELLS/UL (ref 441–2156)
CD3+CD4+ CELLS NFR BLD: 26 % (ref 28–63)
CD3+CD4+ CELLS/CD3+CD8+ CLL BLD: 0.79 % (ref 1.4–2.6)
CD3+CD8+ CELLS # BLD: 687 CELLS/UL (ref 125–1312)
CD3+CD8+ CELLS NFR BLD: 33 % (ref 10–40)
CHLORIDE SERPL-SCNC: 105 MMOL/L (ref 94–109)
CO2 SERPL-SCNC: 24 MMOL/L (ref 20–32)
COLOR UR AUTO: ABNORMAL
CREAT SERPL-MCNC: 0.77 MG/DL (ref 0.52–1.04)
ERYTHROCYTE [DISTWIDTH] IN BLOOD BY AUTOMATED COUNT: 18.7 % (ref 10–15)
FERRITIN SERPL-MCNC: 8 NG/ML (ref 12–150)
GFR SERPL CREATININE-BSD FRML MDRD: 84 ML/MIN/1.7M2
GLUCOSE SERPL-MCNC: 123 MG/DL (ref 70–99)
GLUCOSE UR STRIP-MCNC: NEGATIVE MG/DL
HCT VFR BLD AUTO: 33.2 % (ref 35–47)
HGB BLD-MCNC: 10 G/DL (ref 11.7–15.7)
HGB UR QL STRIP: NEGATIVE
IFC SPECIMEN: ABNORMAL
KETONES UR STRIP-MCNC: NEGATIVE MG/DL
LEUKOCYTE ESTERASE UR QL STRIP: NEGATIVE
MCH RBC QN AUTO: 21 PG (ref 26.5–33)
MCHC RBC AUTO-ENTMCNC: 30.1 G/DL (ref 31.5–36.5)
MCV RBC AUTO: 70 FL (ref 78–100)
NITRATE UR QL: NEGATIVE
PH UR STRIP: 6 PH (ref 5–7)
PLATELET # BLD AUTO: 355 10E9/L (ref 150–450)
POTASSIUM SERPL-SCNC: 3.8 MMOL/L (ref 3.4–5.3)
PROT SERPL-MCNC: 9.1 G/DL (ref 6.8–8.8)
RBC # BLD AUTO: 4.77 10E12/L (ref 3.8–5.2)
RBC #/AREA URNS AUTO: 1 /HPF (ref 0–2)
SODIUM SERPL-SCNC: 137 MMOL/L (ref 133–144)
SOURCE: ABNORMAL
SP GR UR STRIP: 1.01 (ref 1–1.03)
SQUAMOUS #/AREA URNS AUTO: 6 /HPF (ref 0–1)
UROBILINOGEN UR STRIP-MCNC: 0 MG/DL (ref 0–2)
WBC # BLD AUTO: 4.3 10E9/L (ref 4–11)
WBC #/AREA URNS AUTO: 1 /HPF (ref 0–5)

## 2018-08-09 PROCEDURE — G0463 HOSPITAL OUTPT CLINIC VISIT: HCPCS | Mod: ZF

## 2018-08-09 PROCEDURE — 86359 T CELLS TOTAL COUNT: CPT | Performed by: INTERNAL MEDICINE

## 2018-08-09 PROCEDURE — 86360 T CELL ABSOLUTE COUNT/RATIO: CPT | Performed by: INTERNAL MEDICINE

## 2018-08-09 PROCEDURE — 80053 COMPREHEN METABOLIC PANEL: CPT | Performed by: INTERNAL MEDICINE

## 2018-08-09 PROCEDURE — 81001 URINALYSIS AUTO W/SCOPE: CPT | Performed by: INTERNAL MEDICINE

## 2018-08-09 PROCEDURE — 82728 ASSAY OF FERRITIN: CPT | Performed by: INTERNAL MEDICINE

## 2018-08-09 PROCEDURE — 36415 COLL VENOUS BLD VENIPUNCTURE: CPT | Performed by: INTERNAL MEDICINE

## 2018-08-09 PROCEDURE — 87536 HIV-1 QUANT&REVRSE TRNSCRPJ: CPT | Performed by: INTERNAL MEDICINE

## 2018-08-09 PROCEDURE — 85027 COMPLETE CBC AUTOMATED: CPT | Performed by: INTERNAL MEDICINE

## 2018-08-09 RX ORDER — FERROUS SULFATE 325(65) MG
325 TABLET ORAL
Qty: 90 TABLET | Refills: 11 | Status: ON HOLD | OUTPATIENT
Start: 2018-08-09 | End: 2019-07-09

## 2018-08-09 RX ORDER — EMTRICITABINE AND TENOFOVIR DISOPROXIL FUMARATE 200; 300 MG/1; MG/1
1 TABLET, FILM COATED ORAL DAILY
Qty: 30 TABLET | Refills: 11 | Status: SHIPPED | OUTPATIENT
Start: 2018-08-09 | End: 2019-08-27

## 2018-08-09 ASSESSMENT — PAIN SCALES - GENERAL: PAINLEVEL: NO PAIN (0)

## 2018-08-09 NOTE — LETTER
8/9/2018      RE: Marina Mock  [address]  [facility] [address]       [facility] Progress Note  Dr. Claudia Cosme, [facility], [facility], [address] [place] [address], Sixth Floor, [address]  [place] [address]  Patient:  Marina Contreras, Date of birth 1980, Medical record number [id number]  Date of Visit:  Aug 9, 2018         Assessment and Recommendations:     Human immunodeficiency virus (HIV) disease (H)  Ms. Ben Mock reports that she is considering pregnancy but she is not currently preganant.  She is on Descovy and Raltegravir right now.  I will switch her to Truvada and Descovy today. Will check safety and surrogate markers today.     Anemia, microcytic  I strongly suspect this is iron deficiency anemia.  She has been unable to take prenatal vitamins because it causes nausea.  I will prescribe ferrous sulfate. I will also check ferritin.  I also counseled her on getting adequate folic acid in her diet.     LTBI (latent tuberculosis infection)  I discussed this again with her today.  The same as last time, Ms. Ben Mock is not willing to undergo therapy for LTBI at this time based on the side effects that she has experienced with both rifampin and isoniazid. She is aware that the risk this poses to herself given her HIV.  She also understand that active tuberculosis exposure to children under 5 is high risk.      Preventative Medicine  Cardiovascular Sal -    Lipids - 12/21/17 - Total Chol 212, HDL 51   Blood Pressure - /72 today. Will continue to follow.   Cancer Screening   Colon - No indication for screening at present   Cervical - Up to date  Immunizations   Hepatitis A - Ab positive 3/2016   Hepatitis B - Surface Ab/Ag and core Ab negative, Will start vaccination series.    Influenza - Will do today   Pneumovax/Prevnar - Had PCV on 3/16/2017, Will  administer PSV23 at next visit   Tdap - 8/15/2016  Bone Health - No screening indicated at this time  Renal Health - 8/2016 - no proteinuria, Creatinine has been normal  Sexually Transmitted Infection Risk and Screening   Gonorrhea and Chlamydia - Negative 2/2016. Declined testing   Syphilis - Negative T pallidum Ab 9/2016. Declined testing    Claudia Cosme MD  Division of Infectious Diseases and International Medicine  [phone]         History of Infectious Disease Illness:   Ms. Ben Mock is a 38 year old woman who I follow for HIV.  She is doing well.  Taking her HIV medications.  She recently had some confusion on the Raltegravir dosing which was worked out with our pharmacy team. She is trying to get pregnant.  I offered her pregnancy testing today, but she said that based on her periods, she is not pregnant yet.  Previously, her  saw me for PrEP.  I did review that we can help with that for him since they are having unprotected sex and she understands.  She has a lot of fatigue.  She has stopped taking prenatal vitamins because she thinks they give her nausea.  She has a lot of green leafy vegetables in her diet. No fevers.  No rashes.  She continues to not be interested in retrying LTBI therapy.         Past Medical and Surgical History:     Past Medical History:   Diagnosis Date     Malaria      Miscarriage      TB (pulmonary tuberculosis)     latent     Past Surgical History:   Procedure Laterality Date     NO HISTORY OF SURGERY           Family History:     Family History   Problem Relation Age of Onset     Family History Negative Other      Hypertension Father      Hypertension Other      Cancer No family hx of      Diabetes No family hx of      Macular Degeneration No family hx of      Retinal detachment No family hx of            Social History:     Social History   Substance Use Topics     Smoking status: Never Smoker     Smokeless tobacco: Never Used     Alcohol use No  "    Social History     Social History Narrative          Review of Systems:              Current Medications:     Current Outpatient Prescriptions   Medication Sig Dispense Refill     emtricitabine-tenofovir AF (DESCOVY) 200-25 MG per tablet Take 1 tablet by mouth daily 30 tablet      raltegravir (ISENTRESS) 400 MG tablet Take 1 tablet (400 mg) by mouth 2 times daily 60 tablet 11          Immunization History:     Immunization History   Administered Date(s) Administered     Influenza Vaccine IM 3yrs+ 4 Valent IIV4 10/12/2017     Pneumo Conj 13-V (2010&after) 03/16/2017     Pneumococcal 23 valent 04/05/2018     TDAP Vaccine (Boostrix) 08/15/2016            Allergies:   No Known Allergies         Physical Exam:   Vital signs:  /72  Pulse 66  Temp 98.6  F (37  C) (Oral)  Ht 1.549 m (5' 1\")  Wt 51.7 kg (114 lb)  SpO2 97%  BMI 21.54 kg/m2  Physical Examination:  GENERAL:  well-developed, well-nourished.  HEENT:  Head is normocephalic, atraumatic   EYES:  Eyes have anicteric sclerae without conjunctival injection   NECK: Supple, No lymphadenopathy  LYMPH: No axillary LAD  LUNGS:  Clear to auscultation bilateral.   CARDIOVASCULAR:  Regular rate and rhythm with no murmurs, gallops or rubs.  ABDOMEN:  Normal bowel sounds, soft, nontender.   SKIN:  No acute rashes.    NEUROLOGIC:  Grossly nonfocal. Active x4 extremities         Laboratory Data:     Metabolic Studies       Recent Labs   Lab Test  12/21/17   1223  03/16/17   1137  01/05/17   1127  09/21/16   1904  08/25/16   1127  06/30/16   1203  03/31/16   1027   NA  138  141  139   --   136  133  135   POTASSIUM  4.2  3.9  4.0   --   3.8  4.0  3.8   CHLORIDE  106  106  105   --   105  104  102   CO2  26  27  26   --   21  23  19*   ANIONGAP  7  8  8   --   10  6  13   BUN  9  7  10   --   6*  6*  3*   CR  0.79  0.73  0.73  0.73  0.50*  0.52  0.53   GFRESTIMATED  83  >90  Non  GFR Calc    >90  Non  GFR Calc    >90  Non  " American GFR Calc    >90  Non  GFR Calc    >90  Non  GFR Calc    >90  Non  GFR Calc     GLC  96  82  103*   --   83  137*  86   A1C  5.9   --    --    --    --    --    --    JANICE  8.7  8.8  9.0   --   9.5  9.2  10.0       Hepatic Studies    Recent Labs   Lab Test  12/21/17   1223  03/16/17   1137  01/05/17   1127  09/21/16   1904  08/25/16   1127  06/30/16   1203  03/31/16   1027   BILITOTAL  0.2  0.2  0.2   --   0.2  0.2  0.2   ALKPHOS  58  72  82   --   92  64  56   ALBUMIN  3.9  3.8  3.7   --   3.0*  3.0*  3.1*   AST  20  22  31  22  15  21  26   ALT  20  24  32  13  16  20  27       Hematology Studies      Recent Labs   Lab Test  12/21/17   1223  03/16/17   1137  01/05/17   1127  09/21/16   1904 09/19/16   1747  08/25/16   1127   03/03/16   1226  08/14/15   2031   WBC  4.2  4.3  5.1  14.6*  5.6  5.0   < >  8.2  5.7   ANEU   --    --    --    --   3.0   --    --   4.7  3.4   ALYM   --    --    --    --   2.0   --    --   2.7  1.8   CHUCKY   --    --    --    --   0.6   --    --   0.7  0.5   AEOS   --    --    --    --   0.1   --    --   0.0  0.0   HGB  10.1*  10.3*  11.4*  9.3*  10.7*  9.9*   < >  10.7*  10.2*   HCT  32.6*  32.2*  35.9  28.7*  32.1*  30.7*   < >  34.4*  30.1*   PLT  345  247  298  174  181  233   < >  308  251    < > = values in this interval not displayed.       Hepatitis B Testing     Recent Labs   Lab Test  03/03/16   1226   HBCAB  Nonreactive   HEPBANG  Nonreactive     Imaging:  No results found for this or any previous visit (from the past 744 hour(s)).        Claudia Cosme MD

## 2018-08-09 NOTE — MR AVS SNAPSHOT
After Visit Summary   8/9/2018    Marina Mock    MRN: [id number]           Patient Information     Date Of Birth          1980        Visit Information        Provider Department      8/9/2018 11:00 AM Claudia Cosme MD [facility]        Today's Diagnoses     Iron deficiency anemia, unspecified iron deficiency anemia type    -  1    Human immunodeficiency virus (HIV) disease (H)        Preventative health care          Care Instructions    Go to pharmacy.          Follow-ups after your visit        Your next 10 appointments already scheduled     Aug 09, 2018 12:00 PM CDT   Lab with  LAB   [facility] ([facility])    [address] [place] [address]  1st Floor  [facility] [address] [phone]              Future tests that were ordered for you today     Open Future Orders        Priority Expected Expires Ordered    Ferritin Routine  8/9/2019 8/9/2018    CBC with platelets Routine  8/9/2019 8/9/2018    Comprehensive metabolic panel Routine  8/9/2019 8/9/2018    HIV-1 RNA quantitative Routine  8/9/2019 8/9/2018    T cell subset profile Routine  8/9/2019 8/9/2018    Routine UA with micro - No culture Routine  8/9/2019 8/9/2018            Who to contact     If you have questions or need follow up information about today's clinic visit or your schedule please contact [facility] INFECTI[facility] DISEAS[facility] directly at [phone].  Normal or non-critical lab and imaging results will be communicated to you by MyChart, letter or phone within 4 business days after the clinic has received the results. If you do not hear from us within 7 days, please contact the clinic through MyChart or phone. If you have a critical or abnormal lab result, we will notify you by phone as soon as possible.  Submit refill requests through [email or web address] or call your pharmacy and they will forward the refill request to us. Please allow  "3 business days for your refill to be completed.          Additional Information About Your Visit        MyChart Information     katena gives you secure access to your electronic health record. If you see a primary care provider, you can also send messages to your care team and make appointments. If you have questions, please call your primary care clinic.  If you do not have a primary care provider, please call [phone] and they will assist you.        Care EveryWhere ID     This is your Care EveryWhere ID. This could be used by other organizations to access your [facility] medical records  [id number]        Your Vitals Were     Pulse Temperature Height Pulse Oximetry BMI (Body Mass Index)       66 98.6  F (37  C) (Oral) 1.549 m (5' 1\") 97% 21.54 kg/m2        Blood Pressure from Last 3 Encounters:   08/09/18 112/72   04/05/18 128/71   12/21/17 123/82    Weight from Last 3 Encounters:   08/09/18 51.7 kg (114 lb)   04/05/18 52.5 kg (115 lb 12.8 oz)   12/21/17 50.5 kg (111 lb 4.8 oz)                 Today's Medication Changes          These changes are accurate as of 8/9/18 11:49 AM.  If you have any questions, ask your nurse or doctor.               Start taking these medicines.        Dose/Directions    emtricitabine-tenofovir 200-300 MG per tablet   Commonly known as:  TRUVADA   Used for:  Human immunodeficiency virus (HIV) disease (H)   Started by:  Claudia Cosme MD        Dose:  1 tablet   Take 1 tablet by mouth daily   Quantity:  30 tablet   Refills:  11       ferrous sulfate 325 (65 Fe) MG tablet   Commonly known as:  IRON   Used for:  Iron deficiency anemia, unspecified iron deficiency anemia type   Started by:  Claudia Cosme MD        Dose:  325 mg   Take 1 tablet (325 mg) by mouth 3 times daily (with meals)   Quantity:  90 tablet   Refills:  11         Stop taking these medicines if you haven't already. Please contact your care team if you have questions.     " emtricitabine-tenofovir -25 MG per tablet   Commonly known as:  DESCOVY   Stopped by:  Claudia Cosme MD                Where to get your medicines      These medications were sent to [place], [place] - [address]  [address], [facility] [address]    Hours:  TRANSPLANT PHONE NUMBER [phone] Phone:  [phone]     emtricitabine-tenofovir 200-300 MG per tablet    ferrous sulfate 325 (65 Fe) MG tablet                Primary Care Provider Office Phone # Fax #    Clinic [facility] [phone]       [address] [address]        Equal Access to Services     TED YATES : Hadii amanda tidwell hadasho Soomaali, waaxda luqadaha, qaybta kaalmada adeegyada, mele ma. So [facility] [place] [phone].    ATENCIÓN: Si habla español, tiene a bird disposición servicios gratuitos de asistencia lingüística. Llame al [phone].    We comply with applicable federal civil rights laws and [place] laws. We do not discriminate on the basis of race, color, national origin, age, disability, sex, sexual orientation, or gender identity.            Thank you!     Thank you for choosing [facility] AND INFECTIOUS DISEASES  for your care. Our goal is always to provide you with excellent care. Hearing back from our patients is one way we can continue to improve our services. Please take a few minutes to complete the written survey that you may receive in the mail after your visit with us. Thank you!             Your Updated Medication List - Protect others around you: Learn how to safely use, store and throw away your medicines at www.disposemymeds.org.          This list is accurate as of 8/9/18 11:49 AM.  Always use your most recent med list.                   Brand Name Dispense Instructions for use Diagnosis    emtricitabine-tenofovir 200-300 MG per tablet    TRUVADA    30 tablet    Take  1 tablet by mouth daily    Human immunodeficiency virus (HIV) disease (H)       ferrous sulfate 325 (65 Fe) MG tablet    IRON    90 tablet    Take 1 tablet (325 mg) by mouth 3 times daily (with meals)    Iron deficiency anemia, unspecified iron deficiency anemia type       raltegravir 400 MG tablet    ISENTRESS    60 tablet    Take 1 tablet (400 mg) by mouth 2 times daily    HIV disease (H)

## 2018-08-09 NOTE — PROGRESS NOTES
[facility] - Progress Note  Dr. Claudia Cosme, [facility], [facility], [address] [place] [address], Sixth Floor, [address]  [place] [address]  Patient:  Marina Contreras, Date of birth 1980, Medical record number [id number]  Date of Visit:  Aug 9, 2018         Assessment and Recommendations:     Human immunodeficiency virus (HIV) disease (H)  Ms. Ben Mock reports that she is considering pregnancy but she is not currently preganant.  She is on Descovy and Raltegravir right now.  I will switch her to Truvada and Descovy today. Will check safety and surrogate markers today.     Anemia, microcytic  I strongly suspect this is iron deficiency anemia.  She has been unable to take prenatal vitamins because it causes nausea.  I will prescribe ferrous sulfate. I will also check ferritin.  I also counseled her on getting adequate folic acid in her diet.     LTBI (latent tuberculosis infection)  I discussed this again with her today.  The same as last time, Ms. Ben Mock is not willing to undergo therapy for LTBI at this time based on the side effects that she has experienced with both rifampin and isoniazid. She is aware that the risk this poses to herself given her HIV.  She also understand that active tuberculosis exposure to children under 5 is high risk.      Preventative Medicine  Cardiovascular Sal -    Lipids - 12/21/17 - Total Chol 212, HDL 51   Blood Pressure - /72 today. Will continue to follow.   Cancer Screening   Colon - No indication for screening at present   Cervical - Up to date  Immunizations   Hepatitis A - Ab positive 3/2016   Hepatitis B - Surface Ab/Ag and core Ab negative, Will start vaccination series.    Influenza - Will do today   Pneumovax/Prevnar - Had PCV on 3/16/2017, Will administer PSV23 at next visit   Tdap - 8/15/2016  Bone Health - No screening indicated at this  time  Renal Health - 8/2016 - no proteinuria, Creatinine has been normal  Sexually Transmitted Infection Risk and Screening   Gonorrhea and Chlamydia - Negative 2/2016. Declined testing   Syphilis - Negative T pallidum Ab 9/2016. Declined testing    Claudia Cosme MD  Division of Infectious Diseases and International Medicine  [phone]         History of Infectious Disease Illness:   Ms. Ben Mock is a 38 year old woman who I follow for HIV.  She is doing well.  Taking her HIV medications.  She recently had some confusion on the Raltegravir dosing which was worked out with our pharmacy team. She is trying to get pregnant.  I offered her pregnancy testing today, but she said that based on her periods, she is not pregnant yet.  Previously, her  saw me for PrEP.  I did review that we can help with that for him since they are having unprotected sex and she understands.  She has a lot of fatigue.  She has stopped taking prenatal vitamins because she thinks they give her nausea.  She has a lot of green leafy vegetables in her diet. No fevers.  No rashes.  She continues to not be interested in retrying LTBI therapy.         Past Medical and Surgical History:     Past Medical History:   Diagnosis Date     Malaria      Miscarriage      TB (pulmonary tuberculosis)     latent     Past Surgical History:   Procedure Laterality Date     NO HISTORY OF SURGERY           Family History:     Family History   Problem Relation Age of Onset     Family History Negative Other      Hypertension Father      Hypertension Other      Cancer No family hx of      Diabetes No family hx of      Macular Degeneration No family hx of      Retinal detachment No family hx of            Social History:     Social History   Substance Use Topics     Smoking status: Never Smoker     Smokeless tobacco: Never Used     Alcohol use No     Social History     Social History Narrative          Review of Systems:   Answers for HPI/ROS  "submitted by the patient on 8/9/2018   General Symptoms: No  Skin Symptoms: No  HENT Symptoms: No  EYE SYMPTOMS: No  HEART SYMPTOMS: No  LUNG SYMPTOMS: No  INTESTINAL SYMPTOMS: No  URINARY SYMPTOMS: No  GYNECOLOGIC SYMPTOMS: No  BREAST SYMPTOMS: No  SKELETAL SYMPTOMS: No  BLOOD SYMPTOMS: No  NERVOUS SYSTEM SYMPTOMS: No  MENTAL HEALTH SYMPTOMS: No           Current Medications:     Current Outpatient Prescriptions   Medication Sig Dispense Refill     emtricitabine-tenofovir AF (DESCOVY) 200-25 MG per tablet Take 1 tablet by mouth daily 30 tablet      raltegravir (ISENTRESS) 400 MG tablet Take 1 tablet (400 mg) by mouth 2 times daily 60 tablet 11          Immunization History:     Immunization History   Administered Date(s) Administered     Influenza Vaccine IM 3yrs+ 4 Valent IIV4 10/12/2017     Pneumo Conj 13-V (2010&after) 03/16/2017     Pneumococcal 23 valent 04/05/2018     TDAP Vaccine (Boostrix) 08/15/2016            Allergies:   No Known Allergies         Physical Exam:   Vital signs:  /72  Pulse 66  Temp 98.6  F (37  C) (Oral)  Ht 1.549 m (5' 1\")  Wt 51.7 kg (114 lb)  SpO2 97%  BMI 21.54 kg/m2  Physical Examination:  GENERAL:  well-developed, well-nourished.  HEENT:  Head is normocephalic, atraumatic   EYES:  Eyes have anicteric sclerae without conjunctival injection   NECK: Supple, No lymphadenopathy  LYMPH: No axillary LAD  LUNGS:  Clear to auscultation bilateral.   CARDIOVASCULAR:  Regular rate and rhythm with no murmurs, gallops or rubs.  ABDOMEN:  Normal bowel sounds, soft, nontender.   SKIN:  No acute rashes.    NEUROLOGIC:  Grossly nonfocal. Active x4 extremities         Laboratory Data:     Metabolic Studies       Recent Labs   Lab Test  12/21/17   1223  03/16/17   1137  01/05/17   1127  09/21/16   1904  08/25/16   1127  06/30/16   1203  03/31/16   1027   NA  138  141  139   --   136  133  135   POTASSIUM  4.2  3.9  4.0   --   3.8  4.0  3.8   CHLORIDE  106  106  105   --   105  104  102 "   CO2  26  27  26   --   21  23  19*   ANIONGAP  7  8  8   --   10  6  13   BUN  9  7  10   --   6*  6*  3*   CR  0.79  0.73  0.73  0.73  0.50*  0.52  0.53   GFRESTIMATED  83  >90  Non  GFR Calc    >90  Non  GFR Calc    >90  Non  GFR Calc    >90  Non  GFR Calc    >90  Non  GFR Calc    >90  Non  GFR Calc     GLC  96  82  103*   --   83  137*  86   A1C  5.9   --    --    --    --    --    --    JANICE  8.7  8.8  9.0   --   9.5  9.2  10.0       Hepatic Studies    Recent Labs   Lab Test  12/21/17   1223  03/16/17   1137  01/05/17   1127  09/21/16   1904  08/25/16   1127  06/30/16   1203  03/31/16   1027   BILITOTAL  0.2  0.2  0.2   --   0.2  0.2  0.2   ALKPHOS  58  72  82   --   92  64  56   ALBUMIN  3.9  3.8  3.7   --   3.0*  3.0*  3.1*   AST  20  22  31  22  15  21  26   ALT  20  24  32  13  16  20  27       Hematology Studies      Recent Labs   Lab Test  12/21/17   1223  03/16/17   1137  01/05/17   1127  09/21/16   1904 09/19/16   1747  08/25/16   1127   03/03/16   1226  08/14/15   2031   WBC  4.2  4.3  5.1  14.6*  5.6  5.0   < >  8.2  5.7   ANEU   --    --    --    --   3.0   --    --   4.7  3.4   ALYM   --    --    --    --   2.0   --    --   2.7  1.8   CHUCKY   --    --    --    --   0.6   --    --   0.7  0.5   AEOS   --    --    --    --   0.1   --    --   0.0  0.0   HGB  10.1*  10.3*  11.4*  9.3*  10.7*  9.9*   < >  10.7*  10.2*   HCT  32.6*  32.2*  35.9  28.7*  32.1*  30.7*   < >  34.4*  30.1*   PLT  345  247  298  174  181  233   < >  308  251    < > = values in this interval not displayed.       Hepatitis B Testing     Recent Labs   Lab Test  03/03/16   1226   HBCAB  Nonreactive   HEPBANG  Nonreactive     Imaging:  No results found for this or any previous visit (from the past 744 hour(s)).

## 2018-08-09 NOTE — LETTER
8/9/2018       RE: Marina Mock  [address]  [facility] [address]     Dear Colleague,    Thank you for referring your patient, Marina Mock, to the [facility] AND INFECTIOUS DISEASES at [facility]. Please see a copy of my visit note below.    [facility] - Progress Note  Dr. Claudia Cosme, [facility], [facility], [address] [place] [address], Sixth Floor, [address]  [place] [address]  Patient:  Marina Contreras, Date of birth 1980, Medical record number [id number]  Date of Visit:  Aug 9, 2018         Assessment and Recommendations:     Human immunodeficiency virus (HIV) disease (H)  Ms. Ben Mock reports that she is considering pregnancy but she is not currently preganant.  She is on Descovy and Raltegravir right now.  I will switch her to Truvada and Descovy today. Will check safety and surrogate markers today.     Anemia, microcytic  I strongly suspect this is iron deficiency anemia.  She has been unable to take prenatal vitamins because it causes nausea.  I will prescribe ferrous sulfate. I will also check ferritin.  I also counseled her on getting adequate folic acid in her diet.     LTBI (latent tuberculosis infection)  I discussed this again with her today.  The same as last time, Ms. Ben Mock is not willing to undergo therapy for LTBI at this time based on the side effects that she has experienced with both rifampin and isoniazid. She is aware that the risk this poses to herself given her HIV.  She also understand that active tuberculosis exposure to children under 5 is high risk.      Preventative Medicine  Cardiovascular Sal -    Lipids - 12/21/17 - Total Chol 212, HDL 51   Blood Pressure - /72 today. Will continue to follow.   Cancer Screening   Colon - No indication for screening at  present   Cervical - Up to date  Immunizations   Hepatitis A - Ab positive 3/2016   Hepatitis B - Surface Ab/Ag and core Ab negative, Will start vaccination series.    Influenza - Will do today   Pneumovax/Prevnar - Had PCV on 3/16/2017, Will administer PSV23 at next visit   Tdap - 8/15/2016  Bone Health - No screening indicated at this time  Renal Health - 8/2016 - no proteinuria, Creatinine has been normal  Sexually Transmitted Infection Risk and Screening   Gonorrhea and Chlamydia - Negative 2/2016. Declined testing   Syphilis - Negative T pallidum Ab 9/2016. Declined testing    Claudia Cosme MD  Division of Infectious Diseases and International Medicine  [phone]         History of Infectious Disease Illness:   Ms. Ben Mock is a 38 year old woman who I follow for HIV.  She is doing well.  Taking her HIV medications.  She recently had some confusion on the Raltegravir dosing which was worked out with our pharmacy team. She is trying to get pregnant.  I offered her pregnancy testing today, but she said that based on her periods, she is not pregnant yet.  Previously, her  saw me for PrEP.  I did review that we can help with that for him since they are having unprotected sex and she understands.  She has a lot of fatigue.  She has stopped taking prenatal vitamins because she thinks they give her nausea.  She has a lot of green leafy vegetables in her diet. No fevers.  No rashes.  She continues to not be interested in retrying LTBI therapy.         Past Medical and Surgical History:     Past Medical History:   Diagnosis Date     Malaria      Miscarriage      TB (pulmonary tuberculosis)     latent     Past Surgical History:   Procedure Laterality Date     NO HISTORY OF SURGERY           Family History:     Family History   Problem Relation Age of Onset     Family History Negative Other      Hypertension Father      Hypertension Other      Cancer No family hx of      Diabetes No family hx of   "    Macular Degeneration No family hx of      Retinal detachment No family hx of            Social History:     Social History   Substance Use Topics     Smoking status: Never Smoker     Smokeless tobacco: Never Used     Alcohol use No     Social History     Social History Narrative          Review of Systems:              Current Medications:     Current Outpatient Prescriptions   Medication Sig Dispense Refill     emtricitabine-tenofovir AF (DESCOVY) 200-25 MG per tablet Take 1 tablet by mouth daily 30 tablet      raltegravir (ISENTRESS) 400 MG tablet Take 1 tablet (400 mg) by mouth 2 times daily 60 tablet 11          Immunization History:     Immunization History   Administered Date(s) Administered     Influenza Vaccine IM 3yrs+ 4 Valent IIV4 10/12/2017     Pneumo Conj 13-V (2010&after) 03/16/2017     Pneumococcal 23 valent 04/05/2018     TDAP Vaccine (Boostrix) 08/15/2016            Allergies:   No Known Allergies         Physical Exam:   Vital signs:  /72  Pulse 66  Temp 98.6  F (37  C) (Oral)  Ht 1.549 m (5' 1\")  Wt 51.7 kg (114 lb)  SpO2 97%  BMI 21.54 kg/m2  Physical Examination:  GENERAL:  well-developed, well-nourished.  HEENT:  Head is normocephalic, atraumatic   EYES:  Eyes have anicteric sclerae without conjunctival injection   NECK: Supple, No lymphadenopathy  LYMPH: No axillary LAD  LUNGS:  Clear to auscultation bilateral.   CARDIOVASCULAR:  Regular rate and rhythm with no murmurs, gallops or rubs.  ABDOMEN:  Normal bowel sounds, soft, nontender.   SKIN:  No acute rashes.    NEUROLOGIC:  Grossly nonfocal. Active x4 extremities         Laboratory Data:     Metabolic Studies       Recent Labs   Lab Test  12/21/17   1223  03/16/17   1137  01/05/17   1127  09/21/16   1904  08/25/16   1127  06/30/16   1203  03/31/16   1027   NA  138  141  139   --   136  133  135   POTASSIUM  4.2  3.9  4.0   --   3.8  4.0  3.8   CHLORIDE  106  106  105   --   105  104  102   CO2  26  27  26   --   21 23  19* "   ANIONGAP  7  8  8   --   10  6  13   BUN  9  7  10   --   6*  6*  3*   CR  0.79  0.73  0.73  0.73  0.50*  0.52  0.53   GFRESTIMATED  83  >90  Non  GFR Calc    >90  Non  GFR Calc    >90  Non  GFR Calc    >90  Non  GFR Calc    >90  Non  GFR Calc    >90  Non  GFR Calc     GLC  96  82  103*   --   83  137*  86   A1C  5.9   --    --    --    --    --    --    JANICE  8.7  8.8  9.0   --   9.5  9.2  10.0       Hepatic Studies    Recent Labs   Lab Test  12/21/17   1223  03/16/17   1137  01/05/17   1127  09/21/16   1904  08/25/16   1127  06/30/16   1203  03/31/16   1027   BILITOTAL  0.2  0.2  0.2   --   0.2  0.2  0.2   ALKPHOS  58  72  82   --   92  64  56   ALBUMIN  3.9  3.8  3.7   --   3.0*  3.0*  3.1*   AST  20  22  31  22  15  21  26   ALT  20  24  32  13  16  20  27       Hematology Studies      Recent Labs   Lab Test  12/21/17   1223  03/16/17   1137  01/05/17   1127  09/21/16   1904  09/19/16   1747  08/25/16   1127   03/03/16   1226  08/14/15   2031   WBC  4.2  4.3  5.1  14.6*  5.6  5.0   < >  8.2  5.7   ANEU   --    --    --    --   3.0   --    --   4.7  3.4   ALYM   --    --    --    --   2.0   --    --   2.7  1.8   CHUCKY   --    --    --    --   0.6   --    --   0.7  0.5   AEOS   --    --    --    --   0.1   --    --   0.0  0.0   HGB  10.1*  10.3*  11.4*  9.3*  10.7*  9.9*   < >  10.7*  10.2*   HCT  32.6*  32.2*  35.9  28.7*  32.1*  30.7*   < >  34.4*  30.1*   PLT  345  247  298  174  181  233   < >  308  251    < > = values in this interval not displayed.       Hepatitis B Testing     Recent Labs   Lab Test  03/03/16   1226   HBCAB  Nonreactive   HEPBANG  Nonreactive     Imaging:  No results found for this or any previous visit (from the past 744 hour(s)).      Again, thank you for allowing me to participate in the care of your patient.      Sincerely,    Claudia Cosme MD

## 2018-08-10 LAB
HIV1 RNA # PLAS NAA DL=20: <20 {COPIES}/ML
HIV1 RNA SERPL NAA+PROBE-LOG#: <1.3 {LOG_COPIES}/ML

## 2018-09-06 ENCOUNTER — TELEPHONE (OUTPATIENT)
Dept: PHARMACY | Facility: CLINIC | Age: 38
End: 2018-09-06

## 2018-09-06 NOTE — TELEPHONE ENCOUNTER
Attempted to contact the patient to for refill reminder call,  left message on voicemail    Follow-up Date: 10/12

## 2018-09-06 NOTE — TELEPHONE ENCOUNTER
Called patient for refill reminder.    Will johnny prescriptions on 09/07 address has been verified.       Follow-up Date: 10/01

## 2018-09-18 ENCOUNTER — HOSPITAL ENCOUNTER (EMERGENCY)
Facility: CLINIC | Age: 38
Discharge: HOME | End: 2018-09-18
Attending: EMERGENCY MEDICINE | Admitting: EMERGENCY MEDICINE
Payer: COMMERCIAL

## 2018-09-18 VITALS
DIASTOLIC BLOOD PRESSURE: 82 MMHG | HEART RATE: 91 BPM | OXYGEN SATURATION: 100 % | TEMPERATURE: 98.8 F | WEIGHT: 115 LBS | RESPIRATION RATE: 16 BRPM | BODY MASS INDEX: 21.71 KG/M2 | SYSTOLIC BLOOD PRESSURE: 122 MMHG | HEIGHT: 61 IN

## 2018-09-18 DIAGNOSIS — F43.9 SITUATIONAL STRESS: ICD-10-CM

## 2018-09-18 PROCEDURE — 90791 PSYCH DIAGNOSTIC EVALUATION: CPT

## 2018-09-18 PROCEDURE — 99285 EMERGENCY DEPT VISIT HI MDM: CPT | Mod: 25

## 2018-09-18 RX ORDER — EMTRICITABINE AND TENOFOVIR DISOPROXIL FUMARATE 200; 300 MG/1; MG/1
1 TABLET, FILM COATED ORAL DAILY
COMMUNITY
End: 2019-01-09

## 2018-09-18 ASSESSMENT — ENCOUNTER SYMPTOMS: SLEEP DISTURBANCE: 1

## 2018-09-18 NOTE — ED AVS SNAPSHOT
Emergency Department    [address] [facility] [address]    Phone:  [phone]    Fax:  [phone]                                       Marina Mock   MRN: [id number]    Department:   Emergency Department   Date of Visit:  9/18/2018           After Visit Summary Signature Page     I have received my discharge instructions, and my questions have been answered. I have discussed any challenges I see with this plan with the nurse or doctor.    ..........................................................................................................................................  Patient/Patient Representative Signature      ..........................................................................................................................................  Patient Representative Print Name and Relationship to Patient    ..................................................               ................................................  Date                                   Time    ..........................................................................................................................................  Reviewed by Signature/Title    ...................................................              ..............................................  Date                                               Time          22EPIC Rev 08/18

## 2018-09-18 NOTE — DISCHARGE INSTRUCTIONS
Discharge Instructions  Mental Health Concerns    You were seen today for mental health concerns, such as depression, anxiety, or suicidal thinking. Your provider feels that you do not require hospitalization at this time. However, your symptoms may become worse, and you may need to return to the Emergency Department. Most treatments of depression and suicidal thoughts are a process rather than a single intervention.  Medications and counseling can take several weeks or more to help.    Generally, every Emergency Department visit should have a follow-up clinic visit with either a primary or a specialty clinic/provider. Please follow-up as instructed by your emergency provider today.    By accepting these discharge instructions:    You promise to not harm yourself or others.    You agree that if you feel you are becoming unable to keep that promise, you will do something to help yourself before you do anything to harm yourself or others.     You agree to keep any safety plan arranged on your visit here today.    You agree to take any medication prescribed or recommended by your provider.    If you are getting worse, you can contact a friend or a family member, contact your counselor or family provider, contact a crisis line, or other options discussed with the provider or therapist today.    At any time, you can call 911 and return to the Emergency Department for more help.    You understand that follow-up is essential to your treatment, and you will make and keep appointments recommended on your visit today.    How to improve your mental health and prevent suicide:    Involve others by letting family, friends, counselors know.  Do not isolate yourself.    Avoid alcohol or drugs. Remove weapons, poisons from your home.    Try to stick to routines for eating, sleeping and getting regular exercise.      Try to get into sunlight. Bright natural light not only treats seasonal affective disorder but also  depression.    Increase safe activities that you enjoy.    If you feel worse, contact [phone] ([phone]), or call 911, or your primary provider/counselor for additional assistance.    If you were given a prescription for medicine here today, be sure to read all of the information (including the package insert) that comes with your prescription.  This will include important information about the medicine, its side effects, and any warnings that you need to know about.  The pharmacist who fills the prescription can provide more information and answer questions you may have about the medicine.  If you have questions or concerns that the pharmacist cannot address, please call or return to the Emergency Department.   Remember that you can always come back to the Emergency Department if you are not able to see your regular provider in the amount of time listed above, if you get any new symptoms, or if there is anything that worries you.

## 2018-09-18 NOTE — ED AVS SNAPSHOT
Emergency Department    [address] [facility] [address]    Phone:  [phone]    Fax:  [phone]                                       Marina Mock   MRN: [id number]    Department:   Emergency Department   Date of Visit:  9/18/2018           Patient Information     Date Of Birth          1980        Your diagnoses for this visit were:     Situational stress        You were seen by Caridad Cazares MD.      Follow-up Information     Follow up with  Emergency Department.    Specialty:  EMERGENCY MEDICINE    Why:  If symptoms worsen    Contact information:    [address] [facility] [id number] [phone]        Discharge Instructions       Discharge Instructions  Mental Health Concerns    You were seen today for mental health concerns, such as depression, anxiety, or suicidal thinking. Your provider feels that you do not require hospitalization at this time. However, your symptoms may become worse, and you may need to return to the Emergency Department. Most treatments of depression and suicidal thoughts are a process rather than a single intervention.  Medications and counseling can take several weeks or more to help.    Generally, every Emergency Department visit should have a follow-up clinic visit with either a primary or a specialty clinic/provider. Please follow-up as instructed by your emergency provider today.    By accepting these discharge instructions:    You promise to not harm yourself or others.    You agree that if you feel you are becoming unable to keep that promise, you will do something to help yourself before you do anything to harm yourself or others.     You agree to keep any safety plan arranged on your visit here today.    You agree to take any medication prescribed or recommended by your provider.    If you are getting worse, you can contact a friend or a family member, contact your counselor or family provider, contact a crisis  line, or other options discussed with the provider or therapist today.    At any time, you can call 911 and return to the Emergency Department for more help.    You understand that follow-up is essential to your treatment, and you will make and keep appointments recommended on your visit today.    How to improve your mental health and prevent suicide:    Involve others by letting family, friends, counselors know.  Do not isolate yourself.    Avoid alcohol or drugs. Remove weapons, poisons from your home.    Try to stick to routines for eating, sleeping and getting regular exercise.      Try to get into sunlight. Bright natural light not only treats seasonal affective disorder but also depression.    Increase safe activities that you enjoy.    If you feel worse, contact [phone] ([phone]), or call 911, or your primary provider/counselor for additional assistance.    If you were given a prescription for medicine here today, be sure to read all of the information (including the package insert) that comes with your prescription.  This will include important information about the medicine, its side effects, and any warnings that you need to know about.  The pharmacist who fills the prescription can provide more information and answer questions you may have about the medicine.  If you have questions or concerns that the pharmacist cannot address, please call or return to the Emergency Department.   Remember that you can always come back to the Emergency Department if you are not able to see your regular provider in the amount of time listed above, if you get any new symptoms, or if there is anything that worries you.      24 Hour Appointment Hotline       To make an appointment at any [facility], call [phone] ([phone]). If you don't have a family doctor or clinic, we will help you find one. [facility] clinics are conveniently located to serve the needs of you and your family.              Review of your medicines      Our records show that you are taking the medicines listed below. If these are incorrect, please call your family doctor or clinic.        Dose / Directions Last dose taken    emtricitabine-tenofovir 200-300 MG per tablet   Commonly known as:  TRUVADA   Dose:  1 tablet        Take 1 tablet by mouth daily   Refills:  0        IRON SUPPLEMENT PO        Refills:  0        raltegravir 400 MG tablet   Commonly known as:  ISENTRESS   Dose:  400 mg        Take 400 mg by mouth 2 times daily   Refills:  0                Orders Needing Specimen Collection     None      Pending Results     No orders found from 9/16/2018 to 9/19/2018.            Pending Culture Results     No orders found from 9/16/2018 to 9/19/2018.            Pending Results Instructions     If you had any lab results that were not finalized at the time of your Discharge, you can call the ED Lab Result RN at [phone]. You will be contacted by this team for any positive Lab results or changes in treatment. The nurses are available 7 days a week from 10A to 6:30P.  You can leave a message 24 hours per day and they will return your call.        Test Results From Your Hospital Stay               Clinical Quality Measure: Blood Pressure Screening     Your blood pressure was checked while you were in the emergency department today. The last reading we obtained was  BP: 122/82 . Please read the guidelines below about what these numbers mean and what you should do about them.  If your systolic blood pressure (the top number) is less than 120 and your diastolic blood pressure (the bottom number) is less than 80, then your blood pressure is normal. There is nothing more that you need to do about it.  If your systolic blood pressure (the top number) is 120-139 or your diastolic blood pressure (the bottom number) is 80-89, your blood pressure may be higher than it should be. You should have your blood pressure rechecked within a year by  "a primary care provider.  If your systolic blood pressure (the top number) is 140 or greater or your diastolic blood pressure (the bottom number) is 90 or greater, you may have high blood pressure. High blood pressure is treatable, but if left untreated over time it can put you at risk for heart attack, stroke, or kidney failure. You should have your blood pressure rechecked by a primary care provider within the next 4 weeks.  If your provider in the emergency department today gave you specific instructions to follow-up with your doctor or provider even sooner than that, you should follow that instruction and not wait for up to 4 weeks for your follow-up visit.        Thank you for choosing [facility]       Thank you for choosing [facility] for your care. Our goal is always to provide you with excellent care. Hearing back from our patients is one way we can continue to improve our services. Please take a few minutes to complete the written survey that you may receive in the mail after you visit with us. Thank you!        [facility] Information     [email or web address] lets you send messages to your doctor, view your test results, renew your prescriptions, schedule appointments and more. To sign up, go to [email or web address] . Click on \"Log in\" on the left side of the screen, which will take you to the Welcome page. Then click on \"Sign up Now\" on the right side of the page.     You will be asked to enter the access code listed below, as well as some personal information. Please follow the directions to create your username and password.     Your access code is: [email or web address]  Expires: 2018  6:25 PM     Your access code will  in 90 days. If you need help or a new code, please call your [facility] clinic or [phone].        Care EveryWhere ID     This is your Care EveryWhere ID. This could be used by other organizations to access your [facility] medical records  [id number]        Equal Access to Services     TED YATES " AH: Angeles Oliveros, walarry lunoraadaha, qamallyta kamele gant. So [facility] [phone].    ATENCIÓN: Si habla español, tiene a bird disposición servicios gratuitos de asistencia lingüística. Llame al [phone].    We comply with applicable federal civil rights laws and [place] laws. We do not discriminate on the basis of race, color, national origin, age, disability, sex, sexual orientation, or gender identity.            After Visit Summary       This is your record. Keep this with you and show to your community pharmacist(s) and doctor(s) at your next visit.

## 2018-09-18 NOTE — ED NOTES
Bed: [facility]  Expected date:   Expected time:   Means of arrival:   Comments:  432  38 F mad dreams/suicidal  7829
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
## 2018-10-04 ENCOUNTER — TELEPHONE (OUTPATIENT)
Dept: PHARMACY | Facility: CLINIC | Age: 38
End: 2018-10-04

## 2018-10-04 NOTE — TELEPHONE ENCOUNTER
Attempted to contact the patient to for refill reminder call,  left message on voicemail    Follow-up Date: 10/08/18  2nd attempt    Michelle Ricci, [facility]  [phone]

## 2018-10-11 NOTE — TELEPHONE ENCOUNTER
Pt called back. She was out of medication.   Will  2 prescriptions address has been verified.     Follow-up Date: 11/02/18    Michelle Ricci, [facility]  [phone]

## 2018-11-01 ENCOUNTER — TELEPHONE (OUTPATIENT)
Dept: PHARMACY | Facility: CLINIC | Age: 38
End: 2018-11-01

## 2018-11-01 NOTE — TELEPHONE ENCOUNTER
Attempted to contact the patient to for refill reminder call,  left message on voicemail    Follow-up Date: 11/06/18 2nd attempt    .Michelle Ricci, [facility]  [phone]

## 2018-11-07 ENCOUNTER — TELEPHONE (OUTPATIENT)
Dept: PHARMACY | Facility: CLINIC | Age: 38
End: 2018-11-07

## 2018-11-07 NOTE — TELEPHONE ENCOUNTER
Called patient for refill reminder.    Will  2 prescriptions address has been verified.     Follow-up Date: 11/28/18    Michelle Ricci, [facility]  [phone]

## 2018-11-27 ENCOUNTER — TELEPHONE (OUTPATIENT)
Dept: PHARMACY | Facility: CLINIC | Age: 38
End: 2018-11-27

## 2018-11-27 NOTE — TELEPHONE ENCOUNTER
Called patient for refill reminder.    Will mail 3 prescriptions address has been verified.     Follow-up Date: 12/27/18    Michelle Ricci, [facility]  [phone]

## 2018-12-10 ENCOUNTER — TELEPHONE (OUTPATIENT)
Dept: PHARMACY | Facility: CLINIC | Age: 38
End: 2018-12-10

## 2018-12-10 NOTE — TELEPHONE ENCOUNTER
Marina called earlier today to report she is pregnant. She reports she is still taking Truvada once daily and Isentress 400 mg, twice daily from her previous pregnancy/delivery in Sept 2016.   I left her days and times for either Dr. Gonzalez, or Dr. Chen, and asked she call me with a day and time that will work for her. She is encouraged to continue her current medication regimen.     Harmony Connor, [facility] Pharmacist.   [phone]

## 2018-12-27 ENCOUNTER — TELEPHONE (OUTPATIENT)
Dept: PHARMACY | Facility: CLINIC | Age: 38
End: 2018-12-27

## 2018-12-27 NOTE — TELEPHONE ENCOUNTER
Attempted to contact the patient to for refill reminder call,  left message on voicemail    Follow-up Date: 01/02    Pt also need a MD appt.

## 2019-01-02 ENCOUNTER — TRANSFERRED RECORDS (OUTPATIENT)
Dept: HEALTH INFORMATION MANAGEMENT | Facility: CLINIC | Age: 39
End: 2019-01-02

## 2019-01-02 DIAGNOSIS — Z32.01 PREGNANCY TEST POSITIVE: Primary | ICD-10-CM

## 2019-01-08 ENCOUNTER — TELEPHONE (OUTPATIENT)
Dept: PHARMACY | Facility: CLINIC | Age: 39
End: 2019-01-08

## 2019-01-09 ENCOUNTER — ANCILLARY PROCEDURE (OUTPATIENT)
Dept: ULTRASOUND IMAGING | Facility: CLINIC | Age: 39
End: 2019-01-09
Attending: ADVANCED PRACTICE MIDWIFE
Payer: COMMERCIAL

## 2019-01-09 ENCOUNTER — ALLIED HEALTH/NURSE VISIT (OUTPATIENT)
Dept: PHARMACY | Facility: CLINIC | Age: 39
End: 2019-01-09
Payer: MEDICAID

## 2019-01-09 ENCOUNTER — OFFICE VISIT (OUTPATIENT)
Dept: OBGYN | Facility: CLINIC | Age: 39
End: 2019-01-09
Attending: MIDWIFE
Payer: COMMERCIAL

## 2019-01-09 VITALS
DIASTOLIC BLOOD PRESSURE: 69 MMHG | WEIGHT: 108.5 LBS | BODY MASS INDEX: 20.48 KG/M2 | HEART RATE: 75 BPM | HEIGHT: 61 IN | SYSTOLIC BLOOD PRESSURE: 102 MMHG

## 2019-01-09 DIAGNOSIS — O09.91 HIGH-RISK PREGNANCY IN FIRST TRIMESTER: Primary | ICD-10-CM

## 2019-01-09 DIAGNOSIS — Z32.01 PREGNANCY TEST POSITIVE: ICD-10-CM

## 2019-01-09 DIAGNOSIS — Z21 HUMAN IMMUNODEFICIENCY VIRUS I INFECTION (H): Primary | ICD-10-CM

## 2019-01-09 DIAGNOSIS — R87.612 PAPANICOLAOU SMEAR OF CERVIX WITH LOW GRADE SQUAMOUS INTRAEPITHELIAL LESION (LGSIL): ICD-10-CM

## 2019-01-09 DIAGNOSIS — O98.719 HIV AFFECTING PREGNANCY, ANTEPARTUM: ICD-10-CM

## 2019-01-09 DIAGNOSIS — B20 HUMAN IMMUNODEFICIENCY VIRUS (HIV) DISEASE (H): ICD-10-CM

## 2019-01-09 DIAGNOSIS — Z86.32 HISTORY OF DIET CONTROLLED GESTATIONAL DIABETES MELLITUS (GDM): ICD-10-CM

## 2019-01-09 DIAGNOSIS — D50.8 IRON DEFICIENCY ANEMIA SECONDARY TO INADEQUATE DIETARY IRON INTAKE: ICD-10-CM

## 2019-01-09 DIAGNOSIS — O09.521 ELDERLY MULTIGRAVIDA IN FIRST TRIMESTER: ICD-10-CM

## 2019-01-09 PROBLEM — F41.9 ANXIETY: Status: RESOLVED | Noted: 2017-01-06 | Resolved: 2019-01-09

## 2019-01-09 LAB
ABO + RH BLD: NORMAL
ABO + RH BLD: NORMAL
BASOPHILS # BLD AUTO: 0 10E9/L (ref 0–0.2)
BASOPHILS NFR BLD AUTO: 0 %
BLD GP AB SCN SERPL QL: NORMAL
BLOOD BANK CMNT PATIENT-IMP: NORMAL
DIFFERENTIAL METHOD BLD: ABNORMAL
EOSINOPHIL # BLD AUTO: 0 10E9/L (ref 0–0.7)
EOSINOPHIL NFR BLD AUTO: 0.7 %
ERYTHROCYTE [DISTWIDTH] IN BLOOD BY AUTOMATED COUNT: 13.8 % (ref 10–15)
HBA1C MFR BLD: 6.5 % (ref 0–5.6)
HCT VFR BLD AUTO: 31.7 % (ref 35–47)
HGB BLD-MCNC: 10.3 G/DL (ref 11.7–15.7)
IMM GRANULOCYTES # BLD: 0 10E9/L (ref 0–0.4)
IMM GRANULOCYTES NFR BLD: 0 %
LYMPHOCYTES # BLD AUTO: 1.7 10E9/L (ref 0.8–5.3)
LYMPHOCYTES NFR BLD AUTO: 36.9 %
MCH RBC QN AUTO: 24.2 PG (ref 26.5–33)
MCHC RBC AUTO-ENTMCNC: 32.5 G/DL (ref 31.5–36.5)
MCV RBC AUTO: 75 FL (ref 78–100)
MONOCYTES # BLD AUTO: 0.3 10E9/L (ref 0–1.3)
MONOCYTES NFR BLD AUTO: 7.4 %
NEUTROPHILS # BLD AUTO: 2.5 10E9/L (ref 1.6–8.3)
NEUTROPHILS NFR BLD AUTO: 55 %
NRBC # BLD AUTO: 0 10*3/UL
NRBC BLD AUTO-RTO: 0 /100
PLATELET # BLD AUTO: 255 10E9/L (ref 150–450)
RBC # BLD AUTO: 4.25 10E12/L (ref 3.8–5.2)
SPECIMEN EXP DATE BLD: NORMAL
TSH SERPL DL<=0.005 MIU/L-ACNC: 0.94 MU/L (ref 0.4–4)
WBC # BLD AUTO: 4.6 10E9/L (ref 4–11)

## 2019-01-09 PROCEDURE — 87340 HEPATITIS B SURFACE AG IA: CPT | Performed by: MIDWIFE

## 2019-01-09 PROCEDURE — 86901 BLOOD TYPING SEROLOGIC RH(D): CPT | Performed by: MIDWIFE

## 2019-01-09 PROCEDURE — G0463 HOSPITAL OUTPT CLINIC VISIT: HCPCS | Mod: 25,ZF

## 2019-01-09 PROCEDURE — 99207 ZZC NO CHARGE LOS: CPT | Performed by: PHARMACIST

## 2019-01-09 PROCEDURE — 82306 VITAMIN D 25 HYDROXY: CPT | Performed by: MIDWIFE

## 2019-01-09 PROCEDURE — 36415 COLL VENOUS BLD VENIPUNCTURE: CPT | Performed by: MIDWIFE

## 2019-01-09 PROCEDURE — 85660 RBC SICKLE CELL TEST: CPT | Performed by: MIDWIFE

## 2019-01-09 PROCEDURE — 86900 BLOOD TYPING SEROLOGIC ABO: CPT | Performed by: MIDWIFE

## 2019-01-09 PROCEDURE — 84443 ASSAY THYROID STIM HORMONE: CPT | Performed by: MIDWIFE

## 2019-01-09 PROCEDURE — 86762 RUBELLA ANTIBODY: CPT | Performed by: MIDWIFE

## 2019-01-09 PROCEDURE — 83021 HEMOGLOBIN CHROMOTOGRAPHY: CPT | Performed by: MIDWIFE

## 2019-01-09 PROCEDURE — 86780 TREPONEMA PALLIDUM: CPT | Performed by: MIDWIFE

## 2019-01-09 PROCEDURE — 85025 COMPLETE CBC W/AUTO DIFF WBC: CPT | Performed by: MIDWIFE

## 2019-01-09 PROCEDURE — 76801 OB US < 14 WKS SINGLE FETUS: CPT

## 2019-01-09 PROCEDURE — 83036 HEMOGLOBIN GLYCOSYLATED A1C: CPT | Performed by: MIDWIFE

## 2019-01-09 PROCEDURE — 87536 HIV-1 QUANT&REVRSE TRNSCRPJ: CPT | Performed by: MIDWIFE

## 2019-01-09 PROCEDURE — 86850 RBC ANTIBODY SCREEN: CPT | Performed by: MIDWIFE

## 2019-01-09 ASSESSMENT — PAIN SCALES - GENERAL: PAINLEVEL: NO PAIN (0)

## 2019-01-09 ASSESSMENT — MIFFLIN-ST. JEOR: SCORE: 1122.7

## 2019-01-09 NOTE — PROGRESS NOTES
Therapy Management:                                                    Marina Mock is a 38 year old female calling for a therapy management visit.  She was self referred to me.    Reason for Consult: Question. (patient is pregnant and believes she is due at the end of June 2019)    Discussion: Patient called this morning to report she needs her medications refilled.  Reports she is been having a lot of nausea in the morning and the afternoon.  Reports yesterday was the first day that she threw up after taking her medications and she did throw up her morning dose of Isentress.  Reports she is seeing her OB GYN doctor today at 1:30 and will discuss this with them, but is wondering what she should do to help the nausea feeling she is having.  She reports taking her Truvada dose at bedtime, along with her supplements, and feels her nausea is less in the evening.    Plan:  1.  Encouraged that she eats smaller more frequent meals throughout the day, consisting of bland foods, such as bananas, rice, applesauce, toast, or other non-greasy, heavy foods. Eating slowly.  Nibbling on saltine crackers.  2.  Suggested she could sip on peppermint or ginger tea, (ginger ale). Cut up a lemon and smell the slices.  3.  She could try using a Sea band bracelet.  4.  Suggested she talk to her doctor about possibly is starting Zofran if appropriate.  5. Meds will be mailed to her today.  6.She was asked to call if she believes she is continuing to vomit up her medication.    She is asked to call me right away if she feels she is vomiting up her medications.   She is scheduled to see Dr. Gonzalez/[facility] on 01/29.    Harmony Connor, [facility] Pharmacist.   [phone]

## 2019-01-09 NOTE — PROGRESS NOTES
[facility] OB Intake note  Subjective   38 year old woman presents to clinic for initiation of OB care.  Patient's last menstrual period was 10/16/2018.    at 12w1d by Estimated Date of Delivery: 2019 based on LMP.  Reviewed dating ultrasound. Pregnancy is planned.      Symptoms since LMP include nausea and fatigue.  Patient has tried these relief measures: small frequent meals, increased rest and increased fluids.    - Genetic/Infection questionnaire completed, risks include HIV + seen at ID clinic Dr. Cosme    Have you traveled during the pregnancy?No  Have your sexual partner(s) travelled during the pregnancy?No      - Current Medications  Pt is on PNV< FE po and HIV med protocol per ID clinic    Current Outpatient Medications   Medication Sig Dispense Refill     emtricitabine-tenofovir (TRUVADA) 200-300 MG per tablet Take 1 tablet by mouth daily 30 tablet 11     ferrous sulfate (IRON) 325 (65 Fe) MG tablet Take 1 tablet (325 mg) by mouth 3 times daily (with meals) 90 tablet 11     raltegravir (ISENTRESS) 400 MG tablet Take 1 tablet (400 mg) by mouth 2 times daily 60 tablet 11         - Co-morbids hx GDM, HIV +, AMA    Past Medical History:   Diagnosis Date     Asymptomatic human immunodeficiency virus (HIV) infection status (H)      Malaria      Miscarriage      TB (pulmonary tuberculosis)     latent     - Risk for GDM -  does Personal history of GDM, BMI>30, h/o prediabetes/glucose intolerance, first degree relative with GDM or DM    WILL have an early GCT and possible Hgb A1C    - The patient does h/o Pre Eclampsia, Current multi fetal gestation, Pre Gestational Diabetes (Type 1 or Type 2), chronic hypertension, renal disease, Autoimmune disease (systematic lupus erythematosus, antiphospholipid syndrome) so WILL NOT start low dose aspirin (81mg) starting between 12 and 28 weeks to prevent preeclampsia.    - The patient  does not have a history of spontaneous  birth so  WILL NOT consider  progesterone starting at 16-20 weeks and/or serial transvaginal cervical length ultrasounds from 16-24 weeks.         PERSONAL/SOCIAL HISTORY    lives with their family.  Employment: Full time.  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    38 year old  12w1d weeks of pregnancy with MARLENE of 2019 by LMP of Patient's last menstrual period was 10/16/2018.. Ultrasound confirms.   Outpatient Encounter Medications as of 2019   Medication Sig Dispense Refill     emtricitabine-tenofovir (TRUVADA) 200-300 MG per tablet Take 1 tablet by mouth daily 30 tablet 11     ferrous sulfate (IRON) 325 (65 Fe) MG tablet Take 1 tablet (325 mg) by mouth 3 times daily (with meals) 90 tablet 11     raltegravir (ISENTRESS) 400 MG tablet Take 1 tablet (400 mg) by mouth 2 times daily 60 tablet 11     [DISCONTINUED] emtricitabine-tenofovir (TRUVADA) 200-300 MG per tablet Take 1 tablet by mouth daily       [DISCONTINUED] Ferrous Sulfate (IRON SUPPLEMENT PO)        [DISCONTINUED] raltegravir (ISENTRESS) 400 MG tablet Take 400 mg by mouth 2 times daily       No facility-administered encounter medications on file as of 2019.     No orders of the defined types were placed in this encounter.                - Oriented to Practice, types of care, and how to reach a provider.  Pt prefers MD .  - Patient received 1st trimester new OB education packet complete with aide of [employer] Expectant Family booklet including information on genetic screening test options.  - Patient desires 1st trimester screening which was ordered.  - Patient was encouraged to start prenatal vitamins as tolerated.    - Patient was sent to lab for  routine OB labs including hgb A1C .     - Reviewed risk for diabetes in pregnancy, pt agrees to early 1 hour at for NOB visit.  - Reviewed recommendation for low dose aspirin daily to prevent pre eclampsia, pt agrees, follow up at NOB visit.   - Pregnancy concerns to be addressed by provider at new OB exam include:  Needs PAP possible colpo .    Pt to RTO for NOB visit in 3  weeks and prn if questions or concerns    ERIKA Olsen CNM

## 2019-01-09 NOTE — LETTER
2019       RE: Marina Mock  [address]  [place] [facility] [address]     Dear Colleague,    Thank you for referring your patient, Marina Mock, to the [facility] at [facility]. Please see a copy of my visit note below.    [facility] OB Intake note  Subjective   38 year old woman presents to clinic for initiation of OB care.  Patient's last menstrual period was 10/16/2018.    at 12w1d by Estimated Date of Delivery: 2019 based on LMP.  Reviewed dating ultrasound. Pregnancy is planned.      Symptoms since LMP include nausea and fatigue.  Patient has tried these relief measures: small frequent meals, increased rest and increased fluids.    - Genetic/Infection questionnaire completed, risks include HIV + seen at ID clinic Dr. Cosme    Have you traveled during the pregnancy?No  Have your sexual partner(s) travelled during the pregnancy?No      - Current Medications  Pt is on PNV< FE po and HIV med protocol per ID clinic    Current Outpatient Medications   Medication Sig Dispense Refill     emtricitabine-tenofovir (TRUVADA) 200-300 MG per tablet Take 1 tablet by mouth daily 30 tablet 11     ferrous sulfate (IRON) 325 (65 Fe) MG tablet Take 1 tablet (325 mg) by mouth 3 times daily (with meals) 90 tablet 11     raltegravir (ISENTRESS) 400 MG tablet Take 1 tablet (400 mg) by mouth 2 times daily 60 tablet 11         - Co-morbids hx GDM, HIV +, AMA    Past Medical History:   Diagnosis Date     Asymptomatic human immunodeficiency virus (HIV) infection status (H)      Malaria      Miscarriage      TB (pulmonary tuberculosis)     latent     - Risk for GDM -  does Personal history of GDM, BMI>30, h/o prediabetes/glucose intolerance, first degree relative with GDM or DM    WILL have an early GCT and possible Hgb A1C    - The patient does h/o Pre Eclampsia, Current multi fetal gestation, Pre Gestational Diabetes (Type 1 or Type 2), chronic hypertension,  renal disease, Autoimmune disease (systematic lupus erythematosus, antiphospholipid syndrome) so WILL NOT start low dose aspirin (81mg) starting between 12 and 28 weeks to prevent preeclampsia.    - The patient  does not have a history of spontaneous  birth so  WILL NOT consider progesterone starting at 16-20 weeks and/or serial transvaginal cervical length ultrasounds from 16-24 weeks.         PERSONAL/SOCIAL HISTORY    lives with their family.  Employment: Full time.  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    38 year old  12w1d weeks of pregnancy with MARLENE of 2019 by LMP of Patient's last menstrual period was 10/16/2018.. Ultrasound confirms.   Outpatient Encounter Medications as of 2019   Medication Sig Dispense Refill     emtricitabine-tenofovir (TRUVADA) 200-300 MG per tablet Take 1 tablet by mouth daily 30 tablet 11     ferrous sulfate (IRON) 325 (65 Fe) MG tablet Take 1 tablet (325 mg) by mouth 3 times daily (with meals) 90 tablet 11     raltegravir (ISENTRESS) 400 MG tablet Take 1 tablet (400 mg) by mouth 2 times daily 60 tablet 11     [DISCONTINUED] emtricitabine-tenofovir (TRUVADA) 200-300 MG per tablet Take 1 tablet by mouth daily       [DISCONTINUED] Ferrous Sulfate (IRON SUPPLEMENT PO)        [DISCONTINUED] raltegravir (ISENTRESS) 400 MG tablet Take 400 mg by mouth 2 times daily       No facility-administered encounter medications on file as of 2019.     No orders of the defined types were placed in this encounter.                - Oriented to Practice, types of care, and how to reach a provider.  Pt prefers MD .  - Patient received 1st trimester new OB  education packet complete with aide of [employer] Expect[employer] Family booklet including information on genetic screening test options.  - Patient desires 1st trimester screening which was ordered.  - Patient was encouraged to start prenatal vitamins as tolerated.    - Patient was sent to lab for routine OB labs including hgb A1C .     - Reviewed risk for diabetes in pregnancy, pt agrees to early 1 hour at for NOB visit.  - Reviewed recommendation for low dose aspirin daily to prevent pre eclampsia, pt agrees, follow up at NOB visit.   - Pregnancy concerns to be addressed by provider at new OB exam include:  Needs PAP possible colpo .    Pt to RTO for NOB visit in 3  weeks and prn if questions or concerns    ERIKA Olsen CNM

## 2019-01-10 LAB
DEPRECATED CALCIDIOL+CALCIFEROL SERPL-MC: 17 UG/L (ref 20–75)
HBV SURFACE AG SERPL QL IA: NONREACTIVE
HGB A1 MFR BLD: 71.2 % (ref 95–97.9)
HGB A2 MFR BLD: 3.8 % (ref 2–3.5)
HGB C MFR BLD: 0 % (ref 0–0)
HGB E MFR BLD: 0 % (ref 0–0)
HGB F MFR BLD: 0.6 % (ref 0–2.1)
HGB FRACT BLD ELPH-IMP: ABNORMAL
HGB OTHER MFR BLD: 0 % (ref 0–0)
HGB S BLD QL SOLY: POSITIVE
HGB S MFR BLD: 24.4 % (ref 0–0)
PATH INTERP BLD-IMP: ABNORMAL
RUBV IGG SERPL IA-ACNC: 74 IU/ML
T PALLIDUM AB SER QL: NONREACTIVE

## 2019-01-15 DIAGNOSIS — E55.9 VITAMIN D DEFICIENCY: Primary | ICD-10-CM

## 2019-01-17 ENCOUNTER — TELEPHONE (OUTPATIENT)
Dept: MATERNAL FETAL MEDICINE | Facility: CLINIC | Age: 39
End: 2019-01-17

## 2019-01-17 ENCOUNTER — AMBULATORY - HEALTHEAST (OUTPATIENT)
Dept: MATERNAL FETAL MEDICINE | Facility: HOSPITAL | Age: 39
End: 2019-01-17

## 2019-01-17 DIAGNOSIS — O26.90 PREGNANCY, ANTEPARTUM, COMPLICATIONS: ICD-10-CM

## 2019-01-17 NOTE — TELEPHONE ENCOUNTER
MOLINA received referral from [facility] for FTS and MFM consult.  Patient will be seen for FTS on 1/21/19 at [facility].  The MFM consult is scheduled for 2/5/19 at [facility].    Nathalie Mcclain

## 2019-01-18 ENCOUNTER — AMBULATORY - HEALTHEAST (OUTPATIENT)
Dept: MATERNAL FETAL MEDICINE | Facility: HOSPITAL | Age: 39
End: 2019-01-18

## 2019-01-21 ENCOUNTER — RECORDS - HEALTHEAST (OUTPATIENT)
Dept: ADMINISTRATIVE | Facility: OTHER | Age: 39
End: 2019-01-21

## 2019-01-23 ENCOUNTER — AMBULATORY - HEALTHEAST (OUTPATIENT)
Dept: MATERNAL FETAL MEDICINE | Facility: HOSPITAL | Age: 39
End: 2019-01-23

## 2019-01-28 ENCOUNTER — MYC MEDICAL ADVICE (OUTPATIENT)
Dept: INFECTIOUS DISEASES | Facility: CLINIC | Age: 39
End: 2019-01-28

## 2019-02-01 ENCOUNTER — PRE VISIT (OUTPATIENT)
Dept: MATERNAL FETAL MEDICINE | Facility: CLINIC | Age: 39
End: 2019-02-01

## 2019-02-04 ENCOUNTER — OFFICE VISIT (OUTPATIENT)
Dept: OBGYN | Facility: CLINIC | Age: 39
End: 2019-02-04
Attending: ADVANCED PRACTICE MIDWIFE
Payer: COMMERCIAL

## 2019-02-04 VITALS
DIASTOLIC BLOOD PRESSURE: 66 MMHG | SYSTOLIC BLOOD PRESSURE: 108 MMHG | HEART RATE: 80 BPM | BODY MASS INDEX: 20.46 KG/M2 | WEIGHT: 109 LBS

## 2019-02-04 DIAGNOSIS — O09.521 ELDERLY MULTIGRAVIDA IN FIRST TRIMESTER: ICD-10-CM

## 2019-02-04 DIAGNOSIS — O98.719 HIV AFFECTING PREGNANCY, ANTEPARTUM: ICD-10-CM

## 2019-02-04 DIAGNOSIS — Z86.32 HISTORY OF DIET CONTROLLED GESTATIONAL DIABETES MELLITUS (GDM): ICD-10-CM

## 2019-02-04 DIAGNOSIS — R73.09 ELEVATED HEMOGLOBIN A1C: ICD-10-CM

## 2019-02-04 DIAGNOSIS — E55.9 VITAMIN D DEFICIENCY: ICD-10-CM

## 2019-02-04 DIAGNOSIS — O09.91 HIGH-RISK PREGNANCY IN FIRST TRIMESTER: Primary | ICD-10-CM

## 2019-02-04 DIAGNOSIS — D50.8 IRON DEFICIENCY ANEMIA SECONDARY TO INADEQUATE DIETARY IRON INTAKE: ICD-10-CM

## 2019-02-04 LAB — GLUCOSE 1H P 50 G GLC PO SERPL-MCNC: 137 MG/DL (ref 60–129)

## 2019-02-04 PROCEDURE — 87591 N.GONORRHOEAE DNA AMP PROB: CPT | Performed by: ADVANCED PRACTICE MIDWIFE

## 2019-02-04 PROCEDURE — 82950 GLUCOSE TEST: CPT | Performed by: ADVANCED PRACTICE MIDWIFE

## 2019-02-04 PROCEDURE — 36415 COLL VENOUS BLD VENIPUNCTURE: CPT | Performed by: ADVANCED PRACTICE MIDWIFE

## 2019-02-04 PROCEDURE — G0463 HOSPITAL OUTPT CLINIC VISIT: HCPCS

## 2019-02-04 PROCEDURE — 87491 CHLMYD TRACH DNA AMP PROBE: CPT | Performed by: ADVANCED PRACTICE MIDWIFE

## 2019-02-04 RX ORDER — LANOLIN ALCOHOL/MO/W.PET/CERES
1000 CREAM (GRAM) TOPICAL DAILY
Qty: 90 TABLET | Refills: 1 | Status: SHIPPED | OUTPATIENT
Start: 2019-02-04 | End: 2019-12-02

## 2019-02-04 RX ORDER — MULTIVIT WITH MINERALS/LUTEIN
250 TABLET ORAL DAILY
Qty: 90 TABLET | Refills: 1 | Status: SHIPPED | OUTPATIENT
Start: 2019-02-04 | End: 2019-12-02

## 2019-02-04 ASSESSMENT — ANXIETY QUESTIONNAIRES
5. BEING SO RESTLESS THAT IT IS HARD TO SIT STILL: NOT AT ALL
2. NOT BEING ABLE TO STOP OR CONTROL WORRYING: NOT AT ALL
7. FEELING AFRAID AS IF SOMETHING AWFUL MIGHT HAPPEN: NOT AT ALL
GAD7 TOTAL SCORE: 0
6. BECOMING EASILY ANNOYED OR IRRITABLE: NOT AT ALL
3. WORRYING TOO MUCH ABOUT DIFFERENT THINGS: NOT AT ALL
1. FEELING NERVOUS, ANXIOUS, OR ON EDGE: NOT AT ALL

## 2019-02-04 ASSESSMENT — PATIENT HEALTH QUESTIONNAIRE - PHQ9
5. POOR APPETITE OR OVEREATING: NOT AT ALL
SUM OF ALL RESPONSES TO PHQ QUESTIONS 1-9: 5

## 2019-02-04 ASSESSMENT — PAIN SCALES - GENERAL: PAINLEVEL: NO PAIN (0)

## 2019-02-04 NOTE — LETTER
"2019       RE: Marina Mock  [address]  [facility] [address]     Dear Colleague,    Thank you for referring your patient, Marina Mock, to the [facility] at [facility]. Please see a copy of my visit note below.    SUBJECTIVE:   Marina is a 38 year old female who presents to clinic for a new OB visit.   at 15w6d with Estimated Date of Delivery: 2019 based on LMP. Feels well. Has started PNV.     She has not had bleeding since her LMP.   She has had mild nausea. Weight loss has not occurred.   This was a planned pregnancy.   FOB is involved,   \"Chris.\"    OTHER CONCERNS:   - HIV positive  - Hx of GDM- elevated hbga1c at OBI  - Anemia  - Abnormal pap- needs repeat    ===========================================   ROS: 10 point ROS neg other than the symptoms noted above in the HPI.      PSYCHIATRIC:  Denies mood changes, difficulty concentrating, depression, anxiety, panic attacks or post partum depression  PHQ-9 score:    PHQ-9 SCORE 2016   PHQ-9 Total Score 0     No flowsheet data found.      Past History:  Her past medical history   Past Medical History:   Diagnosis Date     Asymptomatic human immunodeficiency virus (HIV) infection status (H)      Gestational diabetes     diet controlled      [place]aria     in [place]      Miscarriage      TB (pulmonary tuberculosis)     latent   .   Since her last LMP she denies use of alcohol, tobacco and street drugs.  HISTORY:  Family History   Problem Relation Age of Onset     Family History Negative Other      Hypertension Father      Hypertension Other      Cancer No family hx of      Diabetes No family hx of      Macular Degeneration No family hx of      Retinal detachment No family hx of      Social History     Socioeconomic History     Marital status:      Spouse name: Chris Roldan     Number of children: None     Years of education: None     Highest " education level: None   Social Needs     Financial resource strain: None     Food insecurity - worry: None     Food insecurity - inability: None     Transportation needs - medical: None     Transportation needs - non-medical: None   Occupational History     None   Tobacco Use     Smoking status: Never Smoker     Smokeless tobacco: Never Used   Substance and Sexual Activity     Alcohol use: No     Drug use: No     Sexual activity: Yes     Partners: Male   Other Topics Concern     Parent/sibling w/ CABG, MI or angioplasty before 65F 55M? Not Asked   Social History Narrative    ** Merged History Encounter **     How much exercise per week? Active with 2 yr    How much calcium per day? supplement       How much caffeine per day? none    How much vitamin D per day? supplement    Do you/your family wear seatbelts?  Yes    Do you/your family use safety helmets? n/a    Do you/your family use sunscreen? No    Do you/your family keep firearms in the home? No    Do you/your family have a smoke detector(s)? Yes        2019 Aldo Perez LPN    Chata Painting CNM APRN          Current Outpatient Medications   Medication Sig     emtricitabine-tenofovir (TRUVADA) 200-300 MG per tablet Take 1 tablet by mouth daily     ferrous sulfate (IRON) 325 (65 Fe) MG tablet Take 1 tablet (325 mg) by mouth 3 times daily (with meals)     raltegravir (ISENTRESS) 400 MG tablet Take 1 tablet (400 mg) by mouth 2 times daily     No current facility-administered medications for this visit.      No Known Allergies    ============================================  MEDICAL HISTORY  Past Medical History:   Diagnosis Date     Asymptomatic human immunodeficiency virus (HIV) infection status (H) 2016     Gestational diabetes     diet controlled      Malaria     in [place]      Miscarriage      TB (pulmonary tuberculosis)     latent     Past Surgical History:   Procedure Laterality Date     NO HISTORY OF SURGERY         Obstetric History       T2       L1     SAB1   TAB0   Ectopic0   Multiple0   Live Births2       # Outcome Date GA Lbr Aden/2nd Weight Sex Delivery Anes PTL Lv   4 Current            3 Term 16 38w6d 01::41 2.48 kg (5 lb 7.5 oz) F Vag-Spont Local, EPI N LULU      Name: Anel       Apgar1:  8                Apgar5: 9   2 SAB 08/19/15           1 Term 07    F   N LIVE BIRTH      Name: Kori            GYN History- Hx LSIL pap- , no colpo or follow up    I personally reviewed the past social/family/medical and surgical history on the date of service.   I reviewed lab work done at Intake visit with patient.    EXAM:  /66   Pulse 80   Wt 49.4 kg (109 lb)   LMP 10/16/2018   Breastfeeding? No   BMI 20.46 kg/m      EXAM:  GENERAL:  Pleasant pregnant female, alert, cooperative and well groomed.  SKIN:  Warm and dry, without lesions or rashes  HEAD: Symmetrical features.  MOUTH:  Buccal mucosa pink, moist without lesions.  Teeth in good repair.    NECK:  Thyroid without enlargement and nodules.  Lymph nodes not palpable.   LUNGS:  Clear to auscultation.  BREAST:    No dominant, fixed or suspicious masses are noted.  No skin or nipple changes or axillary nodes.   Nipples everted.      HEART:  RRR without murmur.  ABDOMEN: Soft without masses , tenderness or organomegaly.  No CVA tenderness.  Uterus palpable at size equal to dates.  No scars noted.. Fetal heart tones present at 155bpm.  MUSCULOSKELETAL:  Full range of motion  EXTREMITIES:  No edema. No significant varicosities.   PELVIC EXAM: declined  WET PREP:Not done  GC/CHLAMYDIA CULTURE OBTAINED:YES    Lab Results   Component Value Date    PAP lsil 2016    Declined pap today.    ASSESSMENT:  38 year old , 15w6d weeks of pregnancy with MARLENE of 2019 by LMP  Intrauterine pregnancy 15w6d size consistent with dates  Genetic Screening:  Level 2 ultrasound      ICD-10-CM    1. High-risk pregnancy in first trimester O09.91        PLAN:  -  Reviewed use of triage nurse line and contacting the on-call provider after hours for an urgent need such as fever, vagina bleeding, bladder or vaginal infection, rupture of membranes,  or term labor.    - Reviewed best evidence for: weight gain for her weight and height for pregnancy:  Based on pre-pregnancy weight and Body mass index is 20.46 kg/m . RECOMMENDED WEIGHT GAIN: 25-35 lbs.    - Reviewed healthy diet and foods to avoid; exercise and activity during pregnancy; avoiding exposure to toxoplasmosis; and maintenance of a generally healthy lifestyle.   - Discussed the harms, benefits, side effects and alternative therapies for current prescribed and OTC medications.    - All pt's and partners questions discussed and answered.  Pt verbalized understanding of and agreement to plan of care.     - OBI labs reviewed.  - GC/CT sent.  - Recommended iron supplementation. Pt has iron at home. Prescription sent for vitamin c and b12. Reviewed use.  - Prescription sent for 5000 international unit(s)/day vitamin d supplementation.   Also recommended increasing iron rich foods.    - Reviewed high risk for gestational diabetes, early 1 hour today after consult with Dr. Tapia. Place endocrine referral if indicaed.  - Recommended repeat pap with hpv cotesting today. Pt declined, wishes to defer to PP.  - MFM consult scheduled or 19.    - Continue scheduled prenatal care, RTC in 4 weeks and prn if questions or concerns    ERIKA Fraga CNM

## 2019-02-04 NOTE — PROGRESS NOTES
"SUBJECTIVE:   Marina is a 38 year old female who presents to clinic for a new OB visit.   at 15w6d with Estimated Date of Delivery: 2019 based on LMP. Feels well. Has started PNV.     She has not had bleeding since her LMP.   She has had mild nausea. Weight loss has not occurred.   This was a planned pregnancy.   FOB is involved,   \"Chris.\"    OTHER CONCERNS:   - HIV positive  - Hx of GDM- elevated hbga1c at OBI  - Anemia  - Abnormal pap- needs repeat    ===========================================   ROS: 10 point ROS neg other than the symptoms noted above in the HPI.      PSYCHIATRIC:  Denies mood changes, difficulty concentrating, depression, anxiety, panic attacks or post partum depression  PHQ-9 score:    PHQ-9 SCORE 2016   PHQ-9 Total Score 0     No flowsheet data found.      Past History:  Her past medical history   Past Medical History:   Diagnosis Date     Asymptomatic human immunodeficiency virus (HIV) infection status (H) 2016     Gestational diabetes     diet controlled      [place]aria     in [place]      Miscarriage      TB (pulmonary tuberculosis)     latent   .   Since her last LMP she denies use of alcohol, tobacco and street drugs.  HISTORY:  Family History   Problem Relation Age of Onset     Family History Negative Other      Hypertension Father      Hypertension Other      Cancer No family hx of      Diabetes No family hx of      Macular Degeneration No family hx of      Retinal detachment No family hx of      Social History     Socioeconomic History     Marital status:      Spouse name: Chris Roldan     Number of children: None     Years of education: None     Highest education level: None   Social Needs     Financial resource strain: None     Food insecurity - worry: None     Food insecurity - inability: None     Transportation needs - medical: None     Transportation needs - non-medical: None   Occupational History     None   Tobacco Use     Smoking status: Never " Smoker     Smokeless tobacco: Never Used   Substance and Sexual Activity     Alcohol use: No     Drug use: No     Sexual activity: Yes     Partners: Male   Other Topics Concern     Parent/sibling w/ CABG, MI or angioplasty before 65F 55M? Not Asked   Social History Narrative    ** Merged History Encounter **     How much exercise per week? Active with 2 yr    How much calcium per day? supplement       How much caffeine per day? none    How much vitamin D per day? supplement    Do you/your family wear seatbelts?  Yes    Do you/your family use safety helmets? n/a    Do you/your family use sunscreen? No    Do you/your family keep firearms in the home? No    Do you/your family have a smoke detector(s)? Yes        2019 Aldo Perez LPN    Chata FINNEYM APRN          Current Outpatient Medications   Medication Sig     emtricitabine-tenofovir (TRUVADA) 200-300 MG per tablet Take 1 tablet by mouth daily     ferrous sulfate (IRON) 325 (65 Fe) MG tablet Take 1 tablet (325 mg) by mouth 3 times daily (with meals)     raltegravir (ISENTRESS) 400 MG tablet Take 1 tablet (400 mg) by mouth 2 times daily     No current facility-administered medications for this visit.      No Known Allergies    ============================================  MEDICAL HISTORY  Past Medical History:   Diagnosis Date     Asymptomatic human immunodeficiency virus (HIV) infection status (H)      Gestational diabetes     diet controlled      Malaria     in [place]      Miscarriage      TB (pulmonary tuberculosis)     latent     Past Surgical History:   Procedure Laterality Date     NO HISTORY OF SURGERY         Obstetric History       T2      L1     SAB1   TAB0   Ectopic0   Multiple0   Live Births2       # Outcome Date GA Lbr Aden/2nd Weight Sex Delivery Anes PTL Lv   4 Current            3 Term 16 38w6d 01:22 / 00:41 2.48 kg (5 lb 7.5 oz) F Vag-Spont Local, EPI N LULU      Name: Tatianna Apgar1:  8                 Apgar5: 9   2 SAB 08/19/15           1 Term 07    F   N LIVE BIRTH      Name: Kori            GYN History- Hx LSIL pap- 2016, no colpo or follow up    I personally reviewed the past social/family/medical and surgical history on the date of service.   I reviewed lab work done at Intake visit with patient.    EXAM:  /66   Pulse 80   Wt 49.4 kg (109 lb)   LMP 10/16/2018   Breastfeeding? No   BMI 20.46 kg/m      EXAM:  GENERAL:  Pleasant pregnant female, alert, cooperative and well groomed.  SKIN:  Warm and dry, without lesions or rashes  HEAD: Symmetrical features.  MOUTH:  Buccal mucosa pink, moist without lesions.  Teeth in good repair.    NECK:  Thyroid without enlargement and nodules.  Lymph nodes not palpable.   LUNGS:  Clear to auscultation.  BREAST:    No dominant, fixed or suspicious masses are noted.  No skin or nipple changes or axillary nodes.   Nipples everted.      HEART:  RRR without murmur.  ABDOMEN: Soft without masses , tenderness or organomegaly.  No CVA tenderness.  Uterus palpable at size equal to dates.  No scars noted.. Fetal heart tones present at 155bpm.  MUSCULOSKELETAL:  Full range of motion  EXTREMITIES:  No edema. No significant varicosities.   PELVIC EXAM: declined  WET PREP:Not done  GC/CHLAMYDIA CULTURE OBTAINED:YES    Lab Results   Component Value Date    PAP lsil 2016    Declined pap today.    ASSESSMENT:  38 year old , 15w6d weeks of pregnancy with MARLENE of 2019 by LMP  Intrauterine pregnancy 15w6d size consistent with dates  Genetic Screening:  Level 2 ultrasound      ICD-10-CM    1. High-risk pregnancy in first trimester O09.91        PLAN:  - Reviewed use of triage nurse line and contacting the on-call provider after hours for an urgent need such as fever, vagina bleeding, bladder or vaginal infection, rupture of membranes,  or term labor.    - Reviewed best evidence for: weight gain for her weight and height for pregnancy:  Based on  pre-pregnancy weight and Body mass index is 20.46 kg/m . RECOMMENDED WEIGHT GAIN: 25-35 lbs.    - Reviewed healthy diet and foods to avoid; exercise and activity during pregnancy; avoiding exposure to toxoplasmosis; and maintenance of a generally healthy lifestyle.   - Discussed the harms, benefits, side effects and alternative therapies for current prescribed and OTC medications.    - All pt's and partners questions discussed and answered.  Pt verbalized understanding of and agreement to plan of care.     - OBI labs reviewed.  - GC/CT sent.  - Recommended iron supplementation. Pt has iron at home. Prescription sent for vitamin c and b12. Reviewed use.  - Prescription sent for 5000 international unit(s)/day vitamin d supplementation.   Also recommended increasing iron rich foods.    - Reviewed high risk for gestational diabetes, early 1 hour today after consult with Dr. Tapia. Place endocrine referral if indicaed.  - Recommended repeat pap with hpv cotesting today. Pt declined, wishes to defer to PP.  - MFM consult scheduled or 2/6/19.    - Continue scheduled prenatal care, RTC in 4 weeks and prn if questions or concerns    ERIKA Fraga, JAQUELINE

## 2019-02-05 DIAGNOSIS — O99.810 ABNORMAL MATERNAL GLUCOSE TOLERANCE, ANTEPARTUM: Primary | ICD-10-CM

## 2019-02-05 LAB
C TRACH DNA SPEC QL NAA+PROBE: NEGATIVE
N GONORRHOEA DNA SPEC QL NAA+PROBE: NEGATIVE
SPECIMEN SOURCE: NORMAL
SPECIMEN SOURCE: NORMAL

## 2019-02-05 ASSESSMENT — ANXIETY QUESTIONNAIRES: GAD7 TOTAL SCORE: 0

## 2019-02-09 ENCOUNTER — TELEPHONE (OUTPATIENT)
Dept: PHARMACY | Facility: CLINIC | Age: 39
End: 2019-02-09

## 2019-02-09 NOTE — TELEPHONE ENCOUNTER
Attempted to contact the patient to for refill reminder call,  left message on voicemail    Follow-up Date: 02/11/19    Michelle Ricci [facility]  [phone]

## 2019-02-13 DIAGNOSIS — O99.810 ABNORMAL MATERNAL GLUCOSE TOLERANCE, ANTEPARTUM: ICD-10-CM

## 2019-02-13 PROBLEM — O99.019 ANTEPARTUM ANEMIA: Status: ACTIVE | Noted: 2019-02-13

## 2019-02-13 PROBLEM — O09.529 ANTEPARTUM MULTIGRAVIDA OF ADVANCED MATERNAL AGE: Status: ACTIVE | Noted: 2019-01-09

## 2019-02-13 LAB
GLUCOSE 1H P 100 G GLC PO SERPL-MCNC: 151 MG/DL (ref 60–179)
GLUCOSE 2H P 100 G GLC PO SERPL-MCNC: 147 MG/DL (ref 60–154)
GLUCOSE 3H P 100 G GLC PO SERPL-MCNC: 122 MG/DL (ref 60–139)
GLUCOSE P FAST SERPL-MCNC: 87 MG/DL (ref 60–94)

## 2019-02-13 PROCEDURE — 82952 GTT-ADDED SAMPLES: CPT | Performed by: MIDWIFE

## 2019-02-13 PROCEDURE — 36415 COLL VENOUS BLD VENIPUNCTURE: CPT | Performed by: MIDWIFE

## 2019-02-13 PROCEDURE — 82951 GLUCOSE TOLERANCE TEST (GTT): CPT | Performed by: MIDWIFE

## 2019-02-13 NOTE — TELEPHONE ENCOUNTER
Called patient for refill reminder.    Will mail 2 prescriptions address has been verified.     Follow-up Date: 03/08/19    Michelle Ricci [facility]  [phone]

## 2019-02-18 ENCOUNTER — TELEPHONE (OUTPATIENT)
Dept: INFECTIOUS DISEASES | Facility: CLINIC | Age: 39
End: 2019-02-18

## 2019-02-18 NOTE — PROGRESS NOTES
Maternal-Fetal Medicine Consultation    Marina Mock  : 1980  MRN: [id number]    REFERRAL:  Marina Mock is a 38 year old sent by [employer] for pregnancy complicated by HIV+ status.    HPI:  Marina Mock is a 38 year old  at 18w0d by LMP c/w 12w2d US presenting with concerns regarding HIV positive status in pregnancy. Patient has been feeling overall well this pregnancy. No VB, cramping, or leaking fluid.     Pregnancy complicated by:  - HIV positive, currently on Truvada and Isentress. 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     Dating:    Patient's last menstrual period was 10/16/2018.   Dating ultrasound: 12w2d on 19    Obstetrics History:  Obstetric History       T2      L1     SAB1   TAB0   Ectopic0   Multiple0   Live Births2       # Outcome Date GA Lbr Aden/2nd Weight Sex Delivery Anes PTL Lv   4 Current            3 Term 16 38w6d 01:22  00:41 2.48 kg (5 lb 7.5 oz) F Vag-Spont Local, EPI N LULU      Name: Anel       Apgar1:  8                Apgar5: 9   2 SAB 08/19/15           1 Term 07    F   N LIVE BIRTH      Name: Kori      Obstetric Comments   Hx of gdm       Gynecologic History:  - Last Pap: LSIL, HPV: other high risk (2016)  - Denies prior cervical surgery or procedures  - Denies any history of frequent UTIs, vaginal infections, or STIs    Past Medical History:  Past Medical History:   Diagnosis Date     Asymptomatic human immunodeficiency virus (HIV) infection status (H) 2016     Gestational diabetes     diet controlled      Malaria     in [place]       Miscarriage      TB (pulmonary tuberculosis)     latent       Past Surgical History:  Past Surgical History:   Procedure Laterality Date     NO HISTORY OF SURGERY         Current Medications:  Prior to Admission medications    Medication Sig Last Dose Taking? Auth Provider   cholecalciferol (VITAMIN D3) 5000 units TABS tablet Take 1 tablet (5,000 Units) by mouth daily   Allan Hernandez CNM   emtricitabine-tenofovir (TRUVADA) 200-300 MG per tablet Take 1 tablet by mouth daily Taking  Claudia Cosme MD   ferrous sulfate (IRON) 325 (65 Fe) MG tablet Take 1 tablet (325 mg) by mouth 3 times daily (with meals) Taking  Claudia Cosme MD   raltegravir (ISENTRESS) 400 MG tablet Take 1 tablet (400 mg) by mouth 2 times daily Taking  Claudia Cosme MD   vitamin B-12 (CYANOCOBALAMIN) 1000 MCG tablet Take 1 tablet (1,000 mcg) by mouth daily   Allan Hernandez CNM   vitamin C (ASCORBIC ACID) 250 MG tablet Take 1 tablet (250 mg) by mouth daily   Allan Hernandez CNM       Drug and lactation database from the [place] [employer] of Medicine:  http://toxnet.nlm.nih.gov/cgi-bin/sis/htmlgen?LACT    Allergies:  Patient has no known allergies.    Social History:   Social History     Socioeconomic History     Marital status:      Spouse name: Chris Roldan     Number of children: Not on file     Years of education: Not on file     Highest education level: Not on file   Social Needs     Financial resource strain: Not on file     Food insecurity - worry: Not on file     Food insecurity - inability: Not on file     Transportation needs - medical: Not on file     Transportation needs - non-medical: Not on file   Occupational History     Not on file   Tobacco Use     Smoking status: Never Smoker     Smokeless tobacco: Never Used   Substance and Sexual Activity     Alcohol use: No     Drug use: No     Sexual activity: Yes      Partners: Male   Other Topics Concern     Parent/sibling w/ CABG, MI or angioplasty before 65F 55M? Not Asked   Social History Narrative    ** Merged History Encounter **     How much exercise per week? Active with 2 yr    How much calcium per day? supplement       How much caffeine per day? none    How much vitamin D per day? supplement    Do you/your family wear seatbelts?  Yes    Do you/your family use safety helmets? n/a    Do you/your family use sunscreen? No    Do you/your family keep firearms in the home? No    Do you/your family have a smoke detector(s)? Yes        2019 Aldo Perez LPN    Chata Painting CNM APRN            Family History:  Family History   Problem Relation Age of Onset     Family History Negative Other      Hypertension Father      Hypertension Other      Cancer No family hx of      Diabetes No family hx of      Macular Degeneration No family hx of      Retinal detachment No family hx of        Partner History:  FOB sickle cell trait  Child with sickle cell trait    ROS:  10-point ROS negative except as in HPI     PHYSICAL EXAM:    Gen: NAD, well appearing  Chest: Non-labored breathing  Abdomen: Gravid    Pre-pregnancy weight: 108; TW lb (at 15w)    Labs:   Date: 19  ABO/Rh: O Pos  ABS: Neg   Hgb: 10.3  HCT: 31.7  MCV: 75  Plt: 255  HgbEP: Sickle cell trait  TPA: NR  HepBSAg: NR  Rubella: immune   GC/Chlam: neg  Urine Cx: not done  Pap/HPV: declines (see below)   HgbA1c: 6.5  Early GCT: failed  GTT: passed  TSH: 0.94    Genetic Testing: Declines    Ultrasounds:   DATE  GA  ASSESSMENT  19  12w2d  Dating    Please see ultrasound report for comprehensive anatomy US performed today.       ASSESSMENT:  Marina Mock is a 38 year old  at 18w0d by LMP c/w 12w2d US presenting with concerns regarding HIV positive status in pregnancy.  - HIV positive, currently on Truvada and Isentress. Last VL undetectable on   - Suspected preexisting T2DM, Hgb A1c 6.5 on 19  (failed GCT, passed GTT)  - Iron deficiency anemia  - Latent TB, declines treatment  - LSIL pap, other high risk HPV+ in 2016, declined colposcopy and repeat Pap  - Sickle cell trait  - Advanced maternal age  - h/o gHTN  - h/o asymmetric FGR with last pregnancy    RECOMMENDATIONS:    #HIV positive  - Continue Truvada and Descovy, discussed importance of taking this as prescribed  - At least q 3 months viral load and CD4 count now. Continue regular ID follow up.  - Viral load between 34-36 weeks to assist with delivery planning.  - Discussed with patient that risk of  transmission is <0.5% if she continues her ART as prescribed and continues to have an undetectable viral load  - Intrapartum IV Zidovudine only if viral load >1000 copies/mL at the time of delivery  - Mode of delivery: Safe for vaginal delivery if viral load remains <1000 copies/mL. Would recommend  section at 38 weeks if viral load >1000 copies/mL.  - No breastfeeding  - Infant prophylaxis within 6-12 hours of life  - Referral sent to [place]  HIV program    #Advanced maternal age  - Discussed genetic screening with NIPT. Discussed this is a screening test that gives a risk assessment of genetic conditions such as trisomy 13,18,21 but is not diagnostic. Patient declines genetic counseling at this time.   - Comprehensive anatomy US today    #Suspected preexisting type 2 diabetes mellitus  - Dicussed with patient given Hgb A1c of 6.5 at her first prenatal visit that she meets diagnosis for type II DM. Discussed risks of poorly controlled diabetes mellitus in pregnancy including fetal anomalies,  delivery, preeclampsia, macrosomia,  hyperglycemia, and worsening of diabetic retinopathy/nephropathy.  - Begin glucose monitoring with fasting BG each morning and post-prandial. Discussed possibility of initiating insulin if poorly controlled blood glucose. Goal fasting <95, 1 hour post-prandial <140, 2 hour  postprandial <120.  - Begin 81 mg ASA for preeclampsia prevention  - s/p Level II anatomy US  - Fetal echocardiogram with peds cardiology in 3-4 weeks (ordered today)  - Serial growth US, weekly BPP if diet controlled, twice weekly if requires insulin starting at 32 weeks    #Maternal Sickle cell trait  - FOB also with sickle cell trait, 3 year old child with sickle cell trait  - Discussed 50% chance of child having sickle cell trait, 25% chance of child being affected by sickle cell disease. Discussed possibility of amniocentesis for definitive diagnosis, however patient declines at this time.   - Offered genetic counseling due to AMA and sickle cell trait, patient declines    #LSIL pap, other high risk HPV positive:  - Discussed importance of yearly pap smear screening due to HIV positive status and increased risk of developing cervical cancer due to impaired immune system.  - Recommend she repeat Pap smear with primary OB at next visit     #h/o gHTN  - Baseline HELLP labs, UPC  - Daily ASA as above for preeclampsia prevention    #h/o FGR  - Follow growth q4 weeks as above    I acted as a scribe for Dr. Tamara Mondragon MD  Obstetrics and Gyncology, PGY-2    This note, as scribed, accurately reflects the examination, my impressions and plan as discussed with the patient.    Josie Geronimo MD  Specialist in Maternal-Fetal Medicine

## 2019-02-19 ENCOUNTER — HOSPITAL ENCOUNTER (OUTPATIENT)
Dept: ULTRASOUND IMAGING | Facility: CLINIC | Age: 39
Discharge: HOME | End: 2019-02-19
Attending: INTERNAL MEDICINE | Admitting: INTERNAL MEDICINE
Payer: COMMERCIAL

## 2019-02-19 ENCOUNTER — OFFICE VISIT (OUTPATIENT)
Dept: INFECTIOUS DISEASES | Facility: CLINIC | Age: 39
End: 2019-02-19
Attending: INTERNAL MEDICINE
Payer: COMMERCIAL

## 2019-02-19 ENCOUNTER — OFFICE VISIT (OUTPATIENT)
Dept: MATERNAL FETAL MEDICINE | Facility: CLINIC | Age: 39
End: 2019-02-19
Attending: INTERNAL MEDICINE
Payer: COMMERCIAL

## 2019-02-19 VITALS
SYSTOLIC BLOOD PRESSURE: 112 MMHG | HEIGHT: 61 IN | WEIGHT: 107 LBS | BODY MASS INDEX: 20.2 KG/M2 | DIASTOLIC BLOOD PRESSURE: 71 MMHG | TEMPERATURE: 98.3 F | HEART RATE: 103 BPM | OXYGEN SATURATION: 100 %

## 2019-02-19 DIAGNOSIS — O26.90 PREGNANCY RELATED CONDITION, ANTEPARTUM: ICD-10-CM

## 2019-02-19 DIAGNOSIS — O98.719 HIV DISEASE AFFECTING PREGNANCY, ANTEPARTUM: ICD-10-CM

## 2019-02-19 DIAGNOSIS — O98.712 HIV DISEASE AFFECTING PREGNANCY IN SECOND TRIMESTER: Primary | ICD-10-CM

## 2019-02-19 PROCEDURE — 76811 OB US DETAILED SNGL FETUS: CPT

## 2019-02-19 RX ORDER — FOLIC ACID 1 MG/1
1 TABLET ORAL DAILY
COMMUNITY
End: 2019-12-02

## 2019-02-19 ASSESSMENT — MIFFLIN-ST. JEOR: SCORE: 1115.9

## 2019-02-19 ASSESSMENT — PAIN SCALES - GENERAL: PAINLEVEL: NO PAIN (0)

## 2019-02-19 NOTE — LETTER
2019       RE: Marina Mock  [address]  [facility] [address]     Dear Colleague,    Thank you for referring your patient, Marina Mock, to the [facility] AND INFECTIOUS DISEASES at [facility]. Please see a copy of my visit note below.      INFECTIOUS DISEASE CLINIC    REASON FOR VISIT:   Follow-up HIV, now pregnant    HISTORY OF PRESENT ILLNESS:   Ms. Marina Mock is a 39 y/o woman with HIV infection.  She was last seen in this clinic on 2018 by my colleague Dr. Claudia Cosme, who has since left the practice.    Marina is now pregnant at 18 weeks gestation.   She continues with Truvada and raltegravir.  Dr. Cosme switched her to this regimen after their last visit as Marina was actively trying to become pregnant again.  This regimen is recommended in pregnancy for mothers with HIV infection.    Marina reports careful adherence to this regimen.  She may have missed a dose of the raltegravir 1-2 times in a month.  She does find twice daily dosing of raltegravir to be challenging.  She works at night, so she keeps odd hours.  Once she wakes up, it takes her some time to feel settled in her stomach, so if she takes it at 1 or 2pm, she then wakes herself up at 2am to take her second dose.   She also had a lot of nausea and vomiting during the first trimester, but this is much better now.    Marina reports that the pregnancy is going well.  She is feeling movement of the baby.  She does have gestational diabetes. Her child (Veronique) is here with her in the clinic.  She was born 16 via  at 38 weeks, 6 days, but with IUGR.  Marina followed with our clinic during that time and was adherent to her ARV regimen.  Veronique has been ruled out for HIV infection.      PAST MEDICAL HISTORY:    Past Medical History:   Diagnosis Date     Asymptomatic human immunodeficiency virus (HIV) infection status (H) 2016     Gestational diabetes      diet controlled      [place]aria     in [place]      Miscarriage      TB (pulmonary tuberculosis)     latent     PAST SURGICAL HISTORY:  Past Surgical History:   Procedure Laterality Date     NO HISTORY OF SURGERY       FAMILY PAST MEDICAL HISTORY:  Family History   Problem Relation Age of Onset     Family History Negative Other      Hypertension Father      Hypertension Other      Cancer No family hx of      Diabetes No family hx of      Macular Degeneration No family hx of      Retinal detachment No family hx of      SOCIAL HISTORY:  Social History     Socioeconomic History     Marital status:      Spouse name: Chris Roldan   Tobacco Use     Smoking status: Never Smoker     Smokeless tobacco: Never Used   Substance and Sexual Activity     Alcohol use: No     Drug use: No     Sexual activity: Yes     Partners: Male   Social History Narrative    ** Merged History Encounter **     How much exercise per week? Active with 2 yr    How much calcium per day? supplement       How much caffeine per day? none    How much vitamin D per day? supplement    Do you/your family wear seatbelts?  Yes    Do you/your family use safety helmets? n/a    Do you/your family use sunscreen? No    Do you/your family keep firearms in the home? No    Do you/your family have a smoke detector(s)? 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, 2019 Aldo Perez LPN    [employer] CNM APRN            CURRENT MEDICATIONS:    Current Outpatient Medications   Medication     cholecalciferol (VITAMIN D3) 5000 units TABS tablet     raltegravir (ISENTRESS) 400 MG     emtricitabine-tenofovir (TRUVADA) 200-300 MG per tablet     ferrous sulfate (IRON) 325 (65 Fe) MG tablet     folic acid (FOLVITE) 1 MG tablet     vitamin B-12 (CYANOCOBALAMIN) 1000 MCG tablet     vitamin C (ASCORBIC ACID) 250 MG tablet     blood glucose (ACCU-CHEK COMPACT DRUM) test strip     blood glucose monitoring (ACCU-CHEK COMPACT CARE KIT) meter device kit     blood glucose monitoring (ACCU-CHEK MULTICLIX)  "lancets     No current facility-administered medications for this visit.       ALLERGIES:  No Known Allergies    REVIEW OF SYSTEMS: A full 12-point review of systems was obtained, pertinent positives and negatives as above.     PHYSICAL EXAMINATION:    VITAL SIGNS: /71   Pulse 103   Temp 98.3  F (36.8  C) (Oral)   Ht 1.554 m (5' 1.2\")   Wt 48.5 kg (107 lb)   LMP 10/16/2018   SpO2 100%   BMI 20.09 kg/m     GENERAL:   No acute distress    HEENT: No icterus or injection. Oropharynx moist and clear without lesions or exudate.    NECK: Supple and nontender  LYMPH:  No cervical, axillary or inguinal lymphadenopathy  LUNGS: Clear to ausculation bilaterally without any increased work of breathing  HEART: Regular rate and rhythm and no murmur, gallop or rub    ABDOMEN: Gravid abdomen with fundus felt above navel.  Normoactive bowel sounds, soft, nontender, nondistended  EXTREMITIES: Warm and well perfused without clubbing, cyanosis, or edema  SKIN:  No rashes  NEURO:  Awake, alert, no focal neurologic deficits.  PSYCH: Affect normal. Speech fluent and appropriate.     LABORATORY DATA:    Reviewed.   19 by OB/GYN  HIV RNA PCR for viral load - not detected  18  Absolute CD4 687    [email or web address]  Fetal ultrasound  Measurements:               CRL = 56.3 mm = 12 2/7 weeks  EGA.                 Fetal anatomy appears normal for gestational age.               Fetal/Fetal Cardiac Activity: Present.  FHR = 166bpm.               Implantation: Intrauterine. Low lying placenta at this time.               Cervix = 5.3 cm               Maternal structures appear normal.   Impression:  IUP with MARLENE: 19.    Recommend comprehensive scan at 18 to 20 weeks.      ASSESSMENT AND PLAN:       1. HIV in pregnancy, currently 18 weeks gestation    Marina finds twice daily dosing of raltegravir to be challenging.  Recently, the HIV guidelines have been updated to support the use of dolutegravir once daily dosing in " the second trimester of pregnancy and beyond.  We will switch to dolutegravir 50mg po once daily for ease of dosing.  She will continue Truvada one tablet po daily as well.  These two tablets can and should be taken together at the same time.     Last HIV viral load was obtained one month ago and was undetectable.  We typically follow HIV viral load every 3 months in pregnancy, and again at 34-36 weeks to determine delivery plan.  Since Marina is changing her medication regimen, we will plan for her to return in 1 month for follow-up. At that time, we will repeat HIV viral load and T cell subsets along with CBC, CMP for toxicity.       Anemia, microcytic  Continues with her last CBC showing Hg of 10.3 and MCV of 75.   Ferritin from 8/9/2018 was 8.  Marina is prescribed ferrous sulfate as well as a prenatal vitamin.  I suspect that she is not taking these tablets.     LTBI (latent tuberculosis infection)  Previously, Marina was counseled about treatment for LTBI. She experienced side effects from both rifampin and isoniazid and has not been willing to take either medication.  She was previously counseled by my colleague about the risk this poses to herself given her HIV, as well as the concept of active tuberculosis exposure to children under 5 being very high risk.       Preventative Medicine  Cardiovascular Sal -                Lipids - 12/21/17 - Total Chol 212, HDL 51               Blood Pressure - normal /71 today. Will continue to follow.   Cancer Screening               Colon - No indication for screening at present               Cervical - refused pap smear at OB visit, has history of LSIL in 2016 without colposcopy or follow-up  Immunizations               Hepatitis A - Ab positive 3/2016               Hepatitis B - Surface Ab/Ag and core Ab negative, hx of vaccination in 2015 in outside records, but will need booster                Influenza - Do not see record, OB usually provides this, will  have to ask patient               Pneumovax/Prevnar - Had PCV on 3/16/2017,  PSV23 4/5/2018               Tdap - 8/15/2016, will receive again in pregnancy  Bone Health - No screening indicated at this time  Renal Health - 8/2016 - no proteinuria, Creatinine has been normal  Sexually Transmitted Infection Risk and Screening               Gonorrhea and Chlamydia - Negative from urine sample 2/4/2019 in OB screening               Syphilis - Negative T pallidum Ab 1/9/2017 in OB screening    Marina will return to see me in 1 month.    I spent 45 minutes in direct care with this patient, including >50% of time face-to-face with the patient counseling about HIV treatment and monitoring in pregnancy, including our decision to change an antiretroviral medication.    Alfredo Martinez MD

## 2019-02-19 NOTE — PROGRESS NOTES
INFECTIOUS DISEASE CLINIC    REASON FOR VISIT:   Follow-up HIV, now pregnant    HISTORY OF PRESENT ILLNESS:   Ms. Marina Mock is a 37 y/o woman with HIV infection.  She was last seen in this clinic on 2018 by my colleague Dr. Claudia Cosme, who has since left the practice.    Marina is now pregnant at 18 weeks gestation.   She continues with Truvada and raltegravir.  Dr. Cosme switched her to this regimen after their last visit as Marina was actively trying to become pregnant again.  This regimen is recommended in pregnancy for mothers with HIV infection.    Marina reports careful adherence to this regimen.  She may have missed a dose of the raltegravir 1-2 times in a month.  She does find twice daily dosing of raltegravir to be challenging.  She works at night, so she keeps odd hours.  Once she wakes up, it takes her some time to feel settled in her stomach, so if she takes it at 1 or 2pm, she then wakes herself up at 2am to take her second dose.   She also had a lot of nausea and vomiting during the first trimester, but this is much better now.    Marina reports that the pregnancy is going well.  She is feeling movement of the baby.  She does have gestational diabetes. Her child (Veronique) is here with her in the clinic.  She was born 16 via  at 38 weeks, 6 days, but with IUGR.  Marina followed with our clinic during that time and was adherent to her ARV regimen.  Veronique has been ruled out for HIV infection.      PAST MEDICAL HISTORY:    Past Medical History:   Diagnosis Date     Asymptomatic human immunodeficiency virus (HIV) infection status (H)      Gestational diabetes     diet controlled      Malaria     in [place]      Miscarriage      TB (pulmonary tuberculosis)     latent     PAST SURGICAL HISTORY:  Past Surgical History:   Procedure Laterality Date     NO HISTORY OF SURGERY       FAMILY PAST MEDICAL HISTORY:  Family History   Problem Relation Age of Onset     Family History  Negative Other      Hypertension Father      Hypertension Other      Cancer No family hx of      Diabetes No family hx of      Macular Degeneration No family hx of      Retinal detachment No family hx of      SOCIAL HISTORY:  Social History     Socioeconomic History     Marital status:      Spouse name: Chris Roldan   Tobacco Use     Smoking status: Never Smoker     Smokeless tobacco: Never Used   Substance and Sexual Activity     Alcohol use: No     Drug use: No     Sexual activity: Yes     Partners: Male   Social History Narrative    ** Merged History Encounter **     How much exercise per week? Active with 2 yr    How much calcium per day? supplement       How much caffeine per day? none    How much vitamin D per day? supplement    Do you/your family wear seatbelts?  Yes    Do you/your family use safety helmets? n/a    Do you/your family use sunscreen? No    Do you/your family keep firearms in the home? No    Do you/your family have a smoke detector(s)? 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, 2019 Aldo Perez LPN    Chata FINNEYM APRN            CURRENT MEDICATIONS:    Current Outpatient Medications   Medication     cholecalciferol (VITAMIN D3) 5000 units TABS tablet     raltegravir (ISENTRESS) 400 MG     emtricitabine-tenofovir (TRUVADA) 200-300 MG per tablet     ferrous sulfate (IRON) 325 (65 Fe) MG tablet     folic acid (FOLVITE) 1 MG tablet     vitamin B-12 (CYANOCOBALAMIN) 1000 MCG tablet     vitamin C (ASCORBIC ACID) 250 MG tablet     blood glucose (ACCU-CHEK COMPACT DRUM) test strip     blood glucose monitoring (ACCU-CHEK COMPACT CARE KIT) meter device kit     blood glucose monitoring (ACCU-CHEK MULTICLIX) lancets     No current facility-administered medications for this visit.       ALLERGIES:  No Known Allergies    REVIEW OF SYSTEMS: A full 12-point review of systems was obtained, pertinent positives and negatives as above.     PHYSICAL EXAMINATION:    VITAL SIGNS: /71   Pulse 103   Temp 98.3  F (36.8  " C) (Oral)   Ht 1.554 m (5' 1.2\")   Wt 48.5 kg (107 lb)   LMP 10/16/2018   SpO2 100%   BMI 20.09 kg/m    GENERAL:   No acute distress    HEENT: No icterus or injection. Oropharynx moist and clear without lesions or exudate.    NECK: Supple and nontender  LYMPH:  No cervical, axillary or inguinal lymphadenopathy  LUNGS: Clear to ausculation bilaterally without any increased work of breathing  HEART: Regular rate and rhythm and no murmur, gallop or rub    ABDOMEN: Gravid abdomen with fundus felt above navel.  Normoactive bowel sounds, soft, nontender, nondistended  EXTREMITIES: Warm and well perfused without clubbing, cyanosis, or edema  SKIN:  No rashes  NEURO:  Awake, alert, no focal neurologic deficits.  PSYCH: Affect normal. Speech fluent and appropriate.     LABORATORY DATA:    Reviewed.   19 by OB/GYN  HIV RNA PCR for viral load - not detected  18  Absolute CD4 687    [email or web address]  Fetal ultrasound  Measurements:               CRL = 56.3 mm = 12 2/7 weeks  EGA.                 Fetal anatomy appears normal for gestational age.               Fetal/Fetal Cardiac Activity: Present.  FHR = 166bpm.               Implantation: Intrauterine. Low lying placenta at this time.               Cervix = 5.3 cm               Maternal structures appear normal.   Impression:  IUP with MARLENE: 19.    Recommend comprehensive scan at 18 to 20 weeks.      ASSESSMENT AND PLAN:       1. HIV in pregnancy, currently 18 weeks gestation    Marina finds twice daily dosing of raltegravir to be challenging.  Recently, the HIV guidelines have been updated to support the use of dolutegravir once daily dosing in the second trimester of pregnancy and beyond.  We will switch to dolutegravir 50mg po once daily for ease of dosing.  She will continue Truvada one tablet po daily as well.  These two tablets can and should be taken together at the same time.     Last HIV viral load was obtained one month ago and was " undetectable.  We typically follow HIV viral load every 3 months in pregnancy, and again at 34-36 weeks to determine delivery plan.  Since Marina is changing her medication regimen, we will plan for her to return in 1 month for follow-up. At that time, we will repeat HIV viral load and T cell subsets along with CBC, CMP for toxicity.       Anemia, microcytic  Continues with her last CBC showing Hg of 10.3 and MCV of 75.   Ferritin from 8/9/2018 was 8.  Marina is prescribed ferrous sulfate as well as a prenatal vitamin.  I suspect that she is not taking these tablets.     LTBI (latent tuberculosis infection)  Previously, Marina was counseled about treatment for LTBI. She experienced side effects from both rifampin and isoniazid and has not been willing to take either medication.  She was previously counseled by my colleague about the risk this poses to herself given her HIV, as well as the concept of active tuberculosis exposure to children under 5 being very high risk.       Preventative Medicine  Cardiovascular Sal -                Lipids - 12/21/17 - Total Chol 212, HDL 51               Blood Pressure - normal /71 today. Will continue to follow.   Cancer Screening               Colon - No indication for screening at present               Cervical - refused pap smear at OB visit, has history of LSIL in 2016 without colposcopy or follow-up  Immunizations               Hepatitis A - Ab positive 3/2016               Hepatitis B - Surface Ab/Ag and core Ab negative, hx of vaccination in 2015 in outside records, but will need booster                Influenza - Do not see record, OB usually provides this, will have to ask patient               Pneumovax/Prevnar - Had PCV on 3/16/2017,  PSV23 4/5/2018               Tdap - 8/15/2016, will receive again in pregnancy  Bone Health - No screening indicated at this time  Renal Health - 8/2016 - no proteinuria, Creatinine has been normal  Sexually Transmitted  Infection Risk and Screening               Gonorrhea and Chlamydia - Negative from urine sample 2/4/2019 in OB screening               Syphilis - Negative T pallidum Ab 1/9/2017 in OB screening      Marina will return to see me in 1 month.    Alfredo Martinez MD, MS    Infectious Disease    pager: [phone]      I spent 45 minutes in direct care with this patient, including >50% of time face-to-face with the patient counseling about HIV treatment and monitoring in pregnancy, including our decision to change an antiretroviral medication.

## 2019-02-19 NOTE — NURSING NOTE
Patient here for consult due to current pregnancy c/b HIV (+) status and GDM.  Patient accompanied by 2 year old daughter today. Marina seen by Dr. Geronimo to discuss plan of care with this pregnancy.  Patient to have a cardiac echo at 20-22 weeks with cardiology - order put in and scheduled by .  Patient to return to primary clinic for diabetic education and follow up.   Patient stable and ambulatory and accompanied [place] [facility] [place]

## 2019-02-21 ENCOUNTER — TELEPHONE (OUTPATIENT)
Dept: OBGYN | Facility: CLINIC | Age: 39
End: 2019-02-21

## 2019-02-21 DIAGNOSIS — O24.311 PRE-EXISTING DIABETES MELLITUS AFFECTING PREGNANCY IN FIRST TRIMESTER, ANTEPARTUM: Primary | ICD-10-CM

## 2019-02-21 PROBLEM — O03.9 MISCARRIAGE: Status: RESOLVED | Noted: 2019-02-21 | Resolved: 2019-02-21

## 2019-02-21 PROBLEM — Z87.42 HISTORY OF ABNORMAL CERVICAL PAP SMEAR: Status: ACTIVE | Noted: 2019-02-21

## 2019-03-11 ENCOUNTER — TELEPHONE (OUTPATIENT)
Dept: PHARMACY | Facility: CLINIC | Age: 39
End: 2019-03-11

## 2019-03-11 NOTE — TELEPHONE ENCOUNTER
Attempted to contact the patient to for refill reminder call,  left message on voicemail    Follow-up Date: 03/15/19    Michelle Ricci [facility]  [phone]

## 2019-03-18 ENCOUNTER — TELEPHONE (OUTPATIENT)
Dept: INFECTIOUS DISEASES | Facility: CLINIC | Age: 39
End: 2019-03-18

## 2019-03-19 ENCOUNTER — OFFICE VISIT (OUTPATIENT)
Dept: INFECTIOUS DISEASES | Facility: CLINIC | Age: 39
End: 2019-03-19
Attending: INTERNAL MEDICINE
Payer: COMMERCIAL

## 2019-03-19 VITALS
TEMPERATURE: 98.2 F | HEART RATE: 77 BPM | BODY MASS INDEX: 20.95 KG/M2 | SYSTOLIC BLOOD PRESSURE: 112 MMHG | WEIGHT: 111.6 LBS | OXYGEN SATURATION: 100 % | DIASTOLIC BLOOD PRESSURE: 66 MMHG

## 2019-03-19 DIAGNOSIS — O98.712 HIV DISEASE AFFECTING PREGNANCY IN SECOND TRIMESTER: ICD-10-CM

## 2019-03-19 DIAGNOSIS — O98.712 HIV DISEASE AFFECTING PREGNANCY IN SECOND TRIMESTER: Primary | ICD-10-CM

## 2019-03-19 LAB
ALBUMIN SERPL-MCNC: 3.2 G/DL (ref 3.4–5)
ALP SERPL-CCNC: 40 U/L (ref 40–150)
ALT SERPL W P-5'-P-CCNC: 15 U/L (ref 0–50)
ANION GAP SERPL CALCULATED.3IONS-SCNC: 7 MMOL/L (ref 3–14)
AST SERPL W P-5'-P-CCNC: 19 U/L (ref 0–45)
BASOPHILS # BLD AUTO: 0 10E9/L (ref 0–0.2)
BASOPHILS NFR BLD AUTO: 0.2 %
BILIRUB SERPL-MCNC: 0.1 MG/DL (ref 0.2–1.3)
BUN SERPL-MCNC: 4 MG/DL (ref 7–30)
CALCIUM SERPL-MCNC: 9.3 MG/DL (ref 8.5–10.1)
CD3 CELLS # BLD: 929 CELLS/UL (ref 603–2990)
CD3 CELLS NFR BLD: 67 % (ref 49–84)
CD3+CD4+ CELLS # BLD: 393 CELLS/UL (ref 441–2156)
CD3+CD4+ CELLS NFR BLD: 28 % (ref 28–63)
CD3+CD4+ CELLS/CD3+CD8+ CLL BLD: 0.78 % (ref 1.4–2.6)
CD3+CD8+ CELLS # BLD: 499 CELLS/UL (ref 125–1312)
CD3+CD8+ CELLS NFR BLD: 36 % (ref 10–40)
CHLORIDE SERPL-SCNC: 106 MMOL/L (ref 94–109)
CO2 SERPL-SCNC: 23 MMOL/L (ref 20–32)
CREAT SERPL-MCNC: 0.52 MG/DL (ref 0.52–1.04)
DIFFERENTIAL METHOD BLD: ABNORMAL
EOSINOPHIL # BLD AUTO: 0 10E9/L (ref 0–0.7)
EOSINOPHIL NFR BLD AUTO: 0.7 %
ERYTHROCYTE [DISTWIDTH] IN BLOOD BY AUTOMATED COUNT: 14.1 % (ref 10–15)
GFR SERPL CREATININE-BSD FRML MDRD: >90 ML/MIN/{1.73_M2}
GLUCOSE SERPL-MCNC: 66 MG/DL (ref 70–99)
HCT VFR BLD AUTO: 32.1 % (ref 35–47)
HGB BLD-MCNC: 9.9 G/DL (ref 11.7–15.7)
IFC SPECIMEN: ABNORMAL
IMM GRANULOCYTES # BLD: 0 10E9/L (ref 0–0.4)
IMM GRANULOCYTES NFR BLD: 0.5 %
LYMPHOCYTES # BLD AUTO: 1.3 10E9/L (ref 0.8–5.3)
LYMPHOCYTES NFR BLD AUTO: 30.9 %
MCH RBC QN AUTO: 24 PG (ref 26.5–33)
MCHC RBC AUTO-ENTMCNC: 30.8 G/DL (ref 31.5–36.5)
MCV RBC AUTO: 78 FL (ref 78–100)
MONOCYTES # BLD AUTO: 0.4 10E9/L (ref 0–1.3)
MONOCYTES NFR BLD AUTO: 8.9 %
NEUTROPHILS # BLD AUTO: 2.5 10E9/L (ref 1.6–8.3)
NEUTROPHILS NFR BLD AUTO: 58.8 %
NRBC # BLD AUTO: 0 10*3/UL
NRBC BLD AUTO-RTO: 0 /100
PLATELET # BLD AUTO: 253 10E9/L (ref 150–450)
POTASSIUM SERPL-SCNC: 3.8 MMOL/L (ref 3.4–5.3)
PROT SERPL-MCNC: 7.7 G/DL (ref 6.8–8.8)
RBC # BLD AUTO: 4.13 10E12/L (ref 3.8–5.2)
SODIUM SERPL-SCNC: 136 MMOL/L (ref 133–144)
WBC # BLD AUTO: 4.3 10E9/L (ref 4–11)

## 2019-03-19 PROCEDURE — 87536 HIV-1 QUANT&REVRSE TRNSCRPJ: CPT | Performed by: INTERNAL MEDICINE

## 2019-03-19 PROCEDURE — 36415 COLL VENOUS BLD VENIPUNCTURE: CPT | Performed by: INTERNAL MEDICINE

## 2019-03-19 PROCEDURE — 80053 COMPREHEN METABOLIC PANEL: CPT | Performed by: INTERNAL MEDICINE

## 2019-03-19 PROCEDURE — G0463 HOSPITAL OUTPT CLINIC VISIT: HCPCS | Mod: ZF

## 2019-03-19 PROCEDURE — 85025 COMPLETE CBC W/AUTO DIFF WBC: CPT | Performed by: INTERNAL MEDICINE

## 2019-03-19 PROCEDURE — 86360 T CELL ABSOLUTE COUNT/RATIO: CPT | Performed by: INTERNAL MEDICINE

## 2019-03-19 PROCEDURE — 86359 T CELLS TOTAL COUNT: CPT | Performed by: INTERNAL MEDICINE

## 2019-03-19 ASSESSMENT — PAIN SCALES - GENERAL: PAINLEVEL: NO PAIN (0)

## 2019-03-19 NOTE — NURSING NOTE
"Chief Complaint   Patient presents with     RECHECK     [facility]     Vital signs:  Temp: 98.2  F (36.8  C) Temp src: Oral BP: 112/66 Pulse: 77     SpO2: 100 %       Weight: 50.6 kg (111 lb 9.6 oz)  Estimated body mass index is 20.95 kg/m  as calculated from the following:    Height as of 2/19/19: 1.554 m (5' 1.2\").    Weight as of this encounter: 50.6 kg (111 lb 9.6 oz).        Renetta Gaytan    "

## 2019-03-19 NOTE — PROGRESS NOTES
Infectious Disease Clinic Note      Marina Mock is a 39 y/o woman, currently pregnant, here to see me for HIV follow-up in pregnancy.    She reports doing well with the new regimen of Truvada and dolutegravir.  She finds it much easier to take her medications just once per day.   No other new side effects or concerns.    Planned for anatomy ultrasound, but knows she is having a boy.  No concerns right now.  She had history of IUGR with previous pregnancy.      Plan:    Orders Placed This Encounter   Procedures     HIV-1 RNA quantitative     CBC with platelets differential     Comprehensive metabolic panel     T cell subset profile     - continue Truvada and dolutegravir  - return in 3 months  (sooner if needed)    If virus is still controlled, labs and visit in 3 months, sooner if needed.  She will also need labs for HIV viral load repeated at 34-36 weeks gestation to ensure viral control nearing delivery.    Alfredo Martinez MD, MS  Infectious Disease    Time:  I spent 30 minutes in direct care with this patient, including >50% of time face-to-face with the patient counseling about importance of adherence to ARVs in pregnancy, and reassurance about low risk to her baby when adhering to therapy.

## 2019-03-19 NOTE — LETTER
3/19/2019      RE: Marina Mock  [address]  [facility] [place] [id number]       Infectious Disease Clinic Note      Marina Mock is a 39 y/o woman, currently pregnant, here to see me for HIV follow-up in pregnancy.    She reports doing well with the new regimen of Truvada and dolutegravir.  She finds it much easier to take her medications just once per day.   No other new side effects or concerns.    Planned for anatomy ultrasound, but knows she is having a boy.  No concerns right now.  She had history of IUGR with previous pregnancy.      Plan:    Orders Placed This Encounter   Procedures     HIV-1 RNA quantitative     CBC with platelets differential     Comprehensive metabolic panel     T cell subset profile     - continue Truvada and dolutegravir  - return in 3 months  (sooner if needed)    If virus is still controlled, labs and visit in 3 months, sooner if needed.  She will also need labs for HIV viral load repeated at 34-36 weeks gestation to ensure viral control nearing delivery.      Time:  I spent 30 minutes in direct care with this patient, including >50% of time face-to-face with the patient counseling about importance of adherence to ARVs in pregnancy, and reassurance about low risk to her baby when adhering to therapy.    Alfredo Martinez MD

## 2019-03-22 ENCOUNTER — DOCUMENTATION ONLY (OUTPATIENT)
Dept: MATERNAL FETAL MEDICINE | Facility: CLINIC | Age: 39
End: 2019-03-22

## 2019-03-22 ENCOUNTER — TELEPHONE (OUTPATIENT)
Dept: OBGYN | Facility: CLINIC | Age: 39
End: 2019-03-22

## 2019-03-22 DIAGNOSIS — O09.90 HIGH RISK PREGNANCY, ANTEPARTUM: ICD-10-CM

## 2019-03-22 DIAGNOSIS — O98.719 HIV AFFECTING PREGNANCY, ANTEPARTUM: Primary | ICD-10-CM

## 2019-03-22 NOTE — TELEPHONE ENCOUNTER
Received call from MILENA Sevilla, at [facility]. She is calling to follow up on the recommendation for serial growth ultrasounds from [facility]'s consult with patient.     She states it appears patient was scheduled for ultrasound with us but cancelled. Discussed with Di would follow up and ensure patient is scheduled for a growth ultrasound next week.     Routing to scheduling.

## 2019-03-22 NOTE — PROGRESS NOTES
Upon chart review for upcoming MFM appt for fetal echo writer noted that pt has not had recommended growth ultrasound.  Spoke with Bernice nurse at [facility] who will follow up with pt and get growth ultrasound scheduled.

## 2019-03-26 ENCOUNTER — TELEPHONE (OUTPATIENT)
Dept: OBGYN | Facility: CLINIC | Age: 39
End: 2019-03-26

## 2019-03-27 ENCOUNTER — OFFICE VISIT (OUTPATIENT)
Dept: OBGYN | Facility: CLINIC | Age: 39
End: 2019-03-27
Payer: COMMERCIAL

## 2019-03-27 ENCOUNTER — HOSPITAL ENCOUNTER (OUTPATIENT)
Dept: CARDIOLOGY | Facility: CLINIC | Age: 39
Discharge: HOME | End: 2019-03-27
Attending: OBSTETRICS & GYNECOLOGY | Admitting: OBSTETRICS & GYNECOLOGY
Payer: COMMERCIAL

## 2019-03-27 ENCOUNTER — TELEPHONE (OUTPATIENT)
Dept: OBGYN | Facility: CLINIC | Age: 39
End: 2019-03-27

## 2019-03-27 VITALS
SYSTOLIC BLOOD PRESSURE: 101 MMHG | BODY MASS INDEX: 20.92 KG/M2 | HEART RATE: 90 BPM | DIASTOLIC BLOOD PRESSURE: 64 MMHG | WEIGHT: 110.8 LBS | HEIGHT: 61 IN

## 2019-03-27 DIAGNOSIS — Z87.59 HISTORY OF GESTATIONAL HYPERTENSION: Primary | ICD-10-CM

## 2019-03-27 DIAGNOSIS — O24.119 TYPE 2 DIABETES MELLITUS DURING PREGNANCY: Primary | ICD-10-CM

## 2019-03-27 DIAGNOSIS — O26.90 PREGNANCY RELATED CONDITION, ANTEPARTUM: ICD-10-CM

## 2019-03-27 PROCEDURE — 76825 ECHO EXAM OF FETAL HEART: CPT

## 2019-03-27 PROCEDURE — G0463 HOSPITAL OUTPT CLINIC VISIT: HCPCS

## 2019-03-27 ASSESSMENT — MIFFLIN-ST. JEOR: SCORE: 1133.14

## 2019-03-27 NOTE — PATIENT INSTRUCTIONS
We will contact you to set you up to monitor your blood sugars and see the diabetic nurse educator.    Go to lab today to have some labs drawn.

## 2019-03-27 NOTE — TELEPHONE ENCOUNTER
----- Message from Selin Chavez RN sent at 3/27/2019  3:41 PM CDT -----  Regarding: FW: Diabetic education/tools  This came to me directly and I'm off tomorrow....  Pt will need endocrinology visit if Type 2 Diabetic and NOT our new GDM clinic.  Thanks!  Selin  ----- Message -----  From: Lee Ann Valderrama MD  Sent: 3/27/2019   3:36 PM  To: Selin Chavez RN  Subject: Diabetic education/tools                         Hi,    This patient has T2DM as seen by her HgA1c of 6.5 and visits with MFM. There has been some confusion as to whether or not she has diabetes by the patient because her GCT was normal. We discussed this today and she now understands she needs to check her blood sugar as well as follow up for this.     She does need follow up with a diabetic educator as well as glucose monitor and lancets.    Thank you,  Lee Ann Valderrama MD  PGY-1  Ob/Gyn

## 2019-03-27 NOTE — TELEPHONE ENCOUNTER
Attempted to reach [facility] to make sure she has both Diabetic Ed and Endocrin clinic phone number to schedule appts. I left vm we would call her back Thurs am 3/28

## 2019-03-27 NOTE — PROGRESS NOTES
"P [facility]  Return OB Visit    S: Feeling well. No nausea or difficulty eating. Denies vaginal bleeding, vaginal discharge, LOF, contractions.  Reports good fetal movement. No questions or concerns. She was confused about her diagnosis of diabetes because her GTT was normal.       O: /64   Pulse 90   Ht 1.554 m (5' 1.2\")   Wt 50.3 kg (110 lb 12.8 oz)   LMP 10/16/2018   BMI 20.80 kg/m    Weight gain:     Gen: Well-appearing, NAD    Fundal Height:  23 cm  FHR: 160 bpm    A/P:  Marina Mock is a 38 year old  at 23w1d by LMP, here for return OB visit.  1. Suspected T2DM; HgA1c 6.5. She has not yet established care with a diabetic educator or with Endocrinology as she was confused about the diagnosis because her 3 hour GTT was normal and was told by CNM she did not have gestational diabeted. Discussed this again today and referred to M note from , Hgba1c at beginning of pregnancy of 6.5 qualifies her as a T2DM.   - Continue ASA 81 mg until 36 weeks  - Level II US normal  - Fetal echo normal 3/27  - Serial growth US, weekly BPP if diet controlled, twice weekly BPP if requires insulin starting at 32 weeks  2. HIV positive; Follows with ID. On Truvada and Descovy  - Viral load q3 months; last 3/19 was undetectable  - Viral load between 34-36 weeks for delivery planning  - Intrapartum Zidovudine if viral load> 1,000 copies at time of delivery  - No breastfeeding  - Infant prophylaxis between 6-12 hours of life  3. LSIL pap other HR HPV positive 2016; Colposcopy recommended given impaired immune system. Patient declined repeat pap until postpartum  4. Maternal Sickle Cell trait: FOB also with sickle cell trait and child with sickle cell trait. Genetic counseling declined.  5. AMA; Declines genetic counseling. Level II US normal  6. Iron deficiency Anemia; On Fe supplements. Last Hg 9.9 3/19  7. Latent TB; Declines treatment  8. History of gestational HTN; Baseline HELLP labs ordered " today  9. History of FGR:   - Serial growth US q4 weeks, last 2/19/19, patient to schedule today  10. PNC: - Prenatal labs reviewed, Rh positive, Antibody negative, Rubella immune, , GTT 87/151/147/122    Return to clinic in 4 weeks for EOB visit.     Staffed with Dr. Regina Valderrama MD  OBGYN PGY-1  3/27/2019, 2:35 PM    The Patient was seen in Resident Continuity Clinic by Dr. Valderrama.   I reviewed the history & exam. Assessment and plan were jointly made.   Reyna Tapia MD, MPH

## 2019-03-28 ENCOUNTER — TELEPHONE (OUTPATIENT)
Dept: OBGYN | Facility: CLINIC | Age: 39
End: 2019-03-28

## 2019-03-28 NOTE — TELEPHONE ENCOUNTER
Left detailed message with Marina to call and get seen by Diabetic Educator within a week. Also gave her number to Endocrinology to schedule.    If unable to get seen by Diabetic Educator in a week, we can have her see our pharmacist Reyna Rose.    Either way ,I asked her to call nurse triage to discuss and answer her questions and if needed help assist with scheduling appoinments

## 2019-04-01 ENCOUNTER — ANCILLARY PROCEDURE (OUTPATIENT)
Dept: ULTRASOUND IMAGING | Facility: CLINIC | Age: 39
End: 2019-04-01
Attending: MIDWIFE
Payer: COMMERCIAL

## 2019-04-01 DIAGNOSIS — O09.90 HIGH RISK PREGNANCY, ANTEPARTUM: ICD-10-CM

## 2019-04-01 DIAGNOSIS — O98.719 HIV AFFECTING PREGNANCY, ANTEPARTUM: ICD-10-CM

## 2019-04-01 PROCEDURE — 76816 OB US FOLLOW-UP PER FETUS: CPT

## 2019-04-13 ENCOUNTER — TELEPHONE (OUTPATIENT)
Dept: PHARMACY | Facility: CLINIC | Age: 39
End: 2019-04-13

## 2019-04-13 NOTE — TELEPHONE ENCOUNTER
Attempted to contact the patient to for refill reminder call,  left message on voicemail. Also asked if she had gotten her diabetic testing supplie. I see that they sent to us but never picked up. I was not aware that they had been sent until now.     Follow-up Date: 04/17/19    Michelle Ricci [facility]  [phone]

## 2019-04-17 NOTE — PROGRESS NOTES
"P [facility]  Extended OB Visit    S: Patient doing well. Has no concerns today. Notes that she gets twinges of pain on her left side every once in awhile. No contractions. No LOF or vaginal bleeding. Normal fetal movement. Missed the calls to set up diabetes education. States she checked her blood glucose twice but it hurt so she is not sure if she is doing it correctly. She checked one fasting value that was 97. Checked 2 hr postprandial which was 102.      O: /68   Pulse 116   Ht 1.554 m (5' 1.2\")   Wt 51.6 kg (113 lb 11.2 oz)   LMP 10/16/2018   BMI 21.34 kg/m    Gen: Well-appearing, NAD  Fundal Height:  26  FHR: 158    A/P:  Marina Mock is a 38 year old  at 27w1d by LMP c/w 12w2d US, here for extended OB visit.    1. Suspected T2DM; HgA1c 6.5. She has not yet established care with a diabetic educator or with Endocrinology. Again dicussed the importance of this. She missed the phone calls to schedule. Will send message to our nurses to help get this scheduled. Okay to call husbands phone if she doesn't answer.  - Discussed ASA 81 mg until 36 weeks, patient had not been taking previously  - Level II US normal  - Fetal echo normal 3/27  - Serial growth US, weekly BPP if diet controlled, twice weekly BPP if requires insulin starting at 32 weeks  2. HIV positive; Follows with ID. On Truvada and Descovy  - Viral load q3 months; last 3/19 was undetectable  - Viral load between 34-36 weeks for delivery planning  - Intrapartum Zidovudine if viral load> 1,000 copies at time of delivery  - No breastfeeding  - Infant prophylaxis between 6-12 hours of life  3. LSIL pap other HR HPV positive 2016; Colposcopy recommended but not performed. Patient declined repeat pap until postpartum.   4. Maternal Sickle Cell trait: FOB also with sickle cell trait and child with sickle cell trait. Genetic counseling declined.  5. AMA; Declines genetic counseling. Level II US normal  6. Iron deficiency Anemia; On Fe " supplements. Last Hgb 9.9 3/19   7. Latent TB; Declines treatment  8. History of gestational HTN; Baseline HELLP labs ordered last visit but patient did not get them done. Will obtain today.   9. History of FGR:              - Serial growth US q4 weeks, last 19, patient to schedule next today   10. PNC: - Prenatal labs reviewed, Rh positive, Antibody negative, Rubella immune, , GTT 87/151/147/122  11. Mild pyelectasis noted in US 19.  Consider future US at [facility] to re-evaluate pyelectasis  12. EOB:    Education completed today includes [facility] hand out, contraception, counting movements, signs of pre-term labor, when to present to birthplace, post partum depression, GBS, getting enough iron and labor induction.    Birth preferences reviewed: Options reviewed, nervous about getting epidural again as it was painful to get done    Labor support:      [place] Feeding plans: Recommend bottlefeeding due to HIV+ status    Contraception planned: Undecided, options discussed and handout given    The following labs were ordered today: CBC w platelets, Vitamin D, and Anti-treponema. TDaP given today    Patient staffed with Dr. Unique Plascencia MD  Ob/Gyn PGY-1  2019 1:54 PM      The Patient was seen in Resident Continuity Clinic by NETTA PLASCENCIA reviewed the history & exam. Assessment and plan were jointly made.    Vikki Calhoun MD

## 2019-04-24 ENCOUNTER — OFFICE VISIT (OUTPATIENT)
Dept: OBGYN | Facility: CLINIC | Age: 39
End: 2019-04-24
Payer: COMMERCIAL

## 2019-04-24 VITALS
DIASTOLIC BLOOD PRESSURE: 68 MMHG | WEIGHT: 113.7 LBS | HEART RATE: 116 BPM | SYSTOLIC BLOOD PRESSURE: 105 MMHG | HEIGHT: 61 IN | BODY MASS INDEX: 21.47 KG/M2

## 2019-04-24 DIAGNOSIS — O09.92 HIGH-RISK PREGNANCY IN SECOND TRIMESTER: Primary | ICD-10-CM

## 2019-04-24 DIAGNOSIS — Z87.59 HISTORY OF GESTATIONAL HYPERTENSION: ICD-10-CM

## 2019-04-24 DIAGNOSIS — Z34.92 PRENATAL CARE IN SECOND TRIMESTER: ICD-10-CM

## 2019-04-24 LAB
ALT SERPL W P-5'-P-CCNC: 14 U/L (ref 0–50)
AST SERPL W P-5'-P-CCNC: 25 U/L (ref 0–45)
CREAT SERPL-MCNC: 0.57 MG/DL (ref 0.52–1.04)
CREAT UR-MCNC: 53 MG/DL
ERYTHROCYTE [DISTWIDTH] IN BLOOD BY AUTOMATED COUNT: 13.6 % (ref 10–15)
GFR SERPL CREATININE-BSD FRML MDRD: >90 ML/MIN/{1.73_M2}
HCT VFR BLD AUTO: 28.9 % (ref 35–47)
HGB BLD-MCNC: 9.3 G/DL (ref 11.7–15.7)
MCH RBC QN AUTO: 24.5 PG (ref 26.5–33)
MCHC RBC AUTO-ENTMCNC: 32.2 G/DL (ref 31.5–36.5)
MCV RBC AUTO: 76 FL (ref 78–100)
PLATELET # BLD AUTO: 224 10E9/L (ref 150–450)
PROT UR-MCNC: 0.1 G/L
PROT/CREAT 24H UR: 0.19 G/G CR (ref 0–0.2)
RBC # BLD AUTO: 3.8 10E12/L (ref 3.8–5.2)
WBC # BLD AUTO: 4.5 10E9/L (ref 4–11)

## 2019-04-24 PROCEDURE — G0463 HOSPITAL OUTPT CLINIC VISIT: HCPCS | Mod: 25

## 2019-04-24 PROCEDURE — 84460 ALANINE AMINO (ALT) (SGPT): CPT | Performed by: STUDENT IN AN ORGANIZED HEALTH CARE EDUCATION/TRAINING PROGRAM

## 2019-04-24 PROCEDURE — 0064U ANTB TP TOTAL&RPR IA QUAL: CPT | Performed by: OBSTETRICS & GYNECOLOGY

## 2019-04-24 PROCEDURE — 25000128 H RX IP 250 OP 636: Mod: ZF

## 2019-04-24 PROCEDURE — 82306 VITAMIN D 25 HYDROXY: CPT | Performed by: OBSTETRICS & GYNECOLOGY

## 2019-04-24 PROCEDURE — 36415 COLL VENOUS BLD VENIPUNCTURE: CPT | Performed by: OBSTETRICS & GYNECOLOGY

## 2019-04-24 PROCEDURE — 90471 IMMUNIZATION ADMIN: CPT | Mod: ZF

## 2019-04-24 PROCEDURE — 84450 TRANSFERASE (AST) (SGOT): CPT | Performed by: STUDENT IN AN ORGANIZED HEALTH CARE EDUCATION/TRAINING PROGRAM

## 2019-04-24 PROCEDURE — 85027 COMPLETE CBC AUTOMATED: CPT | Performed by: STUDENT IN AN ORGANIZED HEALTH CARE EDUCATION/TRAINING PROGRAM

## 2019-04-24 PROCEDURE — 84156 ASSAY OF PROTEIN URINE: CPT | Performed by: OBSTETRICS & GYNECOLOGY

## 2019-04-24 PROCEDURE — 82565 ASSAY OF CREATININE: CPT | Performed by: STUDENT IN AN ORGANIZED HEALTH CARE EDUCATION/TRAINING PROGRAM

## 2019-04-24 PROCEDURE — 90715 TDAP VACCINE 7 YRS/> IM: CPT | Mod: ZF

## 2019-04-24 ASSESSMENT — MIFFLIN-ST. JEOR: SCORE: 1146.29

## 2019-04-25 ENCOUNTER — TELEPHONE (OUTPATIENT)
Dept: OBGYN | Facility: CLINIC | Age: 39
End: 2019-04-25

## 2019-04-25 LAB
DEPRECATED CALCIDIOL+CALCIFEROL SERPL-MC: 44 UG/L (ref 20–75)
T PALLIDUM AB SER QL: NONREACTIVE

## 2019-04-25 NOTE — TELEPHONE ENCOUNTER
----- Message from Kortney Plascencia MD sent at 4/24/2019  4:39 PM CDT -----  Regarding: Diabetes education  Lynn,    This is a patient you had called a few times to help set her up for diabetes education. I saw her in clinic today and re-discussed the importance of scheduling this appointment. She said that if she doesn't answer her phone you could also try her husbands number (listed in demographics). Would you be able to help facilitate getting this scheduled again? Let me know if there is someone else I should reach out to.    Thanks!  Nadine Plascencia  OB Resident

## 2019-04-25 NOTE — TELEPHONE ENCOUNTER
Spoke to Thomas' spouse , Chris, and gave him the number to call and have his wife seen asap at Diabetes ED and Endocrinology.He indicated understanding and agreed with plan.

## 2019-04-30 NOTE — TELEPHONE ENCOUNTER
RECORDS RECEIVED FROM: type 2 DM during pregnancy   DATE RECEIVED: 5.1.19   NOTES (FOR ALL VISITS) STATUS DETAILS   OFFICE NOTE from referring provider Internal 3.27.19 Dr. Valderrama   OFFICE NOTE from other specialist Internal 4.24.19 Dr. Plascencia  2.4.19 [facility]  7.25.16 Diabetic Education   OPERATIVE REPORT (SURGICAL/PATH REPORTS) N/A    MEDICATION LIST Internal    IMAGING (FOR ALL VISITS)     DEXA SCAN N/A    MRI (BRAIN) N/A    CT (HEAD/NECK/ABDOMEN/CHEST) N/A    XRAY (HEAD/NECK/ABDOMEN/CHEST) Internal 4.27.17, 9.21.16   ULTRASOUND (HEAD/NECK/THYROID) N/A    LABS     DIABETES: HBGA1C, CREATININE, FASTING LIPIDS, MICROALBUMIN URINE, POTASSIUM, TSH,T4    THYROID: TSH, T4, CBC, THYROGLONULIN, TOTAL T3, FREE T4, CALCITONIN, CEA Internal 4.24.19, 1.9.19

## 2019-05-01 ENCOUNTER — PRE VISIT (OUTPATIENT)
Dept: ENDOCRINOLOGY | Facility: CLINIC | Age: 39
End: 2019-05-01

## 2019-05-09 ENCOUNTER — ANCILLARY PROCEDURE (OUTPATIENT)
Dept: ULTRASOUND IMAGING | Facility: CLINIC | Age: 39
End: 2019-05-09
Attending: OBSTETRICS & GYNECOLOGY
Payer: COMMERCIAL

## 2019-05-09 DIAGNOSIS — Z34.92 PRENATAL CARE IN SECOND TRIMESTER: ICD-10-CM

## 2019-05-09 PROCEDURE — 76816 OB US FOLLOW-UP PER FETUS: CPT

## 2019-05-14 ENCOUNTER — TELEPHONE (OUTPATIENT)
Dept: PHARMACY | Facility: CLINIC | Age: 39
End: 2019-05-14

## 2019-05-14 NOTE — TELEPHONE ENCOUNTER
Called patient for refill reminder.    Will mail Truvada and Tivicay prescriptions address has been verified.     Last Refill: 04/17/19  Follow-up Date: 06/12/19    Michelle Ricci [facility]  [phone]

## 2019-05-16 ENCOUNTER — HEALTH MAINTENANCE LETTER (OUTPATIENT)
Age: 39
End: 2019-05-16

## 2019-05-16 ENCOUNTER — OFFICE VISIT (OUTPATIENT)
Dept: OBGYN | Facility: CLINIC | Age: 39
End: 2019-05-16
Attending: OBSTETRICS & GYNECOLOGY
Payer: COMMERCIAL

## 2019-05-16 ENCOUNTER — OFFICE VISIT (OUTPATIENT)
Dept: PHARMACY | Facility: CLINIC | Age: 39
End: 2019-05-16

## 2019-05-16 VITALS
HEART RATE: 101 BPM | DIASTOLIC BLOOD PRESSURE: 72 MMHG | HEIGHT: 61 IN | WEIGHT: 114 LBS | BODY MASS INDEX: 21.52 KG/M2 | SYSTOLIC BLOOD PRESSURE: 112 MMHG

## 2019-05-16 DIAGNOSIS — O24.311 PRE-EXISTING DIABETES MELLITUS AFFECTING PREGNANCY IN FIRST TRIMESTER, ANTEPARTUM: ICD-10-CM

## 2019-05-16 DIAGNOSIS — O35.EXX0 PYELECTASIS OF FETUS ON PRENATAL ULTRASOUND: ICD-10-CM

## 2019-05-16 DIAGNOSIS — O98.719 HIV AFFECTING PREGNANCY, ANTEPARTUM: ICD-10-CM

## 2019-05-16 DIAGNOSIS — E11.9 TYPE 2 DIABETES MELLITUS WITHOUT COMPLICATION, WITHOUT LONG-TERM CURRENT USE OF INSULIN (H): ICD-10-CM

## 2019-05-16 DIAGNOSIS — O09.529 ANTEPARTUM MULTIGRAVIDA OF ADVANCED MATERNAL AGE: Primary | ICD-10-CM

## 2019-05-16 PROCEDURE — 99207 ZZC NO CHARGE LOS: CPT | Performed by: PHARMACIST

## 2019-05-16 ASSESSMENT — MIFFLIN-ST. JEOR: SCORE: 1147.65

## 2019-05-16 NOTE — PROGRESS NOTES
KAREN Visit 19    S:   Aside from fatigue, she is doing well.  No concerning contractions, nothing regular or painful.  Denies VB or LOF.  + FM.  Denies HA, vision changes, SOB, RUQ pain or increased swelling in her extremities.  Patient states today she is unsure of how to take her glucose measurements, unsure of how to use glucometer and would like instruction on this.  She is confused about this diagnosis and why she needs to be concerned about this.  She has not made an appointment with Endocrinology or Diabetes education yet.  She is open to learning about these things, there seems to be some language barrier to being able to get this accomplished.    O:  See OB Flowsheet    A/P: 38 year old  at 30w2d by LMP c/w 12w2d US presents for KAREN visit  1) Prenatal care:  Prenatal labs reviewed, Rh positive, Antibody negative, Rubella immune, Hepatitis BsAg HR, RPR, NR, Hgba1c 6.5, , GTT 87/151/147/122  2) AMA/Genetic screening: Declined genetic counseling. Level II US normal,  Having a Boy, Growth US at 23 weeks with bilateral pyelectasis, this persisted at her scan at 29 weeks Right renal pelvis- 7.0mm, Left renal pelvis-7.4mm, referral to MFM placed today at 32 weeks to evaluate and refer to Peds Urology if appropriate  3) Suspected T2DM; HgA1c 6.5 at NOB visit. She has not yet established care with a diabetic educator or with Endocrinology. This has been difficult for patient to coordinate.  Unsure of how to check sugars with glucometer.  Patient not taking ASA 81 mg daily, discussed recommendation to do this, unsure if this will happen.  Did have a normal fetal echo on 3/27/19.  Plan for Serial growth US (last growth scan 19 with EFW 31%ile, DILIP 14), twice weekly BPP now given unknown sugar control at this time.  4) HIV positive; Follows with ID. On Truvada and Descovy.  Viral load q3 months; last 3/19 was undetectable.  Viral load between 34-36 weeks for delivery planning. Intrapartum  Zidovudine if viral load> 1,000 copies at time of delivery. No breastfeeding, Infant prophylaxis between 6-12 hours of life  5) History of gestational HTN: Baseline third trimester HELLP labs completed at 27 weeks, Normal, Urine Pr:Cr 0.19.  6) History of FGR:  Serial growth US q4 weeks, last 5/9/19 EFW 31%ile  7) LSIL pap other HR HPV positive 2/2016: Colposcopy recommended but not performed. Patient declined repeat pap/colposcopy until postpartum, this should be done together.   8) Maternal Sickle Cell trait: FOB also with sickle cell trait and child with sickle cell trait. Genetic counseling declined.  9) Iron deficiency Anemia; On Fe supplements. Last Hgb 9.3 on 4/24/19, re-iterated importance of taking supplements, patient may benefit from IV iron infusion given has not had response with oral and states she is taking this, plan recheck POC Hgb at next visit and if still low, referral for IV iron  10) Latent TB: Declines treatment  11) Delivery plans: Unsure of pain, nervous about getting epidural again as it was painful to get done with last delivery/Bottlefeeding secondary to HIV + status/Unsure on contraception, given handout  12) s/p Tdap  13) RTC in 2 weeks for KAREN visit    Reyna Tapia MD

## 2019-05-16 NOTE — LETTER
2019       RE: Marina Mock  [address]  [place] [place] [address]     Dear Colleague,    Thank you for referring your patient, Marina Mock, to the [facility]. Please see a copy of my visit note below.    KAREN Visit 19    S:   Aside from fatigue, she is doing well.  No concerning contractions, nothing regular or painful.  Denies VB or LOF.  + FM.  Denies HA, vision changes, SOB, RUQ pain or increased swelling in her extremities.  Patient states today she is unsure of how to take her glucose measurements, unsure of how to use glucometer and would like instruction on this.  She is confused about this diagnosis and why she needs to be concerned about this.  She has not made an appointment with Endocrinology or Diabetes education yet.  She is open to learning about these things, there seems to be some language barrier to being able to get this accomplished.    O:  See OB Flowsheet    A/P: 38 year old  at  30w2d by LMP c/w 12w2d US presents for KAREN visit  1) Prenatal care:  Prenatal labs reviewed, Rh positive, Antibody negative, Rubella immune, Hepatitis BsAg HR, RPR, NR, Hgba1c 6.5, , GTT 87/151/147/122  2) AMA/Genetic screening: Declined genetic counseling. Level II US normal,  Having a Boy, Growth US at 23 weeks with bilateral pyelectasis, this persisted at her scan at 29 weeks Right renal pelvis- 7.0mm, Left renal pelvis-7.4mm, referral to MFM placed today at 32 weeks to evaluate and refer to Peds Urology if appropriate  3) Suspected T2DM; HgA1c 6.5 at NOB visit. She has not yet established care with a diabetic educator or with Endocrinology. This has been difficult for patient to coordinate.  Unsure of how to check sugars with glucometer.  Patient not taking ASA 81 mg daily, discussed recommendation to do this, unsure if this will happen.  Did have a normal fetal echo on 3/27/19.  Plan for Serial growth US  (last growth scan 5/9/19 with EFW 31%ile, DILIP 14), twice weekly BPP now given unknown sugar control at this time.  4) HIV positive; Follows with ID. On Truvada and Descovy.  Viral load q3 months; last 3/19 was undetectable.  Viral load between 34-36 weeks for delivery planning. Intrapartum Zidovudine if viral load> 1,000 copies at time of delivery. No breastfeeding, Infant prophylaxis between 6-12 hours of life  5) History of gestational HTN: Baseline third trimester HELLP labs completed at 27 weeks, Normal, Urine Pr:Cr 0.19.  6) History of FGR:  Serial growth US q4 weeks, last  5/9/19 EFW 31%ile  7) LSIL pap other HR HPV positive 2/2016: Colposcopy recommended but not performed. Patient declined repeat pap/colposcopy until postpartum, this should be done together.   8) Maternal Sickle Cell trait: FOB also with sickle cell trait and child with sickle cell trait. Genetic counseling declined.  9) Iron deficiency Anemia; On Fe supplements. Last Hgb 9. 3 on 4/24/19, re-iterated importance of taking supplements, patient may benefit from IV iron infusion given has not had response with oral and states she is taking this, plan recheck POC Hgb at next visit and if still low, referral for IV iron  10) Latent TB: Declines treatment  11) Delivery plans: Unsure of pain, nervous about getting epidural again as it was painful to get done with last delivery/Bottlefeeding secondary to HIV + status/Unsure on contraception, given handout  12) s/p Tdap  13) RTC in 2 weeks for KAREN visit    Reyna Tapia MD

## 2019-05-16 NOTE — PROGRESS NOTES
"Clinical Consult:                                                    Marina Mock is a 38 year old female coming in for a clinical pharmacist consult.  She was referred to me from Dr. Tapia.     Reason for Consult: Discuss diabetes and SMBG    Discussion: We discussed that Marina had an A1c of 6.5% indicating that she has T2DM. We discussed that even though she passed her 3-hour glucose test, it is still important to monitor her BG and get a good picture of where her BG is really at to ensure a safe pregnancy and delivery. Marina reports that she had GDM with her first pregnancy and then it \"went away.\" Marina had been checking her BG by poking herself directly with the lancet because she did not know how to use the device.  She reports that it was very painful (her readings were sporadic likely due to this). We educated her on how to use the lancet device and had her demonstrate using the lancet and her glucometer with us. She was able to demonstrate this well.   Her reading today was 166, which was 1 hour after her last meal.    Plan:  1. Check BG 4 times daily and record on paper log indicating whether mealtime BG is 1 hour or 2 hours after each meal.   2. F/U with Dr. Tapia in 2 weeks to assess SMBG. Bring paper log and glucometer. RN staff are working to get her in with endocrinology for ongoing management.    Marisol Weinstein, Pharm.D. Student.    I was present with the student during the assessment and I approve of the above plan and documentation.      Reyna Rose, Pharm.D., BCPS      "

## 2019-05-17 ENCOUNTER — TELEPHONE (OUTPATIENT)
Dept: OBGYN | Facility: CLINIC | Age: 39
End: 2019-05-17

## 2019-05-17 ENCOUNTER — TELEPHONE (OUTPATIENT)
Dept: ENDOCRINOLOGY | Facility: CLINIC | Age: 39
End: 2019-05-17

## 2019-05-17 NOTE — TELEPHONE ENCOUNTER
Per request of S clinic, called patient to try to schedule with endocrine for type 2 with pregnancy consult. Patient 30 weeks pregnant. Was scheduled back on 5/1/19 with Sue and no-showed to the appointment. Unable to reach patient, and LVM with my direct number to call me back.

## 2019-05-17 NOTE — TELEPHONE ENCOUNTER
Discussed with [facility], unable to get her in on thursdays. They will reach out to patient to schedule. Ok to call 's phone number.

## 2019-05-17 NOTE — TELEPHONE ENCOUNTER
Spoke with Di at [facility]. Patient was scheduled for ultrasound at [facility] on 5/20. She was also scheduled with [facility] for ultrasound 5/20. Di requests ultrasound with [facility] be cancelled as patient does not need two ultrasounds on 5/20. They will complete BPP at [facility]. Ultrasound cancelled.

## 2019-05-17 NOTE — TELEPHONE ENCOUNTER
Patient needs urgent appt. With endocrine. Left message with care coordinator to try and get patient in ASAP. She prefers Thursdays.

## 2019-05-20 ENCOUNTER — OFFICE VISIT (OUTPATIENT)
Dept: MATERNAL FETAL MEDICINE | Facility: CLINIC | Age: 39
End: 2019-05-20
Attending: OBSTETRICS & GYNECOLOGY
Payer: COMMERCIAL

## 2019-05-20 ENCOUNTER — HOSPITAL ENCOUNTER (OUTPATIENT)
Dept: ULTRASOUND IMAGING | Facility: CLINIC | Age: 39
Discharge: HOME | End: 2019-05-20
Attending: OBSTETRICS & GYNECOLOGY | Admitting: OBSTETRICS & GYNECOLOGY
Payer: COMMERCIAL

## 2019-05-20 DIAGNOSIS — O26.90 PREGNANCY RELATED CONDITION, ANTEPARTUM: ICD-10-CM

## 2019-05-20 DIAGNOSIS — O24.410 DIET CONTROLLED GESTATIONAL DIABETES MELLITUS (GDM), ANTEPARTUM: Primary | ICD-10-CM

## 2019-05-20 PROCEDURE — 76816 OB US FOLLOW-UP PER FETUS: CPT

## 2019-05-20 PROCEDURE — 76819 FETAL BIOPHYS PROFIL W/O NST: CPT | Performed by: OBSTETRICS & GYNECOLOGY

## 2019-05-20 NOTE — PROGRESS NOTES
"Please see \"Imaging\" tab under \"Chart Review\" for details of today's US.    Francesca Ross, DO    "

## 2019-05-23 ENCOUNTER — HOSPITAL ENCOUNTER (OUTPATIENT)
Dept: ULTRASOUND IMAGING | Facility: CLINIC | Age: 39
Discharge: HOME | End: 2019-05-23
Attending: OBSTETRICS & GYNECOLOGY | Admitting: OBSTETRICS & GYNECOLOGY
Payer: COMMERCIAL

## 2019-05-23 DIAGNOSIS — E11.9 TYPE 2 DIABETES MELLITUS WITHOUT COMPLICATION, WITHOUT LONG-TERM CURRENT USE OF INSULIN (H): ICD-10-CM

## 2019-05-23 PROCEDURE — 76819 FETAL BIOPHYS PROFIL W/O NST: CPT

## 2019-05-28 ENCOUNTER — ANCILLARY PROCEDURE (OUTPATIENT)
Dept: ULTRASOUND IMAGING | Facility: CLINIC | Age: 39
End: 2019-05-28
Attending: OBSTETRICS & GYNECOLOGY
Payer: COMMERCIAL

## 2019-05-28 DIAGNOSIS — O36.63X0 EXCESSIVE FETAL GROWTH AFFECTING MANAGEMENT OF MOTHER, ANTEPARTUM, THIRD TRIMESTER, NOT APPLICABLE OR UNSPECIFIED FETUS: ICD-10-CM

## 2019-05-28 PROCEDURE — 76819 FETAL BIOPHYS PROFIL W/O NST: CPT

## 2019-05-30 ENCOUNTER — OFFICE VISIT (OUTPATIENT)
Dept: OBGYN | Facility: CLINIC | Age: 39
End: 2019-05-30
Attending: OBSTETRICS & GYNECOLOGY
Payer: COMMERCIAL

## 2019-05-30 ENCOUNTER — HOSPITAL ENCOUNTER (OUTPATIENT)
Dept: ULTRASOUND IMAGING | Facility: CLINIC | Age: 39
Discharge: HOME | End: 2019-05-30
Attending: OBSTETRICS & GYNECOLOGY | Admitting: OBSTETRICS & GYNECOLOGY
Payer: COMMERCIAL

## 2019-05-30 VITALS
WEIGHT: 114 LBS | HEIGHT: 61 IN | DIASTOLIC BLOOD PRESSURE: 65 MMHG | BODY MASS INDEX: 21.52 KG/M2 | SYSTOLIC BLOOD PRESSURE: 104 MMHG | HEART RATE: 76 BPM

## 2019-05-30 DIAGNOSIS — E11.9 TYPE 2 DIABETES MELLITUS WITHOUT COMPLICATION, WITHOUT LONG-TERM CURRENT USE OF INSULIN (H): ICD-10-CM

## 2019-05-30 DIAGNOSIS — O09.893 SUPERVISION OF OTHER HIGH RISK PREGNANCIES, THIRD TRIMESTER: Primary | ICD-10-CM

## 2019-05-30 DIAGNOSIS — O24.113 PRE-EXISTING TYPE 2 DIABETES MELLITUS DURING PREGNANCY IN THIRD TRIMESTER: ICD-10-CM

## 2019-05-30 DIAGNOSIS — D50.8 IRON DEFICIENCY ANEMIA SECONDARY TO INADEQUATE DIETARY IRON INTAKE: ICD-10-CM

## 2019-05-30 DIAGNOSIS — O09.529 ANTEPARTUM MULTIGRAVIDA OF ADVANCED MATERNAL AGE: ICD-10-CM

## 2019-05-30 DIAGNOSIS — O98.719 HIV AFFECTING PREGNANCY, ANTEPARTUM: ICD-10-CM

## 2019-05-30 PROCEDURE — 76819 FETAL BIOPHYS PROFIL W/O NST: CPT

## 2019-05-30 PROCEDURE — G0463 HOSPITAL OUTPT CLINIC VISIT: HCPCS | Mod: ZF,25

## 2019-05-30 ASSESSMENT — MIFFLIN-ST. JEOR: SCORE: 1147.65

## 2019-05-30 NOTE — PROGRESS NOTES
"KAREN Visit 19    S:   Patient is doing well. Reports very active fetus. Is feeling some increased pelvic pressure and few contractions. No LOF or VB. Has been intermittently tracking BG. Ran out of strips and just got some last night. Sometimes checks 2-3 hours post-prandial. Did not check a fasting this AM. She has been unable to schedule visit with diabetes educator and endocrinology due to difficulty coordinating with her 's schedule. She is unsure what she should be eating, so sometimes just doesn't eat at all. Brought her glucometer today, but forgot her log book. BG range from  last week, often 160-170s.     O: /65   Pulse 76   Ht 1.554 m (5' 1.2\")   Wt 51.7 kg (114 lb)   LMP 10/16/2018   BMI 21.40 kg/m    FH: 31  FHT: 135    A/P: 38 year old  at 32w2d by LMP c/w 12w2d US presents for KAREN visit  - Prenatal care:  Rh positive, Antibody negative, Rubella immune, Hepatitis BsAg HR, RPR, NR, A1c 6.5, , GTT 87/151/147/122, s/p TDAP  - AMA/Genetic screening: Declined genetic counseling. Level II US normal,  Having a Boy, Growth US at 23 weeks with bilateral pyelectasis, this persisted at her scan at 29 weeks, referred to M, dilation resolved at 30w6d scan with EFW 56%.  - Suspected T2DM; HgA1c 6.5 at NOB visit. She has not yet established care with a diabetic educator or with Endocrinology. Scheduled visit for next week, . Normal fetal echo on 3/27/19.  Plan for Serial growth US (last growth scan 19 with EFW 56%ile, MVP 4.8), continue twice weekly BPP given unknown sugar control  - HIV positive; Follows with ID. On Truvada and Descovy.  Viral load q3 months; last 3/19 was undetectable.  Viral load between 34-36 weeks for delivery planning. Intrapartum Zidovudine if viral load> 1,000 copies at time of delivery. No breastfeeding, Infant prophylaxis between 6-12 hours of life  - History of gestational HTN: Baseline third trimester HELLP labs completed at 27 weeks, " Normal, Urine Pr:Cr 0.19.  - History of FGR:  Serial growth US q4 weeks, last 5/20/19 EFW 56%ile  - LSIL pap other HR HPV positive 2/2016: Colposcopy recommended but not performed. Patient declined repeat pap/colposcopy until postpartum, this should be done together.   - Maternal Sickle Cell trait: FOB also with sickle cell trait and child with sickle cell trait. Genetic counseling declined.  - Iron deficiency Anemia; On Fe supplements. Last Hgb 9.3 on 4/24/19. Recheck Hgb at next visit, recommend IV iron if still low.  - Latent TB: Declines treatment  - Delivery plans: Unsure of pain, nervous about getting epidural again as it was painful to get done with last delivery/Bottlefeeding secondary to HIV + status/Unsure on contraception    RTC in 2 weeks, cont twice weekly BPP    Patient seen with and examined by Dr. Garcia.    Kalpesh Bailey MD, MPH  OBGYN PGY-1  5/30/2019 1:57 PM     The patient was seen and examined with Dr. Bailey.  I have reviewed and agree with the above note.    Veronica Garcia MD, FACOG

## 2019-05-30 NOTE — LETTER
"2019       RE: Marina Mock  [address]  [facility] [address]     Dear Colleague,    Thank you for referring your patient, Marina Mock, to the [facility] at [facility]. Please see a copy of my visit note below.    KAREN Visit 19    S:   Patient is doing well. Reports very active fetus. Is feeling some increased pelvic pressure and few contractions. No LOF or VB. Has been intermittently tracking BG. Ran out of strips and just got some last night. Sometimes checks 2-3 hours post-prandial. Did not check a fasting this AM. She has been unable to schedule visit with diabetes educator and endocrinology due to difficulty coordinating with her 's schedule. She is unsure what she should be eating, so sometimes just doesn't eat at all. Brought her glucometer today, but forgot her log book. BG range from  last week, often 160-170s.     O: /65   Pulse 76   Ht 1.554 m (5' 1.2\")   Wt 51.7 kg (114 lb)   LMP 10/16/2018   BMI 21.40 kg/m     FH: 31  FHT: 135    A/P: 38 year old  at  32w2d by LMP c/w 12w2d US presents for KAREN visit  - Prenatal care:  Rh positive, Antibody negative, Rubella immune, Hepatitis BsAg HR, RPR, NR, A1c 6.5, , GTT 87/151/147/122, s/p TDAP  - AMA/Genetic screening: Declined genetic counseling. Level II US normal,  Having a Boy, Growth US at 23 weeks with bilateral pyelectasis, this persisted at her scan at 29 weeks, referred to [facility], dilation resolved at 30w6d scan with EFW 56%.  - Suspected T2DM; HgA1c 6.5 at NOB visit. She has not yet established care with a diabetic educator or with Endocrinology. Scheduled visit for next week, . Normal fetal echo on 3/27/19.  Plan for Serial growth US (last growth scan 19 with EFW 56%ile, MVP 4.8), continue twice weekly BPP given unknown sugar control  - HIV positive; Follows with ID. On Truvada and Descovy.  Viral load q3 months; last 3/19 " was undetectable.  Viral load between 34-36 weeks for delivery planning. Intrapartum Zidovudine if viral load> 1,000 copies at time of delivery. No breastfeeding, Infant prophylaxis between 6-12 hours of life  - History of gestational HTN: Baseline third trimester HELLP labs completed at 27 weeks, Normal, Urine Pr:Cr 0.19.  - History of FGR:  Serial growth US q4 weeks, last 5/ 20/19 EFW 56%ile  - LSIL pap other HR HPV positive 2/2016: Colposcopy recommended but not performed. Patient declined repeat pap/colposcopy until postpartum, this should be done together.   - Maternal Sickle Cell trait: FOB also with sickle cell trait and child with sickle cell trait. Genetic counseling declined.  - Iron deficiency Anemia; On Fe supplements. Last Hgb 9.3 on 4/24/19. Recheck Hgb at next visit, recommend IV iron if still low.  - Latent TB: Declines treatment  - Delivery plans: Unsure of pain, nervous about getting epidural again as it was painful to get done with last delivery/Bottlefeeding secondary to HIV + status/Unsure on contraception    RTC in 2 weeks, cont twice weekly BPP    Patient seen with and examined by Dr. Garcia.    Kalpesh Bailey MD, MPH  OBGYN PGY-1  5/30/2019 1:57 PM     The patient was seen and examined with Dr. Bailey.  I have reviewed and agree with the above note.    Veronica Garcia MD, FACOG

## 2019-06-10 ENCOUNTER — ANCILLARY PROCEDURE (OUTPATIENT)
Dept: ULTRASOUND IMAGING | Facility: CLINIC | Age: 39
End: 2019-06-10
Attending: OBSTETRICS & GYNECOLOGY
Payer: MEDICAID

## 2019-06-10 DIAGNOSIS — E11.9 TYPE 2 DIABETES MELLITUS WITHOUT COMPLICATION, WITHOUT LONG-TERM CURRENT USE OF INSULIN (H): ICD-10-CM

## 2019-06-10 PROCEDURE — 76819 FETAL BIOPHYS PROFIL W/O NST: CPT

## 2019-06-13 ENCOUNTER — OFFICE VISIT (OUTPATIENT)
Dept: OBGYN | Facility: CLINIC | Age: 39
End: 2019-06-13
Attending: OBSTETRICS & GYNECOLOGY
Payer: MEDICAID

## 2019-06-13 ENCOUNTER — ANCILLARY PROCEDURE (OUTPATIENT)
Dept: ULTRASOUND IMAGING | Facility: CLINIC | Age: 39
End: 2019-06-13
Attending: OBSTETRICS & GYNECOLOGY
Payer: MEDICAID

## 2019-06-13 VITALS
WEIGHT: 115 LBS | DIASTOLIC BLOOD PRESSURE: 67 MMHG | HEART RATE: 81 BPM | BODY MASS INDEX: 21.59 KG/M2 | SYSTOLIC BLOOD PRESSURE: 109 MMHG

## 2019-06-13 DIAGNOSIS — O09.90 HIGH RISK PREGNANCY, ANTEPARTUM: Primary | ICD-10-CM

## 2019-06-13 DIAGNOSIS — O24.113 PRE-EXISTING TYPE 2 DIABETES MELLITUS DURING PREGNANCY IN THIRD TRIMESTER: ICD-10-CM

## 2019-06-13 PROCEDURE — 76819 FETAL BIOPHYS PROFIL W/O NST: CPT

## 2019-06-13 PROCEDURE — G0463 HOSPITAL OUTPT CLINIC VISIT: HCPCS | Mod: ZF,25

## 2019-06-13 ASSESSMENT — PAIN SCALES - GENERAL: PAINLEVEL: NO PAIN (0)

## 2019-06-13 NOTE — LETTER
2019       RE: Marina Mock  [address]  [facility] [address]     Dear Colleague,    Thank you for referring your patient, Marina Mock, to the [facility] at [facility]. Please see a copy of my visit note below.    Chief complaint: KAREN week 34    Subjective:      Leticia is a 38 year old  at 34w2d who presents for a routine prenatal appointment.    No vaginal bleeding or leakage of fluid. Occasional contractions. Good fetal movement.       No HA, visual changes, RUQ or epigastric pain.   Patient concerns: Left lower quadrant pain consistent with round ligament pain. Marina would like to know more about she can eat to better manage her diabetes. Blood glucose has been about 90 in the morning and goes up to 150-170 two hours after she eats. She is taking her antiretroviral medications for HIV every day as prescribed. Hemoglobin will be checked tomorrow along with her viral load.   Feeling well overall.    Objective:  Vitals:    19 1405   BP: 109/67   Pulse: 81   Weight: 52.2 kg (115 lb)   see ob flowsheet  Assessment/Plan     Encounter Diagnosis   Name Primary?     High risk pregnancy WHS MD care Yes     Orders Placed This Encounter   Procedures     CBC with Platelets     Orders Placed This Encounter   Medications     order for DME     Sig: Maternity Belt order     Dispense:  1 each     Refill:  0     ABO   Date Value Ref Range Status   2019 O  Final     RH(D)   Date Value Ref Range Status   2019 Pos  Final     Antibody Screen   Date Value Ref Range Status   2019 Neg  Final       A/P: Leticia is a 38 year old  at 34w2d by LMP c/w 12w2d US presents for KAREN visit    - Prenatal care:  Rh positive, Antibody negative, Rubella immune, Hepatitis BsAg HR, RPR, NR, A1c 6.5, , GTT 87/151/147/122, s/p TDAP    - AMA/Genetic screening: Declined genetic counseling. Level II US normal,  Having a Boy,  Growth US at 23 weeks with bilateral pyelectasis, this persisted at her scan at 29 weeks, referred to M, dilation resolved at 30w6d scan with EFW 56%.    - Suspected T2DM; HgA1c 6.5 at NOB visit. She so far has been unable to attend the diabetes educational meetings, so another was scheduled and she will have a diabetes specific visit with S and another referral was made to DM education.  Normal fetal echo on 3/27/19.  Plan for Serial growth US (last growth scan 5/20/19 with EFW 56%ile, MVP 4.8), continue twice weekly BPP given unknown sugar control    - HIV positive; Follows with ID. On Truvada and Descovy.  Viral load q3 months; last 3/19 was undetectable. Viral load will be checked again tomorrow for delivery planning. Intrapartum Zidovudine if viral load> 1,000 copies at time of delivery. No breastfeeding, Infant prophylaxis between 6-12 hours of life    - Iron deficiency anemia; Takes iron supplements daily. Last Hgb 9.3 on 4/24/19. Hb will be checked tomorrow at ID visit, recommend IV iron if still low.    - Round ligament pain: Given prescription for pregnancy belt.      Patient education/orders or handouts today:  Hospital tour    Return to clinic in 2 weeks, continue twice weekly BPP and prn if questions or concerns.       Written by Liana Day, MS3, acting as scribe for Dr. Parks.    I agree with the PFSH and ROS as completed by the medical student, except for changes made by me.  The remainder of the encounter was performed by me and scribed by the medical student.  The scribed note accurately reflects my personal services and the decisions made by me.  Lynn Parks MD

## 2019-06-13 NOTE — PROGRESS NOTES
Chief complaint: KAREN week 34    Subjective:      Leticia is a 38 year old  at 34w2d who presents for a routine prenatal appointment.    No vaginal bleeding or leakage of fluid. Occasional contractions. Good fetal movement.       No HA, visual changes, RUQ or epigastric pain.   Patient concerns: Left lower quadrant pain consistent with round ligament pain. Marina would like to know more about she can eat to better manage her diabetes. Blood glucose has been about 90 in the morning and goes up to 150-170 two hours after she eats. She is taking her antiretroviral medications for HIV every day as prescribed. Hemoglobin will be checked tomorrow along with her viral load.   Feeling well overall.    Objective:  Vitals:    19 1405   BP: 109/67   Pulse: 81   Weight: 52.2 kg (115 lb)   see ob flowsheet  Assessment/Plan     Encounter Diagnosis   Name Primary?     High risk pregnancy WHS MD care Yes     Orders Placed This Encounter   Procedures     CBC with Platelets     Orders Placed This Encounter   Medications     order for DME     Sig: Maternity Belt order     Dispense:  1 each     Refill:  0     ABO   Date Value Ref Range Status   2019 O  Final     RH(D)   Date Value Ref Range Status   2019 Pos  Final     Antibody Screen   Date Value Ref Range Status   2019 Neg  Final       A/P: Leticia is a 38 year old  at 34w2d by LMP c/w 12w2d US presents for KAREN visit    - Prenatal care:  Rh positive, Antibody negative, Rubella immune, Hepatitis BsAg HR, RPR, NR, A1c 6.5, , GTT 87/151/147/122, s/p TDAP    - AMA/Genetic screening: Declined genetic counseling. Level II US normal,  Having a Boy, Growth US at 23 weeks with bilateral pyelectasis, this persisted at her scan at 29 weeks, referred to [facility], dilation resolved at 30w6d scan with EFW 56%.    - Suspected T2DM; HgA1c 6.5 at NOB visit. She so far has been unable to attend the diabetes educational meetings, so another was scheduled and she  will have a diabetes specific visit with [facility] and another referral was made to DM education.  Normal fetal echo on 3/27/19.  Plan for Serial growth US (last growth scan 5/20/19 with EFW 56%ile, MVP 4.8), continue twice weekly BPP given unknown sugar control    - HIV positive; Follows with ID. On Truvada and Descovy.  Viral load q3 months; last 3/19 was undetectable. Viral load will be checked again tomorrow for delivery planning. Intrapartum Zidovudine if viral load> 1,000 copies at time of delivery. No breastfeeding, Infant prophylaxis between 6-12 hours of life    - Iron deficiency anemia; Takes iron supplements daily. Last Hgb 9.3 on 4/24/19. Hb will be checked tomorrow at ID visit, recommend IV iron if still low.    - Round ligament pain: Given prescription for pregnancy belt.      Patient education/orders or handouts today:  Hospital tour    Return to clinic in 2 weeks, continue twice weekly BPP and prn if questions or concerns.       Written by Liana Day, MS3, acting as scribe for Dr. Parks.    I agree with the PFSH and ROS as completed by the medical student, except for changes made by me.  The remainder of the encounter was performed by me and scribed by the medical student.  The scribed note accurately reflects my personal services and the decisions made by me.  Lynn Parks MD

## 2019-06-13 NOTE — NURSING NOTE
Chief Complaint   Patient presents with     Prenatal Care     KAREN 34 weeks and 2 days   Linda Wells LPN

## 2019-06-14 ENCOUNTER — OFFICE VISIT (OUTPATIENT)
Dept: INFECTIOUS DISEASES | Facility: CLINIC | Age: 39
End: 2019-06-14
Attending: INTERNAL MEDICINE
Payer: MEDICAID

## 2019-06-14 ENCOUNTER — TELEPHONE (OUTPATIENT)
Dept: ENDOCRINOLOGY | Facility: CLINIC | Age: 39
End: 2019-06-14

## 2019-06-14 VITALS
BODY MASS INDEX: 21.9 KG/M2 | HEIGHT: 61 IN | DIASTOLIC BLOOD PRESSURE: 69 MMHG | TEMPERATURE: 98.5 F | WEIGHT: 116 LBS | SYSTOLIC BLOOD PRESSURE: 109 MMHG | OXYGEN SATURATION: 100 % | HEART RATE: 97 BPM

## 2019-06-14 DIAGNOSIS — Z21 HIV (HUMAN IMMUNODEFICIENCY VIRUS INFECTION) (H): ICD-10-CM

## 2019-06-14 DIAGNOSIS — B20 HUMAN IMMUNODEFICIENCY VIRUS (HIV) DISEASE (H): ICD-10-CM

## 2019-06-14 DIAGNOSIS — O09.91 HIGH-RISK PREGNANCY IN FIRST TRIMESTER: ICD-10-CM

## 2019-06-14 DIAGNOSIS — O98.713 MATERNAL INFECTION WITH HUMAN IMMUNODEFICIENCY VIRUS IN THIRD TRIMESTER: Primary | ICD-10-CM

## 2019-06-14 DIAGNOSIS — O98.719 HIV AFFECTING PREGNANCY, ANTEPARTUM: ICD-10-CM

## 2019-06-14 DIAGNOSIS — Z87.59 HISTORY OF GESTATIONAL HYPERTENSION: ICD-10-CM

## 2019-06-14 LAB
ALBUMIN SERPL-MCNC: 2.7 G/DL (ref 3.4–5)
ALP SERPL-CCNC: 93 U/L (ref 40–150)
ALT SERPL W P-5'-P-CCNC: 18 U/L (ref 0–50)
ANION GAP SERPL CALCULATED.3IONS-SCNC: 7 MMOL/L (ref 3–14)
AST SERPL W P-5'-P-CCNC: 18 U/L (ref 0–45)
BASOPHILS # BLD AUTO: 0 10E9/L (ref 0–0.2)
BASOPHILS NFR BLD AUTO: 0.3 %
BILIRUB SERPL-MCNC: 0.2 MG/DL (ref 0.2–1.3)
BUN SERPL-MCNC: 9 MG/DL (ref 7–30)
CALCIUM SERPL-MCNC: 9 MG/DL (ref 8.5–10.1)
CD3 CELLS # BLD: 862 CELLS/UL (ref 603–2990)
CD3 CELLS NFR BLD: 71 % (ref 49–84)
CD3+CD4+ CELLS # BLD: 385 CELLS/UL (ref 441–2156)
CD3+CD4+ CELLS NFR BLD: 32 % (ref 28–63)
CD3+CD4+ CELLS/CD3+CD8+ CLL BLD: 0.86 % (ref 1.4–2.6)
CD3+CD8+ CELLS # BLD: 448 CELLS/UL (ref 125–1312)
CD3+CD8+ CELLS NFR BLD: 37 % (ref 10–40)
CHLORIDE SERPL-SCNC: 107 MMOL/L (ref 94–109)
CO2 SERPL-SCNC: 22 MMOL/L (ref 20–32)
CREAT SERPL-MCNC: 0.68 MG/DL (ref 0.52–1.04)
DIFFERENTIAL METHOD BLD: ABNORMAL
EOSINOPHIL # BLD AUTO: 0.1 10E9/L (ref 0–0.7)
EOSINOPHIL NFR BLD AUTO: 1.8 %
ERYTHROCYTE [DISTWIDTH] IN BLOOD BY AUTOMATED COUNT: 13.6 % (ref 10–15)
GFR SERPL CREATININE-BSD FRML MDRD: >90 ML/MIN/{1.73_M2}
GLUCOSE SERPL-MCNC: 123 MG/DL (ref 70–99)
HCT VFR BLD AUTO: 31.5 % (ref 35–47)
HGB BLD-MCNC: 10 G/DL (ref 11.7–15.7)
IFC SPECIMEN: ABNORMAL
IMM GRANULOCYTES # BLD: 0 10E9/L (ref 0–0.4)
IMM GRANULOCYTES NFR BLD: 0 %
LYMPHOCYTES # BLD AUTO: 1.2 10E9/L (ref 0.8–5.3)
LYMPHOCYTES NFR BLD AUTO: 36 %
MCH RBC QN AUTO: 24.9 PG (ref 26.5–33)
MCHC RBC AUTO-ENTMCNC: 31.7 G/DL (ref 31.5–36.5)
MCV RBC AUTO: 78 FL (ref 78–100)
MONOCYTES # BLD AUTO: 0.2 10E9/L (ref 0–1.3)
MONOCYTES NFR BLD AUTO: 6.9 %
NEUTROPHILS # BLD AUTO: 1.8 10E9/L (ref 1.6–8.3)
NEUTROPHILS NFR BLD AUTO: 55 %
NRBC # BLD AUTO: 0 10*3/UL
NRBC BLD AUTO-RTO: 0 /100
PLATELET # BLD AUTO: 189 10E9/L (ref 150–450)
POTASSIUM SERPL-SCNC: 3.9 MMOL/L (ref 3.4–5.3)
PROT SERPL-MCNC: 7.3 G/DL (ref 6.8–8.8)
RBC # BLD AUTO: 4.02 10E12/L (ref 3.8–5.2)
SODIUM SERPL-SCNC: 136 MMOL/L (ref 133–144)
WBC # BLD AUTO: 3.3 10E9/L (ref 4–11)

## 2019-06-14 PROCEDURE — 36415 COLL VENOUS BLD VENIPUNCTURE: CPT | Performed by: INTERNAL MEDICINE

## 2019-06-14 PROCEDURE — 86359 T CELLS TOTAL COUNT: CPT | Performed by: INTERNAL MEDICINE

## 2019-06-14 PROCEDURE — 85025 COMPLETE CBC W/AUTO DIFF WBC: CPT | Performed by: INTERNAL MEDICINE

## 2019-06-14 PROCEDURE — 87536 HIV-1 QUANT&REVRSE TRNSCRPJ: CPT | Performed by: INTERNAL MEDICINE

## 2019-06-14 PROCEDURE — G0463 HOSPITAL OUTPT CLINIC VISIT: HCPCS | Mod: ZF

## 2019-06-14 PROCEDURE — 86360 T CELL ABSOLUTE COUNT/RATIO: CPT | Performed by: INTERNAL MEDICINE

## 2019-06-14 PROCEDURE — 87086 URINE CULTURE/COLONY COUNT: CPT | Performed by: MIDWIFE

## 2019-06-14 PROCEDURE — 80053 COMPREHEN METABOLIC PANEL: CPT | Performed by: INTERNAL MEDICINE

## 2019-06-14 ASSESSMENT — PAIN SCALES - GENERAL: PAINLEVEL: NO PAIN (0)

## 2019-06-14 ASSESSMENT — MIFFLIN-ST. JEOR: SCORE: 1153.55

## 2019-06-14 NOTE — NURSING NOTE
"Chief Complaint   Patient presents with     RECHECK     B20     /69   Pulse 97   Temp 98.5  F (36.9  C) (Oral)   Ht 1.549 m (5' 1\")   Wt 52.6 kg (116 lb)   LMP 10/16/2018   SpO2 100%   BMI 21.92 kg/m    Laura Roque [facility]  6/14/2019 1:04 PM      "

## 2019-06-14 NOTE — PROGRESS NOTES
INFECTIOUS DISEASE CLINIC    REASON FOR VISIT:   Follow-up HIV, now 3rd trimester of pregnancy    HISTORY OF PRESENT ILLNESS:   Ms. Marina Mock is a 37 y/o woman returning to follow-up for HIV care in pregnancy.  She was last seen in this clinic as planned on 3/19/2019.    Marina continues to tolerate her regimen of Truvada and dolutegravir well.  She takes them in the early hours of the morning at 1-2 am before she goes to sleep.  She works a late shift 4-9 pm.  However, she is now part-time at work.  She would like to be able to stop altogether and start maternity leave now, but her job will not allow this.  She admits that she and her  are not making enough money now to get by financially.  She is working with her medical case manager Radha to apply for financial services.     Marina reports that the pregnancy is going well.  She is feeling movement of the baby.  She does have gestational diabetes, and on ultrasound there was renal pyelectasis and at one point the baby was deemed large for gestational age (last ultrasound 69% for gestational age).    Marina's child (Veronique) is here with her in the clinic.  Veronique was born 16 via  at 38 weeks, 6 days, but with IUGR.  Marina followed with our clinic during that time and was adherent to her ARV regimen.  Veronique has been ruled out for HIV infection.      PAST MEDICAL HISTORY:    Past Medical History:   Diagnosis Date     Asymptomatic human immunodeficiency virus (HIV) infection status (H)      Gestational diabetes     diet controlled      Malaria     in [place]      Miscarriage      TB (pulmonary tuberculosis)     latent     PAST SURGICAL HISTORY:  Past Surgical History:   Procedure Laterality Date     NO HISTORY OF SURGERY       FAMILY PAST MEDICAL HISTORY:  Family History   Problem Relation Age of Onset     Family History Negative Other      Hypertension Father      Hypertension Other      Cancer No family hx of      Diabetes No  "family hx of      Macular Degeneration No family hx of      Retinal detachment No family hx of      SOCIAL HISTORY:  Social History     Socioeconomic History     Marital status:      Spouse name: Chris Roldan   Tobacco Use     Smoking status: Never Smoker     Smokeless tobacco: Never Used   Substance and Sexual Activity     Alcohol use: No     Drug use: No     Sexual activity: Yes     Partners: Male   Social History Narrative    ** Merged History Encounter **     How much exercise per week? Active with 2 yr    How much calcium per day? supplement       How much caffeine per day? none    How much vitamin D per day? supplement    Do you/your family wear seatbelts?  Yes    Do you/your family use safety helmets? n/a    Do you/your family use sunscreen? No    Do you/your family keep firearms in the home? No    Do you/your family have a smoke detector(s)? 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, 2019 Aldo Perez LPN    [employer] CNM APRN            CURRENT MEDICATIONS:    Current Outpatient Medications   Medication     blood glucose monitoring (ACCU-CHEK MULTICLIX) lancets     cholecalciferol (VITAMIN D3) 5000 units TABS tablet     dolutegravir (TIVICAY) 50 MG tablet     emtricitabine-tenofovir (TRUVADA) 200-300 MG per tablet     ferrous sulfate (IRON) 325 (65 Fe) MG tablet     folic acid (FOLVITE) 1 MG tablet     order for DME     vitamin B-12 (CYANOCOBALAMIN) 1000 MCG tablet     vitamin C (ASCORBIC ACID) 250 MG tablet     No current facility-administered medications for this visit.        ALLERGIES:  No Known Allergies    REVIEW OF SYSTEMS: A full 12-point review of systems was obtained, pertinent positives and negatives as above.     PHYSICAL EXAMINATION:    VITAL SIGNS: /69   Pulse 97   Temp 98.5  F (36.9  C) (Oral)   Ht 1.549 m (5' 1\")   Wt 52.6 kg (116 lb)   LMP 10/16/2018   SpO2 100%   BMI 21.92 kg/m    GENERAL:   No acute distress    HEENT: No icterus or injection. Oropharynx moist and clear without lesions or " exudate.    NECK: Supple and nontender  LYMPH:  No cervical, axillary or inguinal lymphadenopathy  LUNGS: Clear to ausculation bilaterally without any increased work of breathing  HEART: Regular rate and rhythm and no murmur, gallop or rub    ABDOMEN: Gravid abdomen. Normoactive bowel sounds, soft, nontender  EXTREMITIES: Warm and well perfused without clubbing, cyanosis, or edema  SKIN:  No rashes  NEURO:  Awake, alert, no focal neurologic deficits.  PSYCH: Affect normal. Speech fluent and appropriate.     LABORATORY DATA:    Reviewed.   Last 3/19/19  HIV viral load not detected  Absolute CD4 - 393  CBC with Hg of 9.9  CMP with glucose of 66    IMAGIN2019  Fetal ultrasound  Measurements:               CRL = 56.3 mm = 12 2/7 weeks  EGA.                 Fetal anatomy appears normal for gestational age.               Fetal/Fetal Cardiac Activity: Present.  FHR = 166bpm.               Implantation: Intrauterine. Low lying placenta at this time.               Cervix = 5.3 cm               Maternal structures appear normal.   Impression:  IUP with MARLENE: 19.    Recommend comprehensive scan at 18 to 20 weeks.    2019  [facility] comprehensive ultrasound  IMPRESSION  ---------------------------------------------------------------------------------------------------------  1) Intrauterine pregnancy at 30 6/7 weeks gestational age.  2) Visualized fetal anatomy appears normal. Urinary tract dilation has resolved.  3) Growth parameters and estimated fetal weight were consistent with an appropriate for gestation age pattern of growth.  4) The amniotic fluid volume appeared normal.    2019  BPP  38 year old,  , presents at 34 2/7 weeks in pregnancy complicated by Type 2 DM, LGA, HIV, pyelectasis for biophysical assessment.     Fetal Breathing Movements (FBM): Normal - 2  Gross Body Movements (GBM): Normal - 2  Fetal Tone (FT): Normal - 2  AFV: Pocket of amniotic fluid > or = to 2 cm x 2 cm -  2  Quantitative Amniotic Fluid Volume Total: 23.3 cm, upper normal        BPP 8/8.  FHR = 130bpm Normal.   MCA Not done.  NST Not done.    Single fetus in ceaphlic presentation, slightly oblique.  Placenta posterior and grade 1.     USEGA = 34 6/7 weeks.  EFW = 2600 grams, 69 % for 34 weeks.     Left kidney- 9.8mm  Right kidney- 7.5mm     Comments: Appropriate for gestational age pattern of growth, reassuring BPP  Findings discussed with patient.  Further studies: continue twice weekly BPP and serial q 4 week growth ultrasounds .  Follow-up ultrasound in [facility] is recommended given renal pyelectasis.        ASSESSMENT AND PLAN:       HIV in pregnancy, currently 34 weeks gestation    Marina was on Truvada and twice daily dosing of raltegravir at conception and through the first trimester.  She found twice daily dosing to be challenging.  Recently, the HIV guidelines have been updated to support the use of dolutegravir once daily dosing in the second trimester of pregnancy and beyond. Thus, Marina switched to dolutegravir 50mg po once daily and continued with once daily Truvada as of 2/19/19 at 18 weeks gestation.  Repeat viral load after one month remained undetectable.  Marina continues to do well with this regimen.    We typically follow HIV viral load every 3 months in pregnancy, and so today is an appropriate interval to repeat HIV viral load.   6/14/2019  HIV viral load is again undetectable.  Given that Marina is 34 weeks along now, this does not have to be repeated before delivery.      - continue Truvada and dolutegravir intrapartum and postpartum without interruption  - with an undetectable HIV viral load throughout pregnancy and today at 34 weeks gestation:   - a vaginal delivery is planned   - Marina does not require any IV zidovudine  - infant to start zidovudine prophylaxis within 6 -12 hours of delivery     Anemia, microcytic  Continues with her last CBC, and the ferritin from 8/9/2018 was  8.  Marina is prescribed ferrous sulfate as well as a prenatal vitamin.  I suspect that she is not taking these tablets.     LTBI (latent tuberculosis infection)  Previously, Marina was counseled about treatment for LTBI. She experienced side effects from both rifampin and isoniazid and has not been willing to take either medication.  She was previously counseled by my colleague about the risk this poses to herself given her HIV, as well as the concept of active tuberculosis exposure to children under 5 being very high risk.       Preventative Medicine  Cardiovascular Sal -                Lipids - 12/21/17 - Total Chol 212, HDL 51               Blood Pressure - normal /69 today. Will continue to follow.   Cancer Screening               Colon - No indication for screening at present               Cervical - refused pap smear at OB visit, has history of LSIL in 2016 without colposcopy or follow-up  Immunizations               Hepatitis A - Ab positive 3/2016               Hepatitis B - Surface Ab/Ag and core Ab negative, hx of vaccination in 2015 in outside records, but will need booster                Influenza - Do not see record for the 2018-19 season. OB usually provides this, will have to ask patient               Pneumovax/Prevnar - Had PCV on 3/16/2017,  PSV23 4/5/2018               Tdap - 4/24/2019  Bone Health - No screening indicated at this time  Renal Health - 8/2016 - no proteinuria, Creatinine has been normal  Sexually Transmitted Infection Risk and Screening               Gonorrhea and Chlamydia - Negative from urine sample 2/4/2019 in OB screening               Syphilis - Negative T pallidum Ab 1/9/2017 in OB screening      Marina will return to see me after the birth of her baby to discuss ongoing antiretroviral therapy.    Alfredo Martinez MD, MS    Infectious Disease    pager: [phone]      I spent 30 minutes in direct care with this patient, including >50% of time face-to-face  with the patient counseling about HIV treatment and monitoring in pregnancy.

## 2019-06-14 NOTE — LETTER
6/14/2019      RE: Marina Nish  [address]  [facility] [address]         Follow-up HIV in pregnancy.    Alfredo Martinez MD

## 2019-06-14 NOTE — TELEPHONE ENCOUNTER
[facility] Call Center    Phone Message    May a detailed message be left on voicemail: yes    Reason for Call: Other: Patient has a referral from Dr. Tapia with a diagnosis of Type 2 diabetes mellitus during pregnancy [O24.119]. Pt has 3 first appointment no shows (5/1/19, 6/5/19,6/7/19). I was unable to get her in within the next 2-3-days. Unsure how to schedule. Please call Pt back at [phone]     Action Taken: Message routed to:  [facility] ([facility] Diabetes and Endo

## 2019-06-14 NOTE — LETTER
6/14/2019       RE: Marina Mock  [address]  [facility] [address]     Dear Colleague,    Thank you for referring your patient, Marina Mock, to the [facility] AND INFECTIOUS DISEASES at [facility]. Please see a copy of my visit note below.      Follow-up HIV in pregnancy.    Again, thank you for allowing me to participate in the care of your patient.      Sincerely,    Alfredo Martinez MD

## 2019-06-15 LAB
BACTERIA SPEC CULT: NO GROWTH
Lab: NORMAL
SPECIMEN SOURCE: NORMAL

## 2019-06-17 ENCOUNTER — TELEPHONE (OUTPATIENT)
Dept: PHARMACY | Facility: CLINIC | Age: 39
End: 2019-06-17

## 2019-06-17 NOTE — TELEPHONE ENCOUNTER
On call provider states that patient needs to see internal med here or pharm D. Left message for patient to call and schedule. Has ultrasound scheduled for tomorrow. Tried to reach her to schedule with Dr. Morales tomorrow before ultrasound.

## 2019-06-17 NOTE — TELEPHONE ENCOUNTER
Patient has no showed to the diabetes clinic 3 times. She showed up 4 hours late the second time so we schedule her next day when she was here. She no showed and that was the third time. Patient will have to be seen elsewhere for her diabetes care.

## 2019-06-17 NOTE — TELEPHONE ENCOUNTER
Patient no longer able to see endocrinology at [facility] due to no shows. Will route to on call provider.

## 2019-06-17 NOTE — TELEPHONE ENCOUNTER
Attempted to contact the patient to for refill reminder call,  left message on voicemail    Last filled on: 05/15/19    Follow-up Date: 06/19/19 2nd attempt    Michelle Ricci CPhT  On license of [facility]  [phone]

## 2019-06-18 ENCOUNTER — ANCILLARY PROCEDURE (OUTPATIENT)
Dept: ULTRASOUND IMAGING | Facility: CLINIC | Age: 39
End: 2019-06-18
Attending: OBSTETRICS & GYNECOLOGY
Payer: MEDICAID

## 2019-06-18 DIAGNOSIS — E11.9 TYPE 2 DIABETES MELLITUS WITHOUT COMPLICATION, WITHOUT LONG-TERM CURRENT USE OF INSULIN (H): ICD-10-CM

## 2019-06-18 PROCEDURE — 76819 FETAL BIOPHYS PROFIL W/O NST: CPT

## 2019-06-21 ENCOUNTER — ANCILLARY PROCEDURE (OUTPATIENT)
Dept: ULTRASOUND IMAGING | Facility: CLINIC | Age: 39
End: 2019-06-21
Attending: OBSTETRICS & GYNECOLOGY
Payer: MEDICAID

## 2019-06-21 DIAGNOSIS — E11.9 TYPE 2 DIABETES MELLITUS WITHOUT COMPLICATION, WITHOUT LONG-TERM CURRENT USE OF INSULIN (H): ICD-10-CM

## 2019-06-21 PROCEDURE — 76819 FETAL BIOPHYS PROFIL W/O NST: CPT

## 2019-06-27 ENCOUNTER — ANCILLARY PROCEDURE (OUTPATIENT)
Dept: ULTRASOUND IMAGING | Facility: CLINIC | Age: 39
End: 2019-06-27
Attending: OBSTETRICS & GYNECOLOGY
Payer: MEDICAID

## 2019-06-27 ENCOUNTER — OFFICE VISIT (OUTPATIENT)
Dept: OBGYN | Facility: CLINIC | Age: 39
End: 2019-06-27
Attending: OBSTETRICS & GYNECOLOGY
Payer: MEDICAID

## 2019-06-27 VITALS
SYSTOLIC BLOOD PRESSURE: 118 MMHG | WEIGHT: 117 LBS | BODY MASS INDEX: 22.09 KG/M2 | DIASTOLIC BLOOD PRESSURE: 71 MMHG | HEIGHT: 61 IN | HEART RATE: 80 BPM

## 2019-06-27 DIAGNOSIS — O09.529 ANTEPARTUM MULTIGRAVIDA OF ADVANCED MATERNAL AGE: ICD-10-CM

## 2019-06-27 DIAGNOSIS — O09.893 SUPERVISION OF OTHER HIGH RISK PREGNANCIES, THIRD TRIMESTER: Primary | ICD-10-CM

## 2019-06-27 DIAGNOSIS — O24.113 PRE-EXISTING TYPE 2 DIABETES MELLITUS DURING PREGNANCY IN THIRD TRIMESTER: ICD-10-CM

## 2019-06-27 DIAGNOSIS — O98.719 HIV AFFECTING PREGNANCY, ANTEPARTUM: ICD-10-CM

## 2019-06-27 DIAGNOSIS — E11.9 TYPE 2 DIABETES MELLITUS WITHOUT COMPLICATION, WITHOUT LONG-TERM CURRENT USE OF INSULIN (H): ICD-10-CM

## 2019-06-27 PROCEDURE — 76819 FETAL BIOPHYS PROFIL W/O NST: CPT

## 2019-06-27 PROCEDURE — G0463 HOSPITAL OUTPT CLINIC VISIT: HCPCS | Mod: 25

## 2019-06-27 PROCEDURE — 87653 STREP B DNA AMP PROBE: CPT | Performed by: OBSTETRICS & GYNECOLOGY

## 2019-06-27 ASSESSMENT — MIFFLIN-ST. JEOR: SCORE: 1156.26

## 2019-06-27 NOTE — Clinical Note
6/27/2019       RE: Marina Mock  [address]  [facility] [address]     Dear Colleague,    Thank you for referring your patient, Marina Mock, to the [facility] at [facility]. Please see a copy of my visit note below.    Fasting in the 80s, postprandial 140-150s.      Again, thank you for allowing me to participate in the care of your patient.      Sincerely,    Veronica Garcia MD

## 2019-06-27 NOTE — PROGRESS NOTES
S:  Overall doing well, good FM, having more irregular contractions.  She did not go to her endocrine appt and they will not allow her to reschedule.  A plan was made for her to see Dr. Morales or Isi Rangel and Reyna Rose PharmD but she did not follow up with this either.  She reports fasting blood sugars in the 80s, postprandial 140-150s (sometimes 1h, sometimes 2h).    O: see flow    GBS collected  cx cl/l/-3, mid, medium    A:  40 y/o  at 36+2 weeks, doing well overall.  Pregnancy is complicated by HIV infection wit undetectable viral load, AMA, ALEC and uncontrolled Type II DM.    P:  Given her uncontrolled DM, IOL was recommended at 37 weeks.  Reviewed the increased risk of IUFD in the setting of uncontrolled DM.  The patient and her  declined, they will consider 38 weeks.  For now, plan to RTC 1 week, continue twice weekly BPPs.  Labor precautions.      Veronica Garcia MD, FACOG

## 2019-06-29 LAB
GP B STREP DNA SPEC QL NAA+PROBE: NEGATIVE
SPECIMEN SOURCE: NORMAL

## 2019-07-05 ENCOUNTER — HOSPITAL ENCOUNTER (OUTPATIENT)
Dept: ULTRASOUND IMAGING | Facility: CLINIC | Age: 39
Discharge: HOME | End: 2019-07-05
Attending: OBSTETRICS & GYNECOLOGY | Admitting: OBSTETRICS & GYNECOLOGY
Payer: COMMERCIAL

## 2019-07-05 DIAGNOSIS — E11.9 TYPE 2 DIABETES MELLITUS WITHOUT COMPLICATION, WITHOUT LONG-TERM CURRENT USE OF INSULIN (H): ICD-10-CM

## 2019-07-05 PROCEDURE — 76819 FETAL BIOPHYS PROFIL W/O NST: CPT

## 2019-07-07 ENCOUNTER — TELEPHONE (OUTPATIENT)
Dept: OBGYN | Facility: CLINIC | Age: 39
End: 2019-07-07

## 2019-07-07 ENCOUNTER — HOSPITAL ENCOUNTER (OUTPATIENT)
Facility: CLINIC | Age: 39
Discharge: HOME | End: 2019-07-07
Attending: OBSTETRICS & GYNECOLOGY | Admitting: OBSTETRICS & GYNECOLOGY
Payer: COMMERCIAL

## 2019-07-07 VITALS
TEMPERATURE: 98 F | SYSTOLIC BLOOD PRESSURE: 110 MMHG | BODY MASS INDEX: 22.09 KG/M2 | WEIGHT: 117 LBS | HEIGHT: 61 IN | RESPIRATION RATE: 20 BRPM | DIASTOLIC BLOOD PRESSURE: 70 MMHG

## 2019-07-07 PROBLEM — Z36.89 ENCOUNTER FOR TRIAGE IN PREGNANT PATIENT: Status: ACTIVE | Noted: 2019-07-07

## 2019-07-07 LAB
GLUCOSE BLDC GLUCOMTR-MCNC: 122 MG/DL (ref 70–99)
RUPTURE OF FETAL MEMBRANES BY ROM PLUS: NEGATIVE

## 2019-07-07 PROCEDURE — G0463 HOSPITAL OUTPT CLINIC VISIT: HCPCS | Mod: 25

## 2019-07-07 PROCEDURE — 82962 GLUCOSE BLOOD TEST: CPT

## 2019-07-07 PROCEDURE — 84112 EVAL AMNIOTIC FLUID PROTEIN: CPT | Performed by: OBSTETRICS & GYNECOLOGY

## 2019-07-07 PROCEDURE — 59025 FETAL NON-STRESS TEST: CPT

## 2019-07-07 ASSESSMENT — MIFFLIN-ST. JEOR: SCORE: 1155.96

## 2019-07-07 NOTE — DISCHARGE INSTRUCTIONS
Discharge Instruction for Undelivered Patients      You were seen for: Membrane Assessment  We Consulted: [facility] Doctors  You had (Test or Medicine): Fetal and uterine monitoring, ROM plus lab test     Diet:   Drink 8 to 12 glasses of liquids (milk, juice, water) every day.  You may eat meals and snacks.  To manager your diabetes, follow the guidelines for eating and drinking given to you by your Clinic Provider or Diabetes Educator.       Activity:  Count fetal kicks everyday (see handout)  Call your doctor or nurse midwife if your baby is moving less than usual.     Call your provider if you notice:  Swelling in your face or increased swelling in your hands or legs.  Headaches that are not relieved by Tylenol (acetaminophen).  Changes in your vision (blurring: seeing spots or stars.)  Nausea (sick to your stomach) and vomiting (throwing up).   Weight gain of 5 pounds or more per week.  Heartburn that doesn't go away.  Signs of bladder infection: pain when you urinate (use the toilet), need to go more often and more urgently.  The bag of lambert (rupture of membranes) breaks, or you notice leaking in your underwear.  Bright red blood in your underwear.  Abdominal (lower belly) or stomach pain.  For first baby: Contractions (tightening) less than 5 minutes apart for one hour or more.  Second (plus) baby: Contractions (tightening) less than 10 minutes apart and getting stronger.  *If less than 34 weeks: Contractions (tightenings) more than 6 times in one hour.  Increase or change in vaginal discharge (note the color and amount)    Follow-up:  As scheduled in the clinic

## 2019-07-07 NOTE — PLAN OF CARE
D: Patient presents to labor and delivery stating ROM this morning at 0830. She went back to bed until 1030 when she got up again and had a gush of fluid. No loss of fluid since 1030. Denies vaginal bleeding. Admission completed, placed on the monitor and Dr. Coon aware of patient arrival. P: Continue to monitor.

## 2019-07-07 NOTE — PROGRESS NOTES
"Obstetrics Triage Note  CC: possible rupture of membranes    HPI:  Marina Mock is a 39 year old  at 37w5d by LMP c/w 12w2d US who presents for evaluation of potential rupture of membranes. She states that she awoke from sleeping this AM to find that there was some fluid in her bed that was clear. She went to the bathroom, had a bowel movement and went back to sleep. She awoke again a few hours later, went to bathroom again and saw brown fluid when she wiped. She denies ctx, vaginal bleeding. Normal fetal movement. She is otherwise feeling well and denies fevers, h/a, vision change, c/p, sob, epigastric or RUQ pain, n/v, worsening edema, urinary sx, or constipation.     Says she last checked her blood glucose yesterday evening (ran out of test strips so did not check this AM). Does not remember number. Blood sugars have been \"up and down\".    Pregnancy complicated by:  - HIV (last VL undetectable on 2019), on truvada and doltegravir  - T2DM, no meds, A1c 6.5, poorly controlled  - Polyhydramnios (declined 37 w induction)  - fetal bilateral pilectasis   - AMA  - h/o IUGR in prior pregnancy  - latent TB  - h/o LSIL in 2016, declined pap in pregnancy, no colpo  - sickle cell trait    ROS:  Negative except as mentioned in HPI.    PMH:  Past Medical History:   Diagnosis Date     Asymptomatic human immunodeficiency virus (HIV) infection status (H) 2016     Gestational diabetes     diet controlled      Malaria     in [place]      Miscarriage      TB (pulmonary tuberculosis)     latent       PSHx:  Past Surgical History:   Procedure Laterality Date     NO HISTORY OF SURGERY         Medications:  No current facility-administered medications for this encounter.        Allergies:   No Known Allergies    Physical Exam:   Vital signs:    Vitals:    19 1400   BP: 110/70   Resp: 20   Temp: 98  F (36.7  C)   TempSrc: Oral   Weight: 53.1 kg (117 lb)   Height: 1.554 m (5' 1.18\")      Gen: resting comfortably, in " NAD  CV: tachycardic, well perfused  Pulm: no increased work of breathing  Abd: soft, gravid, non-tender, non-distended  SVE: fingertip, thick, posterior, exam limited by patient discomfort    NST:  FHT: BL 150s, mod variability, + accelerations, solitary late deceleration   [place]: inconsistent contraction pattern     Labs:  - POC glucose: 122  - ROM Plus: negative    Assessment/Plan: Marina Mock is a 39 year old  at 37w5d by 12w2d US who presents for evaluation of potential rupture of membranes    - ROM plus neg, unlikely to have ruptured  - maternal tachycardia resolving with oral hydration  - after 1 hour of fetal monitoring (Cat 1 tracing), discussed induction of labor with indication of T2DM and polyhydramnios. Described methods and process of induction, including misoprostol, verdugo balloon and pitocin augmentation. Patient declined induction at this time, would prefer to attend BPP appointment tomorrow and reassess at that time. She was given clear return precautions, including bleeding, loss of fluid and decreased fetal movement. She was then discharged.     Angella Coon MD MSc  Ob/Gyn PGY-1  19 3:06 PM      Staff MD Note    I appreciate the note by Dr. Coon.  Any necessary changes have been made by me.  I evaluated the patient with the resident and agree with the assessment and plan. Recommended IOL given GA > 37 weeks in setting of poorly controlled Type 2 DM/GDM, Polyhydramnios and patient declined today and preferred to follow-up tomorrow for BPP.  Discussed labor precautions.    Reyna Tapia MD

## 2019-07-07 NOTE — TELEPHONE ENCOUNTER
LATE ENTRY SECONDARY TO OTHER PATIENT CARES    Patient called at around 11 AM this morning through answering service with concerns of leaking of fluid.  Patient is a 40 yo  @ 37w5d in pregnancy complicated by HIV on medications with last VL nondetectable 19, presumed Type 2 DM not on medications although poor compliance for recommendations for Endo evaluation, Polyhydramnios, Fetus with bilateral pyelectasis.  GBS negative.  Patient states she initially woke up at 8:30 this morning and thought she had urinated on herself and went to the bathroom and had a bowel movement and then went back to bed.  She woke up again at around 10:30 AM and had continued wetness.  She is now concerned her water broke.  She denies any contractions, maybe cramping.  No bleeding.  Baby is moving well.  We discussed she should come into L&D for evaluation for ROM.  Patient instructed on where to go.  She stated she understood and was agreeable with this plan.    Reyna Tapia MD

## 2019-07-07 NOTE — PROVIDER NOTIFICATION
07/07/19 1500   Provider Notification   Provider Name/Title Dr. Coon    Method of Notification In Department   Notification Reason Maternal Vital Sign Change   D: Initial vital signs when patient arrived pulse was 121. Rechecked pulse and was 125. Placed on the oximeter and Dr. Coon notified. Will provide oral hydration and reassess. P: Continue to monitor.

## 2019-07-07 NOTE — PLAN OF CARE
Data: [facility].   Reason for maternal/fetal assessment per patient is Rule out rupture of membranes  . Patient is a . Prenatal record reviewed.      OB History    Para Term  AB Living   4 2 2 0 1 1   SAB TAB Ectopic Multiple Live Births   1 0 0 0 2      # Outcome Date GA Lbr Aden/2nd Weight Sex Delivery Anes PTL Lv   4 Current            3 Term 16 38w6d 01: 00:41 2.48 kg (5 lb 7.5 oz) F Vag-Spont Local, EPI N LULU      Name: Anel       Apgar1: 8  Apgar5: 9   2 SAB 08/19/15           1 Term 07    F   N LIVE BIRTH      Name: Kori      Obstetric Comments   Hx of gdm      Medical History:   Past Medical History:   Diagnosis Date     Asymptomatic human immunodeficiency virus (HIV) infection status (H)      Gestational diabetes     diet controlled      Malaria     in [place]      Miscarriage      TB (pulmonary tuberculosis)     latent   . Gestational Age 37w5d. VSS. Cervix: not examined.  Fetal movement present. Patient denies cramping, backache, vaginal discharge, pelvic pressure, UTI symptoms, GI problems, bloody show, vaginal bleeding, edema, headache, visual disturbances, epigastric or URQ pain, abdominal pain, rupture of membranes. Support persons spouse and daughter present.  Action: Verbal consent for EFM. Triage assessment completed. EFM applied. Uterine assessment irregular contractions per toco. Pt reports as on and off tightening, not painful. Fetal assessment: Presumed adequate fetal oxygenation documented (see flow record). ROM plus test collected and sent- results negative. Patient education pamphlets given on fetal movement counts. Patient instructed to report change in fetal movement, vaginal leaking of fluid or bleeding, abdominal pain, or any concerns related to the pregnancy to her nurse/physician.   Response: Dr. Coon informed of patient arrival. Plan per provider is discharge home. Patient verbalized understanding of  education and verbalized agreement with plan. Discharged ambulatory at 1654.

## 2019-07-08 ENCOUNTER — HOSPITAL ENCOUNTER (INPATIENT)
Facility: CLINIC | Age: 39
LOS: 1 days | Discharge: HOME | End: 2019-07-09
Attending: OBSTETRICS & GYNECOLOGY | Admitting: OBSTETRICS & GYNECOLOGY
Payer: COMMERCIAL

## 2019-07-08 DIAGNOSIS — D50.9 IRON DEFICIENCY ANEMIA, UNSPECIFIED IRON DEFICIENCY ANEMIA TYPE: ICD-10-CM

## 2019-07-08 PROBLEM — Z36.89 ENCOUNTER FOR TRIAGE IN PREGNANT PATIENT: Status: RESOLVED | Noted: 2019-07-07 | Resolved: 2019-07-08

## 2019-07-08 PROBLEM — Z37.9 NORMAL LABOR: Status: ACTIVE | Noted: 2019-07-08

## 2019-07-08 LAB
ABO + RH BLD: NORMAL
ABO + RH BLD: NORMAL
BASOPHILS # BLD AUTO: 0 10E9/L (ref 0–0.2)
BASOPHILS NFR BLD AUTO: 0.3 %
BLD GP AB SCN SERPL QL: NORMAL
BLOOD BANK CMNT PATIENT-IMP: NORMAL
DIFFERENTIAL METHOD BLD: ABNORMAL
EOSINOPHIL # BLD AUTO: 0.1 10E9/L (ref 0–0.7)
EOSINOPHIL NFR BLD AUTO: 1.6 %
ERYTHROCYTE [DISTWIDTH] IN BLOOD BY AUTOMATED COUNT: 14.1 % (ref 10–15)
GLUCOSE BLDC GLUCOMTR-MCNC: 102 MG/DL (ref 70–99)
GLUCOSE BLDC GLUCOMTR-MCNC: 112 MG/DL (ref 70–99)
GLUCOSE BLDC GLUCOMTR-MCNC: 142 MG/DL (ref 70–99)
GLUCOSE BLDC GLUCOMTR-MCNC: 62 MG/DL (ref 70–99)
GLUCOSE BLDC GLUCOMTR-MCNC: 71 MG/DL (ref 70–99)
GLUCOSE BLDC GLUCOMTR-MCNC: 89 MG/DL (ref 70–99)
GLUCOSE BLDC GLUCOMTR-MCNC: 95 MG/DL (ref 70–99)
HCT VFR BLD AUTO: 30.7 % (ref 35–47)
HGB BLD-MCNC: 9.9 G/DL (ref 11.7–15.7)
IMM GRANULOCYTES # BLD: 0 10E9/L (ref 0–0.4)
IMM GRANULOCYTES NFR BLD: 0.3 %
LYMPHOCYTES # BLD AUTO: 1.8 10E9/L (ref 0.8–5.3)
LYMPHOCYTES NFR BLD AUTO: 49.1 %
MCH RBC QN AUTO: 24.6 PG (ref 26.5–33)
MCHC RBC AUTO-ENTMCNC: 32.2 G/DL (ref 31.5–36.5)
MCV RBC AUTO: 76 FL (ref 78–100)
MONOCYTES # BLD AUTO: 0.3 10E9/L (ref 0–1.3)
MONOCYTES NFR BLD AUTO: 7.3 %
NEUTROPHILS # BLD AUTO: 1.5 10E9/L (ref 1.6–8.3)
NEUTROPHILS NFR BLD AUTO: 41.4 %
NRBC # BLD AUTO: 0 10*3/UL
NRBC BLD AUTO-RTO: 0 /100
PLATELET # BLD AUTO: 153 10E9/L (ref 150–450)
RBC # BLD AUTO: 4.02 10E12/L (ref 3.8–5.2)
SPECIMEN EXP DATE BLD: NORMAL
T PALLIDUM AB SER QL: NONREACTIVE
WBC # BLD AUTO: 3.7 10E9/L (ref 4–11)

## 2019-07-08 PROCEDURE — 25000132 ZZH RX MED GY IP 250 OP 250 PS 637: Performed by: OBSTETRICS & GYNECOLOGY

## 2019-07-08 PROCEDURE — 86901 BLOOD TYPING SEROLOGIC RH(D): CPT | Performed by: STUDENT IN AN ORGANIZED HEALTH CARE EDUCATION/TRAINING PROGRAM

## 2019-07-08 PROCEDURE — 86780 TREPONEMA PALLIDUM: CPT | Performed by: STUDENT IN AN ORGANIZED HEALTH CARE EDUCATION/TRAINING PROGRAM

## 2019-07-08 PROCEDURE — 36415 COLL VENOUS BLD VENIPUNCTURE: CPT | Performed by: STUDENT IN AN ORGANIZED HEALTH CARE EDUCATION/TRAINING PROGRAM

## 2019-07-08 PROCEDURE — 86900 BLOOD TYPING SEROLOGIC ABO: CPT | Performed by: STUDENT IN AN ORGANIZED HEALTH CARE EDUCATION/TRAINING PROGRAM

## 2019-07-08 PROCEDURE — 12000001 ZZH R&B MED SURG/OB UMMC

## 2019-07-08 PROCEDURE — 87536 HIV-1 QUANT&REVRSE TRNSCRPJ: CPT | Performed by: STUDENT IN AN ORGANIZED HEALTH CARE EDUCATION/TRAINING PROGRAM

## 2019-07-08 PROCEDURE — 85025 COMPLETE CBC W/AUTO DIFF WBC: CPT | Performed by: STUDENT IN AN ORGANIZED HEALTH CARE EDUCATION/TRAINING PROGRAM

## 2019-07-08 PROCEDURE — 25000128 H RX IP 250 OP 636: Performed by: STUDENT IN AN ORGANIZED HEALTH CARE EDUCATION/TRAINING PROGRAM

## 2019-07-08 PROCEDURE — 25000125 ZZHC RX 250: Performed by: STUDENT IN AN ORGANIZED HEALTH CARE EDUCATION/TRAINING PROGRAM

## 2019-07-08 PROCEDURE — G0463 HOSPITAL OUTPT CLINIC VISIT: HCPCS

## 2019-07-08 PROCEDURE — 00000146 ZZHCL STATISTIC GLUCOSE BY METER IP

## 2019-07-08 PROCEDURE — 72200001 ZZH LABOR CARE VAGINAL DELIVERY SINGLE

## 2019-07-08 PROCEDURE — 86850 RBC ANTIBODY SCREEN: CPT | Performed by: STUDENT IN AN ORGANIZED HEALTH CARE EDUCATION/TRAINING PROGRAM

## 2019-07-08 PROCEDURE — 25000132 ZZH RX MED GY IP 250 OP 250 PS 637: Performed by: STUDENT IN AN ORGANIZED HEALTH CARE EDUCATION/TRAINING PROGRAM

## 2019-07-08 RX ORDER — ONDANSETRON 2 MG/ML
4 INJECTION INTRAMUSCULAR; INTRAVENOUS EVERY 6 HOURS PRN
Status: DISCONTINUED | OUTPATIENT
Start: 2019-07-08 | End: 2019-07-09 | Stop reason: HOSPADM

## 2019-07-08 RX ORDER — MISOPROSTOL 200 UG/1
TABLET ORAL
Status: DISCONTINUED
Start: 2019-07-08 | End: 2019-07-08 | Stop reason: HOSPADM

## 2019-07-08 RX ORDER — NICOTINE POLACRILEX 4 MG
15-30 LOZENGE BUCCAL
Status: DISCONTINUED | OUTPATIENT
Start: 2019-07-08 | End: 2019-07-09 | Stop reason: HOSPADM

## 2019-07-08 RX ORDER — FENTANYL CITRATE 50 UG/ML
50-100 INJECTION, SOLUTION INTRAMUSCULAR; INTRAVENOUS
Status: DISCONTINUED | OUTPATIENT
Start: 2019-07-08 | End: 2019-07-09 | Stop reason: HOSPADM

## 2019-07-08 RX ORDER — IBUPROFEN 800 MG/1
800 TABLET, FILM COATED ORAL EVERY 6 HOURS PRN
Status: DISCONTINUED | OUTPATIENT
Start: 2019-07-08 | End: 2019-07-09 | Stop reason: HOSPADM

## 2019-07-08 RX ORDER — OXYTOCIN 10 [USP'U]/ML
10 INJECTION, SOLUTION INTRAMUSCULAR; INTRAVENOUS
Status: DISCONTINUED | OUTPATIENT
Start: 2019-07-08 | End: 2019-07-09 | Stop reason: HOSPADM

## 2019-07-08 RX ORDER — PROCHLORPERAZINE 25 MG
25 SUPPOSITORY, RECTAL RECTAL EVERY 12 HOURS PRN
Status: DISCONTINUED | OUTPATIENT
Start: 2019-07-08 | End: 2019-07-09 | Stop reason: HOSPADM

## 2019-07-08 RX ORDER — NALOXONE HYDROCHLORIDE 0.4 MG/ML
.1-.4 INJECTION, SOLUTION INTRAMUSCULAR; INTRAVENOUS; SUBCUTANEOUS
Status: DISCONTINUED | OUTPATIENT
Start: 2019-07-08 | End: 2019-07-08

## 2019-07-08 RX ORDER — HYDROCORTISONE 2.5 %
CREAM (GRAM) TOPICAL 3 TIMES DAILY PRN
Status: DISCONTINUED | OUTPATIENT
Start: 2019-07-08 | End: 2019-07-09 | Stop reason: HOSPADM

## 2019-07-08 RX ORDER — OXYTOCIN 10 [USP'U]/ML
INJECTION, SOLUTION INTRAMUSCULAR; INTRAVENOUS
Status: DISCONTINUED
Start: 2019-07-08 | End: 2019-07-08 | Stop reason: HOSPADM

## 2019-07-08 RX ORDER — LANOLIN 100 %
OINTMENT (GRAM) TOPICAL
Status: DISCONTINUED | OUTPATIENT
Start: 2019-07-08 | End: 2019-07-09 | Stop reason: HOSPADM

## 2019-07-08 RX ORDER — METHYLERGONOVINE MALEATE 0.2 MG/ML
200 INJECTION INTRAVENOUS
Status: DISCONTINUED | OUTPATIENT
Start: 2019-07-08 | End: 2019-07-09 | Stop reason: HOSPADM

## 2019-07-08 RX ORDER — OXYTOCIN/0.9 % SODIUM CHLORIDE 30/500 ML
100 PLASTIC BAG, INJECTION (ML) INTRAVENOUS CONTINUOUS
Status: DISCONTINUED | OUTPATIENT
Start: 2019-07-08 | End: 2019-07-09 | Stop reason: HOSPADM

## 2019-07-08 RX ORDER — OXYTOCIN/0.9 % SODIUM CHLORIDE 30/500 ML
340 PLASTIC BAG, INJECTION (ML) INTRAVENOUS CONTINUOUS PRN
Status: DISCONTINUED | OUTPATIENT
Start: 2019-07-08 | End: 2019-07-09 | Stop reason: HOSPADM

## 2019-07-08 RX ORDER — OXYTOCIN/0.9 % SODIUM CHLORIDE 30/500 ML
100-340 PLASTIC BAG, INJECTION (ML) INTRAVENOUS CONTINUOUS PRN
Status: COMPLETED | OUTPATIENT
Start: 2019-07-08 | End: 2019-07-08

## 2019-07-08 RX ORDER — NALBUPHINE HYDROCHLORIDE 10 MG/ML
10-20 INJECTION, SOLUTION INTRAMUSCULAR; INTRAVENOUS; SUBCUTANEOUS
Status: DISCONTINUED | OUTPATIENT
Start: 2019-07-08 | End: 2019-07-09 | Stop reason: HOSPADM

## 2019-07-08 RX ORDER — OXYTOCIN/0.9 % SODIUM CHLORIDE 30/500 ML
PLASTIC BAG, INJECTION (ML) INTRAVENOUS
Status: DISCONTINUED
Start: 2019-07-08 | End: 2019-07-08 | Stop reason: HOSPADM

## 2019-07-08 RX ORDER — DEXTROSE MONOHYDRATE 25 G/50ML
25-50 INJECTION, SOLUTION INTRAVENOUS
Status: DISCONTINUED | OUTPATIENT
Start: 2019-07-08 | End: 2019-07-09 | Stop reason: HOSPADM

## 2019-07-08 RX ORDER — LIDOCAINE HYDROCHLORIDE 10 MG/ML
INJECTION, SOLUTION EPIDURAL; INFILTRATION; INTRACAUDAL; PERINEURAL
Status: DISCONTINUED
Start: 2019-07-08 | End: 2019-07-08 | Stop reason: HOSPADM

## 2019-07-08 RX ORDER — AMOXICILLIN 250 MG
1 CAPSULE ORAL 2 TIMES DAILY
Status: DISCONTINUED | OUTPATIENT
Start: 2019-07-08 | End: 2019-07-09 | Stop reason: HOSPADM

## 2019-07-08 RX ORDER — TERBUTALINE SULFATE 1 MG/ML
0.25 INJECTION, SOLUTION SUBCUTANEOUS
Status: DISCONTINUED | OUTPATIENT
Start: 2019-07-08 | End: 2019-07-09 | Stop reason: HOSPADM

## 2019-07-08 RX ORDER — ACETAMINOPHEN 325 MG/1
650 TABLET ORAL EVERY 4 HOURS PRN
Status: DISCONTINUED | OUTPATIENT
Start: 2019-07-08 | End: 2019-07-08

## 2019-07-08 RX ORDER — ACETAMINOPHEN 325 MG/1
650 TABLET ORAL EVERY 4 HOURS PRN
Status: DISCONTINUED | OUTPATIENT
Start: 2019-07-08 | End: 2019-07-09 | Stop reason: HOSPADM

## 2019-07-08 RX ORDER — CARBOPROST TROMETHAMINE 250 UG/ML
250 INJECTION, SOLUTION INTRAMUSCULAR
Status: DISCONTINUED | OUTPATIENT
Start: 2019-07-08 | End: 2019-07-09 | Stop reason: HOSPADM

## 2019-07-08 RX ORDER — MISOPROSTOL 100 UG/1
25 TABLET ORAL EVERY 4 HOURS PRN
Status: DISCONTINUED | OUTPATIENT
Start: 2019-07-08 | End: 2019-07-09 | Stop reason: HOSPADM

## 2019-07-08 RX ORDER — IBUPROFEN 800 MG/1
800 TABLET, FILM COATED ORAL
Status: DISCONTINUED | OUTPATIENT
Start: 2019-07-08 | End: 2019-07-08

## 2019-07-08 RX ORDER — NALOXONE HYDROCHLORIDE 0.4 MG/ML
.1-.4 INJECTION, SOLUTION INTRAMUSCULAR; INTRAVENOUS; SUBCUTANEOUS
Status: DISCONTINUED | OUTPATIENT
Start: 2019-07-08 | End: 2019-07-09 | Stop reason: HOSPADM

## 2019-07-08 RX ORDER — OXYCODONE AND ACETAMINOPHEN 5; 325 MG/1; MG/1
1 TABLET ORAL
Status: DISCONTINUED | OUTPATIENT
Start: 2019-07-08 | End: 2019-07-09 | Stop reason: HOSPADM

## 2019-07-08 RX ORDER — BISACODYL 10 MG
10 SUPPOSITORY, RECTAL RECTAL DAILY PRN
Status: DISCONTINUED | OUTPATIENT
Start: 2019-07-10 | End: 2019-07-09 | Stop reason: HOSPADM

## 2019-07-08 RX ORDER — AMOXICILLIN 250 MG
2 CAPSULE ORAL 2 TIMES DAILY
Status: DISCONTINUED | OUTPATIENT
Start: 2019-07-08 | End: 2019-07-09 | Stop reason: HOSPADM

## 2019-07-08 RX ORDER — SODIUM CHLORIDE, SODIUM LACTATE, POTASSIUM CHLORIDE, CALCIUM CHLORIDE 600; 310; 30; 20 MG/100ML; MG/100ML; MG/100ML; MG/100ML
INJECTION, SOLUTION INTRAVENOUS CONTINUOUS
Status: DISCONTINUED | OUTPATIENT
Start: 2019-07-08 | End: 2019-07-09 | Stop reason: HOSPADM

## 2019-07-08 RX ORDER — CITRIC ACID/SODIUM CITRATE 334-500MG
30 SOLUTION, ORAL ORAL ONCE
Status: DISCONTINUED | OUTPATIENT
Start: 2019-07-08 | End: 2019-07-09 | Stop reason: HOSPADM

## 2019-07-08 RX ADMIN — SENNOSIDES AND DOCUSATE SODIUM 1 TABLET: 8.6; 5 TABLET ORAL at 20:21

## 2019-07-08 RX ADMIN — ACETAMINOPHEN 650 MG: 325 TABLET, FILM COATED ORAL at 20:21

## 2019-07-08 RX ADMIN — IBUPROFEN 800 MG: 800 TABLET, FILM COATED ORAL at 22:57

## 2019-07-08 RX ADMIN — FENTANYL CITRATE 50 MCG: 50 INJECTION INTRAMUSCULAR; INTRAVENOUS at 12:28

## 2019-07-08 RX ADMIN — Medication 25 MCG: at 10:05

## 2019-07-08 RX ADMIN — FENTANYL CITRATE 100 MCG: 50 INJECTION INTRAMUSCULAR; INTRAVENOUS at 13:49

## 2019-07-08 RX ADMIN — IBUPROFEN 800 MG: 800 TABLET, FILM COATED ORAL at 17:07

## 2019-07-08 RX ADMIN — Medication 340 ML/HR: at 15:49

## 2019-07-08 NOTE — PLAN OF CARE
Data: Patient presented to [facility] at 0349.   Reason for maternal/fetal assessment per patient is Laboring (contractions started about 1800)  .  Patient is a . Prenatal record reviewed.      OB History    Para Term  AB Living   4 2 2 0 1 1   SAB TAB Ectopic Multiple Live Births   1 0 0 0 2      # Outcome Date GA Lbr Aden/2nd Weight Sex Delivery Anes PTL Lv   4 Current            3 Term 16 38w6d 01: 00:41 2.48 kg (5 lb 7.5 oz) F Vag-Spont Local, EPI N LULU      Name: Anel       Apgar1: 8  Apgar5: 9   2 SAB 08/19/15           1 Term 07    F   N LIVE BIRTH      Name: Kori      Obstetric Comments   Hx of gdm   . Medical history:   Past Medical History:   Diagnosis Date     Asymptomatic human immunodeficiency virus (HIV) infection status (H)      Gestational diabetes     diet controlled      Malaria     in [place]      Miscarriage      TB (pulmonary tuberculosis)     latent   . Gestational Age 37w6d. VSS. Fetal movement present. Patient denies  backache, vaginal discharge, pelvic pressure, UTI symptoms, GI problems, vaginal bleeding, edema, headache, visual disturbances, epigastric or URQ pain, abdominal pain, rupture of membranes. Support persons  and daughter present.  Action: Verbal consent for EFM. Triage assessment completed. Fetal assessment: Presumed adequate fetal oxygenation documented (see flow record). Blood glucose obtained.  Initially 62- given 8 oz apple juice, hypoglycemia protocol followed.  Response: Dr. Handley and Dr Valderrama informed of arrival. Plan per provider- reassess after 2 hours.  Cervix unchanged but given patients GDM, poly and HIV status induction offered. Patient verbalized agreement with plan. Patient transferred to room 456 ambulatory, oriented to room and call light.

## 2019-07-08 NOTE — PLAN OF CARE
Pt not coping well with ctx, appears uncomfortable and tearful at times. Pt reporting pain located in lower abd and R side rib cage, reporting pain intensifies w/ ctx and goes away during rest. Declines any ha, vision changes, or epigastric/heartburn. Pt requiring coaching and encouragement through breathing and relaxation. Pt assisted to reposition as needed. Discussed pharm and non-pharm pain management options such as shower/bath, ambulation, birth ball, nitrous oxide, IV fentanyl, epidural. Pt would like to try birth ball and IV pain medication at this time. Will continue ongoing assessment.     ADDENDUM: pt reports improvement in pain control following IV fentanyl. , moderate variability, accels, no decels. Will continue ongoing assessment.

## 2019-07-08 NOTE — H&P
History and Physical     Marina Mock MRN# [id number]   YOB: 1980 Age: 39 year old      Date of Admission: 2019    Primary care provider: No Ref-Primary, Physician            HPI:     Marina Mock is a 39 year old  at 37w6d by LMP c/w 12w2d US who presents today for painful contractions.  She has been having contractions since yesterday but they are now more frequent and more painful. She has to stop what she is doing to get through them. Denies LOF, vaginal bleeding.    Pregnancy notable for:   HIV; On Descovy and Truvada  Iron deficiency andmia  T2DM  AMA  Polyhydramnios  Fetal pyelectasis  Latent TB  Sickle cell trait      Denies history of postpartum hemorrhage, shoulder dystocia. She does report a history of elevated blood pressure with last pregnancy.     She denies fever, chills, SOB, chest pain, palpitations, N/V, LE swelling/tenderness.  No concerns for headache, vision changes, RUQ or epigastric pain        OB History:    OB History    Para Term  AB Living   4 2 2 0 1 1   SAB TAB Ectopic Multiple Live Births   1 0 0 0 2      # Outcome Date GA Lbr Aden/2nd Weight Sex Delivery Anes PTL Lv   4 Current            3 Term 16 38w6d 01:22 / 00:41 2.48 kg (5 lb 7.5 oz) F Vag-Spont Local, EPI N LULU      Name: Anel       Apgar1: 8  Apgar5: 9   2 SAB 08/19/15           1 Term 07    F   N LIVE BIRTH      Name: Kori      Obstetric Comments   Hx of gdm        Prenatal Lab Results:  Lab Results   Component Value Date    ABO O 2019    RH Pos 2019    AS Neg 2019    HEPBANG Nonreactive 2019    CHPCRT Negative 2019    GCPCRT Negative 2019    TREPAB Negative 2017    HGB 10.0 (L) 2019       GBS Status:   Lab Results   Component Value Date    GBS Negative 2019                Past Medical History:     Past Medical History:   Diagnosis Date     Asymptomatic human immunodeficiency virus (HIV) infection status  (H) 2016     Gestational diabetes     diet controlled      Malaria     in [place]      Miscarriage      TB (pulmonary tuberculosis)     latent             Past Surgical History:     Past Surgical History:   Procedure Laterality Date     NO HISTORY OF SURGERY               Social History:     Social History     Tobacco Use     Smoking status: Never Smoker     Smokeless tobacco: Never Used   Substance Use Topics     Alcohol use: No             Family History:     Family History   Problem Relation Age of Onset     Family History Negative Other      Hypertension Father      Hypertension Other      Cancer No family hx of      Diabetes No family hx of      Macular Degeneration No family hx of      Retinal detachment No family hx of              Immunizations:     Immunization History   Administered Date(s) Administered     Influenza Vaccine IM 3yrs+ 4 Valent IIV4 10/12/2017     Pneumo Conj 13-V (2010&after) 03/16/2017     Pneumococcal 23 valent 04/05/2018     TDAP Vaccine (Boostrix) 08/15/2016, 04/24/2019            Allergies:   No Known Allergies          Medications:     Medications Prior to Admission   Medication Sig Dispense Refill Last Dose     cholecalciferol (VITAMIN D3) 5000 units TABS tablet Take 1 tablet (5,000 Units) by mouth daily 60 tablet 1 Past Week at Unknown time     dolutegravir (TIVICAY) 50 MG tablet Take 1 tablet (50 mg) by mouth daily 30 tablet 11 7/7/2019 at Unknown time     emtricitabine-tenofovir (TRUVADA) 200-300 MG per tablet Take 1 tablet by mouth daily 30 tablet 11 7/7/2019 at Unknown time     ferrous sulfate (IRON) 325 (65 Fe) MG tablet Take 1 tablet (325 mg) by mouth 3 times daily (with meals) 90 tablet 11 7/7/2019 at Unknown time     folic acid (FOLVITE) 1 MG tablet Take 1 mg by mouth daily   7/7/2019 at Unknown time     vitamin B-12 (CYANOCOBALAMIN) 1000 MCG tablet Take 1 tablet (1,000 mcg) by mouth daily 90 tablet 1 7/7/2019 at Unknown time     vitamin C (ASCORBIC ACID) 250 MG tablet Take 1  tablet (250 mg) by mouth daily 90 tablet 1 2019 at Unknown time     blood glucose monitoring (ACCU-CHEK MULTICLIX) lancets Test 4 times daily. 100 each 4 Taking     order for DME Maternity Belt order 1 each 0 Taking             Review of Systems & Physical Exam:     The Review of Systems is negative other than noted in the HPI      /77   Temp 98.7  F (37.1  C) (Oral)   Resp 18   LMP 10/16/2018   Gen: very uncomfortable with contractions, clutching partner's arm, moaning  CV: RRR, nl S1/S2, no murmurs/clicks/gallops  Lungs: CTAB, non-labored breathing  Abd: Gravid, non-tender, non-distended  Ext: No peripheral extremity edema    Cervix: 1.5/30/-3  Membranes: intact  Presentation: Ceph by BSUS.  Estimated Fetal Weight: 7 lb    FHT:  Monitoring External  FHT: Baseline 130 bpm; mod variability; accels present; no decelerations  TOCO 2-3 contractions in 10 minutes         Data:   CBC, viral load, T&S, RPR     Assessment and Plan:       39 year old  at 37w6d by 12w2d US, here for latent labor. Pregnancy is notable for HIV and poorly controlled T2DM.     # Latent Labor  - Plan augmentation if no cervical change in 2-3 hours  - Avoid methergine     # T2DM/ Polyhydramnios  - HgA1c 6.5  - Not on medication  - QID BG in latent labor, q1h in active labor with insulin gtt prn  - Normal fetal echo  - Polyhydramnios: DILIP 30.9 ()> 14.2 (7.5)  - BG range from 70s-200s on monitor.     # HIV   - Continue Truvada, Descovy  - Viral load last undetectable 19, repeat today  - No breastfeeding  - Infant prophylaxis between 6-12 hours of life  - Avoid methergine    # PNC  - Rh positive, Rubella immune, , GBS negative  - Imaging: EFW 70%, AC 93 %   - s/p flu, Tdap vaccines     # FWB:   Cat I tracing, reactive; Cephalic by BSUS; EFW 7 lb  - ContinuousI Fetal Monitoring  - NICU for delivery   - Intrauterine resuscitative measures prn      Patient discussed with Dr. Abhishek Valderrama,  MD  Obstetrics & Gynecology, PGY-2  7/8/2019 7:46 AM    OB/GYN Staff -- Pt seen and examined by me. Agree with note as above. MD Stiven

## 2019-07-08 NOTE — PLAN OF CARE
Patient progressively more uncomfortable with contractions, patient requested fetanyl x2 but stated that the second dose was not effective. Patient requested SVE due to intense pain, SVE 7-/-2 with BBOW. Dr. Weiss in department and updated. Patient then progressed quickly and felt and intense urge to push. MD at bedside.  of a viable male at 1533. First degree laceration which was not repaired due to no bleeding. Unable to collect QBL due to patient moving about in bed and a large amount of amniotic fluid. Will plan to monitor patient closely for 2 hours before transferring to [facility].

## 2019-07-08 NOTE — PROVIDER NOTIFICATION
07/08/19 0950   Provider Notification   Provider Name/Title Abhishek   Method of Notification At Bedside   Request Evaluate in Person   Notification Reason Status Update     Dr. Weiss at bedside for morning rounds. Plan per provider to start IOL with vag miso. No questions  Will continue with assessment and monitoring.

## 2019-07-08 NOTE — DISCHARGE SUMMARY
[facility] Discharge Summary    Marina Mock MRN# [id number]   Age: 39 year old YOB: 1980     Date of Admission:  2019  Date of Discharge:  2019  7:00 PM  Admitting Physician:  Lynn Weiss MD  Discharge Physician:  Lynn Weiss MD    Admit Dx:   Intrauterine pregnancy at 37w6d   Latent labor  HIV; On Descovy and Truvada  Iron deficiency andmia  T2DM  AMA  Polyhydramnios  Fetal pyelectasis  Latent TB  Sickle cell trait    Discharge Dx:  - Same as above, s/p     Procedures:  - Spontaneous vaginal delivery    Admit HPI/Labor Course:  Marina Mock is a 39 year old  at 37w6d by LMP c/w 12w2d US who was admitted for latent labor. She received misoprostol for cervical ripening and progressed to complete. Delivery was complicated by poor maternal pushing in which she stopped pushing after delivering fetal head. Suprapubic pressure applied and shoulders delivered after 15 seconds.     Postpartum Course:  Her postpartum course was uncomplicated. On PPD#1, she was meeting all of her postpartum goals and deemed stable for discharge. She was voiding without difficulty, tolerating a regular diet without nausea and vomiting, her pain was well controlled on oral pain medicines and her lochia was appropriate. Her hemoglobin prior to delivery was 9.9 and after delivery was 10.6. Her Rh status was positive, and Rhogam was not indicated.     Discharge Medications:     Review of your medicines      START taking      Dose / Directions   acetaminophen 325 MG tablet  Commonly known as:  TYLENOL  Used for:   (normal spontaneous vaginal delivery)      Dose:  650 mg  Take 2 tablets (650 mg) by mouth every 6 hours as needed for mild pain Start after Delivery.  Quantity:  100 tablet  Refills:  0     ibuprofen 600 MG tablet  Commonly known as:  ADVIL/MOTRIN  Used for:   (normal spontaneous vaginal delivery)      Dose:  600 mg  Take 1 tablet (600 mg) by  mouth every 6 hours as needed for moderate pain Start after delivery  Quantity:  60 tablet  Refills:  0     senna-docusate 8.6-50 MG tablet  Commonly known as:  SENOKOT-S/PERICOLACE  Used for:   (normal spontaneous vaginal delivery)      Dose:  1 tablet  Take 1 tablet by mouth daily Start after delivery.  Quantity:  100 tablet  Refills:  0        CONTINUE these medicines which may have CHANGED, or have new prescriptions. If we are uncertain of the size of tablets/capsules you have at home, strength may be listed as something that might have changed.      Dose / Directions   * ferrous sulfate 325 (65 Fe) MG tablet  Commonly known as:  FEROSUL  This may have changed:  You were already taking a medication with the same name, and this prescription was added. Make sure you understand how and when to take each.  Used for:   (normal spontaneous vaginal delivery)      Dose:  325 mg  Take 1 tablet (325 mg) by mouth daily (with breakfast)  Quantity:  90 tablet  Refills:  0     * ferrous sulfate 325 (65 Fe) MG tablet  Commonly known as:  FEROSUL  This may have changed:  Another medication with the same name was added. Make sure you understand how and when to take each.  Used for:  Iron deficiency anemia, unspecified iron deficiency anemia type      Dose:  325 mg  Take 1 tablet (325 mg) by mouth 3 times daily (with meals)  Quantity:  90 tablet  Refills:  11         * This list has 2 medication(s) that are the same as other medications prescribed for you. Read the directions carefully, and ask your doctor or other care provider to review them with you.            CONTINUE these medicines which have NOT CHANGED      Dose / Directions   blood glucose monitoring lancets  Used for:  Pre-existing diabetes mellitus affecting pregnancy in first trimester, antepartum      Test 4 times daily.  Quantity:  100 each  Refills:  4     cholecalciferol 5000 units Tabs tablet  Commonly known as:  vitamin D3  Used for:  High-risk  pregnancy in first trimester, Vitamin D deficiency      Dose:  5000 Units  Take 1 tablet (5,000 Units) by mouth daily  Quantity:  60 tablet  Refills:  1     cyanocobalamin 1000 MCG tablet  Commonly known as:  VITAMIN B-12  Used for:  High-risk pregnancy in first trimester, Iron deficiency anemia secondary to inadequate dietary iron intake      Dose:  1000 mcg  Take 1 tablet (1,000 mcg) by mouth daily  Quantity:  90 tablet  Refills:  1     dolutegravir 50 MG tablet  Commonly known as:  TIVICAY  Used for:  HIV disease affecting pregnancy, antepartum      Dose:  50 mg  Take 1 tablet (50 mg) by mouth daily  Quantity:  30 tablet  Refills:  11     emtricitabine-tenofovir 200-300 MG per tablet  Commonly known as:  TRUVADA  Used for:  Human immunodeficiency virus (HIV) disease (H)      Dose:  1 tablet  Take 1 tablet by mouth daily  Quantity:  30 tablet  Refills:  11     folic acid 1 MG tablet  Commonly known as:  FOLVITE      Dose:  1 mg  Take 1 mg by mouth daily  Refills:  0     order for DME  Used for:  High risk pregnancy [facility] order  Quantity:  1 each  Refills:  0     vitamin C 250 MG tablet  Commonly known as:  ASCORBIC ACID  Used for:  High-risk pregnancy in first trimester, Iron deficiency anemia secondary to inadequate dietary iron intake      Dose:  250 mg  Take 1 tablet (250 mg) by mouth daily  Quantity:  90 tablet  Refills:  1           Where to get your medicines      These medications were sent to [facility] [place], [place] - [address]  [address] [address]    Phone:  [phone]     acetaminophen 325 MG tablet    ferrous sulfate 325 (65 Fe) MG tablet    ferrous sulfate 325 (65 Fe) MG tablet    ibuprofen 600 MG tablet    senna-docusate 8.6-50 MG tablet           Discharge/Disposition:  Marina Mock was discharged to home in stable condition with the following instructions/medications:  1) Call for temperature > 100.4, bright red  vaginal bleeding >1 pad an hour x 2 hours, foul smelling vaginal discharge, pain not controlled by usual oral pain meds, persistent nausea and vomiting not controlled on medications  2) She was undecided for contraception.  3) For feeding she decided to bottle feed.  4) She was instructed to follow-up with her primary OB in 6 weeks for a routine postpartum visit and with  PCP or Endocrinology for T2DM and with infectious diseases.    Susie Handley MD  PGY-3 Ob/Gyn  OB/GYN Staff -- Pt seen and examined by me. Agree with note as above.  MD Stiven

## 2019-07-08 NOTE — PROGRESS NOTES
Per RN, patient much more comfortable with contractions now. FHT shows baseline 130, moderate variability, accels present, no decels. [place] shows contractions q5 minutes. Will plan to begin induction with PV misoprostol q4 hours per protocol. Continue QID blood glucose checks. Due to HIV will avoid FSE, AROM, operative vaginal delivery. Also avoid methergine if postpartum hemorrhage. Anticipate .     Luann Harley MD  Obstetrics and Gynecology, PGY2  2019 8:47 AM

## 2019-07-08 NOTE — L&D DELIVERY NOTE
38 yo   At 37+6 who presented in latent labor. Preg complicated by type 2 DM  or GDM and HIV with undetectable viral load. She was given one dose of oral misoprostil and progressed quickly to complete.  She had OV narcotic for analgesia.  She was writhing on the bed while pushing.  She declined to ope her legsa dn pull on them.  She delivered the head and then stopped pushing.  The RN gave suprapubic pressure and the shoulder followed approximately 15 seconds later.

## 2019-07-09 VITALS
HEART RATE: 64 BPM | TEMPERATURE: 98.6 F | RESPIRATION RATE: 16 BRPM | SYSTOLIC BLOOD PRESSURE: 135 MMHG | DIASTOLIC BLOOD PRESSURE: 87 MMHG

## 2019-07-09 LAB
GLUCOSE BLDC GLUCOMTR-MCNC: 106 MG/DL (ref 70–99)
GLUCOSE BLDC GLUCOMTR-MCNC: 63 MG/DL (ref 70–99)
GLUCOSE BLDC GLUCOMTR-MCNC: 68 MG/DL (ref 70–99)
GLUCOSE BLDC GLUCOMTR-MCNC: 80 MG/DL (ref 70–99)
HGB BLD-MCNC: 10.6 G/DL (ref 11.7–15.7)
HIV1 RNA # PLAS NAA DL=20: NORMAL {COPIES}/ML
HIV1 RNA SERPL NAA+PROBE-LOG#: NORMAL {LOG_COPIES}/ML

## 2019-07-09 PROCEDURE — 00000146 ZZHCL STATISTIC GLUCOSE BY METER IP

## 2019-07-09 PROCEDURE — 85018 HEMOGLOBIN: CPT | Performed by: OBSTETRICS & GYNECOLOGY

## 2019-07-09 PROCEDURE — 36415 COLL VENOUS BLD VENIPUNCTURE: CPT | Performed by: OBSTETRICS & GYNECOLOGY

## 2019-07-09 PROCEDURE — 25000128 H RX IP 250 OP 636: Performed by: OBSTETRICS & GYNECOLOGY

## 2019-07-09 PROCEDURE — 90715 TDAP VACCINE 7 YRS/> IM: CPT | Performed by: OBSTETRICS & GYNECOLOGY

## 2019-07-09 PROCEDURE — 25000132 ZZH RX MED GY IP 250 OP 250 PS 637: Performed by: STUDENT IN AN ORGANIZED HEALTH CARE EDUCATION/TRAINING PROGRAM

## 2019-07-09 PROCEDURE — 25000132 ZZH RX MED GY IP 250 OP 250 PS 637: Performed by: OBSTETRICS & GYNECOLOGY

## 2019-07-09 RX ORDER — FERROUS SULFATE 325(65) MG
325 TABLET ORAL
Qty: 90 TABLET | Refills: 0 | Status: SHIPPED | OUTPATIENT
Start: 2019-07-09 | End: 2019-12-02

## 2019-07-09 RX ORDER — IBUPROFEN 600 MG/1
600 TABLET, FILM COATED ORAL EVERY 6 HOURS PRN
Qty: 60 TABLET | Refills: 0 | Status: SHIPPED | OUTPATIENT
Start: 2019-07-09 | End: 2019-12-02

## 2019-07-09 RX ORDER — ACETAMINOPHEN 325 MG/1
650 TABLET ORAL EVERY 6 HOURS PRN
Qty: 100 TABLET | Refills: 0 | Status: SHIPPED | OUTPATIENT
Start: 2019-07-09 | End: 2019-12-02

## 2019-07-09 RX ORDER — FERROUS SULFATE 325(65) MG
325 TABLET ORAL
Qty: 90 TABLET | Refills: 11 | Status: SHIPPED | OUTPATIENT
Start: 2019-07-09 | End: 2019-12-02

## 2019-07-09 RX ORDER — AMOXICILLIN 250 MG
1 CAPSULE ORAL DAILY
Qty: 100 TABLET | Refills: 0 | Status: SHIPPED | OUTPATIENT
Start: 2019-07-09 | End: 2019-12-02

## 2019-07-09 RX ADMIN — IBUPROFEN 800 MG: 800 TABLET, FILM COATED ORAL at 06:17

## 2019-07-09 RX ADMIN — ACETAMINOPHEN 650 MG: 325 TABLET, FILM COATED ORAL at 06:17

## 2019-07-09 RX ADMIN — ACETAMINOPHEN 650 MG: 325 TABLET, FILM COATED ORAL at 01:43

## 2019-07-09 RX ADMIN — CLOSTRIDIUM TETANI TOXOID ANTIGEN (FORMALDEHYDE INACTIVATED), CORYNEBACTERIUM DIPHTHERIAE TOXOID ANTIGEN (FORMALDEHYDE INACTIVATED), BORDETELLA PERTUSSIS TOXOID ANTIGEN (GLUTARALDEHYDE INACTIVATED), BORDETELLA PERTUSSIS FILAMENTOUS HEMAGGLUTININ ANTIGEN (FORMALDEHYDE INACTIVATED), BORDETELLA PERTUSSIS PERTACTIN ANTIGEN, AND BORDETELLA PERTUSSIS FIMBRIAE 2/3 ANTIGEN 0.5 ML: 5; 2; 2.5; 5; 3; 5 INJECTION, SUSPENSION INTRAMUSCULAR at 20:02

## 2019-07-09 RX ADMIN — SENNOSIDES AND DOCUSATE SODIUM 2 TABLET: 8.6; 5 TABLET ORAL at 10:24

## 2019-07-09 RX ADMIN — ACETAMINOPHEN 650 MG: 325 TABLET, FILM COATED ORAL at 10:25

## 2019-07-09 RX ADMIN — DEXTROSE 15 G: 15 GEL ORAL at 06:36

## 2019-07-09 RX ADMIN — IBUPROFEN 800 MG: 800 TABLET, FILM COATED ORAL at 14:51

## 2019-07-09 NOTE — PROGRESS NOTES
Post Partum Progress Note  PPD#1    Subjective:  She is resting comfortably in bed this morning with minimal complaints. Pain is well-controlled. Lochia is similar to a period. She is tolerating PO intake. She has ambulated without dizziness and is voiding spontaneously. Bottle feeding    Objective:  Vitals:    19 1739 19 1850 19 2020 19 2307   BP: 128/69 121/75 132/75 130/73   Pulse:  98 55 56   Resp:  18 18 16   Temp:  98.7  F (37.1  C) 98.8  F (37.1  C) 97.9  F (36.6  C)   TempSrc:  Oral Oral Oral       General: NAD. A&Ox3.  CV: Regular rate, well perfused.   Pulm: Normal respiratory effort.  Abd: Soft, non-tender, non-distended. Fundus is firm and at the umbilicus.    Ext: No lower extremity edema bilaterally. No calf tenderness.    Assessment/Plan:  Marina Mock is a 39 year old  female who is PPD#1 s/p .    - Encourage routine post-operative goals including ambulation and incentive spirometry  - PNC: Rh positive. Rubella immune. No intervention indicated.  - Pain: controlled on oral medications  - Heme: Hgb 9.9>>10.6. VSS, asx for anemia Will discharge home with iron.  - GI: continue anti-emetics and stool softeners as needed.  - : Voiding spontaneously .  - Infant: Stable in room.  - Feeding: Plans on bottlefeeding.  - BC: Deferred    T2DM  - BGs overall controlled in the postpartum period   - FBG in 60s this morning, getting glucose gel during visit   - will need primary care/endocrinology follow-up as outpatient    HIV  - continue Truvada and Descovy  - last viral load 2019 undetectable  - follow-up with infectious disease    Discharge to home this evening.     Susie Handley MD  OBGYN PGY-3  2019  OB/GYN Staff -- Pt seen and examined by me. Agree with note as above.  MD Stiven

## 2019-07-09 NOTE — PLAN OF CARE
VSS and postpartum assessment WDL. Patient reports adequate pain management with tylenol and ibuprofen. Using tucks and ice packs for comfort. Voiding without difficulty but needing reminders to empty bladder frequently. Monitoring blood glucose levels before meals and before bed. 2300 blood glucose was 142 after eating pizza around 2030. Fasting blood glucose 68. Snack given and 15 min recheck was 63, glucose gel given, 15 min recheck was 80, then 15 min recheck 106. Bonding well with . Continue with plan of care.

## 2019-07-09 NOTE — L&D DELIVERY NOTE
38 yo   At 37+6 who presented in latent labor. Preg complicated by type 2 DM  or GDM and HIV with undetectable viral load. She was given one dose of oral misoprostil and progressed quickly to complete.  She had OV narcotic for analgesia.  She was writhing on the bed while pushing.  She declined to ope her legsa dn pull on them.  She delivered the head and then stopped pushing.  The RN gave suprapubic pressure and the shoulder followed approximately 15 seconds later.  MD Stiven    OB Vaginal Delivery Note    Marina Mock MRN# [id number]   Age: 39 year old YOB: 1980       GA: 37w6d  GP:   Labor Complications: None   EBL: 100  mL  QBL: 75 mL  Delivery Type: Vaginal, Spontaneous   ROM to Delivery Time: 0h 08m   Weight: 3.37 kg (7 lb 6.9 oz)    1 Minute 5 Minute 10 Minute   Apgar Totals: 8    8          LORNE JONES     Delivery Details:  Marina Mock, a 39 year old  female delivered a viable infant with apgars of 8   and 8  . Patient was fully dilated and pushing after    hours    minutes in active labor. Delivery was via vaginal, spontaneous  to a sterile field under    anesthesia. Infant delivered in vertex  middle  occiput  anterior  position. Anterior and posterior shoulders delivered without difficulty. The cord was clamped, cut twice and 3 vessels  were noted. Cord blood was obtained in routine fashion with the following disposition: lab .      Cord complications: none   Placenta delivered at 2019  3:37 PM . Placental disposition was Hospital disposal . Fundal massage performed and fundus found to be firm.     Episiotomy: none    Perineum, vagina, cervix were inspected, and the following lacerations were noted:   Perineal lacerations: 1st                   No other significant past medical history or family history. Repair done.    Excellent hemostasis was noted. Needle count correct. Infant and patient in delivery room in good and stable condition.         Labor Event Times    Labor onset date:  19 Onset time:   6:00 PM   Dilation complete date:  19 Complete time:   3:19 PM   Start pushing date/time:  2019 1519      Labor Length    2nd Stage (hrs):  0 (min):  14   3rd Stage (hrs):  0 (min):  4      Labor Events     labor?:  No  Labor Type:  Augmentation, Induction     Antibiotics received during labor?:  No     Rupture date/time: 19 1525   Rupture type:  Spontaneous rupture of membranes prior to induction  Fluid color:  Clear  Fluid odor:  Normal     Indications for augmentation:  Ineffective Contraction Pattern  1:1 continuous labor support provided by?:  RN       Delivery/Placenta Date and Time    Delivery Date:  19 Delivery Time:   3:33 PM   Placenta Date/Time:  2019  3:37 PM  Oxytocin given at the time of delivery:  after delivery of placenta     Vaginal Counts     Initial count performed by 2 team members:   Two Team Members   Karen Weiss MD       [place] [employer] [place] [employer]   Initial counts 2 0 5    Added to count 0 0 0    Final counts 2 0 5    Placed during labor Accounted for at the end of labor   No NA   No NA   No NA    Final count performed by 2 team members:   Two Team Members   Karen Weiss MD      Final count correct?:  Yes     Apgars    Living status:  Living   1 Minute 5 Minute 10 Minute 15 Minute 20 Minute   Skin color: 1  1       Heart rate: 2  2       Reflex irritability: 2  1       Muscle tone: 2  2       Respiratory effort: 1  2       Total: 8  8       Apgars assigned by:  LORNE JONES RN     Cord    Vessels:  3 Vessels Complications:  None   Cord Blood Disposition:  Lab Gases Sent?:  No      [place] Resuscitation    Methods:  None  [place] Care at Delivery:  Infant was placed on mother's chest and was dried and stimulated, baby then had spontaneous cry and was left on mom for further skin to skin  Output in Delivery Room:  Stool      Measurements   "  Weight:  7 lb 6.9 oz Length:  1' 7.5\"   Head circumference:  33.7 cm       Skin to Skin and Feeding Plan    Skin to skin initiation date/time: 1/7/1841    Skin to skin with:  Mother  Skin to skin end date/time:     How do you plan to feed your baby:  Formula     Labor Events and Shoulder Dystocia    Fetal Tracing Prior to Delivery:  Category 1     Delivery (Maternal) (Provider to Complete) ([id number])    Episiotomy:  None  Perineal lacerations:  1st    Vaginal laceration?:  No    Cervical laceration?:  No    Est. blood loss (mL):  100     Blood Loss  Mother: Marina Mock #[id number]   Start of Mother's Information    IO Blood Loss  07/08/19 1800 - 07/09/19 1017    Total QBL Blood Loss (mL) Hospital Encounter 75 mL    Total  75 mL         End of Mother's Information  Mother: Marina Mock #[id number]         Delivery - Provider to Complete ([id number])    Delivering clinician:  Lynn Weiss MD  Delivery Type (Choose the 1 that will go to the Birth History):  Vaginal, Spontaneous   Other personnel:   Provider Role   Lynn Whitman RN Registered Nurse         Placenta    Delayed Cord Clamping:  Done  Date/Time:  7/8/2019  3:37 PM  Removal:  Spontaneous  Disposition:  Hospital disposal     Presentation and Position    Presentation:  Vertex  Position:  Middle Occiput Anterior           Luann Harley MD   Delivery done by MD Lynn Urias MD    "

## 2019-07-09 NOTE — PROVIDER NOTIFICATION
07/08/19 2324   Provider Notification   Provider Name/Title Fronek   Method of Notification Electronic Page   Request Evaluate-Remote   Notification Reason Other   FYI Sepsis protocol popped up.  after eating pizza around 2030.

## 2019-07-09 NOTE — PLAN OF CARE
Patient transferred to room 7146 at 1840 via wheelchair. Patient and  oriented to room and call light and patient is in stable condition. Report given to MILENA Jones at 1915

## 2019-07-10 NOTE — PLAN OF CARE
VSS. Afebrile. Up ad mark. Voiding and passing gas. C/o some pain and medicated with relief. Formula feeding baby per recommendation as pt is HIV positive. Attentive to baby's needs. FOB here this morning and supportive. Discharge instructions and meds reviewed and given to pt. Discharged today at 1400. TDAP shot given. Bordering with baby until his discharge tonight.

## 2019-07-17 ENCOUNTER — TELEPHONE (OUTPATIENT)
Dept: OBGYN | Facility: CLINIC | Age: 39
End: 2019-07-17

## 2019-07-17 NOTE — TELEPHONE ENCOUNTER
FMLA forms filled out and faxed to [facility] at [phone]. Copy put in FMLA folder.

## 2019-07-18 ENCOUNTER — TELEPHONE (OUTPATIENT)
Dept: PHARMACY | Facility: CLINIC | Age: 39
End: 2019-07-18

## 2019-07-18 NOTE — TELEPHONE ENCOUNTER
Called patient for refill reminder.    Will mail Tivicay, Truvada and test strips prescriptions address has been verified.     Last Filled on: 06/18/19   Follow-up Date: 08/16/19    Michelle Ricci CPhT  [facility]  [phone]

## 2019-08-14 ENCOUNTER — TELEPHONE (OUTPATIENT)
Dept: PHARMACY | Facility: CLINIC | Age: 39
End: 2019-08-14

## 2019-08-14 NOTE — TELEPHONE ENCOUNTER
Attempted to contact the patient to for refill reminder call,  left message on voicemail    Last filled on: 07/17/19    Follow-up Date: 08/21/19 2nd attempt    Michelle Ricci CPhT  [facility]  [phone]

## 2019-08-23 NOTE — TELEPHONE ENCOUNTER
Called patient for refill reminder.    Will mail Tivicay and Truvada prescriptions address has been verified.     Last Filled on: 07/17/19   Follow-up Date: 09/18/19    Michelle Ricci CPhT  [facility]  [phone]

## 2019-08-27 DIAGNOSIS — B20 HUMAN IMMUNODEFICIENCY VIRUS (HIV) DISEASE (H): ICD-10-CM

## 2019-08-27 RX ORDER — EMTRICITABINE AND TENOFOVIR DISOPROXIL FUMARATE 200; 300 MG/1; MG/1
1 TABLET, FILM COATED ORAL DAILY
Qty: 30 TABLET | Refills: 11 | Status: CANCELLED | OUTPATIENT
Start: 2019-08-27

## 2019-08-27 RX ORDER — EMTRICITABINE AND TENOFOVIR DISOPROXIL FUMARATE 200; 300 MG/1; MG/1
1 TABLET, FILM COATED ORAL DAILY
Qty: 30 TABLET | Refills: 11 | Status: SHIPPED | OUTPATIENT
Start: 2019-08-27 | End: 2020-01-10 | Stop reason: ALTCHOICE

## 2019-09-18 ENCOUNTER — TELEPHONE (OUTPATIENT)
Dept: PHARMACY | Facility: CLINIC | Age: 39
End: 2019-09-18

## 2019-09-18 NOTE — TELEPHONE ENCOUNTER
Attempted to contact the patient to for refill reminder call,  left message on voicemail    Last filled on: 08/28/19    Follow-up Date: 09/25/19  2nd attempt    Michelle Ricci CPhT  [facility]  [phone]

## 2019-09-28 NOTE — TELEPHONE ENCOUNTER
Attempted to contact the patient to for refill reminder call,  left message on voicemail    Last filled on: 08/28/19    Follow-up Date: 10/04/19  3rd attempt    Michelle Ricci CPhT  [facility]  [phone]

## 2019-10-25 ENCOUNTER — TELEPHONE (OUTPATIENT)
Dept: PHARMACY | Facility: CLINIC | Age: 39
End: 2019-10-25

## 2019-10-25 NOTE — TELEPHONE ENCOUNTER
Attempted to contact the patient to for refill reminder call,  left message on voicemail    Last filled on: 10/01/19    Follow-up Date: 10/30/19    Michelle Ricci CPhT  [facility]  [phone]

## 2019-11-06 NOTE — TELEPHONE ENCOUNTER
Called patient for refill reminder.  Her PMI number changed. That was a big part of the problem  Will  prescriptions address has been verified.   Descovy and Tivicay      Last Filled on: 10/01/19   Follow-up Date: 11/27/19      Michelle Ricci [facility]  [phone]

## 2019-11-26 ENCOUNTER — TELEPHONE (OUTPATIENT)
Dept: PHARMACY | Facility: CLINIC | Age: 39
End: 2019-11-26

## 2019-11-26 NOTE — TELEPHONE ENCOUNTER
Attempted to contact the patient to for refill reminder call,  left message on voicemail    Last filled on: 11/06/19    Follow-up Date: 12/03/19 2nd attempt    Michelle Ricci CPhT  [facility]  [phone]

## 2019-12-02 ENCOUNTER — OFFICE VISIT (OUTPATIENT)
Dept: OBGYN | Facility: CLINIC | Age: 39
End: 2019-12-02
Attending: OBSTETRICS & GYNECOLOGY
Payer: COMMERCIAL

## 2019-12-02 VITALS
HEIGHT: 61 IN | SYSTOLIC BLOOD PRESSURE: 116 MMHG | WEIGHT: 118.7 LBS | DIASTOLIC BLOOD PRESSURE: 71 MMHG | BODY MASS INDEX: 22.41 KG/M2 | HEART RATE: 92 BPM

## 2019-12-02 DIAGNOSIS — N92.1 BREAKTHROUGH BLEEDING ON DEPO PROVERA: ICD-10-CM

## 2019-12-02 DIAGNOSIS — Z22.7 LTBI (LATENT TUBERCULOSIS INFECTION): ICD-10-CM

## 2019-12-02 DIAGNOSIS — Z86.32 HISTORY OF DIET CONTROLLED GESTATIONAL DIABETES MELLITUS (GDM): ICD-10-CM

## 2019-12-02 DIAGNOSIS — Z12.4 SCREENING FOR MALIGNANT NEOPLASM OF CERVIX: ICD-10-CM

## 2019-12-02 DIAGNOSIS — Z01.419 ENCOUNTER FOR GYNECOLOGICAL EXAMINATION WITHOUT ABNORMAL FINDING: ICD-10-CM

## 2019-12-02 DIAGNOSIS — O99.019 ANTEPARTUM ANEMIA: ICD-10-CM

## 2019-12-02 DIAGNOSIS — Z00.00 VISIT FOR PREVENTIVE HEALTH EXAMINATION: Primary | ICD-10-CM

## 2019-12-02 PROBLEM — Z37.9 NORMAL LABOR: Status: RESOLVED | Noted: 2019-07-08 | Resolved: 2019-12-02

## 2019-12-02 PROBLEM — O09.529 ANTEPARTUM MULTIGRAVIDA OF ADVANCED MATERNAL AGE: Status: RESOLVED | Noted: 2019-01-09 | Resolved: 2019-12-02

## 2019-12-02 LAB
ERYTHROCYTE [DISTWIDTH] IN BLOOD BY AUTOMATED COUNT: 15.8 % (ref 10–15)
HBA1C MFR BLD: 7.4 % (ref 0–5.6)
HCG UR QL: NEGATIVE
HCT VFR BLD AUTO: 33.8 % (ref 35–47)
HGB BLD-MCNC: 10.7 G/DL (ref 11.7–15.7)
INTERNAL QC OK POCT: YES
MCH RBC QN AUTO: 22.6 PG (ref 26.5–33)
MCHC RBC AUTO-ENTMCNC: 31.7 G/DL (ref 31.5–36.5)
MCV RBC AUTO: 72 FL (ref 78–100)
PLATELET # BLD AUTO: 258 10E9/L (ref 150–450)
RBC # BLD AUTO: 4.73 10E12/L (ref 3.8–5.2)
WBC # BLD AUTO: 5.3 10E9/L (ref 4–11)

## 2019-12-02 PROCEDURE — 81025 URINE PREGNANCY TEST: CPT | Mod: ZF | Performed by: ADVANCED PRACTICE MIDWIFE

## 2019-12-02 PROCEDURE — 00000104 ZZHCL STATISTIC CYTO-THIN LAYER QC G0145: Performed by: ADVANCED PRACTICE MIDWIFE

## 2019-12-02 PROCEDURE — G0145 SCR C/V CYTO,THINLAYER,RESCR: HCPCS | Performed by: ADVANCED PRACTICE MIDWIFE

## 2019-12-02 PROCEDURE — 87624 HPV HI-RISK TYP POOLED RSLT: CPT | Performed by: ADVANCED PRACTICE MIDWIFE

## 2019-12-02 PROCEDURE — 25000128 H RX IP 250 OP 636: Mod: ZF | Performed by: ADVANCED PRACTICE MIDWIFE

## 2019-12-02 PROCEDURE — 85027 COMPLETE CBC AUTOMATED: CPT | Performed by: ADVANCED PRACTICE MIDWIFE

## 2019-12-02 PROCEDURE — 83036 HEMOGLOBIN GLYCOSYLATED A1C: CPT | Performed by: ADVANCED PRACTICE MIDWIFE

## 2019-12-02 PROCEDURE — 96372 THER/PROPH/DIAG INJ SC/IM: CPT | Mod: ZF

## 2019-12-02 PROCEDURE — 36415 COLL VENOUS BLD VENIPUNCTURE: CPT | Performed by: ADVANCED PRACTICE MIDWIFE

## 2019-12-02 RX ORDER — MEDROXYPROGESTERONE ACETATE 150 MG/ML
150 INJECTION, SUSPENSION INTRAMUSCULAR
Status: COMPLETED | OUTPATIENT
Start: 2019-12-02 | End: 2020-08-06

## 2019-12-02 RX ADMIN — MEDROXYPROGESTERONE ACETATE 150 MG: 150 INJECTION, SUSPENSION INTRAMUSCULAR at 14:26

## 2019-12-02 ASSESSMENT — PAIN SCALES - GENERAL: PAINLEVEL: NO PAIN (0)

## 2019-12-02 ASSESSMENT — MIFFLIN-ST. JEOR: SCORE: 1161.18

## 2019-12-02 NOTE — PATIENT INSTRUCTIONS

## 2019-12-02 NOTE — LETTER
"2019       RE: Marina Mock  [address]  [facility] [address]     Dear Colleague,    Thank you for referring your patient, Marina Mock, to the [facility] at [facility]. Please see a copy of my visit note below.    SUBJECTIVE:  Marina Mock is a 39 year old female who presents today for her annual exam.  HPI: came for \"post partum\" but due for annual and pap  Follows closely with MD for HIV, has appointment in within the next month per patient  [facility], does not want to return to [facility]:  Has been spotting for the last 3 weeks, no bleeding for the first 2+ months on depo. reluctant to change BC at this time. Fearful of IUD   Due for f/u for GDM    MENSTRUAL HISTORY  ==================  OB History    Para Term  AB Living   4 3 3 0 1 2   SAB TAB Ectopic Multiple Live Births   1 0 0 0 3      # Outcome Date GA Lbr Aden/2nd Weight Sex Delivery Anes PTL Lv   4 Term 19 37w6d / 00:14 3.37 kg (7 lb 6.9 oz) M Vag-Spont  N LULU      Name: Cara      Apgar1: 8  Apgar5: 8   3 Term 16 38w6d 01:22 / 00:41 2.48 kg (5 lb 7.5 oz) F Vag-Spont Local, EPI N LULU      Name: Anel       Apgar1: 8  Apgar5: 9   2 SAB 08/19/15           1 Term 07    F   N LIVE BIRTH      Name: Kori      Obstetric Comments   Hx of gdm      No LMP recorded. Patient has had an injection.   Periods have not returned in a reg manner since birth, no bleeding x 2 months, spotting last 3 wks,  Dysmenorrhea none. Cyclic symptoms include  SEXUAL HISTORY  ==============  Current contraception: depoprovera  Number of partners in last year: 1      CURRENT LIFE STYLE:  ===================  Social History     Socioeconomic History     Marital status:      Spouse name: Chris Roldan     Number of children: Not on file     Years of education: Not on file     Highest education level: Not on file   Occupational History "     Not on file   Social Needs     Financial resource strain: Not on file     Food insecurity:     Worry: Not on file     Inability: Not on file     Transportation needs:     Medical: Not on file     Non-medical: Not on file   Tobacco Use     Smoking status: Never Smoker     Smokeless tobacco: Never Used   Substance and Sexual Activity     Alcohol use: No     Drug use: No     Sexual activity: Yes     Partners: Male     Birth control/protection: Injection   Lifestyle     Physical activity:     Days per week: Not on file     Minutes per session: Not on file     Stress: Not on file   Relationships     Social connections:     Talks on phone: Not on file     Gets together: Not on file     Attends Yarsanism service: Not on file     Active member of club or organization: Not on file     Attends meetings of clubs or organizations: Not on file     Relationship status: Not on file     Intimate partner violence:     Fear of current or ex partner: Not on file     Emotionally abused: Not on file     Physically abused: Not on file     Forced sexual activity: Not on file   Other Topics Concern     Parent/sibling w/ CABG, MI or angioplasty before 65F 55M? Not Asked   Social History Narrative    ** Merged History Encounter **     How much exercise per week? Active with 2 yr    How much calcium per day? supplement       How much caffeine per day? none    How much vitamin D per day? supplement    Do you/your family wear seatbelts?  Yes    Do you/your family use safety helmets? n/a    Do you/your family use sunscreen? No    Do you/your family keep firearms in the home? No    Do you/your family have a smoke detector(s)? 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, 2019 Aldo FINNEYM APRN          SCREENING  ===========  Health Maintenance   Topic Date Due     MICROALBUMIN  1982     DIABETIC FOOT EXAM  1982     MENINGITIS IMMUNIZATION (1 - Risk start before 7 months 4-dose series) 1982     EYE EXAM  04/14/2017     LIPID   12/21/2018     INFLUENZA VACCINE (1) 09/01/2019     A1C  06/02/2020     BMP  06/14/2020     PREVENTIVE CARE VISIT  12/02/2020     TSH W/FREE T4 REFLEX  01/09/2021     PNEUMOCOCCAL IMMUNIZATION 19-64 HIGHEST RISK (3 of 3 - PPSV23) 04/05/2023     HPV  12/02/2024     PAP  12/02/2024     DTAP/TDAP/TD IMMUNIZATION (4 - Td) 07/09/2029     PHQ-2  Completed     IPV IMMUNIZATION  Aged Out     Lab Results   Component Value Date    PAP ASC-US 12/02/2019      History of abnormal Pap smear: No    Lab Results   Component Value Date    CHOL 212 12/21/2017     Lab Results   Component Value Date    HDL 51 12/21/2017     Lab Results   Component Value Date     12/21/2017     Lab Results   Component Value Date    TSH 0.94 01/09/2019       IMMUNIZATIONS  =============  Immunization History   Administered Date(s) Administered     Influenza Vaccine IM > 6 months Valent IIV4 10/12/2017     Pneumo Conj 13-V (2010&after) 03/16/2017     Pneumococcal 23 valent 04/05/2018     TDAP Vaccine (Adacel) 07/09/2019     TDAP Vaccine (Boostrix) 08/15/2016, 04/24/2019     HISTORIES  =========   No Known Allergies  Family History   Problem Relation Age of Onset     Family History Negative Other      Hypertension Father      Hypertension Other      Cancer No family hx of      Diabetes No family hx of      Macular Degeneration No family hx of      Retinal detachment No family hx of      Past Medical History:   Diagnosis Date     Asymptomatic human immunodeficiency virus (HIV) infection status (H) 2016     Gestational diabetes     diet controlled      Malaria     in [place]      Miscarriage      TB (pulmonary tuberculosis)     latent     Past Surgical History:   Procedure Laterality Date     NO HISTORY OF SURGERY       REVIEW OF SYSTEMS  ==================  CONSTITUTIONAL: NEGATIVE for fever, chills  EYES: NEGATIVE for vision changes   RESP: NEGATIVE for significant cough or SOB  CV: NEGATIVE for chest pain, palpitations   GI: NEGATIVE for nausea,  "abdominal pain, heartburn, or change in bowel habits  : NEGATIVE for frequency, dysuria, or hematuria  MUSCULOSKELETAL: NEGATIVE for significant arthralgias or myalgia  NEURO: NEGATIVE for weakness, dizziness or paresthesias or headache  EXAM  =========  /71   Pulse 92   Ht 1.55 m (5' 1.02\")   Wt 53.8 kg (118 lb 11.2 oz)   BMI 22.41 kg/m     BMI: Body mass index is 22.41 kg/m .  HENT: nose and mouth without ulcers or lesions  NECK: no adenopathy, no asymmetry, masses, or scars and thyroid normal to palpation  RESP: lungs clear to auscultation - no rales, rhonchi or wheezes  BREAST: normal without masses, tenderness or nipple discharge and no palpable axillary masses or adenopathy  CV: regular rates and rhythm and no murmur, click or rub  ABDOMEN: soft, nontender, without hepatosplenomegaly or masses  NEURO: Normal strength and tone, mentation intact and speech normal  PSYCH: mentation appears normal and affect normal/bright    PELVIC EXAM:  External Genitalia: WNL  Bartholin's/Urethral Meatus/Uhland's (BUS):  WNL/Negative  Bladder:  WNL  Vagina:  Normal mucosa, rugated and   thin homogenous discharge  Cervix:  healthy, multiparous  Uterus: Normal shape, position and consistency, nontender   Adnexae:  Normal without masses or tenderness,bilaterally  Anus/Perineum:  Normal    WET PREP:Not done  Gonorrhea and chlamydia screen obtained: NO      ASSESSMENT  ===========  Annual Gyn exam within normal limits  Depo  (Z00.00) Visit for preventive health examination  (primary encounter diagnosis)    Plan: Pap Smear Exam [] Do Not Remove, Pelvic         and Breast Exam Procedure [], Pap imaged         thin layer screen with HPV - recommended age 30        - 65 years (select HPV order below), HPV High         Risk Types DNA Cervical          (Z86.32) History of diet controlled gestational diabetes mellitus (GDM)    Plan: Hgb A1c, CBC with Platelets       (O99.019) Antepartum anemia    Plan: Hgb A1c, CBC " with Platelets       (Z12.4) Screening for malignant neoplasm of cervix    Plan: Pap Smear Exam [] Do Not Remove, Pelvic         and Breast Exam Procedure [], Pap imaged         thin layer screen with HPV - recommended age 30        - 65 years (select HPV order below), HPV High         Risk Types DNA Cervical         (Z01.419) Encounter for gynecological examination without abnormal finding    Plan: Pap Smear Exam [] Do Not Remove, Pelvic         and Breast Exam Procedure []      (N92.1) Breakthrough bleeding on depo provera    Plan: medroxyPROGESTERone (DEPO-PROVERA) syringe 150         mg, hCG qual urine POCT     PLAN  ===========  PATIENT EDUCATION  =================  1.  Additional teaching done at this visit included: calcium (1200 mg per day), self breast exam and birth control  2.  Discussed with patient risks/ benefits and treatment options of prescribed medications or other treatment modalities.  3.  RTC in one year for annual exam or with concerns.  4.  encourge f/u w HIV clinic as planned  Discussed BC options, pt will consider another option if she continues to have spotting, pt aware she can return for spotting and consider OCP if bleeding not tolerable    Tiffanie Ho, ERIKA PARSONS

## 2019-12-02 NOTE — NURSING NOTE
Chief Complaint   Patient presents with     Physical   del 7-2019   Would like depo provera shot  Next injection is due 12-5-2019     .  [facility]   Has been spotting every day for the last few weeks.  
Clinic Administered Medication Documentation    MEDICATION LIST:   Depo Provera Documentation    Prior to injection, verified patient identity using patient's name and date of birth. Medication was administered. Please see MAR and medication order for additional information. Patient instructed to report any adverse reaction to staff immediately .    BP: 116/71    LAST PAP/EXAM:   Lab Results   Component Value Date    PAP lsil 03/09/2016     URINE HCG:negative    NEXT INJECTION DUE: 2/17/20 - 3/2/20    Was entire vial of medication used? Yes  Vial/Syringe: Single dose vial  Expiration Date:    
home

## 2019-12-04 PROBLEM — R73.9 HEMOGLOBIN A1C BETWEEN 7.0% AND 9.0%: Status: ACTIVE | Noted: 2019-12-04

## 2019-12-06 LAB
COPATH REPORT: ABNORMAL
PAP: ABNORMAL

## 2019-12-09 LAB
FINAL DIAGNOSIS: ABNORMAL
HPV HR 12 DNA CVX QL NAA+PROBE: POSITIVE
HPV16 DNA SPEC QL NAA+PROBE: NEGATIVE
HPV18 DNA SPEC QL NAA+PROBE: NEGATIVE
SPECIMEN DESCRIPTION: ABNORMAL
SPECIMEN SOURCE CVX/VAG CYTO: ABNORMAL

## 2019-12-12 ENCOUNTER — TELEPHONE (OUTPATIENT)
Dept: OBGYN | Facility: CLINIC | Age: 39
End: 2019-12-12

## 2019-12-12 DIAGNOSIS — E11.9 TYPE 2 DIABETES MELLITUS WITHOUT COMPLICATION, WITHOUT LONG-TERM CURRENT USE OF INSULIN (H): Primary | ICD-10-CM

## 2019-12-12 NOTE — TELEPHONE ENCOUNTER
Nurse spoke with [facility] to discuss results and follow-up recommendation. Pt states she has HIV provider and will call for f/u.     Pt does not have endocrinologist and does want referral to endo. Placing referral for patient.    Pt expressed understanding regarding continuing iron supplement.     Discussed HPV/PAP and recommendation for colpo. Pt expressed understanding and will schedule this today.    Pt questions asked and answered.

## 2019-12-18 ENCOUNTER — APPOINTMENT (OUTPATIENT)
Dept: LAB | Facility: CLINIC | Age: 39
End: 2019-12-18
Payer: COMMERCIAL

## 2019-12-18 ENCOUNTER — OFFICE VISIT (OUTPATIENT)
Dept: ENDOCRINOLOGY | Facility: CLINIC | Age: 39
End: 2019-12-18
Attending: ADVANCED PRACTICE MIDWIFE
Payer: COMMERCIAL

## 2019-12-18 VITALS
DIASTOLIC BLOOD PRESSURE: 73 MMHG | WEIGHT: 117.4 LBS | BODY MASS INDEX: 22.17 KG/M2 | HEART RATE: 75 BPM | SYSTOLIC BLOOD PRESSURE: 112 MMHG

## 2019-12-18 DIAGNOSIS — E11.9 TYPE 2 DIABETES MELLITUS WITHOUT COMPLICATION, WITHOUT LONG-TERM CURRENT USE OF INSULIN (H): ICD-10-CM

## 2019-12-18 LAB
CREAT UR-MCNC: 209 MG/DL
MICROALBUMIN UR-MCNC: 34 MG/L
MICROALBUMIN/CREAT UR: 16.08 MG/G CR (ref 0–25)

## 2019-12-18 RX ORDER — BISMUTH SUBSALICYLATE 262MG/15ML
1 SUSPENSION, ORAL (FINAL DOSE FORM) ORAL DAILY
Qty: 100 EACH | Refills: 3 | Status: SHIPPED | OUTPATIENT
Start: 2019-12-18 | End: 2024-07-17

## 2019-12-18 ASSESSMENT — PAIN SCALES - GENERAL: PAINLEVEL: NO PAIN (0)

## 2019-12-18 NOTE — PROGRESS NOTES
"[facility] Consultation  Date: 12/18/2019  Fellow: Rajan Monreal MD  Attending: Ritesh Benites MD    Subjective:  Marina Mock is a 39 year old female with PMHx of HIV and GDM who presents for evaluation of diabetes.    History of Diabetes  Previously history of gestational diabetes during past pregnancies. Most recently pregnancy concluded with delivery in 07/2019. During this pregnancy, in 02/2019, her OGTT screen elevated at 137. Later in 02/2019, her 3 hour 100g OGTT was normal. Despite this, she was checking her blood sugars during her pregnancy and most of the time she says they \"were good.\" Highest number she can recall seeing was 150. She reports having diet-controlled GDM in 2016 during another pregnancy.     Current Treatment: none    HbA1c is 7.4% on 12/2/19 compared to 6.5% in 01/2019. Of note, she has chronic anemia, so it's not clear if her HbA1c is completely reliable. No meter today - she says it's not working    Diet:   - Usually eats breakfast when she is at work, but not when she is at home. Could be toast and eggs or pancakes  - Lunch (12-1PM): rice, african food, soup   - Dinner: sometimes skips. Might have a snack like some bread or a piece of meat or cheese  - Alcohol or sugared beverages: diet soda, ensure+ two per day    Exercise:  None    Complications:  - Retinopathy: unclear. Last eye exam 3 years ago.   - Nephropathy: has never been screened. Normal Cr and eGFR in 06/2019  - Neuropathy: none. Last monofilament testing: today, was normal  - CAD: no  - Hyperlipidemia: yes. Last  in 12/2017  - Tobacco: no  - HTN: no    Review of Systems:   10 point ROS reviewed and was negative unless noted in HPI    Past Medical History:   Past Medical History:   Diagnosis Date     Asymptomatic human immunodeficiency virus (HIV) infection status (H) 2016     Gestational diabetes     diet controlled      Malaria     in [place]      Miscarriage      TB " (pulmonary tuberculosis)     latent       Current Medications:   Current Outpatient Rx   Medication Sig Dispense Refill     dolutegravir (TIVICAY) 50 MG tablet Take 1 tablet (50 mg) by mouth daily 30 tablet 11     emtricitabine-tenofovir (TRUVADA) 200-300 MG per tablet Take 1 tablet by mouth daily 30 tablet 11     Nutritional Supplements (ENSURE ACTIVE PO)          Family History:  Mom with diabetes    Social History:  Social History     Socioeconomic History     Marital status:      Spouse name: Chris Roldan     Number of children: Not on file     Years of education: Not on file     Highest education level: Not on file   Occupational History     Not on file   Social Needs     Financial resource strain: Not on file     Food insecurity:     Worry: Not on file     Inability: Not on file     Transportation needs:     Medical: Not on file     Non-medical: Not on file   Tobacco Use     Smoking status: Never Smoker     Smokeless tobacco: Never Used   Substance and Sexual Activity     Alcohol use: No     Drug use: No     Sexual activity: Yes     Partners: Male     Birth control/protection: Injection   Lifestyle     Physical activity:     Days per week: Not on file     Minutes per session: Not on file     Stress: Not on file   Relationships     Social connections:     Talks on phone: Not on file     Gets together: Not on file     Attends Religion service: Not on file     Active member of club or organization: Not on file     Attends meetings of clubs or organizations: Not on file     Relationship status: Not on file     Intimate partner violence:     Fear of current or ex partner: Not on file     Emotionally abused: Not on file     Physically abused: Not on file     Forced sexual activity: Not on file   Other Topics Concern     Parent/sibling w/ CABG, MI or angioplasty before 65F 55M? Not Asked   Social History Narrative    ** Merged History Encounter **     How much exercise per week? Active with 2 yr    How much  calcium per day? supplement       How much caffeine per day? none    How much vitamin D per day? supplement    Do you/your family wear seatbelts?  Yes    Do you/your family use safety helmets? n/a    Do you/your family use sunscreen? No    Do you/your family keep firearms in the home? No    Do you/your family have a smoke detector(s)? 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, 2019 Aldo Perez LPN    [employer] CNM APRN                   Physical Exam:  Previous Weights:    Wt Readings from Last 2 Encounters:   12/18/19 53.3 kg (117 lb 6.4 oz)   12/02/19 53.8 kg (118 lb 11.2 oz)   BMI:  Body mass index is 22.17 kg/m .  /73   Pulse 75   Wt 53.3 kg (117 lb 6.4 oz)   LMP 11/27/2019   BMI 22.17 kg/m    Gen: NAD, pleasant  HEENT:  Normocephalic, atraumatic, EOMI, no scleral icterus   Neck: supple, no LAD, no thyromegaly  CV: RRR, +s1/s2, no murmurs  Resp: CTAB  Abd: +BS, soft, NT/ND  Ext: WWP.  no edema.    Skin: warm and dry  Foot exam: no skin breakdown, normal monofilament, DP pulses 2+  Neuro: alert and oriented, no gross focal deficits     Lab Results:  Reviewed in EPIC    Assessment and Plan:  Marina Mock is a 39 year old female with PMHx of HIV and GDM who presents for evaluation of diabetes.    1.Type 2 diabetes mellitus  HbA1c today is 7.4%. This would be a new diagnosis of type 2 DM. Her HbA1c may not be entirely accurate given chronic anemia, however her HbA1c trend has slowly risen over the years. Her HIV medications can have hyperglycemia as side effects, so they may be contributing. Her weight is normal.  - Start metformin 500mg daily, with increase to 1000mg daily after 1 week  - Recommend increased physical activity  - Recommend switching ensure to glucerna, which should have fewer carbohydrates  - Provided patient with new glucometer. Recommend checking 3-4 times per week in the morning    2. Diabetic complications  - Retinopathy: none known. Last eye exam 3 years ago. Ophthalmology referral placed  -  Nephropathy: has never been screened. Normal Cr and eGFR in 06/2019. Urine alb/cr ordered  - Neuropathy: none. Last monofilament testing: today, was normal  - CAD: no  - Hyperlipidemia: yes. Last  in 12/2017. Her HIV medications can contribute to this. Lipid panel at next visit. We briefly discussed statin, but did not start today  - Tobacco: no  - HTN: no    RTC in 3 months for diabetes    Patient was seen and discussed with Dr. Benites,     Rajan Monreal MD  PGY5, Endocrinology Fellow    .I was present with the fellow who participated in the service and in the documentation of the note. I have verified the history and personally performed the physical exam and medical decision making. I agree with the assessment and plan of care as documented in the note.     Ritesh Benites MD  Division of Diabetes and Endocrinology  Department of Medicine  [phone]

## 2019-12-18 NOTE — RESULT ENCOUNTER NOTE
Dear Marina,   Here are your recent results. Your urine albumin test looks normal. We will repeat it again in 1 year.    Rajan Monreal MD

## 2019-12-18 NOTE — PATIENT INSTRUCTIONS
- Start metformin 500mg daily with dinner. After 1 week, increase to 1000mg (2 pills) with dinner   - Ask your  to switch ensure to glucerna

## 2019-12-18 NOTE — LETTER
"12/18/2019     RE: Marina Mock  [address]  [facility] [address]     Dear Colleague,    Thank you for referring your patient, Marina Mock, to the [facility] ENDOCRINOLOGY at [facility]. Please see a copy of my visit note below.    [facility] Endocrinology Clinic Consultation  Date: 12/18/2019  Fellow: Rajan Monreal MD  Attending: Ritesh Benites MD    Subjective:  Marina Mock is a 39 year old female with PMHx of HIV and GDM who presents for evaluation of diabetes.    History of Diabetes  Previously history of gestational diabetes during past pregnancies. Most recently pregnancy concluded with delivery in 07/2019. During this pregnancy, in 02/2019, her OGTT screen elevated at 137. Later in 02/2019, her 3 hour 100g OGTT was normal. Despite this, she was checking her blood sugars during her pregnancy and most of the time she says they \"were good.\" Highest number she can recall seeing was 150. She reports having diet-controlled GDM in 2016 during another pregnancy.     Current Treatment: none    HbA1c is 7.4% on 12/2/19 compared to 6.5% in 01/2019. Of note, she has chronic anemia, so it's not clear if her HbA1c is completely reliable. No meter today - she says it's not working    Diet:   - Usually eats breakfast when she is at work, but not when she is at home. Could be toast and eggs or pancakes  - Lunch (12-1PM): rice, african food, soup   - Dinner: sometimes skips. Might have a snack like some bread or a piece of meat or cheese  - Alcohol or sugared beverages: diet soda, ensure+ two per day    Exercise:  None    Complications:  - Retinopathy: unclear. Last eye exam 3 years ago.   - Nephropathy: has never been screened. Normal Cr and eGFR in 06/2019  - Neuropathy: none. Last monofilament testing: today, was normal  - CAD: no  - Hyperlipidemia: yes. Last  in 12/2017  - Tobacco: no  - HTN: no    Review of Systems:   10 point ROS reviewed " and was negative unless noted in HPI    Past Medical History:   Past Medical History:   Diagnosis Date     Asymptomatic human immunodeficiency virus (HIV) infection status (H) 2016     Gestational diabetes     diet controlled      Malaria     in [place]      Miscarriage      TB (pulmonary tuberculosis)     latent       Current Medications:   Current Outpatient Rx   Medication Sig Dispense Refill     dolutegravir (TIVICAY) 50 MG tablet Take 1 tablet (50 mg) by mouth daily 30 tablet 11     emtricitabine-tenofovir (TRUVADA) 200-300 MG per tablet Take 1 tablet by mouth daily 30 tablet 11     Nutritional Supplements (ENSURE ACTIVE PO)          Family History:  Mom with diabetes    Social History:  Social History     Socioeconomic History     Marital status:      Spouse name: Chris Roldan     Number of children: Not on file     Years of education: Not on file     Highest education level: Not on file   Occupational History     Not on file   Social Needs     Financial resource strain: Not on file     Food insecurity:     Worry: Not on file     Inability: Not on file     Transportation needs:     Medical: Not on file     Non-medical: Not on file   Tobacco Use     Smoking status: Never Smoker     Smokeless tobacco: Never Used   Substance and Sexual Activity     Alcohol use: No     Drug use: No     Sexual activity: Yes     Partners: Male     Birth control/protection: Injection   Lifestyle     Physical activity:     Days per week: Not on file     Minutes per session: Not on file     Stress: Not on file   Relationships     Social connections:     Talks on phone: Not on file     Gets together: Not on file     Attends Mandaeism service: Not on file     Active member of club or organization: Not on file     Attends meetings of clubs or organizations: Not on file     Relationship status: Not on file     Intimate partner violence:     Fear of current or ex partner: Not on file     Emotionally abused: Not on file     Physically  abused: Not on file     Forced sexual activity: Not on file   Other Topics Concern     Parent/sibling w/ CABG, MI or angioplasty before 65F 55M? Not Asked   Social History Narrative    ** Merged History Encounter **     How much exercise per week? Active with 2 yr    How much calcium per day? supplement       How much caffeine per day? none    How much vitamin D per day? supplement    Do you/your family wear seatbelts?  Yes    Do you/your family use safety helmets? n/a    Do you/your family use sunscreen? No    Do you/your family keep firearms in the home? No    Do you/your family have a smoke detector(s)? 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, 2019 Aldo Perez LPN    Chata Painting CNM APRN                   Physical Exam:  Previous Weights:    Wt Readings from Last 2 Encounters:   12/18/19 53.3 kg (117 lb 6.4 oz)   12/02/19 53.8 kg (118 lb 11.2 oz)   BMI:  Body mass index is 22.17 kg/m .  /73   Pulse 75   Wt 53.3 kg (117 lb 6.4 oz)   LMP 11/27/2019   BMI 22.17 kg/m     Gen: NAD, pleasant  HEENT:  Normocephalic, atraumatic, EOMI, no scleral icterus   Neck: supple, no LAD, no thyromegaly  CV: RRR, +s1/s2, no murmurs  Resp: CTAB  Abd: +BS, soft, NT/ND  Ext: WWP.  no edema.    Skin: warm and dry  Foot exam: no skin breakdown, normal monofilament, DP pulses 2+  Neuro: alert and oriented, no gross focal deficits     Lab Results:  Reviewed in EPIC    Assessment and Plan:  Marina Mock is a 39 year old female with PMHx of HIV and GDM who presents for evaluation of diabetes.    1.Type 2 diabetes mellitus  HbA1c today is 7.4%. This would be a new diagnosis of type 2 DM. Her HbA1c may not be entirely accurate given chronic anemia, however her HbA1c trend has slowly risen over the years. Her HIV medications can have hyperglycemia as side effects, so they may be contributing. Her weight is normal.  - Start metformin 500mg daily, with increase to 1000mg daily after 1 week  - Recommend increased physical activity  - Recommend  switching ensure to glucerna, which should have fewer carbohydrates  - Provided patient with new glucometer. Recommend checking 3-4 times per week in the morning    2. Diabetic complications  - Retinopathy: none known. Last eye exam 3 years ago. Ophthalmology referral placed  - Nephropathy: has never been screened. Normal Cr and eGFR in 06/2019. Urine alb/cr ordered  - Neuropathy: none. Last monofilament testing: today, was normal  - CAD: no  - Hyperlipidemia: yes. Last  in 12/2017. Her HIV medications can contribute to this. Lipid panel at next visit. We briefly discussed statin, but did not start today  - Tobacco: no  - HTN: no    RTC in 3 months for diabetes    Patient was seen and discussed with Dr. Benites,     Rajan Monreal MD  PGY5, Endocrinology Fellow    .I was present with the fellow who participated in the service and in the documentation of the note. I have verified the history and personally performed the physical exam and medical decision making. I agree with the assessment and plan of care as documented in the note.     Ritesh Benites MD  Division of Diabetes and Endocrinology  Department of Medicine  [phone]

## 2019-12-31 ENCOUNTER — TELEPHONE (OUTPATIENT)
Dept: PHARMACY | Facility: CLINIC | Age: 39
End: 2019-12-31

## 2020-01-09 ENCOUNTER — TELEPHONE (OUTPATIENT)
Dept: INFECTIOUS DISEASES | Facility: CLINIC | Age: 40
End: 2020-01-09

## 2020-01-09 NOTE — TELEPHONE ENCOUNTER
Left message for pt reminding them of upcoming appointment.  Instructed pt to bring updated medications list.  Phoenix Jackson MA

## 2020-01-10 ENCOUNTER — OFFICE VISIT (OUTPATIENT)
Dept: INFECTIOUS DISEASES | Facility: CLINIC | Age: 40
End: 2020-01-10
Attending: INTERNAL MEDICINE
Payer: COMMERCIAL

## 2020-01-10 VITALS
BODY MASS INDEX: 21.33 KG/M2 | TEMPERATURE: 98.4 F | WEIGHT: 113 LBS | SYSTOLIC BLOOD PRESSURE: 115 MMHG | OXYGEN SATURATION: 100 % | HEART RATE: 65 BPM | DIASTOLIC BLOOD PRESSURE: 68 MMHG

## 2020-01-10 DIAGNOSIS — B20 HUMAN IMMUNODEFICIENCY VIRUS (HIV) DISEASE (H): ICD-10-CM

## 2020-01-10 DIAGNOSIS — Z23 NEED FOR INFLUENZA VACCINATION: ICD-10-CM

## 2020-01-10 DIAGNOSIS — B20 HUMAN IMMUNODEFICIENCY VIRUS (HIV) DISEASE (H): Primary | ICD-10-CM

## 2020-01-10 LAB
ALBUMIN SERPL-MCNC: 4.2 G/DL (ref 3.4–5)
ALBUMIN UR-MCNC: NEGATIVE MG/DL
ALP SERPL-CCNC: 55 U/L (ref 40–150)
ALT SERPL W P-5'-P-CCNC: 28 U/L (ref 0–50)
ANION GAP SERPL CALCULATED.3IONS-SCNC: 4 MMOL/L (ref 3–14)
APPEARANCE UR: ABNORMAL
AST SERPL W P-5'-P-CCNC: 32 U/L (ref 0–45)
BASOPHILS # BLD AUTO: 0 10E9/L (ref 0–0.2)
BASOPHILS NFR BLD AUTO: 0.2 %
BILIRUB SERPL-MCNC: 0.6 MG/DL (ref 0.2–1.3)
BILIRUB UR QL STRIP: NEGATIVE
BUN SERPL-MCNC: 7 MG/DL (ref 7–30)
CALCIUM SERPL-MCNC: 9.1 MG/DL (ref 8.5–10.1)
CD3 CELLS # BLD: 1369 CELLS/UL (ref 603–2990)
CD3 CELLS NFR BLD: 66 % (ref 49–84)
CD3+CD4+ CELLS # BLD: 622 CELLS/UL (ref 441–2156)
CD3+CD4+ CELLS NFR BLD: 30 % (ref 28–63)
CD3+CD4+ CELLS/CD3+CD8+ CLL BLD: 0.88 % (ref 1.4–2.6)
CD3+CD8+ CELLS # BLD: 702 CELLS/UL (ref 125–1312)
CD3+CD8+ CELLS NFR BLD: 34 % (ref 10–40)
CHLORIDE SERPL-SCNC: 110 MMOL/L (ref 94–109)
CHOLEST SERPL-MCNC: 164 MG/DL
CO2 SERPL-SCNC: 24 MMOL/L (ref 20–32)
COLOR UR AUTO: YELLOW
CREAT SERPL-MCNC: 0.91 MG/DL (ref 0.52–1.04)
DIFFERENTIAL METHOD BLD: ABNORMAL
EOSINOPHIL # BLD AUTO: 0 10E9/L (ref 0–0.7)
EOSINOPHIL NFR BLD AUTO: 0.7 %
ERYTHROCYTE [DISTWIDTH] IN BLOOD BY AUTOMATED COUNT: 16.1 % (ref 10–15)
GFR SERPL CREATININE-BSD FRML MDRD: 80 ML/MIN/{1.73_M2}
GLUCOSE SERPL-MCNC: 83 MG/DL (ref 70–99)
GLUCOSE UR STRIP-MCNC: NEGATIVE MG/DL
HCT VFR BLD AUTO: 28.9 % (ref 35–47)
HDLC SERPL-MCNC: 32 MG/DL
HGB BLD-MCNC: 8.7 G/DL (ref 11.7–15.7)
HGB UR QL STRIP: ABNORMAL
IFC SPECIMEN: ABNORMAL
IMM GRANULOCYTES # BLD: 0 10E9/L (ref 0–0.4)
IMM GRANULOCYTES NFR BLD: 0.2 %
KETONES UR STRIP-MCNC: NEGATIVE MG/DL
LDLC SERPL CALC-MCNC: 104 MG/DL
LEUKOCYTE ESTERASE UR QL STRIP: NEGATIVE
LYMPHOCYTES # BLD AUTO: 2 10E9/L (ref 0.8–5.3)
LYMPHOCYTES NFR BLD AUTO: 44.7 %
MCH RBC QN AUTO: 21.4 PG (ref 26.5–33)
MCHC RBC AUTO-ENTMCNC: 30.1 G/DL (ref 31.5–36.5)
MCV RBC AUTO: 71 FL (ref 78–100)
MONOCYTES # BLD AUTO: 0.4 10E9/L (ref 0–1.3)
MONOCYTES NFR BLD AUTO: 9.1 %
MUCOUS THREADS #/AREA URNS LPF: PRESENT /LPF
NEUTROPHILS # BLD AUTO: 2 10E9/L (ref 1.6–8.3)
NEUTROPHILS NFR BLD AUTO: 45.1 %
NITRATE UR QL: NEGATIVE
NONHDLC SERPL-MCNC: 133 MG/DL
NRBC # BLD AUTO: 0 10*3/UL
NRBC BLD AUTO-RTO: 0 /100
PH UR STRIP: 5 PH (ref 5–7)
PLATELET # BLD AUTO: 309 10E9/L (ref 150–450)
POTASSIUM SERPL-SCNC: 3.9 MMOL/L (ref 3.4–5.3)
PROT SERPL-MCNC: 8.3 G/DL (ref 6.8–8.8)
RBC # BLD AUTO: 4.07 10E12/L (ref 3.8–5.2)
RBC #/AREA URNS AUTO: <1 /HPF (ref 0–2)
SODIUM SERPL-SCNC: 139 MMOL/L (ref 133–144)
SOURCE: ABNORMAL
SP GR UR STRIP: 1.02 (ref 1–1.03)
SQUAMOUS #/AREA URNS AUTO: 5 /HPF (ref 0–1)
TRIGL SERPL-MCNC: 142 MG/DL
UROBILINOGEN UR STRIP-MCNC: 0 MG/DL (ref 0–2)
WBC # BLD AUTO: 4.4 10E9/L (ref 4–11)
WBC #/AREA URNS AUTO: 2 /HPF (ref 0–5)

## 2020-01-10 PROCEDURE — 86359 T CELLS TOTAL COUNT: CPT | Performed by: INTERNAL MEDICINE

## 2020-01-10 PROCEDURE — 86360 T CELL ABSOLUTE COUNT/RATIO: CPT | Performed by: INTERNAL MEDICINE

## 2020-01-10 PROCEDURE — 87536 HIV-1 QUANT&REVRSE TRNSCRPJ: CPT | Performed by: INTERNAL MEDICINE

## 2020-01-10 PROCEDURE — 80053 COMPREHEN METABOLIC PANEL: CPT | Performed by: INTERNAL MEDICINE

## 2020-01-10 PROCEDURE — 25000128 H RX IP 250 OP 636: Mod: ZF | Performed by: INTERNAL MEDICINE

## 2020-01-10 PROCEDURE — 90686 IIV4 VACC NO PRSV 0.5 ML IM: CPT | Mod: ZF | Performed by: INTERNAL MEDICINE

## 2020-01-10 PROCEDURE — G0008 ADMIN INFLUENZA VIRUS VAC: HCPCS

## 2020-01-10 PROCEDURE — 81001 URINALYSIS AUTO W/SCOPE: CPT | Performed by: INTERNAL MEDICINE

## 2020-01-10 PROCEDURE — 80061 LIPID PANEL: CPT | Performed by: INTERNAL MEDICINE

## 2020-01-10 PROCEDURE — 36415 COLL VENOUS BLD VENIPUNCTURE: CPT | Performed by: INTERNAL MEDICINE

## 2020-01-10 PROCEDURE — 85025 COMPLETE CBC W/AUTO DIFF WBC: CPT | Performed by: INTERNAL MEDICINE

## 2020-01-10 RX ADMIN — INFLUENZA A VIRUS A/BRISBANE/02/2018 IVR-190 (H1N1) ANTIGEN (FORMALDEHYDE INACTIVATED), INFLUENZA A VIRUS A/KANSAS/14/2017 X-327 (H3N2) ANTIGEN (FORMALDEHYDE INACTIVATED), INFLUENZA B VIRUS B/PHUKET/3073/2013 ANTIGEN (FORMALDEHYDE INACTIVATED), AND INFLUENZA B VIRUS B/MARYLAND/15/2016 BX-69A ANTIGEN (FORMALDEHYDE INACTIVATED) 0.5 ML: 15; 15; 15; 15 INJECTION, SUSPENSION INTRAMUSCULAR at 13:26

## 2020-01-10 NOTE — PROGRESS NOTES
"    Infectious Disease Clinic Note    Reason for visit:  Return for HIV follow-up      SUBJECTIVE    Ms. Marina Mock is a 40 y/o woman returning to for HIV follow-up.  I have not seen her since 6/2019 when she was still pregnant.      Marina reports that her infant son \"Jorge\" is doing very well.  He was born by induced vaginal delivery 7/8/2019.  She describes him as \"very healthy,\" wearing 18-24 month clothing despite being 6 months of age.  The infant is not with Marina today, but she did bring her 3.5 year old daughter Anel.    Although Marina chose to use Depo-provera injections for contraception, she does not intend on continuing.  She has breakthrough bleeding and does not like this.   She plans to return to following her ovulation cycles to prevent pregnancy.   I did  her that this method often fails.   Marina continues on her regimen of Truvada and dolutegravir that she took during the latter two semesters of her pregnancy.     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   is helping as well, but he is working full time as a mental health counselor.  Anel and Jorge do not attend .    Both Anel and Jorge tested negative for HIV after completing the initial course of prophylactic zidovudine.      PAST MEDICAL HISTORY:    Past Medical History:   Diagnosis Date     Asymptomatic human immunodeficiency virus (HIV) infection status (H) 2016     Gestational diabetes     diet controlled      Malaria     in [place]      Miscarriage      TB (pulmonary tuberculosis)     latent     PAST SURGICAL HISTORY:  Past Surgical History:   Procedure Laterality Date     NO HISTORY OF SURGERY       FAMILY PAST MEDICAL HISTORY:  Family History   Problem Relation Age of Onset     Family History Negative Other      Hypertension Father      Hypertension Other      Cancer No family hx of      Diabetes No family hx of      Macular Degeneration No family hx of      " Retinal detachment No family hx of      SOCIAL HISTORY:  Social History     Socioeconomic History     Marital status:      Spouse name: Chris Roldan   Tobacco Use     Smoking status: Never Smoker     Smokeless tobacco: Never Used   Substance and Sexual Activity     Alcohol use: No     Drug use: No     Sexual activity: Yes     Partners: Male   Social History Narrative    ** Merged History Encounter **     How much exercise per week? Active with 2 yr    How much calcium per day? supplement       How much caffeine per day? none    How much vitamin D per day? supplement    Do you/your family wear seatbelts?  Yes    Do you/your family use safety helmets? n/a    Do you/your family use sunscreen? No    Do you/your family keep firearms in the home? No    Do you/your family have a smoke detector(s)? 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, 2019 Aldo Perez LPN    Chata FINNEYM APRN            CURRENT MEDICATIONS:    Current Outpatient Medications   Medication     blood glucose (NO BRAND SPECIFIED) test strip     blood glucose monitoring (ULTRA THIN 30G) lancets     metFORMIN (GLUCOPHAGE) 500 MG tablet     Nutritional Supplements (ENSURE ACTIVE PO)     Current Facility-Administered Medications   Medication     medroxyPROGESTERone (DEPO-PROVERA) syringe 150 mg     ALLERGIES:  No Known Allergies    REVIEW OF SYSTEMS: A full 12-point review of systems was obtained, pertinent positives and negatives as above.     PHYSICAL EXAMINATION:    VITAL SIGNS: /68   Pulse 65   Temp 98.4  F (36.9  C) (Oral)   Wt 51.3 kg (113 lb)   SpO2 100%   BMI 21.33 kg/m    GENERAL:   No acute distress    HEENT: No icterus or injection. Oropharynx moist and clear without lesions or exudate.    NECK: Supple and nontender  LYMPH:  No cervical, axillary or inguinal lymphadenopathy  LUNGS: Clear to ausculation bilaterally without any increased work of breathing  HEART: Regular rate and rhythm and no murmur, gallop or rub    ABDOMEN:  Normoactive bowel  sounds, soft, nontender  EXTREMITIES: Warm and well perfused without clubbing, cyanosis, or edema  SKIN:  No rashes  NEURO:  Awake, alert, no focal neurologic deficits.  PSYCH: Affect normal. Speech fluent and appropriate.     LABORATORY DATA:    Reviewed.   Last 6/14/2019  HIV viral load not detected  Absolute CD4 - 385        ASSESSMENT AND PLAN:       HIV infection    Marnia is tolerating once daily Truvada + dolutegravir.  She does not desire pregnancy at this time, but she is planning to follow her ovulation cycles to prevent pregnancy.  As this is not a very reliable method to prevent pregnancy, I must make sure that she is on a regimen that would be safe at the time of conception.  Dolutegravir is not recommended for women of child-bearing age not using reliable contraception.  Dolutegravir may be related to increased risk of neural tube defects which occur shortly after conception.    Marina did not do well with a twice daily regimen in the past with BID raltegravir.  Therefore, I recommended Odefsey (TAF/emtricitabine/ripilvirine), a once daily single tablet regimen.   While the TAF would need to be changed to TDF if Marina were to get pregnant, we do not expect that TAF would cause teratogenicity.  The concern is more about adequate drug levels for TAF vs TDF in pregnancy.    We will check routine HIV labs today.  Marina will need to return in 1 month after switch to Odefsey for repeat labs and discussion of adherence/adverse effects.     History of anemia, microcytic, did not take iron tabs  Checking CBC with Diff again today     LTBI (latent tuberculosis infection)  Previously, Marina was counseled about treatment for LTBI. She experienced side effects from both rifampin and isoniazid and has not been willing to take either medication.  She was previously counseled by my colleague about the risk this poses to herself given her HIV, as well as the concept of active tuberculosis exposure to  children under 5 being very high risk.       Preventative Medicine  Cardiovascular Sal -                Lipids - 12/21/17 - Total Chol 212, HDL 51 - annual screen overdue, repeating today               Blood Pressure - normal /68 today. Will continue to follow.   Cancer Screening               Colon - No indication for screening at present               Cervical - pap smear at last OB visit, with ASCUS and has history of LSIL in 2016 without colposcopy or follow-up, now scheduled for colposcopy  Immunizations               Hepatitis A - Ab positive 3/2016               Hepatitis B - Surface Ab/Ag and core Ab negative, hx of vaccination in 2015 in outside records, but will need booster                Influenza - ordered for today               Pneumovax/Prevnar - Had PCV on 3/16/2017,  PSV23 4/5/2018               Tdap - 4/24/2019  Bone Health - No screening indicated at this time  Renal Health - 8/2016 - no proteinuria, Creatinine has been normal.  Annual screen overdue.  Urinalysis today.  CMP will contain a creatinine as well.  Sexually Transmitted Infection Risk and Screening               Gonorrhea and Chlamydia - Negative from urine sample 2/4/2019 in OB screening               Syphilis - Negative T pallidum Ab 1/9/2017 in OB screening      Marina will return to see me in 1 month after switching to Odefsey.    Alfredo Martinez MD, MS    Infectious Disease    pager: [phone]      I spent 30 minutes in direct care with this patient, including >50% of time face-to-face in counseling

## 2020-01-10 NOTE — LETTER
Date:January 28, 2020      Patient was self referred, no letter generated. Do not send.        [facility] Information

## 2020-01-10 NOTE — Clinical Note
1/10/2020       RE: Marina Mock  [address]  [facility] [address]     Dear Colleague,    Thank you for referring your patient, Marina Mock, to the [facility] AND INFECTIOUS DISEASES at [facility]. Please see a copy of my visit note below.      HIV follow-up    Again, thank you for allowing me to participate in the care of your patient.      Sincerely,    Alfredo Martinez MD

## 2020-01-29 ENCOUNTER — ALLIED HEALTH/NURSE VISIT (OUTPATIENT)
Dept: PHARMACY | Facility: CLINIC | Age: 40
End: 2020-01-29
Payer: COMMERCIAL

## 2020-01-29 ENCOUNTER — TELEPHONE (OUTPATIENT)
Dept: PHARMACY | Facility: CLINIC | Age: 40
End: 2020-01-29

## 2020-01-29 DIAGNOSIS — Z21 HUMAN IMMUNODEFICIENCY VIRUS I INFECTION (H): Primary | ICD-10-CM

## 2020-01-29 PROCEDURE — 99207 ZZC NO CHARGE LOS: CPT | Performed by: PHARMACIST

## 2020-01-29 NOTE — PROGRESS NOTES
Therapy Management:                                                    Marina Mock is a 39 year old female called for a therapy management visit.      Reason for Consult: medication question.    Discussion: Marina reports on 01/10/20 she was switched from Tivicay and Truvada to Odefsey, and about a few days after the switch she got a cold. Reports the cold is almost gone, but is wondering if it is from the med switch. Reports she believes she is tolerating Odefsey except for some nausea. Reports she sometimes takes it on an empty stomach and sometimes with food and believes food helps nausea symptoms. Reports she keeps in touch with Michelle for refills, and reports no missed doses. She reports her blood sugars have been better since starting Odefsey and says her AM fasting sugars are between 70-92 and in the evening they are . Reports she has not started taking Metformin yet, as she want to control blood sugars with diet and exercise. Reports she is Googling diabetic diet and is following this. Reports she is following up with endo soon. Reports she did not make a follow up appt and needs to see Dr. Martinez in one month.     Plan:  1. Suggested she continue to take Odefsey with food, as rilpiverine is better absorbed with food and this can help with nausea symtoms. She agreed to this.  2. Discussed the cold she got was a coincidence and we reviewed her CD4 and reviewed how she can get a cold just like every one else, and her immune system is strong.  3. Encouraged pt to follow with diabetes clinic. She agreed.  4. Will have  contact pt to make follow up appt.    Harmony Connor, [facility] Pharmacist.   [phone]

## 2020-01-29 NOTE — TELEPHONE ENCOUNTER
TUSHAR for pt to call me. Would like to ck on medication adherence. She saw Dr. Martinez on 01/10. I was unable to see pt that day.    Harmony Connor, [facility] Pharmacist.   [phone]

## 2020-01-30 ENCOUNTER — TELEPHONE (OUTPATIENT)
Dept: PHARMACY | Facility: CLINIC | Age: 40
End: 2020-01-30

## 2020-01-30 NOTE — TELEPHONE ENCOUNTER
Attempted to contact the patient to for refill reminder call,  left message on voicemail    Last filled on: 01/10/20    Follow-up Date: 02/05/20 2nd attempt    Michelle Ricci CPhT  [facility]  [phone]

## 2020-01-31 ENCOUNTER — OFFICE VISIT (OUTPATIENT)
Dept: OBGYN | Facility: CLINIC | Age: 40
End: 2020-01-31
Attending: OBSTETRICS & GYNECOLOGY
Payer: COMMERCIAL

## 2020-01-31 VITALS
BODY MASS INDEX: 20.6 KG/M2 | WEIGHT: 109.1 LBS | SYSTOLIC BLOOD PRESSURE: 134 MMHG | DIASTOLIC BLOOD PRESSURE: 77 MMHG | HEIGHT: 61 IN | HEART RATE: 128 BPM

## 2020-01-31 DIAGNOSIS — Z01.812 PRE-PROCEDURE LAB EXAM: Primary | ICD-10-CM

## 2020-01-31 DIAGNOSIS — R87.610 ASCUS WITH POSITIVE HIGH RISK HPV CERVICAL: ICD-10-CM

## 2020-01-31 DIAGNOSIS — R87.810 ASCUS WITH POSITIVE HIGH RISK HPV CERVICAL: ICD-10-CM

## 2020-01-31 LAB
HCG UR QL: NEGATIVE
INTERNAL QC OK POCT: YES

## 2020-01-31 PROCEDURE — 57454 BX/CURETT OF CERVIX W/SCOPE: CPT | Mod: ZF | Performed by: OBSTETRICS & GYNECOLOGY

## 2020-01-31 PROCEDURE — 81025 URINE PREGNANCY TEST: CPT | Mod: ZF | Performed by: OBSTETRICS & GYNECOLOGY

## 2020-01-31 PROCEDURE — 88305 TISSUE EXAM BY PATHOLOGIST: CPT | Performed by: OBSTETRICS & GYNECOLOGY

## 2020-01-31 PROCEDURE — G0463 HOSPITAL OUTPT CLINIC VISIT: HCPCS | Mod: ZF

## 2020-01-31 ASSESSMENT — ANXIETY QUESTIONNAIRES
3. WORRYING TOO MUCH ABOUT DIFFERENT THINGS: NOT AT ALL
GAD7 TOTAL SCORE: 0
1. FEELING NERVOUS, ANXIOUS, OR ON EDGE: NOT AT ALL
2. NOT BEING ABLE TO STOP OR CONTROL WORRYING: NOT AT ALL
5. BEING SO RESTLESS THAT IT IS HARD TO SIT STILL: NOT AT ALL
7. FEELING AFRAID AS IF SOMETHING AWFUL MIGHT HAPPEN: NOT AT ALL
6. BECOMING EASILY ANNOYED OR IRRITABLE: NOT AT ALL

## 2020-01-31 ASSESSMENT — MIFFLIN-ST. JEOR: SCORE: 1117.25

## 2020-01-31 ASSESSMENT — PATIENT HEALTH QUESTIONNAIRE - PHQ9: 5. POOR APPETITE OR OVEREATING: NOT AT ALL

## 2020-01-31 NOTE — LETTER
"1/31/2020       RE: Marina Mock  [address]  [facility] [address]     Dear Colleague,    Thank you for referring your patient, Marina Mock, to [facility] Please see a copy of my visit note below.    [facility]  Colposcopy Procedure Note    39 year old  female presents for colposcopy due to pap smear on 12/2/2019 showing ASCUS, other HR HPV positive. Reports no prior colposcopies.     - Pap Smear History:      PAP   Date Value Ref Range Status   12/02/2019 ASC-US (A)  Final   03/09/2016 lsil  Final        HPV 16 DNA   Date Value Ref Range Status   12/02/2019 Negative NEG^Negative Final     HPV 18 DNA   Date Value Ref Range Status   12/02/2019 Negative NEG^Negative Final     Other HR HPV   Date Value Ref Range Status   12/02/2019 Positive (A) NEG^Negative Final      - Prior Cervical disease: No  - Prior cervical treatment: No prior excisional procedures     Cervical Cancer Risk Factors:  - Smoking: Never Smoker  - Sexual partners: Not sexually active for the last 6 months. Monogamous with her spouse.   - Immunosuppression: HIV positive on Odefsey, Viral load undetectable and CD4 count 622 on 1/10/2020  - HPV vaccination status: Never vaccinated     Time Out - \"Pause for the Cause\"  Just before the procedure begins, through verbal and active participation of team members, verify:    Initials   Patient Name    NRQ   Patient Date of Birth NRQ   Procedure to be performed  NRQ     Procedure for colposcopy and biopsy has been explained to the patient.  Consent:  Risks, benefits of treatment, and no treatment were discussed.  Patient's questions were elicited and answered. , Written consent signed and scanned into medical record. and Patient received and verbalized understanding of discharge instructions    Findings:  Cervix:   - Squamocolumnar junction fully visualized. Colposcopy satisfactory. Ectropion present.    - Focal " Acetyl white changes noted at 4-7 o'clock    - Hemostasis after liberal application of Monsel's paste and application of pressure.      EBL 10 cc    Procedure:  Speculum placed in vagina and excellent visualization of cervix achieved. Cervix swabbed x 3 with acetic acid solution. Colposcopy of cervix preformed with above findings. Cervical biopsy preformed at 6 o'clock with Tischler forceps. Endocervical curettage preformed with Kevorkian endocervical curette.     Assessment:   Ms. Marina Mock is a 39 year old  with low-grade HPV related changes on colposcopy.    PLAN:   -Specimens sent to pathology  -Will base further treatment on pathology findings.  Pelvic rest for 3 days. Emphasized return precautions for post-procedural bleeding.     Ray Lyn MD  Ob/Gyn Resident, PGY-1  20 2:20 PM    I have seen and examined the patient with the resident. I have reviewed, edited, and agree with the note. I was there for the entire procedure and helped with hemostasis.  My findings are: as above.    Alvaro Heard M.D.

## 2020-01-31 NOTE — PROGRESS NOTES
"[facility]  Colposcopy Procedure Note    39 year old  female presents for colposcopy due to pap smear on 12/2/2019 showing ASCUS, other HR HPV positive. Reports no prior colposcopies.     - Pap Smear History:      PAP   Date Value Ref Range Status   12/02/2019 ASC-US (A)  Final   03/09/2016 lsil  Final        HPV 16 DNA   Date Value Ref Range Status   12/02/2019 Negative NEG^Negative Final     HPV 18 DNA   Date Value Ref Range Status   12/02/2019 Negative NEG^Negative Final     Other HR HPV   Date Value Ref Range Status   12/02/2019 Positive (A) NEG^Negative Final      - Prior Cervical disease: No  - Prior cervical treatment: No prior excisional procedures     Cervical Cancer Risk Factors:  - Smoking: Never Smoker  - Sexual partners: Not sexually active for the last 6 months. Monogamous with her spouse.   - Immunosuppression: HIV positive on Odefsey, Viral load undetectable and CD4 count 622 on 1/10/2020  - HPV vaccination status: Never vaccinated     Time Out - \"Pause for the Cause\"  Just before the procedure begins, through verbal and active participation of team members, verify:    Initials   Patient Name    NRQ   Patient Date of Birth NRQ   Procedure to be performed  NRQ     Procedure for colposcopy and biopsy has been explained to the patient.  Consent:  Risks, benefits of treatment, and no treatment were discussed.  Patient's questions were elicited and answered. , Written consent signed and scanned into medical record. and Patient received and verbalized understanding of discharge instructions    Findings:  Cervix:   - Squamocolumnar junction fully visualized. Colposcopy satisfactory. Ectropion present.    - Focal Acetyl white changes noted at 4-7 o'clock    - Hemostasis after liberal application of Monsel's paste and application of pressure.      EBL 10 cc    Procedure:  Speculum placed in vagina and excellent visualization of cervix achieved. Cervix swabbed x 3 with acetic acid solution. Colposcopy of " cervix preformed with above findings. Cervical biopsy preformed at 6 o'clock with Tischler forceps. Endocervical curettage preformed with Kevorkian endocervical curette.     Assessment:   Ms. Marina Mock is a 39 year old  with low-grade HPV related changes on colposcopy.    PLAN:   -Specimens sent to pathology  -Will base further treatment on pathology findings.  Pelvic rest for 3 days. Emphasized return precautions for post-procedural bleeding.     Ray Lyn MD  Ob/Gyn Resident, PGY-1  20 2:20 PM    I have seen and examined the patient with the resident. I have reviewed, edited, and agree with the note. I was there for the entire procedure and helped with hemostasis.  My findings are: as above.  Alvaro Heard M.D.

## 2020-02-01 ASSESSMENT — ANXIETY QUESTIONNAIRES: GAD7 TOTAL SCORE: 0

## 2020-02-05 NOTE — TELEPHONE ENCOUNTER
Pt called back.   Will  prescriptions address has been verified.     1 month of on time refill.    Last Filled on: 01/10/20   Follow-up Date: 03/06/20    Michelle Ricci CPhT  [facility]  [phone]

## 2020-02-12 LAB — COPATH REPORT: NORMAL

## 2020-02-17 ENCOUNTER — ALLIED HEALTH/NURSE VISIT (OUTPATIENT)
Dept: OBGYN | Facility: CLINIC | Age: 40
End: 2020-02-17
Payer: COMMERCIAL

## 2020-02-17 DIAGNOSIS — Z30.42 ENCOUNTER FOR DEPO-PROVERA CONTRACEPTION: Primary | ICD-10-CM

## 2020-02-17 PROCEDURE — 96372 THER/PROPH/DIAG INJ SC/IM: CPT

## 2020-02-17 PROCEDURE — 25000128 H RX IP 250 OP 636: Mod: ZF | Performed by: ADVANCED PRACTICE MIDWIFE

## 2020-02-17 RX ADMIN — MEDROXYPROGESTERONE ACETATE 150 MG: 150 INJECTION, SUSPENSION INTRAMUSCULAR at 12:20

## 2020-03-05 ENCOUNTER — TELEPHONE (OUTPATIENT)
Dept: PHARMACY | Facility: CLINIC | Age: 40
End: 2020-03-05

## 2020-03-05 NOTE — TELEPHONE ENCOUNTER
Called patient for refill reminder.    Will  prescriptions address has been verified.     2 month of on time refill.    Last Filled on: 02/06/20   Follow-up Date: 04/03/20    Michelle Ricci CPhT  [facility]  [phone]

## 2020-03-16 ENCOUNTER — TELEPHONE (OUTPATIENT)
Dept: OBGYN | Facility: CLINIC | Age: 40
End: 2020-03-16

## 2020-03-16 NOTE — TELEPHONE ENCOUNTER
----- Message from Melanie Brooks sent at 3/9/2020  9:11 AM CDT -----  Regarding: call back/ questions about depo shot  Pt requesting call back to verify the date she needs to come back for her next depo shot. Pt can be reached at [phone]. Thanks.    [facility]  Call Center    Please DO NOT send this message and/or reply back to sender.  Call Center Representatives DO NOT respond to messages.

## 2020-03-16 NOTE — TELEPHONE ENCOUNTER
Left message for patient that she can get next depo shot in early May 5/5-5/19 of this year. Last injection was 2/17/20.

## 2020-03-20 ENCOUNTER — TELEPHONE (OUTPATIENT)
Dept: INFECTIOUS DISEASES | Facility: CLINIC | Age: 40
End: 2020-03-20

## 2020-03-26 DIAGNOSIS — E11.9 TYPE 2 DIABETES MELLITUS WITHOUT COMPLICATION, WITHOUT LONG-TERM CURRENT USE OF INSULIN (H): ICD-10-CM

## 2020-03-26 LAB
CHOLEST SERPL-MCNC: 234 MG/DL
HDLC SERPL-MCNC: 51 MG/DL
LDLC SERPL CALC-MCNC: 168 MG/DL
NONHDLC SERPL-MCNC: 183 MG/DL
TRIGL SERPL-MCNC: 76 MG/DL

## 2020-03-26 PROCEDURE — 36415 COLL VENOUS BLD VENIPUNCTURE: CPT | Performed by: INTERNAL MEDICINE

## 2020-03-26 PROCEDURE — 80061 LIPID PANEL: CPT | Performed by: INTERNAL MEDICINE

## 2020-03-27 ENCOUNTER — VIRTUAL VISIT (OUTPATIENT)
Dept: INFECTIOUS DISEASES | Facility: CLINIC | Age: 40
End: 2020-03-27
Attending: INTERNAL MEDICINE
Payer: COMMERCIAL

## 2020-03-27 DIAGNOSIS — B20 HUMAN IMMUNODEFICIENCY VIRUS (HIV) DISEASE (H): Primary | ICD-10-CM

## 2020-03-27 NOTE — PROGRESS NOTES
"  Marina Mock is a 39 year old female who is being evaluated via a billable telephone visit.      The patient has been notified of following:     \"This telephone visit will be conducted via a call between you and your physician/provider. We have found that certain health care needs can be provided without the need for a physical exam.  This service lets us provide the care you need with a short phone conversation.  If a prescription is necessary we can send it directly to your pharmacy.  If lab work is needed we can place an order for that and you can then stop by our lab to have the test done at a later time.    If during the course of the call the physician/provider feels a telephone visit is not appropriate, you will not be charged for this service.\"     Physician has received verbal consent for a Telephone Visit from the patient? YES        Reason for visit:   Infectious Disease Return Visit, planned follow-up for HIV infection    SUBJECTIVE:    Marina is well known to me.  I follow her for her HIV infection.  During our last visit, we decided to change medications to Odefsey as of 1/10/2020.   She was meant to return in 1 month for labs after the change in antiretroviral regimen.    Marina is not having any side effects.  She likes the small single tablet to take once daily.    Sadly, her mother  just 3 weeks ago.  She had cancer and kidney problems, and she  at home in [place].  Marina was very sad to not be able to see her before she .  She is feeling very down about this.  Marina has a heavy feeling on her chest that she thinks is from stress and anxiety.    Marina is staying home with her children right now.  Due to COVID, she stopped picking up shifts.      OBJECTIVE:    Current Outpatient Medications   Medication     blood glucose (NO BRAND SPECIFIED) test strip     blood glucose monitoring (ULTRA THIN 30G) lancets     emtricitabine-rilpivirine-tenofovir (ODEFSEY) 200-25-25 MG TABS " per tablet     metFORMIN (GLUCOPHAGE) 500 MG tablet     Nutritional Supplements (ENSURE ACTIVE PO)     Current Facility-Administered Medications   Medication     medroxyPROGESTERone (DEPO-PROVERA) syringe 150 mg       No Known Allergies      No new labs or imaging to review.  Last had labs 1/10/2020.      ASSESSMENT/PLAN:    HIV infection.    Last absolute CD4 622 as of 1/10/2020 and undetectable.  She has since had a change in her antiretroviral medication, and it is important that we re-check labs for both toxicity and efficacy.    I asked [facility] to please schedule labs to be done (closer to home is OK).  If labs are acceptable, I will refill the Odefsey, and Marina can follow-up with me again in 3 months.   (in person if possible, but virtual will also be OK).    Alfredo Martinez MD, MS  Infectious Disease    Time:  I spent 25 minutes on the telephone with the patient, including >50% of time in counseling.      Phone call duration: 25 minutes

## 2020-03-31 ENCOUNTER — DOCUMENTATION ONLY (OUTPATIENT)
Dept: INFECTIOUS DISEASES | Facility: CLINIC | Age: 40
End: 2020-03-31

## 2020-03-31 ENCOUNTER — TELEPHONE (OUTPATIENT)
Dept: ENDOCRINOLOGY | Facility: CLINIC | Age: 40
End: 2020-03-31

## 2020-03-31 NOTE — PROGRESS NOTES
Patient has lab only appointment scheduled for 04/09/20.   Due to current coronavirus pandemic, please consider whether these lab tests can be deferred.     Please open ORDER REVIEW, review orders, and then confirm or defer orders ASAP.  Enter <dot>keep or <dot>defer smart phrase into NOTES, complete documentation, and route encounter.  [facility]: /team  [facility]: individual provider pool  [facility] primary care:   [facility] specialty: scheduling pool

## 2020-03-31 NOTE — TELEPHONE ENCOUNTER
----- Message from Ritesh Benites MD sent at 3/26/2020  4:17 PM CDT -----  Hi,    Can you please set her up for phone visit to follow up diabetes and hyperlipidemia?    Thanks,Ritesh

## 2020-04-01 ENCOUNTER — TELEPHONE (OUTPATIENT)
Dept: PHARMACY | Facility: CLINIC | Age: 40
End: 2020-04-01

## 2020-04-01 NOTE — TELEPHONE ENCOUNTER
Called patient for refill reminder.    Will  prescriptions address has been verified.     3 month of on time refill.    Last Filled on: 03/05/20   Follow-up Date: 05/04/20    Michelle Ricci CPhT  [facility]  [phone]

## 2020-04-01 NOTE — PROGRESS NOTES
The need for performing scheduled test(s) has been reviewed, and test(s) should be performed at this time, as ordered.    Alfredo Martinez MD, MS  Infectious Disease

## 2020-04-03 DIAGNOSIS — B20 HUMAN IMMUNODEFICIENCY VIRUS (HIV) DISEASE (H): ICD-10-CM

## 2020-04-03 RX ORDER — POLYMYXIN B SULFATE, BACITRACIN ZINC AND NEOMYCIN SULFATE 400; 3.5; 5 [USP'U]/G; MG/G; [USP'U]/G
1 OINTMENT TOPICAL
Qty: 30 TABLET | Refills: 11 | Status: SHIPPED | OUTPATIENT
Start: 2020-04-03 | End: 2021-02-12

## 2020-04-09 ENCOUNTER — DOCUMENTATION ONLY (OUTPATIENT)
Dept: CARE COORDINATION | Facility: CLINIC | Age: 40
End: 2020-04-09

## 2020-04-25 ENCOUNTER — DOCUMENTATION ONLY (OUTPATIENT)
Dept: LAB | Facility: CLINIC | Age: 40
End: 2020-04-25

## 2020-04-25 NOTE — PROGRESS NOTES
Patient has lab only appointment scheduled for 05/05/20.   Due to current coronavirus pandemic, please consider whether these lab tests can be deferred.     Please open ORDER REVIEW, review orders, and then confirm or defer orders ASAP.  Enter <dot>keep or <dot>defer smart phrase into NOTES, complete documentation, and route encounter.  [facility]: /team  [facility]: individual provider pool  [facility] primary care:   [facility] specialty: scheduling pool

## 2020-05-04 ENCOUNTER — TELEPHONE (OUTPATIENT)
Dept: PHARMACY | Facility: CLINIC | Age: 40
End: 2020-05-04

## 2020-05-04 NOTE — TELEPHONE ENCOUNTER
Attempted to contact the patient to for refill reminder call,  left message on voicemail    Last filled on: 04/06/20    Follow-up Date: 05/08/20 2nd attempt    Michelle Ricci CPhT  [facility]  [phone]

## 2020-05-07 ENCOUNTER — DOCUMENTATION ONLY (OUTPATIENT)
Dept: LAB | Facility: CLINIC | Age: 40
End: 2020-05-07

## 2020-05-07 NOTE — PROGRESS NOTES
Patient has lab only appointment scheduled for 05/12/2020.   Due to current coronavirus pandemic, please consider whether these lab tests can be deferred.     Please open ORDER REVIEW, review orders, and then confirm or defer orders ASAP.  Enter <dot>keep or <dot>defer smart phrase into NOTES, complete documentation, and route encounter.  [facility]: /team  [facility]: individual provider pool  [facility] primary care:   [facility] specialty: scheduling pool

## 2020-05-12 NOTE — TELEPHONE ENCOUNTER
Called patient for refill reminder.    Will mail prescriptions address has been verified.         4 month of on time refill.    Last Filled on:04/06/2020   Follow-up Date: 06/01/2020    Wisam Moore  -----------------------------------------------   [email or web address]  Pharmacy Technician  [facility]: [phone]

## 2020-05-13 ENCOUNTER — ALLIED HEALTH/NURSE VISIT (OUTPATIENT)
Dept: NURSING | Facility: CLINIC | Age: 40
End: 2020-05-13
Payer: COMMERCIAL

## 2020-05-13 DIAGNOSIS — B20 HUMAN IMMUNODEFICIENCY VIRUS (HIV) DISEASE (H): ICD-10-CM

## 2020-05-13 DIAGNOSIS — Z30.9 CONTRACEPTIVE MANAGEMENT: Primary | ICD-10-CM

## 2020-05-13 LAB
ALBUMIN SERPL-MCNC: 4 G/DL (ref 3.4–5)
ALP SERPL-CCNC: 60 U/L (ref 40–150)
ALT SERPL W P-5'-P-CCNC: 25 U/L (ref 0–50)
ANION GAP SERPL CALCULATED.3IONS-SCNC: 3 MMOL/L (ref 3–14)
AST SERPL W P-5'-P-CCNC: 19 U/L (ref 0–45)
BASOPHILS # BLD AUTO: 0 10E9/L (ref 0–0.2)
BASOPHILS NFR BLD AUTO: 0.2 %
BILIRUB SERPL-MCNC: 0.3 MG/DL (ref 0.2–1.3)
BUN SERPL-MCNC: 11 MG/DL (ref 7–30)
CALCIUM SERPL-MCNC: 9.3 MG/DL (ref 8.5–10.1)
CHLORIDE SERPL-SCNC: 107 MMOL/L (ref 94–109)
CO2 SERPL-SCNC: 27 MMOL/L (ref 20–32)
CREAT SERPL-MCNC: 0.7 MG/DL (ref 0.52–1.04)
DIFFERENTIAL METHOD BLD: ABNORMAL
EOSINOPHIL # BLD AUTO: 0.1 10E9/L (ref 0–0.7)
EOSINOPHIL NFR BLD AUTO: 2.7 %
ERYTHROCYTE [DISTWIDTH] IN BLOOD BY AUTOMATED COUNT: 17.4 % (ref 10–15)
GFR SERPL CREATININE-BSD FRML MDRD: >90 ML/MIN/{1.73_M2}
GLUCOSE SERPL-MCNC: 104 MG/DL (ref 70–99)
HCT VFR BLD AUTO: 34.4 % (ref 35–47)
HGB BLD-MCNC: 11.6 G/DL (ref 11.7–15.7)
LYMPHOCYTES # BLD AUTO: 2.3 10E9/L (ref 0.8–5.3)
LYMPHOCYTES NFR BLD AUTO: 47.3 %
MCH RBC QN AUTO: 23.8 PG (ref 26.5–33)
MCHC RBC AUTO-ENTMCNC: 33.7 G/DL (ref 31.5–36.5)
MCV RBC AUTO: 71 FL (ref 78–100)
MONOCYTES # BLD AUTO: 0.4 10E9/L (ref 0–1.3)
MONOCYTES NFR BLD AUTO: 8 %
NEUTROPHILS # BLD AUTO: 2.1 10E9/L (ref 1.6–8.3)
NEUTROPHILS NFR BLD AUTO: 41.8 %
PLATELET # BLD AUTO: 252 10E9/L (ref 150–450)
POTASSIUM SERPL-SCNC: 3.8 MMOL/L (ref 3.4–5.3)
PROT SERPL-MCNC: 9.1 G/DL (ref 6.8–8.8)
RBC # BLD AUTO: 4.87 10E12/L (ref 3.8–5.2)
SODIUM SERPL-SCNC: 137 MMOL/L (ref 133–144)
WBC # BLD AUTO: 4.9 10E9/L (ref 4–11)

## 2020-05-13 PROCEDURE — 96372 THER/PROPH/DIAG INJ SC/IM: CPT

## 2020-05-13 PROCEDURE — 87536 HIV-1 QUANT&REVRSE TRNSCRPJ: CPT | Performed by: INTERNAL MEDICINE

## 2020-05-13 PROCEDURE — 85025 COMPLETE CBC W/AUTO DIFF WBC: CPT | Performed by: INTERNAL MEDICINE

## 2020-05-13 PROCEDURE — 36415 COLL VENOUS BLD VENIPUNCTURE: CPT | Performed by: INTERNAL MEDICINE

## 2020-05-13 PROCEDURE — 80053 COMPREHEN METABOLIC PANEL: CPT | Performed by: INTERNAL MEDICINE

## 2020-05-13 RX ADMIN — MEDROXYPROGESTERONE ACETATE 150 MG: 150 INJECTION, SUSPENSION INTRAMUSCULAR at 09:45

## 2020-05-13 NOTE — PROGRESS NOTES
BP: Data Unavailable    LAST PAP/EXAM:   Lab Results   Component Value Date    PAP ASC-US 12/02/2019     URINE HCG:not indicated    The following medication was given:     MEDICATION: Depo Provera 150mg  ROUTE: IM  SITE: [facility]  : MYLAN  LOT #: [id number]  EXP:03/01/2021  NEXT INJECTION DUE: 7/29/20 - 8/12/20   Provider: Dr. Merrill

## 2020-05-13 NOTE — PROGRESS NOTES
Patient consents to receive outdoor care: Yes    Upon arrival, patient instructed to proceed to designated location, place vehicle in park, turn off, and remove keys     If we are unable to safely and ergonomically able to provide care- is the patient able to safely able to get out of car and transfer to a chair? Yes    Escorted patient to indoor exam room due to Place of depo injection    Patient would like to receive their AVS in person after care is given.

## 2020-05-14 ENCOUNTER — TELEPHONE (OUTPATIENT)
Dept: PHARMACY | Facility: CLINIC | Age: 40
End: 2020-05-14

## 2020-05-14 NOTE — TELEPHONE ENCOUNTER
TUSHAR for pt to call me. Would like to ck on medication adherence.    Harmony Connor, [facility] Pharmacist.   [phone]

## 2020-06-04 ENCOUNTER — TELEPHONE (OUTPATIENT)
Dept: PHARMACY | Facility: CLINIC | Age: 40
End: 2020-06-04

## 2020-06-04 NOTE — TELEPHONE ENCOUNTER
Attempted to contact the patient to for refill reminder call,  left message on voicemail    Last filled on: 05/06/20    Follow-up Date: 06/09/20 2nd attempt    Michelle Ricci CPhT  [facility]  [phone]

## 2020-06-09 NOTE — TELEPHONE ENCOUNTER
Attempted to contact the patient to for refill reminder call,  left message on voicemail    Last filled on: 05/06/2020    Follow-up Date: 06/19/2020    Wisam Moore  -----------------------------------------------   [email or web address]  Pharmacy Technician  [facility]: [phone]

## 2020-06-30 ENCOUNTER — TELEPHONE (OUTPATIENT)
Dept: ENDOCRINOLOGY | Facility: CLINIC | Age: 40
End: 2020-06-30

## 2020-06-30 DIAGNOSIS — E11.9 DM TYPE 2 (DIABETES MELLITUS, TYPE 2) (H): Primary | ICD-10-CM

## 2020-06-30 RX ORDER — LANCETS
EACH MISCELLANEOUS
Qty: 400 EACH | Refills: 3 | Status: SHIPPED | OUTPATIENT
Start: 2020-06-30

## 2020-06-30 NOTE — TELEPHONE ENCOUNTER
[facility] Call Center    Phone Message    May a detailed message be left on voicemail: yes     Reason for Call: Other: Pt called stating her meter is not working. She called her pharmacy and was directed to get a new battery// call us. Pt did try to put in a new battery but the meter still does not work. Please review and follow up with pt     Action Taken: Message routed to:  [facility] Endo    Travel Screening: Not Applicable

## 2020-07-03 ENCOUNTER — VIRTUAL VISIT (OUTPATIENT)
Dept: INFECTIOUS DISEASES | Facility: CLINIC | Age: 40
End: 2020-07-03
Attending: INTERNAL MEDICINE
Payer: COMMERCIAL

## 2020-07-03 DIAGNOSIS — B20 HUMAN IMMUNODEFICIENCY VIRUS (HIV) DISEASE (H): Primary | ICD-10-CM

## 2020-07-03 NOTE — PROGRESS NOTES
"Marina Mock is a 40 year old female who is being evaluated via a billable telephone visit.      The patient has been notified of following:     \"This telephone visit will be conducted via a call between you and your physician/provider. We have found that certain health care needs can be provided without the need for a physical exam.  This service lets us provide the care you need with a short phone conversation.  If a prescription is necessary we can send it directly to your pharmacy.  If lab work is needed we can place an order for that and you can then stop by our lab to have the test done at a later time.    Telephone visits are billed at different rates depending on your insurance coverage. During this emergency period, for some insurers they may be billed the same as an in-person visit.  Please reach out to your insurance provider with any questions.    If during the course of the call the physician/provider feels a telephone visit is not appropriate, you will not be charged for this service.\"    Patient has given verbal consent for Telephone visit?  Yes    What phone number would you like to be contacted at? [phone]    How would you like to obtain your AVS? Toi    Phone call duration: 15 minutes    Reason for visit:   Infectious Disease Return Visit, planned follow-up for HIV    SUBJECTIVE:    Marina is well known to me.  I follow her for her HIV infection. We decided to change medications to Odefsey as of 1/10/2020.   She was meant to return in 1 month for labs after the change in antiretroviral regimen.  We did not get those labs until 5/13/2020.   Thankfully, there was no toxicity seen on labs and the HIV viral load was not detected.     Marina is not having any side effects.  She likes the small single tablet to take once daily.  She takes it at night, not always with food.  Sometimes she feels chest discomfort that she thinks gets better when she takes her iron. She does not miss taking " her medication, but may have taken it later than planned 2-3 times per month.    Otherwise, Marina tells me she knows that she is due for Depo Provera injection -2020, and she is planning on going in to get it.   She also is asking for another glucose meter because she would like to be checking her blood sugar levels.     Sadly, her mother  earlier this .  She had cancer and kidney problems, and she  at home in [place]. Marina was feeling very sad about not being able to see her before she .       Marina is staying home with her children right now.  Due to COVID, she stopped picking up shifts.      I have reviewed and updated the patient's Past Medical History, Social History, Family History and Medication List.    OBJECTIVE:    Current Outpatient Medications   Medication     blood glucose (NO BRAND SPECIFIED) test strip     blood glucose (NO BRAND SPECIFIED) test strip     blood glucose monitoring (NO BRAND SPECIFIED) meter device kit     blood glucose monitoring (ULTRA THIN 30G) lancets     emtricitabine-rilpivirine-tenofovir (ODEFSEY) 200-25-25 MG TABS per tablet     metFORMIN (GLUCOPHAGE) 500 MG tablet     Nutritional Supplements (ENSURE ACTIVE PO)     thin (NO BRAND SPECIFIED) lancets     Current Facility-Administered Medications   Medication     medroxyPROGESTERone (DEPO-PROVERA) syringe 150 mg       No Known Allergies      Examination via telephone visit:     GENERAL: Patient is alert and does not seem to be in any acute distress    RESPIRATORY:  Breathing is not audible. No cough or labored breathing is noted.  PSYCH: Affect is bright. Speech fluent and appropriate.      The rest of a comprehensive physical examination is deferred due to the public health emergency telephone visit restrictions.      New labs:  2020  WBC 4.9, Hg 11.6 on iron  CMP with a glucose of 104, otherwise OK  HIV viral load not detected      ASSESSMENT:    Well controlled HIV    Marina is doing well  with Odefsey.   We will keep this going.   While Marina is using Depo Provera, she has missed her shot time interval and expressed dissatisfaction with this method, but no plan for an alternative.    Therefore, I view Marina's case as someone who may become pregnant again without planning.  Therefore, I am choosing Odefsey as a safer alternative at time of conception.      PLAN:    - continue Odefsey    - I sent an Epic message to Endocrine to let them know that Marina wants a glucose meter, and wants to get back into care to manage her diabetes    - Follow-up with me in about 3 months.   Mid September 2020 would be 4 months since last labs.        Alfredo Martinez MD, MS  Infectious Disease    Time:  I spent 15 total minutes on the telephone with the patient, including >50% of time in counseling.

## 2020-07-03 NOTE — LETTER
7/3/2020       RE: Marina Mock  [address]  [place] [address]     Dear Colleague,    Thank you for referring your patient, Marina Mock, to the [facility] AND INFECTIOUS DISEASES at [facility]. Please see a copy of my visit note below.    The patient was call to setup the virtual visit but didn't answered the phone I left the voice mail to call back.  Phoenix Jackson MA    Marina Mock is a 40 year old female who is being evaluated via a billable telephone visit.          Phone call duration: 15 minutes    Reason for visit:   Infectious Disease Return Visit, planned follow-up for HIV    SUBJECTIVE:      I have reviewed and updated the patient's Past Medical History, Social History, Family History and Medication List.    OBJECTIVE:    Current Outpatient Medications   Medication     blood glucose (NO BRAND SPECIFIED) test strip     blood glucose (NO BRAND SPECIFIED) test strip     blood glucose monitoring (NO BRAND SPECIFIED) meter device kit     blood glucose monitoring (ULTRA THIN 30G) lancets     emtricitabine-rilpivirine-tenofovir (ODEFSEY) 200-25-25 MG TABS per tablet     metFORMIN (GLUCOPHAGE) 500 MG tablet     Nutritional Supplements (ENSURE ACTIVE PO)     thin (NO BRAND SPECIFIED) lancets     Current Facility-Administered Medications   Medication     medroxyPROGESTERone (DEPO-PROVERA) syringe 150 mg       No Known Allergies      Examination via telephone visit:     GENERAL: Patient is alert and does not seem to be in any acute distress    RESPIRATORY:  Breathing is not audible. No cough or labored breathing is noted.  PSYCH: Affect is bright. Speech fluent and appropriate.      The rest of a comprehensive physical examination is deferred due to the public health emergency telephone visit restrictions.      No new labs or imaging to review      ASSESSMENT:      PLAN:        Alfredo Martinez MD, MS  Infectious Disease    Time:  I spent 15  total minutes on the telephone with the patient, including >50% of time in counseling.      Again, thank you for allowing me to participate in the care of your patient.      Sincerely,    Alfredo Martinez MD

## 2020-07-03 NOTE — PROGRESS NOTES
The patient was call to setup the virtual visit but didn't answered the phone I left the voice mail to call back.  Phoenix Jackson MA

## 2020-07-04 DIAGNOSIS — E11.65 TYPE 2 DIABETES MELLITUS WITH HYPERGLYCEMIA, UNSPECIFIED WHETHER LONG TERM INSULIN USE (H): ICD-10-CM

## 2020-07-15 ENCOUNTER — TELEPHONE (OUTPATIENT)
Dept: PHARMACY | Facility: CLINIC | Age: 40
End: 2020-07-15

## 2020-07-15 NOTE — TELEPHONE ENCOUNTER
Called patient for refill reminder.    Will deliver prescriptions address has been verified.     [place]    7 month of on time refill.    Last Filled on:06/15/2020   Follow-up Date: 08/11/2020    Wisam Moore  -----------------------------------------------   [email or web address]  Pharmacy Technician  [facility]: [phone]

## 2020-08-06 ENCOUNTER — ALLIED HEALTH/NURSE VISIT (OUTPATIENT)
Dept: NURSING | Facility: CLINIC | Age: 40
End: 2020-08-06
Payer: COMMERCIAL

## 2020-08-06 DIAGNOSIS — Z30.9 CONTRACEPTIVE MANAGEMENT: Primary | ICD-10-CM

## 2020-08-06 PROCEDURE — 96372 THER/PROPH/DIAG INJ SC/IM: CPT

## 2020-08-06 RX ADMIN — MEDROXYPROGESTERONE ACETATE 150 MG: 150 INJECTION, SUSPENSION INTRAMUSCULAR at 10:54

## 2020-08-06 NOTE — PROGRESS NOTES
Clinic Administered Medication Documentation      Depo Provera Documentation    URINE HCG: not indicated    Depo-Provera Standing Order inclusion/exclusion criteria reviewed.   Patient meets: inclusion criteria     BP: Data Unavailable  LAST PAP/EXAM:   Lab Results   Component Value Date    PAP ASC-US 12/02/2019       Prior to injection, verified patient identity using patient's name and date of birth. Medication was administered. Please see MAR and medication order for additional information.     Was entire vial of medication used? Yes  Vial/Syringe: Single dose vial  Expiration Date:  Mar2021    Patient instructed to report any adverse reaction to staff immediately  and stay in clinic after the injection but patient declined.  NEXT INJECTION DUE: 10/22/20 - 11/5/20

## 2020-08-12 ENCOUNTER — TELEPHONE (OUTPATIENT)
Dept: PHARMACY | Facility: CLINIC | Age: 40
End: 2020-08-12

## 2020-08-12 NOTE — TELEPHONE ENCOUNTER
Attempted to contact the patient to for refill reminder call,  left message on voicemail    Last filled on: 07/16/20    Follow-up Date: 08/17/20  2nd attempt    Michelle Ricci CPhT  [facility]  [phone]

## 2020-08-17 NOTE — TELEPHONE ENCOUNTER
Attempted to contact the patient to for refill reminder call,  left message on voicemail    Last filled on: 07/16/2020    Follow-up Date: 08/24/2020    Wisam Moore  -----------------------------------------------   [email or web address]  Pharmacy Technician  [facility]: [phone]

## 2020-08-24 ENCOUNTER — TELEPHONE (OUTPATIENT)
Dept: PHARMACY | Facility: CLINIC | Age: 40
End: 2020-08-24

## 2020-08-24 NOTE — TELEPHONE ENCOUNTER
Called patient for refill reminder.    Will deliver prescriptions address has been verified.     Ensure  Odfesey    8 month of on time refill.    Last Filled on:08/17/2020   Follow-up Date: 09/11/2020    Wisam Moore  -----------------------------------------------   [email or web address]  Pharmacy Technician  [facility]: [phone]

## 2020-09-11 ENCOUNTER — TELEPHONE (OUTPATIENT)
Dept: PHARMACY | Facility: CLINIC | Age: 40
End: 2020-09-11

## 2020-09-11 NOTE — TELEPHONE ENCOUNTER
Attempted to contact the patient to for refill reminder call,  left message on voicemail    Last filled on: 08/17/20    Follow-up Date: 09/17/20 2nd attempt     Michelle Ricci CPhT  [facility]  [phone]

## 2020-09-17 ENCOUNTER — TELEPHONE (OUTPATIENT)
Dept: PHARMACY | Facility: CLINIC | Age: 40
End: 2020-09-17

## 2020-09-17 NOTE — TELEPHONE ENCOUNTER
Called patient for refill reminder.    Will  Ensure and Odefsey prescriptions address has been verified.     Last Filled on: 08/17/20   Follow-up Date: 10/16/20    Michelle Ricci CPhT  On license of [facility]  [phone]

## 2020-09-17 NOTE — TELEPHONE ENCOUNTER
Left message for patient to call me/clinic.  Would like to check on medication adherence/follow-up.    Harmony Connor, [facility] Pharmacist.   [phone]

## 2020-09-29 ENCOUNTER — OFFICE VISIT (OUTPATIENT)
Dept: INTERNAL MEDICINE | Facility: CLINIC | Age: 40
End: 2020-09-29
Payer: COMMERCIAL

## 2020-09-29 VITALS
HEART RATE: 106 BPM | OXYGEN SATURATION: 99 % | WEIGHT: 120.7 LBS | SYSTOLIC BLOOD PRESSURE: 110 MMHG | RESPIRATION RATE: 16 BRPM | DIASTOLIC BLOOD PRESSURE: 78 MMHG | BODY MASS INDEX: 22.81 KG/M2

## 2020-09-29 DIAGNOSIS — Z30.42 ENCOUNTER FOR SURVEILLANCE OF INJECTABLE CONTRACEPTIVE: Primary | ICD-10-CM

## 2020-09-29 LAB — HCG UR QL: NEGATIVE

## 2020-09-29 PROCEDURE — 96372 THER/PROPH/DIAG INJ SC/IM: CPT | Performed by: PHYSICIAN ASSISTANT

## 2020-09-29 PROCEDURE — 81025 URINE PREGNANCY TEST: CPT | Performed by: PHYSICIAN ASSISTANT

## 2020-09-29 PROCEDURE — 99207 ZZC NO CHARGE NURSE ONLY: CPT | Performed by: PHYSICIAN ASSISTANT

## 2020-09-29 RX ORDER — MEDROXYPROGESTERONE ACETATE 150 MG/ML
150 INJECTION, SUSPENSION INTRAMUSCULAR
Status: COMPLETED | OUTPATIENT
Start: 2020-09-29 | End: 2021-05-28

## 2020-09-29 RX ADMIN — MEDROXYPROGESTERONE ACETATE 150 MG: 150 INJECTION, SUSPENSION INTRAMUSCULAR at 14:14

## 2020-09-29 NOTE — PROGRESS NOTES
Subjective     Marina Mock is a 40 year old female who presents to clinic today for the following health issues:    HPI       Medication Followup of depo     Taking Medication as prescribed: NO, missed a dose    Side Effects:  Nausea     Medication Helping Symptoms:  Yes    Pt saw OBGYN December of 2019          Objective    /78   Pulse 106   Resp 16   Wt 54.7 kg (120 lb 11.2 oz)   SpO2 99%   BMI 22.81 kg/m    Body mass index is 22.81 kg/m .  Physical Exam   GENERAL: healthy, alert and no distress  RESP: lungs clear to auscultation - no rales, rhonchi or wheezes  CV: regular rates and rhythm, normal S1 S2, no S3 or S4 and no murmur, click or rub    Results for orders placed or performed in visit on 09/29/20 (from the past 24 hour(s))   HCG Qual, Urine ([id number])   Result Value Ref Range    HCG Qual Urine Negative NEG^Negative           Assessment & Plan     Encounter for surveillance of injectable contraceptive  - continue depo, follow up with OBGYN end of January   - HCG Qual, Urine ([id number])  - medroxyPROGESTERone (DEPO-PROVERA) injection 150 mg    Return in about 4 months (around 1/29/2021), or OBGYN follow up with pap smear.    Marie Abarca PA-C  [facility]

## 2020-09-29 NOTE — NURSING NOTE
Clinic Administered Medication Documentation      Depo Provera Documentation    URINE HCG: negative    Depo-Provera Standing Order inclusion/exclusion criteria reviewed.   Patient meets: inclusion criteria     BP: 110/78  LAST PAP/EXAM:   Lab Results   Component Value Date    PAP ASC-US 12/02/2019       Prior to injection, verified patient identity using patient's name and date of birth. Medication was administered. Please see MAR and medication order for additional information.     Was entire vial of medication used? Yes  Vial/Syringe: Single dose vial  Expiration Date:  01/2021    Patient instructed to report any adverse reaction to staff immediately  and stay in clinic after the injection but patient declined.  NEXT INJECTION DUE: 12/15/20 - 12/29/20

## 2020-10-09 ENCOUNTER — VIRTUAL VISIT (OUTPATIENT)
Dept: INFECTIOUS DISEASES | Facility: CLINIC | Age: 40
End: 2020-10-09
Attending: INTERNAL MEDICINE
Payer: COMMERCIAL

## 2020-10-09 DIAGNOSIS — E11.9 TYPE 2 DIABETES MELLITUS WITHOUT COMPLICATION, WITHOUT LONG-TERM CURRENT USE OF INSULIN (H): ICD-10-CM

## 2020-10-09 DIAGNOSIS — B20 HUMAN IMMUNODEFICIENCY VIRUS (HIV) DISEASE (H): Primary | ICD-10-CM

## 2020-10-09 PROCEDURE — 99213 OFFICE O/P EST LOW 20 MIN: CPT | Mod: 95 | Performed by: INTERNAL MEDICINE

## 2020-10-09 RX ORDER — MEDROXYPROGESTERONE ACETATE 150 MG/ML
150 INJECTION, SUSPENSION INTRAMUSCULAR
COMMUNITY
End: 2021-08-13

## 2020-10-09 ASSESSMENT — PAIN SCALES - GENERAL: PAINLEVEL: NO PAIN (0)

## 2020-10-09 NOTE — PROGRESS NOTES
"  Marina Mock is a 40 year old female who is being evaluated via a billable telephone visit.      The patient has been notified of following:     \"This telephone visit will be conducted via a call between you and your physician/provider. We have found that certain health care needs can be provided without the need for a physical exam.  This service lets us provide the care you need with a short phone conversation.  If a prescription is necessary we can send it directly to your pharmacy.  If lab work is needed we can place an order for that and you can then stop by our lab to have the test done at a later time.    Telephone visits are billed at different rates depending on your insurance coverage. During this emergency period, for some insurers they may be billed the same as an in-person visit.  Please reach out to your insurance provider with any questions.    If during the course of the call the physician/provider feels a telephone visit is not appropriate, you will not be charged for this service.\"    Patient has given verbal consent for Telephone visit?  Yes    What phone number would you like to be contacted at? [phone]    How would you like to obtain your AVS? Toi    Phone call duration: 8 minutes    Reason for visit:   Infectious Disease Return Visit, planned follow-up for HIV     SUBJECTIVE:     Marina is well known to me.  I follow her for her HIV infection. We decided to change medications to Odefsey as of 1/10/2020.   She was meant to return in 1 month for labs after the change in antiretroviral regimen.  We did not get those labs until 5/13/2020.   Thankfully, there was no toxicity seen on labs and the HIV viral load was not detected.     Marina is not having any side effects.  She likes the small single tablet to take once daily.  She takes it at night, not always with food. Lately she may forget it at night and then take it in the morning.    During our last visit I did remind Marina " that she was due for her DepoProvera injection -2020.  However she did not get the dose until 2020.  She continues to be at risk for another pregnancy.      Sadly, her mother  earlier this .  She had cancer and kidney problems, and she  at home in [place]. Marina was feeling very sad about not being able to see her before she .       Marina is staying home with her children right now.  Due to COVID, she stopped picking up shifts.  Her  is still working, and they are getting by OK.     I have reviewed and updated the patient's Past Medical History, Social History, Family History and Medication List.     OBJECTIVE:         Current Outpatient Medications   Medication     blood glucose (NO BRAND SPECIFIED) test strip     blood glucose (NO BRAND SPECIFIED) test strip     blood glucose monitoring (NO BRAND SPECIFIED) meter device kit     blood glucose monitoring (ULTRA THIN 30G) lancets     emtricitabine-rilpivirine-tenofovir (ODEFSEY) 200-25-25 MG TABS per tablet     metFORMIN (GLUCOPHAGE) 500 MG tablet     Nutritional Supplements (ENSURE ACTIVE PO)     thin (NO BRAND SPECIFIED) lancets          Current Facility-Administered Medications   Medication     medroxyPROGESTERone (DEPO-PROVERA) syringe 150 mg         No Known Allergies        Examination via telephone visit:     GENERAL: Patient is alert and does not seem to be in any acute distress    RESPIRATORY:  Breathing is not audible. No cough or labored breathing is noted.  PSYCH: Affect is bright. Speech fluent and appropriate.      The rest of a comprehensive physical examination is deferred due to the public health emergency telephone visit restrictions.        Last labs:  2020  WBC 4.9, Hg 11.6 on iron  CMP with a glucose of 104, otherwise OK  HIV viral load not detected        ASSESSMENT:     Well controlled HIV     Marina is doing well with Odefsey.    I view Marina's case as someone who may become pregnant again  without planning.  I was purposely avoiding dolutegravir, but recently guidelines were updated to reflect more data suggesting that dolutegravir might not be contributing to neural tube defects.  We could change to Descovy/dolutegravir if we needed to, but Marina seems to be doing well with Odefsey.    While Marina is using Depo Provera, she has missed her shot time interval more than once and expressed dissatisfaction with this method, but no plan for an alternative.     PLAN:     - continue Joshua     - We scheduled a follow-up with me in January 2021.  She was stable on last labs 5/2020, and reports no issues. Given her desire to stay out of public spaces during the COVID pandemic, I am OK with postponing labs until January.  Ideally labs would be q6months in her case, but this small delay is acceptable given the circumstances.     Alfredo Martinez MD, MS  Infectious Disease     Time:  I spent 8 total minutes on the telephone with the patient, including >50% of time in counseling.

## 2020-10-09 NOTE — LETTER
"10/9/2020       RE: Marina Mock  [address]  [place] [address]     Dear Colleague,    Thank you for referring your patient, Marina Mock, to the [facility]ECTI[facility] DISEASE CLINIC [place] at [facility]. Please see a copy of my visit note below.      Marina Mock is a 40 year old female who is being evaluated via a billable telephone visit.      The patient has been notified of following:     \"This telephone visit will be conducted via a call between you and your physician/provider. We have found that certain health care needs can be provided without the need for a physical exam.  This service lets us provide the care you need with a short phone conversation.  If a prescription is necessary we can send it directly to your pharmacy.  If lab work is needed we can place an order for that and you can then stop by our lab to have the test done at a later time.    Telephone visits are billed at different rates depending on your insurance coverage. During this emergency period, for some insurers they may be billed the same as an in-person visit.  Please reach out to your insurance provider with any questions.    If during the course of the call the physician/provider feels a telephone visit is not appropriate, you will not be charged for this service.\"    Patient has given verbal consent for Telephone visit?  Yes    What phone number would you like to be contacted at? [phone]    How would you like to obtain your AVS? Toi    Phone call duration: 8 minutes    Reason for visit:   Infectious Disease Return Visit, planned follow-up for HIV     SUBJECTIVE:     Marina is well known to me.  I follow her for her HIV infection. We decided to change medications to Odefsey as of 1/10/2020.   She was meant to return in 1 month for labs after the change in antiretroviral regimen.  We did not get those labs until 5/13/2020.   Thankfully, there was no " toxicity seen on labs and the HIV viral load was not detected.     Marina is not having any side effects.  She likes the small single tablet to take once daily.  She takes it at night, not always with food. Lately she may forget it at night and then take it in the morning.    During our last visit I did remind Marina that she was due for her DepoProvera injection -2020.  However she did not get the dose until 2020.  She continues to be at risk for another pregnancy.      Sadly, her mother  earlier this .  She had cancer and kidney problems, and she  at home in [place]. Marina was feeling very sad about not being able to see her before she .       Marina is staying home with her children right now.  Due to COVID, she stopped picking up shifts.  Her  is still working, and they are getting by OK.     I have reviewed and updated the patient's Past Medical History, Social History, Family History and Medication List.     OBJECTIVE:         Current Outpatient Medications   Medication     blood glucose (NO BRAND SPECIFIED) test strip     blood glucose (NO BRAND SPECIFIED) test strip     blood glucose monitoring (NO BRAND SPECIFIED) meter device kit     blood glucose monitoring (ULTRA THIN 30G) lancets     emtricitabine-rilpivirine-tenofovir (ODEFSEY) 200-25-25 MG TABS per tablet     metFORMIN (GLUCOPHAGE) 500 MG tablet     Nutritional Supplements (ENSURE ACTIVE PO)     thin (NO BRAND SPECIFIED) lancets          Current Facility-Administered Medications   Medication     medroxyPROGESTERone (DEPO-PROVERA) syringe 150 mg         No Known Allergies        Examination via telephone visit:     GENERAL: Patient is alert and does not seem to be in any acute distress    RESPIRATORY:  Breathing is not audible. No cough or labored breathing is noted.  PSYCH: Affect is bright. Speech fluent and appropriate.      The rest of a comprehensive physical examination is deferred due to the public  health emergency telephone visit restrictions.        Last labs:  5/13/2020  WBC 4.9, Hg 11.6 on iron  CMP with a glucose of 104, otherwise OK  HIV viral load not detected        ASSESSMENT:     Well controlled HIV     Mraina is doing well with Odefsey.    I view Marina's case as someone who may become pregnant again without planning.  I was purposely avoiding dolutegravir, but recently guidelines were updated to reflect more data suggesting that dolutegravir might not be contributing to neural tube defects.  We could change to Descovy/dolutegravir if we needed to, but Marina seems to be doing well with Odefsey.    While Marina is using Depo Provera, she has missed her shot time interval more than once and expressed dissatisfaction with this method, but no plan for an alternative.     PLAN:     - continue Odefchayo     - We scheduled a follow-up with me in January 2021.  She was stable on last labs 5/2020, and reports no issues. Given her desire to stay out of public spaces during the COVID pandemic, I am OK with postponing labs until January.  Ideally labs would be q6months in her case, but this small delay is acceptable given the circumstances.     Alfredo Martinez MD, MS  Infectious Disease     Time:  I spent 8 total minutes on the telephone with the patient, including >50% of time in counseling.          Again, thank you for allowing me to participate in the care of your patient.      Sincerely,    Alfredo Martinez MD

## 2020-10-15 ENCOUNTER — TELEPHONE (OUTPATIENT)
Dept: ENDOCRINOLOGY | Facility: CLINIC | Age: 40
End: 2020-10-15

## 2020-10-15 ENCOUNTER — TELEPHONE (OUTPATIENT)
Dept: PHARMACY | Facility: CLINIC | Age: 40
End: 2020-10-15

## 2020-10-15 NOTE — TELEPHONE ENCOUNTER
[facility] Call Center    Phone Message    May a detailed message be left on voicemail: yes     Reason for Call: Other: Pt is requesting a call back to discuss high blood sugar readings and to get clarificaton on how she should be taking her metformin.  Please follow up.     Action Taken: Message routed to:  [facility] ([facility]): ENDO    Travel Screening: Not Applicable

## 2020-10-15 NOTE — TELEPHONE ENCOUNTER
Left patient a detailed message to please give us a call back with high blood sugars and metformin instructions.

## 2020-10-15 NOTE — TELEPHONE ENCOUNTER
Called patient for refill reminder.    Will  prescriptions address has been verified.       Last Filled on:  09/18/20   Follow-up Date: 11/17/20    Michelle Ricci CPhT  [facility]  [phone]

## 2020-10-20 ENCOUNTER — TELEPHONE (OUTPATIENT)
Dept: ENDOCRINOLOGY | Facility: CLINIC | Age: 40
End: 2020-10-20

## 2020-10-20 NOTE — TELEPHONE ENCOUNTER
Marina started Metformin 500 mg a week ago  And tells me before starting BS were 300's. Today she will start the 2 tablets at [place] . She had a hard time reading the meter but tells me :  10/20   10/19/20 118 =  .  SHe will inform us if she has any lows  and let us know how she is doing. Yasemin Ayers RN on 10/20/2020 at 1:07 PM

## 2020-11-08 PROBLEM — E11.9 DIABETES MELLITUS, TYPE 2 (H): Status: ACTIVE | Noted: 2020-11-08

## 2020-11-17 ENCOUNTER — TELEPHONE (OUTPATIENT)
Dept: PHARMACY | Facility: CLINIC | Age: 40
End: 2020-11-17

## 2020-11-17 NOTE — TELEPHONE ENCOUNTER
Pt called to order refills.   Will  prescriptions address has been verified.       Last Filled on: 10/15/20   Follow-up Date: 12/15/20    Michelle Ricci CPhT  [facility]  [phone] \

## 2020-12-15 ENCOUNTER — TELEPHONE (OUTPATIENT)
Dept: PHARMACY | Facility: CLINIC | Age: 40
End: 2020-12-15

## 2020-12-15 ENCOUNTER — ALLIED HEALTH/NURSE VISIT (OUTPATIENT)
Dept: NURSING | Facility: CLINIC | Age: 40
End: 2020-12-15
Payer: COMMERCIAL

## 2020-12-15 DIAGNOSIS — Z30.9 CONTRACEPTIVE MANAGEMENT: Primary | ICD-10-CM

## 2020-12-15 PROCEDURE — 96372 THER/PROPH/DIAG INJ SC/IM: CPT

## 2020-12-15 RX ADMIN — MEDROXYPROGESTERONE ACETATE 150 MG: 150 INJECTION, SUSPENSION INTRAMUSCULAR at 11:22

## 2020-12-15 NOTE — TELEPHONE ENCOUNTER
Attempted to contact the patient to for refill reminder call,  left message on voicemail    Last filled on: 11/13/20  Follow-up Date: 12/21/20 2nd attempt    Michelle Ricci CPhT  [facility]  [phone]

## 2020-12-15 NOTE — NURSING NOTE
Clinic Administered Medication Documentation      Depo Provera Documentation    URINE HCG: not indicated    Depo-Provera Standing Order inclusion/exclusion criteria reviewed.   Patient meets: inclusion criteria     BP: Data Unavailable  LAST PAP/EXAM:   Lab Results   Component Value Date    PAP ASC-US 12/02/2019       Prior to injection, verified patient identity using patient's name and date of birth. Medication was administered. Please see MAR and medication order for additional information.     Was entire vial of medication used? Yes  Vial/Syringe: Single dose vial  Expiration Date:  01/2021    Patient instructed to report any adverse reaction to staff immediately .  NEXT INJECTION DUE: 3/2/21 - 3/16/21

## 2020-12-27 ENCOUNTER — HEALTH MAINTENANCE LETTER (OUTPATIENT)
Age: 40
End: 2020-12-27

## 2021-01-12 ENCOUNTER — VIRTUAL VISIT (OUTPATIENT)
Dept: INFECTIOUS DISEASES | Facility: CLINIC | Age: 41
End: 2021-01-12
Attending: INTERNAL MEDICINE
Payer: COMMERCIAL

## 2021-01-12 DIAGNOSIS — E11.9 TYPE 2 DIABETES MELLITUS WITHOUT COMPLICATION, WITHOUT LONG-TERM CURRENT USE OF INSULIN (H): ICD-10-CM

## 2021-01-12 DIAGNOSIS — B20 HUMAN IMMUNODEFICIENCY VIRUS (HIV) DISEASE (H): Primary | ICD-10-CM

## 2021-01-12 PROCEDURE — 99213 OFFICE O/P EST LOW 20 MIN: CPT | Mod: 95 | Performed by: INTERNAL MEDICINE

## 2021-01-12 ASSESSMENT — PAIN SCALES - GENERAL: PAINLEVEL: NO PAIN (0)

## 2021-01-12 NOTE — PROGRESS NOTES
Marina is a 40 year old who is being evaluated via a billable video visit.      How would you like to obtain your AVS? Alexandrohart  If the video visit is dropped, the invitation should be resent by: Text to cell phone: [phone]  Will anyone else be joining your video visit? No    Reason for visit:   Infectious Disease Return Visit, planned follow-up for HIV     SUBJECTIVE:     Marina is well known to me.  I follow her for her HIV infection. We decided to change medications to Odefsey as of 1/10/2020, and she has done well since then.    We are now overdue for labs, last done 2020 with an undetectable HIV viral load.  Last time we checked T cell subsets was before the ART switch on 1/10/2020 with absolute CD4 of 622.        Marina sometimes feels nauseous after taking the Odefsey.  She is not consistent about whether or not she takes it with food.  She usually takes it at night. Occasionally she may still forget it at night and then take it in the morning.     Marina continues on Depo Provera injection for contraception, but she does not always get her doses on time.  She continues to be at risk for another pregnancy.      Sadly, her mother  earlier this .  She had cancer and kidney problems, and she  at home in [place]. Marina was feeling very sad about not being able to see her before she .       Marina is staying home with her children right now.  Due to COVID, she stopped picking up shifts.  Her  is still working, and they are getting by OK financially.  As of this visit 2011, children are Veronique 4 years old and son (name?) is 18 months old     I have reviewed and updated the patient's Past Medical History, Social History, Family History and Medication List.     OBJECTIVE:           Current Outpatient Medications   Medication     blood glucose (NO BRAND SPECIFIED) test strip     blood glucose (NO BRAND SPECIFIED) test strip     blood glucose monitoring (NO BRAND  SPECIFIED) meter device kit     blood glucose monitoring (ULTRA THIN 30G) lancets     emtricitabine-rilpivirine-tenofovir (ODEFSEY) 200-25-25 MG TABS per tablet     metFORMIN (GLUCOPHAGE) 500 MG tablet     Nutritional Supplements (ENSURE ACTIVE PO)     thin (NO BRAND SPECIFIED) lancets            Current Facility-Administered Medications   Medication     medroxyPROGESTERone (DEPO-PROVERA) syringe 150 mg         No Known Allergies        Examination via telephone visit:     GENERAL: Patient is alert and does not seem to be in any acute distress    RESPIRATORY:  Breathing is not audible. No cough or labored breathing is noted.  PSYCH: Affect is bright. Speech fluent and appropriate.      The rest of a comprehensive physical examination is deferred due to the public health emergency telephone visit restrictions.        Last labs:  5/13/2020  WBC 4.9, Hg 11.6 on iron  CMP with a glucose of 104, otherwise OK  HIV viral load not detected        ASSESSMENT:     Well controlled HIV     Marina is doing well with Odefsey.    I view Marina's case as someone who may become pregnant again without planning.  When we made the switch, I was purposely avoiding dolutegravir at time of conception, but recently guidelines were updated to reflect more data suggesting that dolutegravir might not be contributing to neural tube defects.  We could change to Descovy/dolutegravir if we needed to, but Marina seems to be doing well with Odefsey.     While Marina is using Depo Provera, she has missed her shot time interval more than once and expressed dissatisfaction with this method, but no plan for an alternative.     PLAN:     - continue Odefsey     - now due for HIV monitoring labs:    Orders Placed This Encounter   Procedures     Comprehensive metabolic panel     CBC with platelets differential     HIV-1 RNA quantitative     T cell subset profile     Lipid Profile     UA with Microscopic     Hemoglobin A1c     - Marina will get  her labs done, fasting at her local [facility] clinic in [facility] telephone visit with me 3-7 days after labs      Alfredo Martinez MD, MS  Infectious Disease      Video-Visit Details    Type of service:  Video Visit    Video Start Time: 1:20 PM  Video End Time: 1:35 PM    Originating Location (pt. Location): Home    Distant Location (provider location):  [facility] INFECTIOUS DISEASE CLINIC [place]     Platform used for Video Visit: [employer]       Total time spent on care of this patient today was 25 minutes.  15 minutes on the video visit, and an additional 10 minutes on chart review, placing orders, and care coordination.

## 2021-01-12 NOTE — LETTER
"1/12/2021       RE: Marina Mock  [address]  [place] [facility] [place] [address]     Dear Colleague,    Thank you for referring your patient, Marina Mock, to the [facility] IN[facility]ECTI[facility] DISEASE CLIN[facility] [place] at [facility]. Please see a copy of my visit note below.      Marina is a 40 year old who is being evaluated via a billable video visit.      How would you like to obtain your AVS? MyChart  If the video visit is dropped, the invitation should be resent by: Text to cell phone: [phone]  Will anyone else be joining your video visit? No  {If patient encounters technical issues they should call [phone] :[id number]}        Subjective     Marina is a 40 year old who presents to clinic today for the following health issues {ACCOMPANIED BY STATEMENT (Optional):692272}    HPI       ***    Review of Systems   {ROS COMP (Optional):656061}      Objective           Vitals:  No vitals were obtained today due to virtual visit.    Physical Exam   {video visit exam brief selected:806782::\"GENERAL: Healthy, alert and no distress\",\"EYES: Eyes grossly normal to inspection.  No discharge or erythema, or obvious scleral/conjunctival abnormalities.\",\"RESP: No audible wheeze, cough, or visible cyanosis.  No visible retractions or increased work of breathing.  \",\"SKIN: Visible skin clear. No significant rash, abnormal pigmentation or lesions.\",\"NEURO: Cranial nerves grossly intact.  Mentation and speech appropriate for age.\",\"PSYCH: Mentation appears normal, affect normal/bright, judgement and insight intact, normal speech and appearance well-groomed.\"}    {Diagnostic Test Results (Optional):580778}    {AMBULATORY ATTESTATION (Optional):493681}        {PROVIDER CHARTING PREFERENCE:342536}      Video-Visit Details    Type of service:  Video Visit    Video Start Time: {video visit start/end time for provider to select:077565}  Video End Time:{video visit start/end time for " "provider to select:887186}    Originating Location (pt. Location): {video visit patient location:637184::\"Home\"}    Distant Location (provider location):  [facility] INFECTI[facility] DISEASE CLINIC [place]     Platform used for Video Visit: {Virtual Visit Platforms:509700::\"[employer]\"}      "

## 2021-01-15 ENCOUNTER — TELEPHONE (OUTPATIENT)
Dept: PHARMACY | Facility: CLINIC | Age: 41
End: 2021-01-15

## 2021-01-15 ENCOUNTER — HEALTH MAINTENANCE LETTER (OUTPATIENT)
Age: 41
End: 2021-01-15

## 2021-01-15 NOTE — TELEPHONE ENCOUNTER
Attempted to contact the patient to for refill reminder call,  left message on voicemail    Last filled on: 12/17/20    Follow-up Date: 01/21/20 2nd attempt    Michelle Ricci CPhT  [facility]  [phone]

## 2021-01-22 NOTE — TELEPHONE ENCOUNTER
Called patient for refill reminder.    Will mail 2 prescriptions and  Ensure when PA approved. The address has been verified.     Last Filled on: 12/17/20   Follow-up Date: 02/17/21    Michelle Ricci CPhT  [facility]  [phone]

## 2021-02-03 DIAGNOSIS — E11.9 TYPE 2 DIABETES MELLITUS WITHOUT COMPLICATION, WITHOUT LONG-TERM CURRENT USE OF INSULIN (H): ICD-10-CM

## 2021-02-03 DIAGNOSIS — B20 HUMAN IMMUNODEFICIENCY VIRUS (HIV) DISEASE (H): ICD-10-CM

## 2021-02-03 LAB
ALBUMIN SERPL-MCNC: 4.2 G/DL (ref 3.4–5)
ALBUMIN UR-MCNC: NEGATIVE MG/DL
ALP SERPL-CCNC: 66 U/L (ref 40–150)
ALT SERPL W P-5'-P-CCNC: 35 U/L (ref 0–50)
ANION GAP SERPL CALCULATED.3IONS-SCNC: 5 MMOL/L (ref 3–14)
APPEARANCE UR: CLEAR
AST SERPL W P-5'-P-CCNC: 23 U/L (ref 0–45)
BASOPHILS # BLD AUTO: 0 10E9/L (ref 0–0.2)
BASOPHILS NFR BLD AUTO: 0.2 %
BILIRUB SERPL-MCNC: 0.4 MG/DL (ref 0.2–1.3)
BILIRUB UR QL STRIP: NEGATIVE
BUN SERPL-MCNC: 9 MG/DL (ref 7–30)
CALCIUM SERPL-MCNC: 10.1 MG/DL (ref 8.5–10.1)
CD3 CELLS # BLD: 1629 CELLS/UL (ref 603–2990)
CD3 CELLS NFR BLD: 65 % (ref 49–84)
CD3+CD4+ CELLS # BLD: 790 CELLS/UL (ref 441–2156)
CD3+CD4+ CELLS NFR BLD: 31 % (ref 28–63)
CD3+CD4+ CELLS/CD3+CD8+ CLL BLD: 1 % (ref 1.4–2.6)
CD3+CD8+ CELLS # BLD: 787 CELLS/UL (ref 125–1312)
CD3+CD8+ CELLS NFR BLD: 31 % (ref 10–40)
CHLORIDE SERPL-SCNC: 107 MMOL/L (ref 94–109)
CHOLEST SERPL-MCNC: 219 MG/DL
CO2 SERPL-SCNC: 25 MMOL/L (ref 20–32)
COLOR UR AUTO: YELLOW
CREAT SERPL-MCNC: 0.76 MG/DL (ref 0.52–1.04)
DIFFERENTIAL METHOD BLD: ABNORMAL
EOSINOPHIL # BLD AUTO: 0.1 10E9/L (ref 0–0.7)
EOSINOPHIL NFR BLD AUTO: 1.2 %
ERYTHROCYTE [DISTWIDTH] IN BLOOD BY AUTOMATED COUNT: 13.9 % (ref 10–15)
GFR SERPL CREATININE-BSD FRML MDRD: >90 ML/MIN/{1.73_M2}
GLUCOSE SERPL-MCNC: 96 MG/DL (ref 70–99)
GLUCOSE UR STRIP-MCNC: NEGATIVE MG/DL
HBA1C MFR BLD: 7.2 % (ref 0–5.6)
HCT VFR BLD AUTO: 38.4 % (ref 35–47)
HDLC SERPL-MCNC: 43 MG/DL
HGB BLD-MCNC: 12.6 G/DL (ref 11.7–15.7)
HGB UR QL STRIP: NEGATIVE
IFC SPECIMEN: ABNORMAL
KETONES UR STRIP-MCNC: NEGATIVE MG/DL
LDLC SERPL CALC-MCNC: 133 MG/DL
LEUKOCYTE ESTERASE UR QL STRIP: NEGATIVE
LYMPHOCYTES # BLD AUTO: 2.1 10E9/L (ref 0.8–5.3)
LYMPHOCYTES NFR BLD AUTO: 50.4 %
MCH RBC QN AUTO: 24.1 PG (ref 26.5–33)
MCHC RBC AUTO-ENTMCNC: 32.8 G/DL (ref 31.5–36.5)
MCV RBC AUTO: 73 FL (ref 78–100)
MONOCYTES # BLD AUTO: 0.4 10E9/L (ref 0–1.3)
MONOCYTES NFR BLD AUTO: 8.5 %
NEUTROPHILS # BLD AUTO: 1.7 10E9/L (ref 1.6–8.3)
NEUTROPHILS NFR BLD AUTO: 39.7 %
NITRATE UR QL: NEGATIVE
NON-SQ EPI CELLS #/AREA URNS LPF: ABNORMAL /LPF
NONHDLC SERPL-MCNC: 176 MG/DL
PH UR STRIP: 5.5 PH (ref 5–7)
PLATELET # BLD AUTO: 229 10E9/L (ref 150–450)
POTASSIUM SERPL-SCNC: 3.7 MMOL/L (ref 3.4–5.3)
PROT SERPL-MCNC: 9 G/DL (ref 6.8–8.8)
RBC # BLD AUTO: 5.23 10E12/L (ref 3.8–5.2)
RBC #/AREA URNS AUTO: ABNORMAL /HPF
SODIUM SERPL-SCNC: 137 MMOL/L (ref 133–144)
SOURCE: ABNORMAL
SP GR UR STRIP: 1.02 (ref 1–1.03)
TRIGL SERPL-MCNC: 215 MG/DL
UROBILINOGEN UR STRIP-ACNC: 0.2 EU/DL (ref 0.2–1)
WBC # BLD AUTO: 4.3 10E9/L (ref 4–11)
WBC #/AREA URNS AUTO: ABNORMAL /HPF

## 2021-02-03 PROCEDURE — 83036 HEMOGLOBIN GLYCOSYLATED A1C: CPT | Performed by: INTERNAL MEDICINE

## 2021-02-03 PROCEDURE — 80053 COMPREHEN METABOLIC PANEL: CPT | Performed by: INTERNAL MEDICINE

## 2021-02-03 PROCEDURE — 87536 HIV-1 QUANT&REVRSE TRNSCRPJ: CPT | Performed by: INTERNAL MEDICINE

## 2021-02-03 PROCEDURE — 86359 T CELLS TOTAL COUNT: CPT | Performed by: INTERNAL MEDICINE

## 2021-02-03 PROCEDURE — 81001 URINALYSIS AUTO W/SCOPE: CPT | Performed by: INTERNAL MEDICINE

## 2021-02-03 PROCEDURE — 36415 COLL VENOUS BLD VENIPUNCTURE: CPT | Performed by: INTERNAL MEDICINE

## 2021-02-03 PROCEDURE — 85025 COMPLETE CBC W/AUTO DIFF WBC: CPT | Performed by: INTERNAL MEDICINE

## 2021-02-03 PROCEDURE — 86360 T CELL ABSOLUTE COUNT/RATIO: CPT | Performed by: INTERNAL MEDICINE

## 2021-02-03 PROCEDURE — 80061 LIPID PANEL: CPT | Performed by: INTERNAL MEDICINE

## 2021-02-12 ENCOUNTER — VIRTUAL VISIT (OUTPATIENT)
Dept: INFECTIOUS DISEASES | Facility: CLINIC | Age: 41
End: 2021-02-12
Attending: INTERNAL MEDICINE
Payer: COMMERCIAL

## 2021-02-12 DIAGNOSIS — B20 HUMAN IMMUNODEFICIENCY VIRUS (HIV) DISEASE (H): Primary | ICD-10-CM

## 2021-02-12 PROCEDURE — 99214 OFFICE O/P EST MOD 30 MIN: CPT | Mod: 95 | Performed by: INTERNAL MEDICINE

## 2021-02-12 RX ORDER — POLYMYXIN B SULFATE, BACITRACIN ZINC AND NEOMYCIN SULFATE 400; 3.5; 5 [USP'U]/G; MG/G; [USP'U]/G
1 OINTMENT TOPICAL
Qty: 30 TABLET | Refills: 5 | Status: SHIPPED | OUTPATIENT
Start: 2021-02-12 | End: 2021-09-03

## 2021-02-12 NOTE — PROGRESS NOTES
Marina is a 40 year old who is being evaluated via a billable telephone visit.      What phone number would you like to be contacted at? [phone]  How would you like to obtain your AVS? Toi    Phone call duration: 25 minutes      Reason for visit:   Infectious Disease Return Visit, planned follow-up for HIV     SUBJECTIVE:     Marina is well known to me.  I follow her for her HIV infection. We decided to change medications to Odefsey as of 1/10/2020, and she has done well since then.     She was overdue for labs, so she went in for lab draw on 2/3/2021.  There were no concerns from an HIV perspective.  Her HgA1C is up to 7.2%, so we talked about her Diabetes management.   She did not like the GI side effects of metformin, so she only took it for a short time.  She had a lack of appetite and her stomach hurt.  She is following a low glycemic diet and checking her blood sugars, which she feels are better than they once were.  Mornings 100s.  Max gets up to 150s.    Marina continues on Depo Provera injection for contraception, but she does not always get her doses on time.  She continues to be at risk for another pregnancy.  Next dose is due 2021, so I reminded her about that.     Sadly, her mother  earlier this .  She had cancer and kidney problems, and she  at home in [place]. Marina was feeling very sad about not being able to see her before she .       Marina is staying home with her children right now.  Due to COVID, she stopped picking up shifts.  Her  is still working, and they are getting by OK financially.  As of visit 2021, children are Veronique 4 years old and son (name?) is 18 months old.  I saw them both on the video at a recent visit, and they looked healthy and energetic.     I have reviewed and updated the patient's Past Medical History, Social History, Family History and Medication List.     OBJECTIVE:           Current Outpatient Medications    Medication     blood glucose (NO BRAND SPECIFIED) test strip     blood glucose (NO BRAND SPECIFIED) test strip     blood glucose monitoring (NO BRAND SPECIFIED) meter device kit     blood glucose monitoring (ULTRA THIN 30G) lancets     emtricitabine-rilpivirine-tenofovir (ODEFSEY) 200-25-25 MG TABS per tablet     Nutritional Supplements (ENSURE ACTIVE PO)     thin (NO BRAND SPECIFIED) lancets      Medication     medroxyPROGESTERone (DEPO-PROVERA) syringe 150 mg         No Known Allergies        Examination via telephone visit:     GENERAL: Patient is alert and does not seem to be in any acute distress    RESPIRATORY:  No cough or labored breathing is noted.  PSYCH: Affect is bright. Speech fluent and appropriate.      The rest of a comprehensive physical examination is deferred due to the public health emergency telephone visit restrictions.        Last labs:  2/3/2021  HIV viral load not detected  CD4 - 790 absolute and 31%  Hg 12.6, MCV 73, not sure if she is still taking iron supplement  CMP with a glucose of 96 and all other values within normal limits  HgA1C 7.2%  , Triglycerides 215, not fasting        ASSESSMENT:     Well controlled HIV     Marina is doing well with Odefsey.    I view Marina's case as someone who may become pregnant again without planning.  When we made the switch, I was purposely avoiding dolutegravir at time of conception, but recently guidelines were updated to reflect more data suggesting that dolutegravir might not be contributing to neural tube defects.  We could change to Descovy/dolutegravir if we needed to, but Marina seems to be doing well with Odefsey.     While Marina is using Depo Provera, she has missed her shot time interval more than once and expressed dissatisfaction with this method, but no plan for an alternative.    Diabetes and dyslipidemia    HgA1C is 7.2%,     She tells me that she could not tolerate metformin due to GI side effects so she  stopped taking it.   She is trying to follow a diabetic glycemic control diet only.  She does check her blood glucose.  She is willing to try a different medicine, but not metformin.  She is willing to have another visit with Endocrine, provided that they do not make her take metformin.  I have reached out to Endocrine, and asked them to set up follow-up for Marina.  They can advise diet and potential statin therapy.       PLAN:     - continue Joshsey    - visit in July 2021 with me  I will hold off on ordering labs to see if she is re-established with endocrine at that time      Alfredo Martinez MD, MS  Infectious Disease    Time:  A total of 35 minutes was spent in care of the patient today. 25 minutes were spent on the video/telephone, with an additional 10 minutes spent on chart review and care coordination.

## 2021-02-12 NOTE — LETTER
2021       RE: Marina Mock  [address]  [facility] [address]     Dear Colleague,    Thank you for referring your patient, Marina Mock, to the [facility] [place] [facility]. Please see a copy of my visit note below.      Marina is a 40 year old who is being evaluated via a billable telephone visit.      What phone number would you like to be contacted at? [phone]  How would you like to obtain your AVS? Toi    Phone call duration: 25 minutes      Reason for visit:   Infectious Disease Return Visit, planned follow-up for HIV     SUBJECTIVE:     Marina is well known to me.  I follow her for her HIV infection. We decided to change medications to Odefsey as of 1/10/2020, and she has done well since then.     She was overdue for labs, so she went in for lab draw on 2/3/2021.  There were no concerns from an HIV perspective.  Her HgA1C is up to 7.2%, so we talked about her Diabetes management.   She did not like the GI side effects of metformin, so she only took it for a short time.  She had a lack of appetite and her stomach hurt.  She is following a low glycemic diet and checking her blood sugars, which she feels are better than they once were.  Mornings 100s.  Max gets up to 150s.    Marina continues on Depo Provera injection for contraception, but she does not always get her doses on time.  She continues to be at risk for another pregnancy.  Next dose is due 2021, so I reminded her about that.     Sadly, her mother  earlier this .  She had cancer and kidney problems, and she  at home in [place]. Marina was feeling very sad about not being able to see her before she .       Marina is staying home with her children right now.  Due to COVID, she stopped picking up shifts.  Her  is still working, and they are getting by OK financially.  As of visit 2021,  children are Veronique 4 years old and son (name?) is 18 months old.  I saw them both on the video at a recent visit, and they looked healthy and energetic.     I have reviewed and updated the patient's Past Medical History, Social History, Family History and Medication List.     OBJECTIVE:           Current Outpatient Medications   Medication     blood glucose (NO BRAND SPECIFIED) test strip     blood glucose (NO BRAND SPECIFIED) test strip     blood glucose monitoring (NO BRAND SPECIFIED) meter device kit     blood glucose monitoring (ULTRA THIN 30G) lancets     emtricitabine-rilpivirine-tenofovir (ODEFSEY) 200-25-25 MG TABS per tablet     Nutritional Supplements (ENSURE ACTIVE PO)     thin (NO BRAND SPECIFIED) lancets      Medication     medroxyPROGESTERone (DEPO-PROVERA) syringe 150 mg         No Known Allergies        Examination via telephone visit:     GENERAL: Patient is alert and does not seem to be in any acute distress    RESPIRATORY:  No cough or labored breathing is noted.  PSYCH: Affect is bright. Speech fluent and appropriate.      The rest of a comprehensive physical examination is deferred due to the public health emergency telephone visit restrictions.        Last labs:  2/3/2021  HIV viral load not detected  CD4 - 790 absolute and 31%  Hg 12.6, MCV 73, not sure if she is still taking iron supplement  CMP with a glucose of 96 and all other values within normal limits  HgA1C 7.2%  , Triglycerides 215, not fasting        ASSESSMENT:     Well controlled HIV     Marina is doing well with Odefsey.    I view Marina's case as someone who may become pregnant again without planning.  When we made the switch, I was purposely avoiding dolutegravir at time of conception, but recently guidelines were updated to reflect more data suggesting that dolutegravir might not be contributing to neural tube defects.  We could change to Descovy/dolutegravir if we needed to, but Marina seems to be doing well  with Joshua.     While Marina is using Depo Provera, she has missed her shot time interval more than once and expressed dissatisfaction with this method, but no plan for an alternative.    Diabetes and dyslipidemia    HgA1C is 7.2%,     She tells me that she could not tolerate metformin due to GI side effects so she stopped taking it.   She is trying to follow a diabetic glycemic control diet only.  She does check her blood glucose.  She is willing to try a different medicine, but not metformin.  She is willing to have another visit with Endocrine, provided that they do not make her take metformin.  I have reached out to Endocrine, and asked them to set up follow-up for Marina.  They can advise diet and potential statin therapy.       PLAN:     - continue Joshua    - visit in July 2021 with me  I will hold off on ordering labs to see if she is re-established with endocrine at that time      Alfredo Martinez MD, MS  Infectious Disease    Time:  A total of 35 minutes was spent in care of the patient today. 25 minutes were spent on the video/telephone, with an additional 10 minutes spent on chart review and care coordination.        Again, thank you for allowing me to participate in the care of your patient.      Sincerely,    Alfredo Martinez MD

## 2021-02-18 ENCOUNTER — TELEPHONE (OUTPATIENT)
Dept: PHARMACY | Facility: CLINIC | Age: 41
End: 2021-02-18

## 2021-02-18 NOTE — TELEPHONE ENCOUNTER
Called patient for refill reminder.    Will mail prescriptions address has been verified.       Last Filled on: 01/20/21   Follow-up Date: 03/18/21    Michelle Ricci CPhT  [facility]  [phone]

## 2021-03-02 ENCOUNTER — ALLIED HEALTH/NURSE VISIT (OUTPATIENT)
Dept: NURSING | Facility: CLINIC | Age: 41
End: 2021-03-02
Payer: COMMERCIAL

## 2021-03-02 DIAGNOSIS — Z30.8 ENCOUNTER FOR OTHER CONTRACEPTIVE MANAGEMENT: Primary | ICD-10-CM

## 2021-03-02 PROCEDURE — 96372 THER/PROPH/DIAG INJ SC/IM: CPT

## 2021-03-02 RX ADMIN — MEDROXYPROGESTERONE ACETATE 150 MG: 150 INJECTION, SUSPENSION INTRAMUSCULAR at 15:24

## 2021-03-02 NOTE — PATIENT INSTRUCTIONS
The following medication was given:     MEDICATION: Depo Provera 150mg    NEXT INJECTION DUE: 5/18/21 - 6/1/21

## 2021-03-02 NOTE — NURSING NOTE
BP: Data Unavailable    LAST PAP/EXAM:   Lab Results   Component Value Date    PAP ASC-US 12/02/2019     URINE HCG:not indicated    The following medication was given:     MEDICATION: Depo Provera 150mg  ROUTE: IM  SITE: Deltoid - Left  : [facility]   LOT #: [id number]  EXP:9/2022  NEXT INJECTION DUE: 5/18/21 - 6/1/21   Provider: ying

## 2021-03-19 ENCOUNTER — TELEPHONE (OUTPATIENT)
Dept: PHARMACY | Facility: CLINIC | Age: 41
End: 2021-03-19

## 2021-03-19 NOTE — TELEPHONE ENCOUNTER
Attempted to contact the patient to for refill reminder call,  left message on voicemail    Last filled on: 02/19/21    Follow-up Date: 03/25/21 2nd attempt    Michelle Ricci CPhT  [facility]  [phone]

## 2021-03-24 NOTE — PATIENT INSTRUCTIONS
We appreciate your assistance in coordinating your healthcare.     Please upload your insulin pump, blood sugar meter and/or continuous glucose monitor at home 1-2 days before your next diabetes-related appointment.   This will allow your provider to review your  data before your scheduled virtual visit.    To ask a question to your Endocrine care team, please send them a [facility] message, or reach them by phone at [phone]     To expedite your medication refill(s), please contact your pharmacy and have them   fax a refill request to: [phone].  *Please allow 3 business days for routine medication refills.  *Please allow 5 business days for controlled substance medication refills.    For after-hours urgent Endocrine issues, that do not require 421, please dial [phone], and ask to speak with the Endocrinologist On-Call

## 2021-03-24 NOTE — TELEPHONE ENCOUNTER
Called patient for refill reminder.    Will mail prescriptions address has been verified.       Last Filled on: 02/19/21   Follow-up Date: 04/19/21    Michelle Ricci CPhT  [facility]  [phone]

## 2021-03-24 NOTE — PROGRESS NOTES
Outcome for 03/24/21 10:02 AM :Left Voicemail for patient to call back   Outcome for 03/25/21 9:28 AM :Unable to Leave   Outcome for 03/25/21 2:01 PM :Unable to Leave VM

## 2021-03-26 ENCOUNTER — VIRTUAL VISIT (OUTPATIENT)
Dept: ENDOCRINOLOGY | Facility: CLINIC | Age: 41
End: 2021-03-26
Payer: COMMERCIAL

## 2021-03-26 DIAGNOSIS — E11.65 TYPE 2 DIABETES MELLITUS WITH HYPERGLYCEMIA, UNSPECIFIED WHETHER LONG TERM INSULIN USE (H): Primary | ICD-10-CM

## 2021-03-26 PROCEDURE — 99213 OFFICE O/P EST LOW 20 MIN: CPT | Mod: 95 | Performed by: INTERNAL MEDICINE

## 2021-03-26 NOTE — PROGRESS NOTES
[facility]   Date: 03/26/2021  Attending: Ritesh Benites MD    Subjective:  Marina Mock is a 40 year old female with PMHx of HIV and GDM who has f/up type 2 diabetes.    She was diagnosed with type 2 diabetes in 2019 and was started on metformin. However, she could not tolerate it due to upset stomach and lack of appetite. She had history of gestational diabetes during past pregnancies.    She has not been on diabetes medication in the past 2 years. Recent A1c was 7.2% on 2/3/2021. She checked BG -- reported BG between  depending on her diet.    Diet: mostly eat traditional  food    Exercise: None    Complications:  - Retinopathy: unclear. Last eye exam 5 years ago.   - Nephropathy: has never been screened. Normal Cr and eGFR in 06/2019  - Neuropathy: none. Last monofilament testing: today, was normal  - CAD: no  - Hyperlipidemia: yes. Last  in 2/2021  - Tobacco: no  - HTN: no    Review of Systems:   10 point ROS reviewed and was negative unless noted in HPI    Past Medical History:   Past Medical History:   Diagnosis Date     Asymptomatic human immunodeficiency virus (HIV) infection status (H) 2016     Gestational diabetes     diet controlled      Malaria     in [place]      Miscarriage      TB (pulmonary tuberculosis)     latent       Current Medications:   Current Outpatient Rx   Medication Sig Dispense Refill     blood glucose (NO BRAND SPECIFIED) test strip Use to test blood sugar 4 times daily or as directed. Any covered brand that works with meter. 400 strip 3     blood glucose (NO BRAND SPECIFIED) test strip Use to test blood sugar 3-4 times per week in the morning. 100 each 3     blood glucose monitoring (NO BRAND SPECIFIED) meter device kit Use to test blood sugar 4 times daily or as directed. Any Covered Brand. 1 kit 1     blood glucose monitoring (ULTRA THIN 30G) lancets 1 each daily Use to test blood sugar 3-4 times per week 100 each 3      emtricitabine-rilpivirine-tenofovir (ODEFSEY) 200-25-25 MG TABS per tablet Take 1 tablet by mouth daily (with dinner) 30 tablet 5     medroxyPROGESTERone (DEPO-PROVERA) 150 MG/ML IM injection Inject 150 mg into the muscle every 3 months       metFORMIN (GLUCOPHAGE) 500 MG tablet Start with 500mg (1 pill) with dinner. After 1 week, increase to 1000mg (2 pills) with dinner (Patient not taking: Reported on 9/29/2020) 90 tablet 3     Nutritional Supplements (ENSURE ACTIVE PO)        thin (NO BRAND SPECIFIED) lancets Use with lanceting device. 4x daily. Any covered brand. 400 each 3       Family History:  Mom with diabetes  Father with possible CAD    Social History:  Social History     Socioeconomic History     Marital status:      Spouse name: Chris Roldan     Number of children: Not on file     Years of education: Not on file     Highest education level: Not on file   Occupational History     Not on file   Social Needs     Financial resource strain: Not on file     Food insecurity:     Worry: Not on file     Inability: Not on file     Transportation needs:     Medical: Not on file     Non-medical: Not on file   Tobacco Use     Smoking status: Never Smoker     Smokeless tobacco: Never Used   Substance and Sexual Activity     Alcohol use: No     Drug use: No     Sexual activity: Yes     Partners: Male     Birth control/protection: Injection   Lifestyle     Physical activity:     Days per week: Not on file     Minutes per session: Not on file     Stress: Not on file   Relationships     Social connections:     Talks on phone: Not on file     Gets together: Not on file     Attends Orthodox service: Not on file     Active member of club or organization: Not on file     Attends meetings of clubs or organizations: Not on file     Relationship status: Not on file     Intimate partner violence:     Fear of current or ex partner: Not on file     Emotionally abused: Not on file     Physically abused: Not on file     Forced  sexual activity: Not on file   Other Topics Concern     Parent/sibling w/ CABG, MI or angioplasty before 65F 55M? Not Asked   Social History Narrative    ** Merged History Encounter **     How much exercise per week? Active with 2 yr    How much calcium per day? supplement       How much caffeine per day? none    How much vitamin D per day? supplement    Do you/your family wear seatbelts?  Yes    Do you/your family use safety helmets? n/a    Do you/your family use sunscreen? No    Do you/your family keep firearms in the home? No    Do you/your family have a smoke detector(s)? 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, 2019 Aldo Perez LPN    [employer] CNM APRN                   Physical Exam:  Limited due to virtual visit  Gen: NAD, pleasant  Neuro: alert and oriented     Lab Results:  Reviewed in EPIC    Lab Results   Component Value Date    A1C 7.2 02/03/2021    A1C 7.4 12/02/2019    A1C 6.5 01/09/2019    A1C 5.9 12/21/2017     ENDO DIABETES Latest Ref Rng & Units 2/3/2021   GLUCOSE 70 - 99 mg/dL 96   CHOLESTEROL <200 mg/dL 219 (H)   LDL CHOLESTEROL, CALCULATED <100 mg/dL 133 (H)   HDL CHOLESTEROL >49 mg/dL 43 (L)   NON HDL CHOLESTEROL <130 mg/dL 176 (H)   TRIGLYCERIDES <150 mg/dL 215 (H)     ENDO DIABETES Latest Ref Rng & Units 2/3/2021   CREATININE 0.52 - 1.04 mg/dL 0.76     Assessment and Plan:  Marina Mock is a 40 year old female with PMHx of HIV and GDM who presents for type 2 diabetes    1.Type 2 diabetes mellitus  HbA1c in Feb 2021 was 7.2%. Her HIV medications can have hyperglycemia as side effects, so they may be contributing. Her weight is normal.  - could not tolerate metformin due to GI side effects     Plan  - discuss different anti-diabetic medications  - would start DPP-4 inhibitor or sulfonylurea. Will start Januvia 100 mg daily. If insurance does not cover, will do sulfonylurea  - Recommend increased physical activity  - continue to check BG at least 2 times per day    2. Diabetic complications  - Retinopathy:  none known. Last eye exam 5 years ago. Recommend for eye exam  - Nephropathy: has never been screened. Normal Cr and eGFR in 06/2019. Urine alb/cr ordered  - Neuropathy: none. Last monofilament testing was normal  - CAD: no  - Hyperlipidemia: yes. Last  in 2/2021. Her HIV medications can contribute to this. Lipid panel at next visit. We briefly discussed statin but will check with drug interaction with anti HIV medication  - Tobacco: no  - HTN: no    RTC in 3 months for diabetes    VDO start 1245 pm  VDO end 1256 pm  VDO duration 11 minutes    External notes/medical records independently reviewed, labs and imaging independently reviewed, medical management and tests to be discussed/communicated to patient.    Time: I spent 25 minutes spent on the date of the encounter preparing to see patient (including chart review and preparation), obtaining and or reviewing additional medical history, performing a limited physical exam and evaluation, documenting clinical information in the electronic health record, independently interpreting results, communicating results to the patient and coordinating care.    Ritesh Benites MD  Division of Diabetes and Endocrinology  Department of Medicine  [phone]

## 2021-03-26 NOTE — LETTER
3/26/2021       RE: Marina Mock  [address]  [facility] [address]     Dear Colleague,    Thank you for referring your patient, Marina Mock, to the [facility] ENDOCRINOLOGY [facility]INIC [place] at [facility]. Please see a copy of my visit note below.    Outcome for 03/24/21 10:02 AM :Left Voicemail for patient to call back   Outcome for 03/25/21 9:28 AM :Unable to Leave   Outcome for 03/25/21 2:01 PM :Unable to Leave [facility]   Date: 03/26/2021  Attending: Ritesh Benites MD    Subjective:  Marina Mock is a 40 year old female with PMHx of HIV and GDM who has f/up type 2 diabetes.    She was diagnosed with type 2 diabetes in 2019 and was started on metformin. However, she could not tolerate it due to upset stomach and lack of appetite. She had history of gestational diabetes during past pregnancies.    She has not been on diabetes medication in the past 2 years. Recent A1c was 7.2% on 2/3/2021. She checked BG -- reported BG between  depending on her diet.    Diet: mostly eat traditional  food    Exercise: None    Complications:  - Retinopathy: unclear. Last eye exam 5 years ago.   - Nephropathy: has never been screened. Normal Cr and eGFR in 06/2019  - Neuropathy: none. Last monofilament testing: today, was normal  - CAD: no  - Hyperlipidemia: yes. Last  in 2/2021  - Tobacco: no  - HTN: no    Review of Systems:   10 point ROS reviewed and was negative unless noted in HPI    Past Medical History:   Past Medical History:   Diagnosis Date     Asymptomatic human immunodeficiency virus (HIV) infection status (H) 2016     Gestational diabetes     diet controlled      Malaria     in [place]      Miscarriage      TB (pulmonary tuberculosis)     latent       Current Medications:   Current Outpatient Rx   Medication Sig Dispense Refill     blood glucose (NO BRAND SPECIFIED) test  strip Use to test blood sugar 4 times daily or as directed. Any covered brand that works with meter. 400 strip 3     blood glucose (NO BRAND SPECIFIED) test strip Use to test blood sugar 3-4 times per week in the morning. 100 each 3     blood glucose monitoring (NO BRAND SPECIFIED) meter device kit Use to test blood sugar 4 times daily or as directed. Any Covered Brand. 1 kit 1     blood glucose monitoring (ULTRA THIN 30G) lancets 1 each daily Use to test blood sugar 3-4 times per week 100 each 3     emtricitabine-rilpivirine-tenofovir (ODEFSEY) 200-25-25 MG TABS per tablet Take 1 tablet by mouth daily (with dinner) 30 tablet 5     medroxyPROGESTERone (DEPO-PROVERA) 150 MG/ML IM injection Inject 150 mg into the muscle every 3 months       metFORMIN (GLUCOPHAGE) 500 MG tablet Start with 500mg (1 pill) with dinner. After 1 week, increase to 1000mg (2 pills) with dinner (Patient not taking: Reported on 9/29/2020) 90 tablet 3     Nutritional Supplements (ENSURE ACTIVE PO)        thin (NO BRAND SPECIFIED) lancets Use with lanceting device. 4x daily. Any covered brand. 400 each 3       Family History:  Mom with diabetes  Father with possible CAD    Social History:  Social History     Socioeconomic History     Marital status:      Spouse name: Chris Rlodan     Number of children: Not on file     Years of education: Not on file     Highest education level: Not on file   Occupational History     Not on file   Social Needs     Financial resource strain: Not on file     Food insecurity:     Worry: Not on file     Inability: Not on file     Transportation needs:     Medical: Not on file     Non-medical: Not on file   Tobacco Use     Smoking status: Never Smoker     Smokeless tobacco: Never Used   Substance and Sexual Activity     Alcohol use: No     Drug use: No     Sexual activity: Yes     Partners: Male     Birth control/protection: Injection   Lifestyle     Physical activity:     Days per week: Not on file     Minutes  per session: Not on file     Stress: Not on file   Relationships     Social connections:     Talks on phone: Not on file     Gets together: Not on file     Attends Jehovah's witness service: Not on file     Active member of club or organization: Not on file     Attends meetings of clubs or organizations: Not on file     Relationship status: Not on file     Intimate partner violence:     Fear of current or ex partner: Not on file     Emotionally abused: Not on file     Physically abused: Not on file     Forced sexual activity: Not on file   Other Topics Concern     Parent/sibling w/ CABG, MI or angioplasty before 65F 55M? Not Asked   Social History Narrative    ** Merged History Encounter **     How much exercise per week? Active with 2 yr    How much calcium per day? supplement       How much caffeine per day? none    How much vitamin D per day? supplement    Do you/your family wear seatbelts?  Yes    Do you/your family use safety helmets? n/a    Do you/your family use sunscreen? No    Do you/your family keep firearms in the home? No    Do you/your family have a smoke detector(s)? 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, 2019 Aldo Perez LPN    Chata [employer] CNM APRN                   Physical Exam:  Limited due to virtual visit  Gen: NAD, pleasant  Neuro: alert and oriented     Lab Results:  Reviewed in EPIC    Lab Results   Component Value Date    A1C 7.2 02/03/2021    A1C 7.4 12/02/2019    A1C 6.5 01/09/2019    A1C 5.9 12/21/2017     ENDO DIABETES Latest Ref Rng & Units 2/3/2021   GLUCOSE 70 - 99 mg/dL 96   CHOLESTEROL <200 mg/dL 219 (H)   LDL CHOLESTEROL, CALCULATED <100 mg/dL 133 (H)   HDL CHOLESTEROL >49 mg/dL 43 (L)   NON HDL CHOLESTEROL <130 mg/dL 176 (H)   TRIGLYCERIDES <150 mg/dL 215 (H)     ENDO DIABETES Latest Ref Rng & Units 2/3/2021   CREATININE 0.52 - 1.04 mg/dL 0.76     Assessment and Plan:  Marina Mock is a 40 year old female with PMHx of HIV and GDM who presents for type 2 diabetes    1.Type 2 diabetes mellitus  HbA1c in  Feb 2021 was 7.2%. Her HIV medications can have hyperglycemia as side effects, so they may be contributing. Her weight is normal.  - could not tolerate metformin due to GI side effects     Plan  - discuss different anti-diabetic medications  - would start DPP-4 inhibitor or sulfonylurea. Will start Januvia 100 mg daily. If insurance does not cover, will do sulfonylurea  - Recommend increased physical activity  - continue to check BG at least 2 times per day    2. Diabetic complications  - Retinopathy: none known. Last eye exam 5 years ago. Recommend for eye exam  - Nephropathy: has never been screened. Normal Cr and eGFR in 06/2019. Urine alb/cr ordered  - Neuropathy: none. Last monofilament testing was normal  - CAD: no  - Hyperlipidemia: yes. Last  in 2/2021. Her HIV medications can contribute to this. Lipid panel at next visit. We briefly discussed statin but will check with drug interaction with anti HIV medication  - Tobacco: no  - HTN: no    RTC in 3 months for diabetes    VDO start 1245 pm  VDO end 1256 pm  VDO duration 11 minutes    External notes/medical records independently reviewed, labs and imaging independently reviewed, medical management and tests to be discussed/communicated to patient.    Time: I spent 25 minutes spent on the date of the encounter preparing to see patient (including chart review and preparation), obtaining and or reviewing additional medical history, performing a limited physical exam and evaluation, documenting clinical information in the electronic health record, independently interpreting results, communicating results to the patient and coordinating care.    Ritesh Benites MD  Division of Diabetes and Endocrinology  Department of Medicine  [phone]                    Again, thank you for allowing me to participate in the care of your patient.      Sincerely,    Ritesh Benites MD

## 2021-04-21 ENCOUNTER — TELEPHONE (OUTPATIENT)
Dept: PHARMACY | Facility: CLINIC | Age: 41
End: 2021-04-21

## 2021-04-21 NOTE — TELEPHONE ENCOUNTER
Called patient for refill reminder.    Will mail prescriptions address has been verified.   30 day supply         Last Filled on: 03/23/21   Follow-up Date: 05/19/21    Michelle Ricci CPhT  [facility]  [phone]

## 2021-04-24 ENCOUNTER — HEALTH MAINTENANCE LETTER (OUTPATIENT)
Age: 41
End: 2021-04-24

## 2021-05-19 ENCOUNTER — TELEPHONE (OUTPATIENT)
Dept: PHARMACY | Facility: CLINIC | Age: 41
End: 2021-05-19

## 2021-05-19 NOTE — TELEPHONE ENCOUNTER
Called patient for refill reminder.    Will mail through [facility] prescriptions address has been verified.   30 day supply           Last Filled on:04/21/2021   Follow-up Date: 06/18/2021    Thank You,  Jeff Blackburn, Student Pharmacist  [facility]

## 2021-05-28 ENCOUNTER — ALLIED HEALTH/NURSE VISIT (OUTPATIENT)
Dept: NURSING | Facility: CLINIC | Age: 41
End: 2021-05-28
Payer: COMMERCIAL

## 2021-05-28 DIAGNOSIS — Z30.9 CONTRACEPTIVE MANAGEMENT: Primary | ICD-10-CM

## 2021-05-28 PROCEDURE — 96372 THER/PROPH/DIAG INJ SC/IM: CPT

## 2021-05-28 RX ADMIN — MEDROXYPROGESTERONE ACETATE 150 MG: 150 INJECTION, SUSPENSION INTRAMUSCULAR at 14:58

## 2021-06-17 ENCOUNTER — TELEPHONE (OUTPATIENT)
Dept: PHARMACY | Facility: CLINIC | Age: 41
End: 2021-06-17

## 2021-06-17 NOTE — TELEPHONE ENCOUNTER
Attempted to contact the patient to for refill reminder call,  left message on voicemail    Last filled on: 05/19/21    Follow-up Date: 06/24/21 2nd attempt    Michelle Ricci CPhT  [facility]  [phone]

## 2021-06-23 NOTE — PROGRESS NOTES
MFM received referral from [facility] for FTS, L2 and consultation.  Patient was scheduled for the FTS on 1/21/19 in which she no showed for.  This was the last day for FTS scheduling window.  Patient is scheduled for MFM consult on 2/5/19 @ [facility].  Referring clinic notified.  Removing orders.    Nathalie Mcclain

## 2021-06-24 NOTE — TELEPHONE ENCOUNTER
Attempted to contact the patient to for refill reminder call,  left message on voicemail    Last filled on: 05/19/21      Follow-up Date: 06/30/21 3rd attempt    Michelle Ricci CPhT  formerly [facility]  [phone]

## 2021-06-30 NOTE — TELEPHONE ENCOUNTER
Called patient for refill reminder.    Will mail prescriptions address has been verified.   30 day supply         Last Filled on: 05/19/21   Follow-up Date: 07/23/21    Michelle Ricci CPhT  [facility]  [phone]

## 2021-08-08 ENCOUNTER — HEALTH MAINTENANCE LETTER (OUTPATIENT)
Age: 41
End: 2021-08-08

## 2021-08-13 ENCOUNTER — ALLIED HEALTH/NURSE VISIT (OUTPATIENT)
Dept: NURSING | Facility: CLINIC | Age: 41
End: 2021-08-13
Payer: COMMERCIAL

## 2021-08-13 DIAGNOSIS — Z30.42 DEPO-PROVERA CONTRACEPTIVE STATUS: Primary | ICD-10-CM

## 2021-08-13 DIAGNOSIS — Z30.8 ENCOUNTER FOR OTHER CONTRACEPTIVE MANAGEMENT: Primary | ICD-10-CM

## 2021-08-13 DIAGNOSIS — B20 HUMAN IMMUNODEFICIENCY VIRUS (HIV) DISEASE (H): Primary | ICD-10-CM

## 2021-08-13 PROCEDURE — 96372 THER/PROPH/DIAG INJ SC/IM: CPT | Performed by: INTERNAL MEDICINE

## 2021-08-13 RX ORDER — MEDROXYPROGESTERONE ACETATE 150 MG/ML
150 INJECTION, SUSPENSION INTRAMUSCULAR
Status: COMPLETED | OUTPATIENT
Start: 2021-08-13 | End: 2022-07-21

## 2021-08-13 RX ADMIN — MEDROXYPROGESTERONE ACETATE 150 MG: 150 INJECTION, SUSPENSION INTRAMUSCULAR at 15:59

## 2021-08-13 NOTE — NURSING NOTE
BP: Data Unavailable    LAST PAP/EXAM:   Lab Results   Component Value Date    PAP ASC-US 12/02/2019     URINE HCG:not indicated    The following medication was given:     MEDICATION: Depo Provera 150mg  ROUTE: IM  SITE: Deltoid - Right  : [employer]ylan   LOT #: [id number]  EXP:08/2022  NEXT INJECTION DUE: 10/29/21 - 11/12/21   Provider: Dr Calya Merrill

## 2021-08-30 DIAGNOSIS — E11.65 TYPE 2 DIABETES MELLITUS WITH HYPERGLYCEMIA, UNSPECIFIED WHETHER LONG TERM INSULIN USE (H): Primary | ICD-10-CM

## 2021-09-02 RX ORDER — BLOOD SUGAR DIAGNOSTIC
STRIP MISCELLANEOUS
Qty: 400 STRIP | Refills: 3 | Status: SHIPPED | OUTPATIENT
Start: 2021-09-02 | End: 2024-07-17

## 2021-09-02 RX ORDER — LANCETS 33 GAUGE
EACH MISCELLANEOUS
Qty: 400 EACH | Refills: 0 | Status: SHIPPED | OUTPATIENT
Start: 2021-09-02 | End: 2024-07-17

## 2021-09-03 DIAGNOSIS — B20 HUMAN IMMUNODEFICIENCY VIRUS (HIV) DISEASE (H): ICD-10-CM

## 2021-09-04 RX ORDER — POLYMYXIN B SULFATE, BACITRACIN ZINC AND NEOMYCIN SULFATE 400; 3.5; 5 [USP'U]/G; MG/G; [USP'U]/G
1 OINTMENT TOPICAL
Qty: 30 TABLET | Refills: 0 | Status: SHIPPED | OUTPATIENT
Start: 2021-09-04 | End: 2021-10-13

## 2021-09-04 NOTE — TELEPHONE ENCOUNTER
"  Marina is overdue for labs, and was a no-show for her last lab appt.       I have put in one 30 day supply of refill of the Odefsey.    Please reach out to Marina to schedule labs.  These need to be fasting.  Please put in comments \"Labs for both Juan and Liza\"    Visit with me next available after labs.   Follow-up B20.   30 minutes.   See if she would use Doximity, as she has a hard time with the [employer] program.      Alfredo Martinez MD, MS  Infectious Disease   "

## 2021-10-04 ENCOUNTER — HEALTH MAINTENANCE LETTER (OUTPATIENT)
Age: 41
End: 2021-10-04

## 2021-10-13 DIAGNOSIS — B20 HUMAN IMMUNODEFICIENCY VIRUS (HIV) DISEASE (H): Primary | ICD-10-CM

## 2021-10-13 RX ORDER — POLYMYXIN B SULFATE, BACITRACIN ZINC AND NEOMYCIN SULFATE 400; 3.5; 5 [USP'U]/G; MG/G; [USP'U]/G
1 OINTMENT TOPICAL
Qty: 30 TABLET | Refills: 0 | Status: SHIPPED | OUTPATIENT
Start: 2021-10-13 | End: 2021-11-23

## 2021-10-20 ENCOUNTER — LAB (OUTPATIENT)
Dept: LAB | Facility: CLINIC | Age: 41
End: 2021-10-20
Payer: COMMERCIAL

## 2021-10-20 DIAGNOSIS — B20 HUMAN IMMUNODEFICIENCY VIRUS (HIV) DISEASE (H): ICD-10-CM

## 2021-10-20 LAB
BASOPHILS # BLD AUTO: 0 10E3/UL (ref 0–0.2)
BASOPHILS NFR BLD AUTO: 0 %
EOSINOPHIL # BLD AUTO: 0 10E3/UL (ref 0–0.7)
EOSINOPHIL NFR BLD AUTO: 1 %
ERYTHROCYTE [DISTWIDTH] IN BLOOD BY AUTOMATED COUNT: 13.8 % (ref 10–15)
HCT VFR BLD AUTO: 38.7 % (ref 35–47)
HGB BLD-MCNC: 12.8 G/DL (ref 11.7–15.7)
LYMPHOCYTES # BLD AUTO: 1.8 10E3/UL (ref 0.8–5.3)
LYMPHOCYTES NFR BLD AUTO: 36 %
MCH RBC QN AUTO: 24.6 PG (ref 26.5–33)
MCHC RBC AUTO-ENTMCNC: 33.1 G/DL (ref 31.5–36.5)
MCV RBC AUTO: 74 FL (ref 78–100)
MONOCYTES # BLD AUTO: 0.4 10E3/UL (ref 0–1.3)
MONOCYTES NFR BLD AUTO: 8 %
NEUTROPHILS # BLD AUTO: 2.8 10E3/UL (ref 1.6–8.3)
NEUTROPHILS NFR BLD AUTO: 55 %
PLATELET # BLD AUTO: 235 10E3/UL (ref 150–450)
RBC # BLD AUTO: 5.2 10E6/UL (ref 3.8–5.2)
WBC # BLD AUTO: 5.1 10E3/UL (ref 4–11)

## 2021-10-20 PROCEDURE — 87536 HIV-1 QUANT&REVRSE TRNSCRPJ: CPT

## 2021-10-20 PROCEDURE — 86359 T CELLS TOTAL COUNT: CPT

## 2021-10-20 PROCEDURE — 80053 COMPREHEN METABOLIC PANEL: CPT

## 2021-10-20 PROCEDURE — 85025 COMPLETE CBC W/AUTO DIFF WBC: CPT

## 2021-10-20 PROCEDURE — 86360 T CELL ABSOLUTE COUNT/RATIO: CPT

## 2021-10-20 PROCEDURE — 36415 COLL VENOUS BLD VENIPUNCTURE: CPT

## 2021-10-21 LAB
ALBUMIN SERPL-MCNC: 4.1 G/DL (ref 3.4–5)
ALP SERPL-CCNC: 57 U/L (ref 40–150)
ALT SERPL W P-5'-P-CCNC: 18 U/L (ref 0–50)
ANION GAP SERPL CALCULATED.3IONS-SCNC: 6 MMOL/L (ref 3–14)
AST SERPL W P-5'-P-CCNC: 17 U/L (ref 0–45)
BILIRUB SERPL-MCNC: 0.4 MG/DL (ref 0.2–1.3)
BUN SERPL-MCNC: 11 MG/DL (ref 7–30)
CALCIUM SERPL-MCNC: 9.3 MG/DL (ref 8.5–10.1)
CD3 CELLS # BLD: 1200 CELLS/UL (ref 603–2990)
CD3 CELLS NFR BLD: 62 % (ref 49–84)
CD3+CD4+ CELLS # BLD: 584 CELLS/UL (ref 441–2156)
CD3+CD4+ CELLS NFR BLD: 30 % (ref 28–63)
CD3+CD4+ CELLS/CD3+CD8+ CLL BLD: 1.01 % (ref 1.4–2.6)
CD3+CD8+ CELLS # BLD: 577 CELLS/UL (ref 125–1312)
CD3+CD8+ CELLS NFR BLD: 30 % (ref 10–40)
CHLORIDE BLD-SCNC: 109 MMOL/L (ref 94–109)
CO2 SERPL-SCNC: 26 MMOL/L (ref 20–32)
CREAT SERPL-MCNC: 0.79 MG/DL (ref 0.52–1.04)
GFR SERPL CREATININE-BSD FRML MDRD: >90 ML/MIN/1.73M2
GLUCOSE BLD-MCNC: 121 MG/DL (ref 70–99)
POTASSIUM BLD-SCNC: 4 MMOL/L (ref 3.4–5.3)
PROT SERPL-MCNC: 8.4 G/DL (ref 6.8–8.8)
SODIUM SERPL-SCNC: 141 MMOL/L (ref 133–144)
T CELL COMMENT: ABNORMAL

## 2021-10-22 LAB — HIV1 RNA # PLAS NAA DL=20: NOT DETECTED COPIES/ML

## 2021-11-04 ENCOUNTER — LAB (OUTPATIENT)
Dept: LAB | Facility: CLINIC | Age: 41
End: 2021-11-04
Payer: COMMERCIAL

## 2021-11-04 ENCOUNTER — ALLIED HEALTH/NURSE VISIT (OUTPATIENT)
Dept: INTERNAL MEDICINE | Facility: CLINIC | Age: 41
End: 2021-11-04
Payer: COMMERCIAL

## 2021-11-04 DIAGNOSIS — E11.65 TYPE 2 DIABETES MELLITUS WITH HYPERGLYCEMIA, UNSPECIFIED WHETHER LONG TERM INSULIN USE (H): ICD-10-CM

## 2021-11-04 DIAGNOSIS — Z30.42 ENCOUNTER FOR SURVEILLANCE OF INJECTABLE CONTRACEPTIVE: Primary | ICD-10-CM

## 2021-11-04 LAB
CHOLEST SERPL-MCNC: 198 MG/DL
FASTING STATUS PATIENT QL REPORTED: YES
HBA1C MFR BLD: 6.2 % (ref 0–5.6)
HDLC SERPL-MCNC: 40 MG/DL
LDLC SERPL CALC-MCNC: 135 MG/DL
NONHDLC SERPL-MCNC: 158 MG/DL
TRIGL SERPL-MCNC: 117 MG/DL

## 2021-11-04 PROCEDURE — 99207 PR NO CHARGE NURSE ONLY: CPT

## 2021-11-04 PROCEDURE — 96372 THER/PROPH/DIAG INJ SC/IM: CPT | Performed by: INTERNAL MEDICINE

## 2021-11-04 PROCEDURE — 80061 LIPID PANEL: CPT

## 2021-11-04 PROCEDURE — 83036 HEMOGLOBIN GLYCOSYLATED A1C: CPT

## 2021-11-04 PROCEDURE — 82043 UR ALBUMIN QUANTITATIVE: CPT

## 2021-11-04 PROCEDURE — 36415 COLL VENOUS BLD VENIPUNCTURE: CPT

## 2021-11-04 RX ADMIN — MEDROXYPROGESTERONE ACETATE 150 MG: 150 INJECTION, SUSPENSION INTRAMUSCULAR at 16:00

## 2021-11-05 LAB
CREAT UR-MCNC: 238 MG/DL
MICROALBUMIN UR-MCNC: 9 MG/L
MICROALBUMIN/CREAT UR: 3.78 MG/G CR (ref 0–25)

## 2021-11-10 ENCOUNTER — CLINICAL UPDATE (OUTPATIENT)
Dept: PHARMACY | Facility: CLINIC | Age: 41
End: 2021-11-10
Payer: COMMERCIAL

## 2021-11-10 DIAGNOSIS — E11.9 TYPE 2 DIABETES MELLITUS WITHOUT COMPLICATION, WITHOUT LONG-TERM CURRENT USE OF INSULIN (H): Primary | ICD-10-CM

## 2021-11-10 PROCEDURE — 99207 PR NO CHARGE LOS: CPT | Performed by: PHARMACIST

## 2021-11-10 NOTE — Clinical Note
FYI - atorvastatin, rosuvastatin no Drug drug interactions with current med list, including Odefsey.     Lauren Bloch, PharmD  Medication Therapy Management Pharmacist   [facility] [place]

## 2021-11-10 NOTE — PROGRESS NOTES
Clinical Update:                                                    At the request of Dr. Ritesh Benites, a chart review was conducted for Marina Mock.    Reason for Chart Review: Appropriate statin selection given patient's current medication list, including medication(s) for HIV.     Discussion: Patient has type 2 diabetes and provider looking for appropriate statin that is safe within patient's regimen. Drug-drug interactions were assessed between statins and the patient's current medication list.  Typically simvastatin and lovastatin are the medications to avoid with in most HIV regimens.  Atorvastatin and rosuvastatin appear to have no interaction with the patient's current regimen including Odefsey.  Therefore, these would likely be the statin options that should be considered given the patient's current medication regimen.    Current Outpatient Medications:      blood glucose (NO BRAND SPECIFIED) test strip, Use to test blood sugar 4 times daily or as directed. Any covered brand that works with meter., Disp: 400 strip, Rfl: 3     blood glucose (NO BRAND SPECIFIED) test strip, Use to test blood sugar 3-4 times per week in the morning., Disp: 100 each, Rfl: 3     blood glucose (ONETOUCH ULTRA) test strip, USE TO TEST BLOOD SUGAR FOUR TIMES A DAY OR AS DIRECTED, Disp: 400 strip, Rfl: 3     blood glucose monitoring (NO BRAND SPECIFIED) meter device kit, Use to test blood sugar 4 times daily or as directed. Any Covered Brand., Disp: 1 kit, Rfl: 1     blood glucose monitoring (ULTRA THIN 30G) lancets, 1 each daily Use to test blood sugar 3-4 times per week, Disp: 100 each, Rfl: 3     emtricitabine-rilpivirine-tenofovir (ODEFSEY) 200-25-25 MG TABS per tablet, Take 1 tablet by mouth daily (with dinner), Disp: 30 tablet, Rfl: 0     Lancets (ONETOUCH DELICA PLUS [id number]) MISC, USE WITH LANCETING DEVICE 4 TIMES DAILY., Disp: 400 each, Rfl: 0     metFORMIN (GLUCOPHAGE) 500 MG tablet, Start with 500mg (1  pill) with dinner. After 1 week, increase to 1000mg (2 pills) with dinner (Patient not taking: Reported on 9/29/2020), Disp: 90 tablet, Rfl: 3     Nutritional Supplements (ENSURE ACTIVE PO), , Disp: , Rfl:      sitagliptin (JANUVIA) 100 MG tablet, Take 1 tablet (100 mg) by mouth daily, Disp: 90 tablet, Rfl: 3     thin (NO BRAND SPECIFIED) lancets, Use with lanceting device. 4x daily. Any covered brand., Disp: 400 each, Rfl: 3    Current Facility-Administered Medications:      medroxyPROGESTERone (DEPO-PROVERA) injection 150 mg, 150 mg, Intramuscular, Q90 Days, Cayla Merrill MD, 150 mg at 11/04/21 1600    Plan:  1.  Could consider either atorvastatin or rosuvastatin as safe options within patient's current regimen.     Lauren Bloch, PharmD  Medication Therapy Management Pharmacist   [facility] [place]

## 2021-11-18 DIAGNOSIS — B20 HUMAN IMMUNODEFICIENCY VIRUS (HIV) DISEASE (H): ICD-10-CM

## 2021-11-19 RX ORDER — POLYMYXIN B SULFATE, BACITRACIN ZINC AND NEOMYCIN SULFATE 400; 3.5; 5 [USP'U]/G; MG/G; [USP'U]/G
1 OINTMENT TOPICAL
Qty: 30 TABLET | Refills: 0 | Status: CANCELLED | OUTPATIENT
Start: 2021-11-19

## 2021-11-23 ENCOUNTER — OFFICE VISIT (OUTPATIENT)
Dept: INFECTIOUS DISEASES | Facility: CLINIC | Age: 41
End: 2021-11-23
Attending: INTERNAL MEDICINE
Payer: COMMERCIAL

## 2021-11-23 VITALS
TEMPERATURE: 98.4 F | HEART RATE: 78 BPM | DIASTOLIC BLOOD PRESSURE: 67 MMHG | WEIGHT: 118.8 LBS | BODY MASS INDEX: 22.43 KG/M2 | HEIGHT: 61 IN | SYSTOLIC BLOOD PRESSURE: 117 MMHG | OXYGEN SATURATION: 100 %

## 2021-11-23 DIAGNOSIS — E11.65 TYPE 2 DIABETES MELLITUS WITH HYPERGLYCEMIA, WITHOUT LONG-TERM CURRENT USE OF INSULIN (H): ICD-10-CM

## 2021-11-23 DIAGNOSIS — B20 HUMAN IMMUNODEFICIENCY VIRUS (HIV) DISEASE (H): Primary | ICD-10-CM

## 2021-11-23 PROCEDURE — G0463 HOSPITAL OUTPT CLINIC VISIT: HCPCS

## 2021-11-23 PROCEDURE — 99214 OFFICE O/P EST MOD 30 MIN: CPT | Performed by: INTERNAL MEDICINE

## 2021-11-23 RX ORDER — POLYMYXIN B SULFATE, BACITRACIN ZINC AND NEOMYCIN SULFATE 400; 3.5; 5 [USP'U]/G; MG/G; [USP'U]/G
1 OINTMENT TOPICAL
Qty: 30 TABLET | Refills: 2 | Status: SHIPPED | OUTPATIENT
Start: 2021-11-23 | End: 2022-03-20

## 2021-11-23 ASSESSMENT — PAIN SCALES - GENERAL: PAINLEVEL: NO PAIN (0)

## 2021-11-23 ASSESSMENT — MIFFLIN-ST. JEOR: SCORE: 1151.25

## 2021-11-23 NOTE — LETTER
"2021       RE: Marina Mock  [address]  [facility] [address]     Dear Colleague,    Thank you for referring your patient, Marina Mock, to the [facility] INFECTIOUS DISEASE CLINIC [place] at [facility]. Please see a copy of my visit note below.      Infectious Disease Clinic Note - in person      Reason for visit:   Infectious Disease Return Visit, planned follow-up for HIV     SUBJECTIVE and Interval History:     Mairna is well known to me.  I follow her for her HIV infection. We decided to change medications to Odefsey as of 1/10/2020, and she has done well since then.  She presents to clinic with her two adorable children accompanying her.     She was overdue for labs; they were done 2/3/2021 and 10/20/2021. Our goal is q6months for HIV monitoring. There were no concerns from an HIV perspective.  Viral load not detected and absolute CD4 stable at 584.    She did also have endocrine labs on 2021 and A1C was down to 6.2%.  .   She admits to me that she is not taking any other medications aside from her ART (Odefsey).   She is strict about sugars, and took out soda \"pop,\" no juice, and she likes to eat almonds if she sees her blood sugar is high.  She also takes black pepper and cinnamon and feels this really is helping her.   She is feeling well otherwise.    Marina's biggest concern today was that she has some ongoing halitosis and it is really embarrassing to her.  She is not sure what to do.  She does not have any oral pain, but she does have broken teeth and caries.  She would like to see a dentist.    Marina continues on Depo Provera injection for contraception, but she does not always get her doses on time.  She continues to be at risk for another pregnancy.       Sadly, her mother  earlier this .  She had cancer and kidney problems, and she  at home in [place]. Marina was feeling " "very sad about not being able to see her before she .       I have reviewed and updated the patient's Past Medical History, Social History, Family History and Medication List.    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     OBJECTIVE:    Current Outpatient Medications   Medication     blood glucose (NO BRAND SPECIFIED) test strip     blood glucose (NO BRAND SPECIFIED) test strip     blood glucose (ONETOUCH ULTRA) test strip     blood glucose monitoring (NO BRAND SPECIFIED) meter device kit     blood glucose monitoring (ULTRA THIN 30G) lancets     emtricitabine-rilpivirine-tenofovir (ODEFSEY) 200-25-25 MG TABS per tablet     Lancets (ONETOUCH DELICA PLUS [id number]) MISC     Nutritional Supplements (ENSURE ACTIVE PO)     thin (NO BRAND SPECIFIED) lancets     Facility-Administered Medications   Medication     medroxyPROGESTERone (DEPO-PROVERA) injection 150 mg       No Known Allergies       Physical Examination:   VITAL SIGNS: /67   Pulse 78   Temp 98.4  F (36.9  C) (Oral)   Ht 1.549 m (5' 1\")   Wt 53.9 kg (118 lb 12.8 oz)   SpO2 100%   BMI 22.45 kg/m      GENERAL:   No acute distress    HEENT: No icterus or injection. Oropharynx moist and clear without lesions or exudate.   Several broken teeth and caries.  No obvious abscesses or signs of infection.  No tenderness elicited.   NECK: Supple and nontender  LYMPH:  No cervical, axillary or inguinal lymphadenopathy  LUNGS: Clear to ausculation bilaterally without any increased work of breathing  HEART: Regular rate and rhythm and no murmur, gallop or rub    ABDOMEN: Normoactive bowel sounds, soft, nontender, nondistended, no hepatosplenomegaly.    EXTREMITIES: Warm and well perfused without clubbing, cyanosis, or edema  SKIN:  No rashes  NEURO:  Awake, alert, no focal neurologic deficits.  PSYCH: Affect normal. Speech fluent and appropriate.      Last labs:  10/20/2021  HIV viral load not detected  CD4 - " 584 absolute and 30%  Hg 12.8, MCV 74, does not take her iron supplement  CMP with a glucose of 121 and all other values within normal limits    11/4/2021  HgA1C 6.2%          ASSESSMENT:     Well controlled HIV     Marina is doing well with Odefsey.    I view Marina's case as someone who may become pregnant again without planning.  When we made the switch, I was purposely avoiding dolutegravir at time of conception, but recently guidelines were updated to reflect more data suggesting that dolutegravir might not be contributing to neural tube defects.  She is a candidate for two drug regimens such as Dovato or long acting injectable Cabenuva.      Marina is interested in Cabenuva.  I did express to her the importance of getting the injections monthly.  She currently receives Depo Provera for contraception, and she has missed her shot time interval more than once. I will discuss the option with our [facility] pharmacist.    Diabetes and dyslipidemia    HgA1C is now 6.2 %, diet control only,     She does have follow-up with Endocrine.  They can advise diet and potential statin therapy, although I am not sure if Marina will be willing to take any additional medication.    PLAN:     - continue Odefsey, but we will look into potential for Cabenuva for her  Marina felt she could do this if appts could be later in the afternoon.  She is off at 2:45pm and would need to  the children before coming to the injection appt.    - needs help identifying a dentist.  I will reach out to her medical case manager from [facility] (Jenny Schoenberg)    - visit follow-up depends on whether or not we initiate Cabenuva    Time:  A total of 35 minutes was spent in care of the patient today. 25 minutes were spent in the room with the patient, with an additional 10 minutes spent on chart review and care coordination.      Again, thank you for allowing me to participate in the care of your patient.       Sincerely,    Alfredo Martinez MD

## 2021-11-23 NOTE — LETTER
"2021      RE: Marina Mock  [address]  [facility] [address]         Infectious Disease Clinic Note - in person      Reason for visit:   Infectious Disease Return Visit, planned follow-up for HIV     SUBJECTIVE and Interval History:     Marina is well known to me.  I follow her for her HIV infection. We decided to change medications to Odefsey as of 1/10/2020, and she has done well since then.  She presents to clinic with her two adorable children accompanying her.     She was overdue for labs; they were done 2/3/2021 and 10/20/2021. Our goal is q6months for HIV monitoring. There were no concerns from an HIV perspective.  Viral load not detected and absolute CD4 stable at 584.    She did also have endocrine labs on 2021 and A1C was down to 6.2%.  .   She admits to me that she is not taking any other medications aside from her ART (Odefsey).   She is strict about sugars, and took out soda \"pop,\" no juice, and she likes to eat almonds if she sees her blood sugar is high.  She also takes black pepper and cinnamon and feels this really is helping her.   She is feeling well otherwise.    Marina's biggest concern today was that she has some ongoing halitosis and it is really embarrassing to her.  She is not sure what to do.  She does not have any oral pain, but she does have broken teeth and caries.  She would like to see a dentist.    Marina continues on Depo Provera injection for contraception, but she does not always get her doses on time.  She continues to be at risk for another pregnancy.       Sadly, her mother  earlier this .  She had cancer and kidney problems, and she  at home in [place]. Marina was feeling very sad about not being able to see her before she .       I have reviewed and updated the patient's Past Medical History, Social History, Family History and Medication List.    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     OBJECTIVE:    Current Outpatient Medications   Medication     blood glucose (NO BRAND SPECIFIED) test strip     blood glucose (NO BRAND SPECIFIED) test strip     blood glucose (ONETOUCH ULTRA) test strip     blood glucose monitoring (NO BRAND SPECIFIED) meter device kit     blood glucose monitoring (ULTRA THIN 30G) lancets     emtricitabine-rilpivirine-tenofovir (ODEFSEY) 200-25-25 MG TABS per tablet     Lancets (ONETOUCH DELICA PLUS [id number]) MISC     Nutritional Supplements (ENSURE ACTIVE PO)     thin (NO BRAND SPECIFIED) lancets     Facility-Administered Medications   Medication     medroxyPROGESTERone (DEPO-PROVERA) injection 150 mg       No Known Allergies       Physical Examination:   VITAL SIGNS: /67   Pulse 78   Temp 98.4  F (36.9  C) (Oral)   Ht 1.549 m (5' 1\")   Wt 53.9 kg (118 lb 12.8 oz)   SpO2 100%   BMI 22.45 kg/m      GENERAL:   No acute distress    HEENT: No icterus or injection. Oropharynx moist and clear without lesions or exudate.   Several broken teeth and caries.  No obvious abscesses or signs of infection.  No tenderness elicited.   NECK: Supple and nontender  LYMPH:  No cervical, axillary or inguinal lymphadenopathy  LUNGS: Clear to ausculation bilaterally without any increased work of breathing  HEART: Regular rate and rhythm and no murmur, gallop or rub    ABDOMEN: Normoactive bowel sounds, soft, nontender, nondistended, no hepatosplenomegaly.    EXTREMITIES: Warm and well perfused without clubbing, cyanosis, or edema  SKIN:  No rashes  NEURO:  Awake, alert, no focal neurologic deficits.  PSYCH: Affect normal. Speech fluent and appropriate.      Last labs:  10/20/2021  HIV viral load not detected  CD4 - 584 absolute and 30%  Hg 12.8, MCV 74, does not take her iron supplement  CMP with a glucose of 121 and all other values within normal limits    11/4/2021  HgA1C 6.2%          ASSESSMENT:     Well controlled HIV     Marina is doing well with " Joshua.    I view Marina's case as someone who may become pregnant again without planning.  When we made the switch, I was purposely avoiding dolutegravir at time of conception, but recently guidelines were updated to reflect more data suggesting that dolutegravir might not be contributing to neural tube defects.  She is a candidate for two drug regimens such as Dovato or long acting injectable Cabenuva.      Marina is interested in Cabenuva.  I did express to her the importance of getting the injections monthly.  She currently receives Depo Provera for contraception, and she has missed her shot time interval more than once. I will discuss the option with our [facility] pharmacist.    Diabetes and dyslipidemia    HgA1C is now 6.2 %, diet control only,     She does have follow-up with Endocrine.  They can advise diet and potential statin therapy, although I am not sure if Marina will be willing to take any additional medication.       PLAN:     - continue Odefsey, but we will look into potential for Cabenuva for her  Marina felt she could do this if appts could be later in the afternoon.  She is off at 2:45pm and would need to  the children before coming to the injection appt.    - needs help identifying a dentist.  I will reach out to her medical case manager from [facility] (Jenny Schoenberg)    - visit follow-up depends on whether or not we initiate Cabenuva      Time:  A total of 35 minutes was spent in care of the patient today. 25 minutes were spent in the room with the patient, with an additional 10 minutes spent on chart review and care coordination.        Alfredo Martinez MD

## 2021-11-24 NOTE — PROGRESS NOTES
"  Infectious Disease Clinic Note - in person      Reason for visit:   Infectious Disease Return Visit, planned follow-up for HIV     SUBJECTIVE and Interval History:     Marina is well known to me.  I follow her for her HIV infection. We decided to change medications to Odefsey as of 1/10/2020, and she has done well since then.  She presents to clinic with her two adorable children accompanying her.     She was overdue for labs; they were done 2/3/2021 and 10/20/2021. Our goal is q6months for HIV monitoring. There were no concerns from an HIV perspective.  Viral load not detected and absolute CD4 stable at 584.    She did also have endocrine labs on 2021 and A1C was down to 6.2%.  .   She admits to me that she is not taking any other medications aside from her ART (Odefsey).   She is strict about sugars, and took out soda \"pop,\" no juice, and she likes to eat almonds if she sees her blood sugar is high.  She also takes black pepper and cinnamon and feels this really is helping her.   She is feeling well otherwise.    Marina's biggest concern today was that she has some ongoing halitosis and it is really embarrassing to her.  She is not sure what to do.  She does not have any oral pain, but she does have broken teeth and caries.  She would like to see a dentist.    Marina continues on Depo Provera injection for contraception, but she does not always get her doses on time.  She continues to be at risk for another pregnancy.       Sadly, her mother  earlier this .  She had cancer and kidney problems, and she  at home in [place]. Marina was feeling very sad about not being able to see her before she .       I have reviewed and updated the patient's Past Medical History, Social History, Family History and Medication List.    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     OBJECTIVE:    Current Outpatient Medications   Medication     " "blood glucose (NO BRAND SPECIFIED) test strip     blood glucose (NO BRAND SPECIFIED) test strip     blood glucose (ONETOUCH ULTRA) test strip     blood glucose monitoring (NO BRAND SPECIFIED) meter device kit     blood glucose monitoring (ULTRA THIN 30G) lancets     emtricitabine-rilpivirine-tenofovir (ODEFSEY) 200-25-25 MG TABS per tablet     Lancets (ONETOUCH DELICA PLUS [id number]) MISC     Nutritional Supplements (ENSURE ACTIVE PO)     thin (NO BRAND SPECIFIED) lancets     Facility-Administered Medications   Medication     medroxyPROGESTERone (DEPO-PROVERA) injection 150 mg       No Known Allergies       Physical Examination:   VITAL SIGNS: /67   Pulse 78   Temp 98.4  F (36.9  C) (Oral)   Ht 1.549 m (5' 1\")   Wt 53.9 kg (118 lb 12.8 oz)   SpO2 100%   BMI 22.45 kg/m      GENERAL:   No acute distress    HEENT: No icterus or injection. Oropharynx moist and clear without lesions or exudate.   Several broken teeth and caries.  No obvious abscesses or signs of infection.  No tenderness elicited.   NECK: Supple and nontender  LYMPH:  No cervical, axillary or inguinal lymphadenopathy  LUNGS: Clear to ausculation bilaterally without any increased work of breathing  HEART: Regular rate and rhythm and no murmur, gallop or rub    ABDOMEN: Normoactive bowel sounds, soft, nontender, nondistended, no hepatosplenomegaly.    EXTREMITIES: Warm and well perfused without clubbing, cyanosis, or edema  SKIN:  No rashes  NEURO:  Awake, alert, no focal neurologic deficits.  PSYCH: Affect normal. Speech fluent and appropriate.      Last labs:  10/20/2021  HIV viral load not detected  CD4 - 584 absolute and 30%  Hg 12.8, MCV 74, does not take her iron supplement  CMP with a glucose of 121 and all other values within normal limits    11/4/2021  HgA1C 6.2%          ASSESSMENT:     Well controlled HIV     Marina is doing well with Odefsey.    I view Marina's case as someone who may become pregnant again without " planning.  When we made the switch, I was purposely avoiding dolutegravir at time of conception, but recently guidelines were updated to reflect more data suggesting that dolutegravir might not be contributing to neural tube defects.  She is a candidate for two drug regimens such as Dovato or long acting injectable Cabenuva.      Marina is interested in Cabenuva.  I did express to her the importance of getting the injections monthly.  She currently receives Depo Provera for contraception, and she has missed her shot time interval more than once. I will discuss the option with our [facility] pharmacist.    Diabetes and dyslipidemia    HgA1C is now 6.2 %, diet control only,     She does have follow-up with Endocrine.  They can advise diet and potential statin therapy, although I am not sure if Marina will be willing to take any additional medication.       PLAN:     - continue Odefsey, but we will look into potential for Cabenuva for her  Marina felt she could do this if appts could be later in the afternoon.  She is off at 2:45pm and would need to  the children before coming to the injection appt.    - needs help identifying a dentist.  I will reach out to her medical case manager from [facility] (Jenny Schoenberg)    - visit follow-up depends on whether or not we initiate Cabenuva    Alfredo Martinez MD, MS  Infectious Disease    Time:  A total of 35 minutes was spent in care of the patient today. 25 minutes were spent in the room with the patient, with an additional 10 minutes spent on chart review and care coordination.

## 2022-01-23 ENCOUNTER — HEALTH MAINTENANCE LETTER (OUTPATIENT)
Age: 42
End: 2022-01-23

## 2022-02-09 ENCOUNTER — ALLIED HEALTH/NURSE VISIT (OUTPATIENT)
Dept: INTERNAL MEDICINE | Facility: CLINIC | Age: 42
End: 2022-02-09
Payer: COMMERCIAL

## 2022-02-09 DIAGNOSIS — Z30.9 CONTRACEPTIVE MANAGEMENT: Primary | ICD-10-CM

## 2022-02-09 LAB — HCG UR QL: NEGATIVE

## 2022-02-09 PROCEDURE — 99207 PR NO CHARGE NURSE ONLY: CPT

## 2022-02-09 PROCEDURE — 81025 URINE PREGNANCY TEST: CPT

## 2022-02-09 PROCEDURE — 96372 THER/PROPH/DIAG INJ SC/IM: CPT | Performed by: INTERNAL MEDICINE

## 2022-02-09 RX ADMIN — MEDROXYPROGESTERONE ACETATE 150 MG: 150 INJECTION, SUSPENSION INTRAMUSCULAR at 16:13

## 2022-03-17 ENCOUNTER — TELEPHONE (OUTPATIENT)
Dept: PHARMACY | Facility: CLINIC | Age: 42
End: 2022-03-17
Payer: COMMERCIAL

## 2022-03-17 DIAGNOSIS — B20 HUMAN IMMUNODEFICIENCY VIRUS (HIV) DISEASE (H): ICD-10-CM

## 2022-03-17 NOTE — TELEPHONE ENCOUNTER
Attempted to contact the patient to for refill reminder call,  left message on voicemail. Sent refill req to the clinic     Last filled on: 02/16/22    Follow-up Date: 03/23/22 2nd attempt.       Michelle Ricci [facility]  [phone]

## 2022-03-20 RX ORDER — POLYMYXIN B SULFATE, BACITRACIN ZINC AND NEOMYCIN SULFATE 400; 3.5; 5 [USP'U]/G; MG/G; [USP'U]/G
1 OINTMENT TOPICAL
Qty: 30 TABLET | Refills: 1 | Status: SHIPPED | OUTPATIENT
Start: 2022-03-20 | End: 2022-05-27

## 2022-03-23 NOTE — TELEPHONE ENCOUNTER
Pt called back to order refills. Transferred her up to Arcenio Phelps  to reschedule an appointment.   [facility]   30 day supply       Last Filled on: 02/16/22   Follow-up Date: 04/20/22      Michelle Ricci CPhT  [facility]  [phone]

## 2022-04-20 ENCOUNTER — TELEPHONE (OUTPATIENT)
Dept: PHARMACY | Facility: CLINIC | Age: 42
End: 2022-04-20
Payer: COMMERCIAL

## 2022-04-20 NOTE — TELEPHONE ENCOUNTER
Attempted to contact the patient to for refill reminder call,  left message on voicemail. Also reminded her she has an appointment with Dr Martinez on 4/26.     Last filled on: 03/25/22    Follow-up Date: 04/26/22 2nd attempt    Michelle Ricci CPhT  [facility]  [phone]

## 2022-04-26 ENCOUNTER — VIRTUAL VISIT (OUTPATIENT)
Dept: INFECTIOUS DISEASES | Facility: CLINIC | Age: 42
End: 2022-04-26
Attending: INTERNAL MEDICINE
Payer: COMMERCIAL

## 2022-04-26 DIAGNOSIS — B20 HUMAN IMMUNODEFICIENCY VIRUS (HIV) DISEASE (H): Primary | ICD-10-CM

## 2022-04-26 PROCEDURE — 99213 OFFICE O/P EST LOW 20 MIN: CPT | Mod: 95 | Performed by: INTERNAL MEDICINE

## 2022-04-26 NOTE — LETTER
"2022       RE: Marina Mock  [address]  [facility] [address]     Dear Colleague,    Thank you for referring your patient, Marina Mock, to the [facility] INFECTIOUS DISEASE CLINIC [place] at [facility]. Please see a copy of my visit note below.          Marina Mock is a 41 year old woman who is being evaluated via a billable telephone visit.       What phone number would you like to be contacted at?    How would you like to obtain your AVS?     Phone call duration: 12 minutes      Reason for visit:   Infectious Disease Return Visit, planned follow-up for HIV    SUBJECTIVE and Interval History:    Marina is well known to me.  I follow her for her HIV infection. We decided to change medications to Odefsey as of 1/10/2020, and she has done well since then.  She was not able to get a ride to clinic today, so she changed to a telephone visit in the last minute.  She does have a car now and is learning how to drive, but not yet comfortable driving in busy areas by herself.    She is overdue for labs.   She denies any issues with Odefsey, and reports daily adherence.    She has her DepoProvera injection scheduled for tomorrow.  She does often miss her timing for injection.     She did also have endocrine labs on 2021 and A1C was down to 6.2%.  .   She admits to me that she is not taking any other medications aside from her ART (Odefsey).   She is strict about sugars, and took out soda \"pop,\" no juice, and she likes to eat almonds if she sees her blood sugar is high.  She also takes black pepper and cinnamon and feels this really is helping her.   She is feeling well otherwise.       Sadly, her mother  earlier this .  She had cancer and kidney problems, and she  at home in [place]. Marina was feeling very sad about not being able to see her before she .       I have reviewed and updated the patient's Past " Medical History, Social History, Family History and Medication List.    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     OBJECTIVE:    Current Outpatient Medications   Medication     blood glucose (NO BRAND SPECIFIED) test strip     blood glucose (NO BRAND SPECIFIED) test strip     blood glucose (ONETOUCH ULTRA) test strip     blood glucose monitoring (NO BRAND SPECIFIED) meter device kit     blood glucose monitoring (ULTRA THIN 30G) lancets     emtricitabine-rilpivirine-tenofovir (ODEFSEY) 200-25-25 MG TABS per tablet     Lancets (ONETOUCH DELICA PLUS [id number]) MISC     Nutritional Supplements (ENSURE ACTIVE PO)     thin (NO BRAND SPECIFIED) lancets     Facility-Administered Medications   Medication     medroxyPROGESTERone (DEPO-PROVERA) injection 150 mg       No Known Allergies       Examination via telephone visit:     GENERAL: Patient is alert and does not seem to be in any acute distress    RESPIRATORY:  No cough or labored breathing is noted.  PSYCH: Affect is bright. Speech fluent and appropriate.      The rest of a comprehensive physical examination is deferred due to the public health emergency telephone visit restrictions.     Last labs:  10/20/2021  HIV viral load not detected  CD4 - 584 absolute and 30%  Hg 12.8, MCV 74, does not take her iron supplement  CMP with a glucose of 121 and all other values within normal limits    11/4/2021  HgA1C 6.2%          ASSESSMENT:     Well controlled HIV     Marina is doing well with Odefsey.    I view Marina's case as someone who may become pregnant again without planning.  When we made the switch, I was purposely avoiding dolutegravir at time of conception, but recently guidelines were updated to reflect more data suggesting that dolutegravir might not be contributing to neural tube defects.  She is a candidate for two drug regimens such as Dovato or Juluca.   We are not using injection Cabenuva in women  of childbearing age as of yet.      Overdue for labs, so we will work to get her in ASAP.    Diabetes and dyslipidemia    HgA1C last 6.2 %, diet control only,     She does have follow-up with Endocrine.  They can advise diet and potential statin therapy, although I am not sure if Marina will be willing to take any additional medication.       PLAN:     - continue Odefsey for now, but I think a good candidate for 2 drug regimen  Labs ASAP.    - I forgot to ask Marina if she got in with a dentist or not after our last in person visit    - visit follow-up depends on labs    Alfredo Martinez MD, MS  Infectious Disease    Time:  A total of 22 minutes was spent in care of the patient today. 12 minutes were spent on the telephone, with an additional 10 minutes spent on orders, chart review and care coordination.        Again, thank you for allowing me to participate in the care of your patient.      Sincerely,    Alfredo Martinez MD

## 2022-04-26 NOTE — LETTER
Date:May 10, 2022      Patient was self referred, no letter generated. Do not send.        [facility] Health Information

## 2022-04-26 NOTE — PROGRESS NOTES
"      Marina Mock is a 41 year old woman who is being evaluated via a billable telephone visit.       What phone number would you like to be contacted at?    How would you like to obtain your AVS?     Phone call duration: 12 minutes      Reason for visit:   Infectious Disease Return Visit, planned follow-up for HIV    SUBJECTIVE and Interval History:    Marina is well known to me.  I follow her for her HIV infection. We decided to change medications to Odefsey as of 1/10/2020, and she has done well since then.  She was not able to get a ride to clinic today, so she changed to a telephone visit in the last minute.  She does have a car now and is learning how to drive, but not yet comfortable driving in busy areas by herself.    She is overdue for labs.   She denies any issues with Odefsey, and reports daily adherence.    She has her DepoProvera injection scheduled for tomorrow.  She does often miss her timing for injection.     She did also have endocrine labs on 2021 and A1C was down to 6.2%.  .   She admits to me that she is not taking any other medications aside from her ART (Odefsey).   She is strict about sugars, and took out soda \"pop,\" no juice, and she likes to eat almonds if she sees her blood sugar is high.  She also takes black pepper and cinnamon and feels this really is helping her.   She is feeling well otherwise.       Sadly, her mother  earlier this .  She had cancer and kidney problems, and she  at home in [place]. Marina was feeling very sad about not being able to see her before she .       I have reviewed and updated the patient's Past Medical History, Social History, Family History and Medication List.    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     OBJECTIVE:    Current Outpatient Medications   Medication     blood glucose (NO BRAND SPECIFIED) test strip     blood glucose (NO BRAND SPECIFIED) test strip     " blood glucose (ONETOUCH ULTRA) test strip     blood glucose monitoring (NO BRAND SPECIFIED) meter device kit     blood glucose monitoring (ULTRA THIN 30G) lancets     emtricitabine-rilpivirine-tenofovir (ODEFSEY) 200-25-25 MG TABS per tablet     Lancets (ONETOUCH DELICA PLUS [id number]) MISC     Nutritional Supplements (ENSURE ACTIVE PO)     thin (NO BRAND SPECIFIED) lancets     Facility-Administered Medications   Medication     medroxyPROGESTERone (DEPO-PROVERA) injection 150 mg       No Known Allergies       Examination via telephone visit:     GENERAL: Patient is alert and does not seem to be in any acute distress    RESPIRATORY:  No cough or labored breathing is noted.  PSYCH: Affect is bright. Speech fluent and appropriate.      The rest of a comprehensive physical examination is deferred due to the public health emergency telephone visit restrictions.     Last labs:  10/20/2021  HIV viral load not detected  CD4 - 584 absolute and 30%  Hg 12.8, MCV 74, does not take her iron supplement  CMP with a glucose of 121 and all other values within normal limits    11/4/2021  HgA1C 6.2%          ASSESSMENT:     Well controlled HIV     Marina is doing well with Odefsey.    I view Marina's case as someone who may become pregnant again without planning.  When we made the switch, I was purposely avoiding dolutegravir at time of conception, but recently guidelines were updated to reflect more data suggesting that dolutegravir might not be contributing to neural tube defects.  She is a candidate for two drug regimens such as Dovato or Juluca.   We are not using injection Cabenuva in women of childbearing age as of yet.      Overdue for labs, so we will work to get her in ASAP.    Diabetes and dyslipidemia    HgA1C last 6.2 %, diet control only,     She does have follow-up with Endocrine.  They can advise diet and potential statin therapy, although I am not sure if Marina will be willing to take any  additional medication.       PLAN:     - continue Odefsey for now, but I think a good candidate for 2 drug regimen  Labs ASAP.    - I forgot to ask Marina if she got in with a dentist or not after our last in person visit    - visit follow-up depends on labs    Alfredo Martinez MD, MS  Infectious Disease    Time:  A total of 22 minutes was spent in care of the patient today. 12 minutes were spent on the telephone, with an additional 10 minutes spent on orders, chart review and care coordination.

## 2022-04-27 ENCOUNTER — ALLIED HEALTH/NURSE VISIT (OUTPATIENT)
Dept: INTERNAL MEDICINE | Facility: CLINIC | Age: 42
End: 2022-04-27
Payer: COMMERCIAL

## 2022-04-27 DIAGNOSIS — Z30.9 CONTRACEPTIVE MANAGEMENT: Primary | ICD-10-CM

## 2022-04-27 PROCEDURE — 99207 PR NO CHARGE NURSE ONLY: CPT

## 2022-04-27 NOTE — TELEPHONE ENCOUNTER
Called patient for refill reminder.    Will mail [facility] prescriptions address has been verified.   30 day supply       Last Filled on: 03/25/22   Follow-up Date:  05/26/22    Michelle Ricci CPhT  [facility]  [phone]

## 2022-04-27 NOTE — PROGRESS NOTES
Clinic Administered Medication Documentation          Depo Provera Documentation    URINE HCG: not indicated    Depo-Provera Standing Order inclusion/exclusion criteria reviewed.   Patient meets: inclusion criteria     BP: Data Unavailable  LAST PAP/EXAM:   Lab Results   Component Value Date    PAP ASC-US 12/02/2019       Prior to injection, verified patient identity using patient's name and date of birth. Medication was administered. Please see MAR and medication order for additional information.     Was entire vial of medication used? Yes  Vial/Syringe: Single dose vial  Expiration Date:  9/1/23    Patient instructed to remain in clinic for 15 minutes and report any adverse reaction to staff immediately .  NEXT INJECTION DUE: 7/13/22 - 7/27/22

## 2022-05-15 ENCOUNTER — HEALTH MAINTENANCE LETTER (OUTPATIENT)
Age: 42
End: 2022-05-15

## 2022-05-20 ENCOUNTER — TELEPHONE (OUTPATIENT)
Dept: INFECTIOUS DISEASES | Facility: CLINIC | Age: 42
End: 2022-05-20
Payer: COMMERCIAL

## 2022-05-20 NOTE — TELEPHONE ENCOUNTER
----- Message from Alfredo Martinez MD sent at 5/8/2022 11:15 PM CDT -----  Regarding: needs labs      [facility] needs to get labs done for me.  We talked about this a couple of weeks ago, and my orders are in now.    Please call her to schedule.  She likes to do them close to home, which is (I think) at [facility].    Thanks!    -Alfredo

## 2022-05-26 ENCOUNTER — TELEPHONE (OUTPATIENT)
Dept: INFECTIOUS DISEASES | Facility: CLINIC | Age: 42
End: 2022-05-26

## 2022-05-26 ENCOUNTER — TELEPHONE (OUTPATIENT)
Dept: PHARMACY | Facility: CLINIC | Age: 42
End: 2022-05-26
Payer: COMMERCIAL

## 2022-05-26 DIAGNOSIS — B20 HUMAN IMMUNODEFICIENCY VIRUS (HIV) DISEASE (H): ICD-10-CM

## 2022-05-26 NOTE — TELEPHONE ENCOUNTER
Called pt but no answer. Left voice message letting pt know Dr Martinez would like her to get labs drawn before refilling her Odefsey again. Pt had clinic apt, but did not get labs drawn. Let pt know this writer would be happy to help her get scheduled with a lab apt where ever would be most convenient for her.  Luann Moreno RN

## 2022-05-26 NOTE — TELEPHONE ENCOUNTER
Attempted to contact the patient to for refill reminder call,  left message on voicemail    Last filled on: 04/27/22    Follow-up Date: 05/31/22 2nd attempt    Michelle Ricci CPhT  [facility]  [phone]

## 2022-05-27 RX ORDER — POLYMYXIN B SULFATE, BACITRACIN ZINC AND NEOMYCIN SULFATE 400; 3.5; 5 [USP'U]/G; MG/G; [USP'U]/G
1 OINTMENT TOPICAL
Qty: 30 TABLET | Refills: 0 | Status: SHIPPED | OUTPATIENT
Start: 2022-05-27 | End: 2022-06-08

## 2022-06-06 ENCOUNTER — LAB (OUTPATIENT)
Dept: LAB | Facility: CLINIC | Age: 42
End: 2022-06-06
Payer: COMMERCIAL

## 2022-06-06 DIAGNOSIS — B20 HUMAN IMMUNODEFICIENCY VIRUS (HIV) DISEASE (H): ICD-10-CM

## 2022-06-06 LAB
ALBUMIN SERPL-MCNC: 3.8 G/DL (ref 3.4–5)
ALBUMIN UR-MCNC: NEGATIVE MG/DL
ALP SERPL-CCNC: 63 U/L (ref 40–150)
ALT SERPL W P-5'-P-CCNC: 23 U/L (ref 0–50)
ANION GAP SERPL CALCULATED.3IONS-SCNC: 7 MMOL/L (ref 3–14)
APPEARANCE UR: ABNORMAL
AST SERPL W P-5'-P-CCNC: 13 U/L (ref 0–45)
BACTERIA #/AREA URNS HPF: ABNORMAL /HPF
BASOPHILS # BLD AUTO: 0 10E3/UL (ref 0–0.2)
BASOPHILS NFR BLD AUTO: 0 %
BILIRUB SERPL-MCNC: 0.2 MG/DL (ref 0.2–1.3)
BILIRUB UR QL STRIP: NEGATIVE
BUN SERPL-MCNC: 12 MG/DL (ref 7–30)
CALCIUM SERPL-MCNC: 8.7 MG/DL (ref 8.5–10.1)
CHLORIDE BLD-SCNC: 111 MMOL/L (ref 94–109)
CO2 SERPL-SCNC: 21 MMOL/L (ref 20–32)
COLOR UR AUTO: YELLOW
CREAT SERPL-MCNC: 0.81 MG/DL (ref 0.52–1.04)
CREAT UR-MCNC: 92 MG/DL
EOSINOPHIL # BLD AUTO: 0.1 10E3/UL (ref 0–0.7)
EOSINOPHIL NFR BLD AUTO: 3 %
ERYTHROCYTE [DISTWIDTH] IN BLOOD BY AUTOMATED COUNT: 13.6 % (ref 10–15)
GFR SERPL CREATININE-BSD FRML MDRD: >90 ML/MIN/1.73M2
GLUCOSE BLD-MCNC: 193 MG/DL (ref 70–99)
GLUCOSE UR STRIP-MCNC: 100 MG/DL
HCT VFR BLD AUTO: 34.1 % (ref 35–47)
HGB BLD-MCNC: 11.1 G/DL (ref 11.7–15.7)
HGB UR QL STRIP: NEGATIVE
KETONES UR STRIP-MCNC: NEGATIVE MG/DL
LEUKOCYTE ESTERASE UR QL STRIP: ABNORMAL
LYMPHOCYTES # BLD AUTO: 2 10E3/UL (ref 0.8–5.3)
LYMPHOCYTES NFR BLD AUTO: 47 %
MCH RBC QN AUTO: 24.2 PG (ref 26.5–33)
MCHC RBC AUTO-ENTMCNC: 32.6 G/DL (ref 31.5–36.5)
MCV RBC AUTO: 74 FL (ref 78–100)
MICROALBUMIN UR-MCNC: 6 MG/L
MICROALBUMIN/CREAT UR: 6.52 MG/G CR (ref 0–25)
MONOCYTES # BLD AUTO: 0.4 10E3/UL (ref 0–1.3)
MONOCYTES NFR BLD AUTO: 10 %
NEUTROPHILS # BLD AUTO: 1.7 10E3/UL (ref 1.6–8.3)
NEUTROPHILS NFR BLD AUTO: 41 %
NITRATE UR QL: NEGATIVE
PH UR STRIP: 5.5 [PH] (ref 5–7)
PLATELET # BLD AUTO: 230 10E3/UL (ref 150–450)
POTASSIUM BLD-SCNC: 3.6 MMOL/L (ref 3.4–5.3)
PROT SERPL-MCNC: 7.9 G/DL (ref 6.8–8.8)
RBC # BLD AUTO: 4.59 10E6/UL (ref 3.8–5.2)
RBC #/AREA URNS AUTO: ABNORMAL /HPF
SODIUM SERPL-SCNC: 139 MMOL/L (ref 133–144)
SP GR UR STRIP: 1.02 (ref 1–1.03)
SQUAMOUS #/AREA URNS AUTO: ABNORMAL /LPF
UROBILINOGEN UR STRIP-ACNC: 0.2 E.U./DL
WBC # BLD AUTO: 4.3 10E3/UL (ref 4–11)
WBC #/AREA URNS AUTO: ABNORMAL /HPF

## 2022-06-06 PROCEDURE — 80053 COMPREHEN METABOLIC PANEL: CPT

## 2022-06-06 PROCEDURE — 36415 COLL VENOUS BLD VENIPUNCTURE: CPT

## 2022-06-06 PROCEDURE — 86360 T CELL ABSOLUTE COUNT/RATIO: CPT

## 2022-06-06 PROCEDURE — 87536 HIV-1 QUANT&REVRSE TRNSCRPJ: CPT

## 2022-06-06 PROCEDURE — 86359 T CELLS TOTAL COUNT: CPT

## 2022-06-06 PROCEDURE — 81001 URINALYSIS AUTO W/SCOPE: CPT

## 2022-06-06 PROCEDURE — 85025 COMPLETE CBC W/AUTO DIFF WBC: CPT

## 2022-06-06 PROCEDURE — 82043 UR ALBUMIN QUANTITATIVE: CPT

## 2022-06-07 LAB
CD3 CELLS # BLD: 1429 CELLS/UL (ref 603–2990)
CD3 CELLS NFR BLD: 66 % (ref 49–84)
CD3+CD4+ CELLS # BLD: 749 CELLS/UL (ref 441–2156)
CD3+CD4+ CELLS NFR BLD: 35 % (ref 28–63)
CD3+CD4+ CELLS/CD3+CD8+ CLL BLD: 1.17 % (ref 1.4–2.6)
CD3+CD8+ CELLS # BLD: 638 CELLS/UL (ref 125–1312)
CD3+CD8+ CELLS NFR BLD: 30 % (ref 10–40)
HIV1 RNA # PLAS NAA DL=20: NOT DETECTED COPIES/ML
T CELL COMMENT: ABNORMAL

## 2022-06-08 DIAGNOSIS — B20 HUMAN IMMUNODEFICIENCY VIRUS (HIV) DISEASE (H): Primary | ICD-10-CM

## 2022-06-08 RX ORDER — POLYMYXIN B SULFATE, BACITRACIN ZINC AND NEOMYCIN SULFATE 400; 3.5; 5 [USP'U]/G; MG/G; [USP'U]/G
1 OINTMENT TOPICAL
Qty: 30 TABLET | Refills: 5 | Status: SHIPPED | OUTPATIENT
Start: 2022-06-08 | End: 2023-01-10

## 2022-06-10 ENCOUNTER — TELEPHONE (OUTPATIENT)
Dept: ENDOCRINOLOGY | Facility: CLINIC | Age: 42
End: 2022-06-10
Payer: COMMERCIAL

## 2022-06-10 NOTE — TELEPHONE ENCOUNTER
LVM for pt to reschedule 7/15 appt with Dr Awad     use make up clinic days 6/17 in person or 6/24 virtual. Remove hold once scheduled

## 2022-07-21 ENCOUNTER — ALLIED HEALTH/NURSE VISIT (OUTPATIENT)
Dept: INTERNAL MEDICINE | Facility: CLINIC | Age: 42
End: 2022-07-21
Payer: COMMERCIAL

## 2022-07-21 DIAGNOSIS — Z30.9 CONTRACEPTIVE MANAGEMENT: Primary | ICD-10-CM

## 2022-07-21 PROCEDURE — 96372 THER/PROPH/DIAG INJ SC/IM: CPT | Performed by: INTERNAL MEDICINE

## 2022-07-21 PROCEDURE — 99207 PR NO CHARGE NURSE ONLY: CPT

## 2022-07-21 RX ADMIN — MEDROXYPROGESTERONE ACETATE 150 MG: 150 INJECTION, SUSPENSION INTRAMUSCULAR at 10:27

## 2022-07-21 NOTE — PROGRESS NOTES
Clinic Administered Medication Documentation    Administrations This Visit     medroxyPROGESTERone (DEPO-PROVERA) injection 150 mg     Admin Date  07/21/2022 Action  Given Dose  150 mg Route  Intramuscular Site  Left Deltoid Administered By  Damaris Chua CMA    Ordering Provider: Cayla Merrill MD    NDC: 08951-499-32    Lot#: [id number]    : [facility]    Patient Supplied?: No                  Depo Provera Documentation    URINE HCG: not indicated    Depo-Provera Standing Order inclusion/exclusion criteria reviewed.   Patient meets: inclusion criteria     BP: Data Unavailable  LAST PAP/EXAM:   Lab Results   Component Value Date    PAP ASC-US 12/02/2019       Prior to injection, verified patient identity using patient's name and date of birth. Medication was administered. Please see MAR and medication order for additional information.     Was entire vial of medication used? Yes  Vial/Syringe: Single dose vial  Expiration Date:  01/24    Patient instructed to remain in clinic for 15 minutes and report any adverse reaction to staff immediately .  NEXT INJECTION DUE: 10/6/22 - 10/20/22

## 2022-09-11 ENCOUNTER — HEALTH MAINTENANCE LETTER (OUTPATIENT)
Age: 42
End: 2022-09-11

## 2022-09-13 ENCOUNTER — TELEPHONE (OUTPATIENT)
Dept: ENDOCRINOLOGY | Facility: CLINIC | Age: 42
End: 2022-09-13

## 2022-09-13 NOTE — TELEPHONE ENCOUNTER
Action Needed --  I've placed a hold that needs scheduling. Please see below.  Department: Endo/ diab RTN  Provider:Noah   Date/Time: 9/20/22 2 PM  In person   RFV:Diabetes follow up [facility] pt   Patient aware please just schedule   Yasemin Ayers, RN on 9/13/2022 at 4:35 PM

## 2022-09-14 NOTE — TELEPHONE ENCOUNTER
Dr. Ritesh Arnold does not have a schedule on 09/20/2022. Did you perhaps mean a different provider or a different day?     Thanks,  Randy

## 2022-09-16 NOTE — PROGRESS NOTES
Outcome for 09/16/22 9:49 AM :Sent patient [employer] message asking them to upload their BG data   Abida Gibbons    Outcome for 09/19/22 2:02 PM :Unable to Leave    Abida Gibbons

## 2022-09-20 ENCOUNTER — OFFICE VISIT (OUTPATIENT)
Dept: ENDOCRINOLOGY | Facility: CLINIC | Age: 42
End: 2022-09-20
Payer: COMMERCIAL

## 2022-09-20 ENCOUNTER — LAB (OUTPATIENT)
Dept: LAB | Facility: CLINIC | Age: 42
End: 2022-09-20
Payer: COMMERCIAL

## 2022-09-20 VITALS
SYSTOLIC BLOOD PRESSURE: 110 MMHG | HEART RATE: 90 BPM | BODY MASS INDEX: 23.05 KG/M2 | DIASTOLIC BLOOD PRESSURE: 71 MMHG | WEIGHT: 122 LBS

## 2022-09-20 DIAGNOSIS — E11.65 TYPE 2 DIABETES MELLITUS WITH HYPERGLYCEMIA, UNSPECIFIED WHETHER LONG TERM INSULIN USE (H): Primary | ICD-10-CM

## 2022-09-20 DIAGNOSIS — E11.65 TYPE 2 DIABETES MELLITUS WITH HYPERGLYCEMIA, UNSPECIFIED WHETHER LONG TERM INSULIN USE (H): ICD-10-CM

## 2022-09-20 LAB
ALBUMIN UR-MCNC: NEGATIVE MG/DL
APPEARANCE UR: CLEAR
BACTERIA #/AREA URNS HPF: ABNORMAL /HPF
BILIRUB UR QL STRIP: NEGATIVE
COLOR UR AUTO: YELLOW
GLUCOSE UR STRIP-MCNC: NEGATIVE MG/DL
HBA1C MFR BLD: 6.4 % (ref 4.3–?)
HGB UR QL STRIP: NEGATIVE
KETONES UR STRIP-MCNC: NEGATIVE MG/DL
LEUKOCYTE ESTERASE UR QL STRIP: NEGATIVE
NITRATE UR QL: NEGATIVE
PH UR STRIP: 6 [PH] (ref 5–7)
RBC URINE: 1 /HPF
SP GR UR STRIP: 1 (ref 1–1.03)
SQUAMOUS EPITHELIAL: <1 /HPF
UROBILINOGEN UR STRIP-MCNC: NORMAL MG/DL
WBC URINE: <1 /HPF

## 2022-09-20 PROCEDURE — 81001 URINALYSIS AUTO W/SCOPE: CPT | Performed by: PATHOLOGY

## 2022-09-20 PROCEDURE — 99215 OFFICE O/P EST HI 40 MIN: CPT | Performed by: PHYSICIAN ASSISTANT

## 2022-09-20 PROCEDURE — 83036 HEMOGLOBIN GLYCOSYLATED A1C: CPT | Performed by: PHYSICIAN ASSISTANT

## 2022-09-20 ASSESSMENT — PAIN SCALES - GENERAL: PAINLEVEL: NO PAIN (0)

## 2022-09-20 NOTE — LETTER
Date:September 26, 2022      Patient was self referred, no letter generated. Do not send.        [facility] Health Information

## 2022-09-20 NOTE — LETTER
9/20/2022       RE: Marina Mock  [address]  [facility] [address]     Dear Colleague,    Thank you for referring your patient, Marina Mock, to the [facility] ENDOCRINOLOGY [facility]IN[facility] [place] at [facility]. Please see a copy of my visit note below.    Outcome for 09/16/22 9:49 AM :Sent patient [employer] message asking them to upload their BG data   Abida Gibbons    Outcome for 09/19/22 2:02 PM :Unable to Leave    Abida Gibbons              HPI  Marina Mock is a 42 year old female with type 2 diabetes mellitus here today in clinic for diabetes follow up.  Pt last seen by Dr. Benites in March 2021.  Hx of GDM and dx having type 2 diabetes in 2019 and was treated with Metformin.   She states she can not tolerate Metformin - GI side effects.  Pt denies hx of retinopathy, nephropathy or neuropathy.  Her hx is significant for HIV +, latent TB and iron de anemia.  For her diabetes, she is currently taking Januvia 100 mg daily.  She reports low blood sugars of 50 and feels shaky and nauseated with this dose of Januvia.  I have no blood sugar data at this time.  Most recent A1C was 6.4 % on 9/20/2022. A1C was 6.2 % in 11/2021.  Her UA done 9/20/2022 was negative for glucose.  On ROS today, shaky, weak and nauseated with low blood sugars per patient.  Denies blurred vision, SOB at rest, cough or fever.  No chest pain, abd pain, diarrhea, dysuria or hematuria.  He receives depo injections.  No sx of neuropathy and no foot ulcers.    Diabetes Care  Retinopathy: none per patient. Reminded her to see Oph for annual diabetic eye exam.  Nephropathy:urine microalbuminuria negative in 6/2022.  Neuropathy:none.  Foot Exam:no ulcers.  Taking aspirin:no.  Lipids:  in 11/2021.  Insulin: none.  DM meds: Januvia.  Testing: glucose meter.      ROS  See under HPI.    Allergies  No Known Allergies    Medications  Current Outpatient Medications    Medication Sig Dispense Refill     blood glucose (NO BRAND SPECIFIED) test strip Use to test blood sugar 4 times daily or as directed. Any covered brand that works with meter. 400 strip 3     blood glucose (NO BRAND SPECIFIED) test strip Use to test blood sugar 3-4 times per week in the morning. 100 each 3     blood glucose (ONETOUCH ULTRA) test strip USE TO TEST BLOOD SUGAR FOUR TIMES A DAY OR AS DIRECTED 400 strip 3     blood glucose monitoring (NO BRAND SPECIFIED) meter device kit Use to test blood sugar 4 times daily or as directed. Any Covered Brand. 1 kit 1     blood glucose monitoring (ULTRA THIN 30G) lancets 1 each daily Use to test blood sugar 3-4 times per week 100 each 3     emtricitabine-rilpivirine-tenofovir (ODEFSEY) 200-25-25 MG TABS per tablet Take 1 tablet by mouth daily (with breakfast) 30 tablet 5     Lancets (ONETOUCH DELICA PLUS [id number]) MISC USE WITH LANCETING DEVICE 4 TIMES DAILY. 400 each 0     Nutritional Supplements (ENSURE ACTIVE PO)        sitagliptin (JANUVIA) 25 MG tablet Take 1 tablet (25 mg) by mouth daily 90 tablet 3     thin (NO BRAND SPECIFIED) lancets Use with lanceting device. 4x daily. Any covered brand. 400 each 3       Family History  family history includes Family History Negative in an other family member; Hypertension in her father and another family member.    Social History   reports that she has never smoked. She has never used smokeless tobacco. She reports that she does not drink alcohol and does not use drugs.     Past Medical History  Past Medical History:   Diagnosis Date     Asymptomatic human immunodeficiency virus (HIV) infection status (H) 2016     Gestational diabetes     diet controlled      Malaria     in [place]      Miscarriage      TB (pulmonary tuberculosis)     latent       Past Surgical History:   Procedure Laterality Date     NO HISTORY OF SURGERY         Physical Exam  /71 (BP Location: Left arm, Patient Position: Sitting, Cuff Size: Adult  Regular)   Pulse 90   Wt 55.3 kg (122 lb)   BMI 23.05 kg/m    Body mass index is 23.05 kg/m .    FEET:No ulcers.    RESULTS  Creatinine   Date Value Ref Range Status   06/06/2022 0.81 0.52 - 1.04 mg/dL Final   02/03/2021 0.76 0.52 - 1.04 mg/dL Final     GFR Estimate   Date Value Ref Range Status   06/06/2022 >90 >60 mL/min/1.73m2 Final     Comment:     Effective December 21, 2021 eGFRcr in adults is calculated using the 2021 CKD-EPI creatinine equation which includes age and gender (Tesfaye et al., NEJ, DOI: 10.1056/DLRWaf8199802)   02/03/2021 >90 >60 mL/min/[1.73_m2] Final     Comment:     Non  GFR Calc  Starting 12/18/2018, serum creatinine based estimated GFR (eGFR) will be   calculated using the Chronic Kidney Disease Epidemiology Collaboration   (CKD-EPI) equation.       Hemoglobin A1C   Date Value Ref Range Status   11/04/2021 6.2 (H) 0.0 - 5.6 % Final     Comment:     Normal <5.7%   Prediabetes 5.7-6.4%    Diabetes 6.5% or higher     Note: Adopted from ADA consensus guidelines.   02/03/2021 7.2 (H) 0 - 5.6 % Final     Comment:     Normal <5.7% Prediabetes 5.7-6.4%  Diabetes 6.5% or higher - adopted from ADA   consensus guidelines.       Potassium   Date Value Ref Range Status   06/06/2022 3.6 3.4 - 5.3 mmol/L Final   02/03/2021 3.7 3.4 - 5.3 mmol/L Final     ALT   Date Value Ref Range Status   06/06/2022 23 0 - 50 U/L Final   02/03/2021 35 0 - 50 U/L Final     AST   Date Value Ref Range Status   06/06/2022 13 0 - 45 U/L Final   02/03/2021 23 0 - 45 U/L Final     TSH   Date Value Ref Range Status   01/09/2019 0.94 0.40 - 4.00 mU/L Final       Cholesterol   Date Value Ref Range Status   11/04/2021 198 <200 mg/dL Final   02/03/2021 219 (H) <200 mg/dL Final     Comment:     Desirable:       <200 mg/dl   03/26/2020 234 (H) <200 mg/dL Final     Comment:     Desirable:       <200 mg/dl     HDL Cholesterol   Date Value Ref Range Status   02/03/2021 43 (L) >49 mg/dL Final   03/26/2020 51 >49 mg/dL  Final     Direct Measure HDL   Date Value Ref Range Status   11/04/2021 40 (L) >=50 mg/dL Final     LDL Cholesterol Calculated   Date Value Ref Range Status   11/04/2021 135 (H) <=100 mg/dL Final   02/03/2021 133 (H) <100 mg/dL Final     Comment:     Above desirable:  100-129 mg/dl  Borderline High:  130-159 mg/dL  High:             160-189 mg/dL  Very high:       >189 mg/dl     03/26/2020 168 (H) <100 mg/dL Final     Comment:     Above desirable:  100-129 mg/dl  Borderline High:  130-159 mg/dL  High:             160-189 mg/dL  Very high:       >189 mg/dl       Triglycerides   Date Value Ref Range Status   11/04/2021 117 <150 mg/dL Final   02/03/2021 215 (H) <150 mg/dL Final     Comment:     Borderline high:  150-199 mg/dl  High:             200-499 mg/dl  Very high:       >499 mg/dl     03/26/2020 76 <150 mg/dL Final         ASSESSMENT/PLAN:    1.  TYPE 2 DIABETES MELLITUS: I asked Marina to decrease Januvia 25 mg daily. New RX sent to pharmacy today. She reports having blood sugars in the 50 range and not feeling well while taking 100 mg Januvia daily.  She does now tolerate Metformin- GI side effects.  I asked her to check her FBS each am and check her bs predinner daily.  Reminded her to see Oph annually for diabetic eye exam and glaucoma check.  No sx of neuropathy and no ulcers on exam.   Her urine microalbuminuria was negative in June 2022 with normal creat/GFR in 6/2022.   in 11/2021- reminded pt to reduce fat in her diet and remain active.  /71 today.     2.  FOLLOW UP: With Dr. Benites in Jan 2023.    Time spent reviewing chart and labs today = 10 minutes.  Time for clinic visit today = 25 minutes.  Time for documentation today = 15 minutes.    Total time for visit today = 40 minutes.    Amber Zamora PA-C            Again, thank you for allowing me to participate in the care of your patient.      Sincerely,    Amber Zamora PA-C

## 2022-09-20 NOTE — NURSING NOTE
"Chief Complaint   Patient presents with     Diabetes     Vital signs:      BP: 110/71 Pulse: 90             Weight: 55.3 kg (122 lb)  Estimated body mass index is 23.05 kg/m  as calculated from the following:    Height as of 11/23/21: 1.549 m (5' 1\").    Weight as of this encounter: 55.3 kg (122 lb).          "

## 2022-09-25 NOTE — PROGRESS NOTES
HPI  Marina Mock is a 42 year old female with type 2 diabetes mellitus here today in clinic for diabetes follow up.  Pt last seen by Dr. Benites in March 2021.  Hx of GDM and dx having type 2 diabetes in 2019 and was treated with Metformin.   She states she can not tolerate Metformin - GI side effects.  Pt denies hx of retinopathy, nephropathy or neuropathy.  Her hx is significant for HIV +, latent TB and iron de anemia.  For her diabetes, she is currently taking Januvia 100 mg daily.  She reports low blood sugars of 50 and feels shaky and nauseated with this dose of Januvia.  I have no blood sugar data at this time.  Most recent A1C was 6.4 % on 9/20/2022. A1C was 6.2 % in 11/2021.  Her UA done 9/20/2022 was negative for glucose.  On ROS today, shaky, weak and nauseated with low blood sugars per patient.  Denies blurred vision, SOB at rest, cough or fever.  No chest pain, abd pain, diarrhea, dysuria or hematuria.  He receives depo injections.  No sx of neuropathy and no foot ulcers.    Diabetes Care  Retinopathy: none per patient. Reminded her to see Oph for annual diabetic eye exam.  Nephropathy:urine microalbuminuria negative in 6/2022.  Neuropathy:none.  Foot Exam:no ulcers.  Taking aspirin:no.  Lipids:  in 11/2021.  Insulin: none.  DM meds: Januvia.  Testing: glucose meter.      ROS  See under HPI.    Allergies  No Known Allergies    Medications  Current Outpatient Medications   Medication Sig Dispense Refill     blood glucose (NO BRAND SPECIFIED) test strip Use to test blood sugar 4 times daily or as directed. Any covered brand that works with meter. 400 strip 3     blood glucose (NO BRAND SPECIFIED) test strip Use to test blood sugar 3-4 times per week in the morning. 100 each 3     blood glucose (ONETOUCH ULTRA) test strip USE TO TEST BLOOD SUGAR FOUR TIMES A DAY OR AS DIRECTED 400 strip 3     blood glucose monitoring (NO BRAND SPECIFIED) meter device kit Use to test blood sugar 4 times  daily or as directed. Any Covered Brand. 1 kit 1     blood glucose monitoring (ULTRA THIN 30G) lancets 1 each daily Use to test blood sugar 3-4 times per week 100 each 3     emtricitabine-rilpivirine-tenofovir (ODEFSEY) 200-25-25 MG TABS per tablet Take 1 tablet by mouth daily (with breakfast) 30 tablet 5     Lancets (ONETOUCH DELICA PLUS [id number]) MISC USE WITH LANCETING DEVICE 4 TIMES DAILY. 400 each 0     Nutritional Supplements (ENSURE ACTIVE PO)        sitagliptin (JANUVIA) 25 MG tablet Take 1 tablet (25 mg) by mouth daily 90 tablet 3     thin (NO BRAND SPECIFIED) lancets Use with lanceting device. 4x daily. Any covered brand. 400 each 3       Family History  family history includes Family History Negative in an other family member; Hypertension in her father and another family member.    Social History   reports that she has never smoked. She has never used smokeless tobacco. She reports that she does not drink alcohol and does not use drugs.     Past Medical History  Past Medical History:   Diagnosis Date     Asymptomatic human immunodeficiency virus (HIV) infection status (H) 2016     Gestational diabetes     diet controlled      Malaria     in [place]      Miscarriage      TB (pulmonary tuberculosis)     latent       Past Surgical History:   Procedure Laterality Date     NO HISTORY OF SURGERY         Physical Exam  /71 (BP Location: Left arm, Patient Position: Sitting, Cuff Size: Adult Regular)   Pulse 90   Wt 55.3 kg (122 lb)   BMI 23.05 kg/m    Body mass index is 23.05 kg/m .    FEET:No ulcers.    RESULTS  Creatinine   Date Value Ref Range Status   06/06/2022 0.81 0.52 - 1.04 mg/dL Final   02/03/2021 0.76 0.52 - 1.04 mg/dL Final     GFR Estimate   Date Value Ref Range Status   06/06/2022 >90 >60 mL/min/1.73m2 Final     Comment:     Effective December 21, 2021 eGFRcr in adults is calculated using the 2021 CKD-EPI creatinine equation which includes age and gender (Tesfaye et al., NEJ, DOI:  10.1056/AZFAyf5588505)   02/03/2021 >90 >60 mL/min/[1.73_m2] Final     Comment:     Non  GFR Calc  Starting 12/18/2018, serum creatinine based estimated GFR (eGFR) will be   calculated using the Chronic Kidney Disease Epidemiology Collaboration   (CKD-EPI) equation.       Hemoglobin A1C   Date Value Ref Range Status   11/04/2021 6.2 (H) 0.0 - 5.6 % Final     Comment:     Normal <5.7%   Prediabetes 5.7-6.4%    Diabetes 6.5% or higher     Note: Adopted from ADA consensus guidelines.   02/03/2021 7.2 (H) 0 - 5.6 % Final     Comment:     Normal <5.7% Prediabetes 5.7-6.4%  Diabetes 6.5% or higher - adopted from ADA   consensus guidelines.       Potassium   Date Value Ref Range Status   06/06/2022 3.6 3.4 - 5.3 mmol/L Final   02/03/2021 3.7 3.4 - 5.3 mmol/L Final     ALT   Date Value Ref Range Status   06/06/2022 23 0 - 50 U/L Final   02/03/2021 35 0 - 50 U/L Final     AST   Date Value Ref Range Status   06/06/2022 13 0 - 45 U/L Final   02/03/2021 23 0 - 45 U/L Final     TSH   Date Value Ref Range Status   01/09/2019 0.94 0.40 - 4.00 mU/L Final       Cholesterol   Date Value Ref Range Status   11/04/2021 198 <200 mg/dL Final   02/03/2021 219 (H) <200 mg/dL Final     Comment:     Desirable:       <200 mg/dl   03/26/2020 234 (H) <200 mg/dL Final     Comment:     Desirable:       <200 mg/dl     HDL Cholesterol   Date Value Ref Range Status   02/03/2021 43 (L) >49 mg/dL Final   03/26/2020 51 >49 mg/dL Final     Direct Measure HDL   Date Value Ref Range Status   11/04/2021 40 (L) >=50 mg/dL Final     LDL Cholesterol Calculated   Date Value Ref Range Status   11/04/2021 135 (H) <=100 mg/dL Final   02/03/2021 133 (H) <100 mg/dL Final     Comment:     Above desirable:  100-129 mg/dl  Borderline High:  130-159 mg/dL  High:             160-189 mg/dL  Very high:       >189 mg/dl     03/26/2020 168 (H) <100 mg/dL Final     Comment:     Above desirable:  100-129 mg/dl  Borderline High:  130-159 mg/dL  High:              160-189 mg/dL  Very high:       >189 mg/dl       Triglycerides   Date Value Ref Range Status   11/04/2021 117 <150 mg/dL Final   02/03/2021 215 (H) <150 mg/dL Final     Comment:     Borderline high:  150-199 mg/dl  High:             200-499 mg/dl  Very high:       >499 mg/dl     03/26/2020 76 <150 mg/dL Final         ASSESSMENT/PLAN:    1.  TYPE 2 DIABETES MELLITUS: I asked Marina to decrease Januvia 25 mg daily. New RX sent to pharmacy today. She reports having blood sugars in the 50 range and not feeling well while taking 100 mg Januvia daily.  She does now tolerate Metformin- GI side effects.  I asked her to check her FBS each am and check her bs predinner daily.  Reminded her to see Oph annually for diabetic eye exam and glaucoma check.  No sx of neuropathy and no ulcers on exam.   Her urine microalbuminuria was negative in June 2022 with normal creat/GFR in 6/2022.   in 11/2021- reminded pt to reduce fat in her diet and remain active.  /71 today.     2.  FOLLOW UP: With Dr. Benites in Jan 2023.    Time spent reviewing chart and labs today = 10 minutes.  Time for clinic visit today = 25 minutes.  Time for documentation today = 15 minutes.    Total time for visit today = 40 minutes.    Amber Zamora PA-C

## 2022-10-17 ENCOUNTER — TELEPHONE (OUTPATIENT)
Dept: INFECTIOUS DISEASES | Facility: CLINIC | Age: 42
End: 2022-10-17

## 2022-10-18 ENCOUNTER — OFFICE VISIT (OUTPATIENT)
Dept: INFECTIOUS DISEASES | Facility: CLINIC | Age: 42
End: 2022-10-18
Attending: INTERNAL MEDICINE
Payer: COMMERCIAL

## 2022-10-18 ENCOUNTER — LAB (OUTPATIENT)
Dept: LAB | Facility: CLINIC | Age: 42
End: 2022-10-18
Attending: INTERNAL MEDICINE
Payer: COMMERCIAL

## 2022-10-18 VITALS
HEART RATE: 69 BPM | BODY MASS INDEX: 22.48 KG/M2 | DIASTOLIC BLOOD PRESSURE: 79 MMHG | WEIGHT: 119 LBS | SYSTOLIC BLOOD PRESSURE: 122 MMHG | OXYGEN SATURATION: 100 %

## 2022-10-18 DIAGNOSIS — B20 HUMAN IMMUNODEFICIENCY VIRUS (HIV) DISEASE (H): Primary | ICD-10-CM

## 2022-10-18 DIAGNOSIS — B20 HUMAN IMMUNODEFICIENCY VIRUS (HIV) DISEASE (H): ICD-10-CM

## 2022-10-18 DIAGNOSIS — Z30.8 ENCOUNTER FOR OTHER CONTRACEPTIVE MANAGEMENT: ICD-10-CM

## 2022-10-18 LAB
ALBUMIN SERPL BCG-MCNC: 4.8 G/DL (ref 3.5–5.2)
ALP SERPL-CCNC: 60 U/L (ref 35–104)
ALT SERPL W P-5'-P-CCNC: 17 U/L (ref 10–35)
ANION GAP SERPL CALCULATED.3IONS-SCNC: 13 MMOL/L (ref 7–15)
AST SERPL W P-5'-P-CCNC: 21 U/L (ref 10–35)
BASOPHILS # BLD AUTO: 0 10E3/UL (ref 0–0.2)
BASOPHILS NFR BLD AUTO: 0 %
BILIRUB SERPL-MCNC: 0.4 MG/DL
BUN SERPL-MCNC: 9.3 MG/DL (ref 6–20)
CALCIUM SERPL-MCNC: 10.2 MG/DL (ref 8.6–10)
CHLORIDE SERPL-SCNC: 100 MMOL/L (ref 98–107)
CHOLEST SERPL-MCNC: 231 MG/DL
CREAT SERPL-MCNC: 0.76 MG/DL (ref 0.51–0.95)
DEPRECATED HCO3 PLAS-SCNC: 25 MMOL/L (ref 22–29)
EOSINOPHIL # BLD AUTO: 0 10E3/UL (ref 0–0.7)
EOSINOPHIL NFR BLD AUTO: 1 %
ERYTHROCYTE [DISTWIDTH] IN BLOOD BY AUTOMATED COUNT: 13.6 % (ref 10–15)
GFR SERPL CREATININE-BSD FRML MDRD: >90 ML/MIN/1.73M2
GLUCOSE SERPL-MCNC: 85 MG/DL (ref 70–99)
HCT VFR BLD AUTO: 36.5 % (ref 35–47)
HDLC SERPL-MCNC: 45 MG/DL
HGB BLD-MCNC: 11.8 G/DL (ref 11.7–15.7)
IMM GRANULOCYTES # BLD: 0 10E3/UL
IMM GRANULOCYTES NFR BLD: 0 %
LDLC SERPL CALC-MCNC: 169 MG/DL
LYMPHOCYTES # BLD AUTO: 2.5 10E3/UL (ref 0.8–5.3)
LYMPHOCYTES NFR BLD AUTO: 42 %
MCH RBC QN AUTO: 23.8 PG (ref 26.5–33)
MCHC RBC AUTO-ENTMCNC: 32.3 G/DL (ref 31.5–36.5)
MCV RBC AUTO: 74 FL (ref 78–100)
MONOCYTES # BLD AUTO: 0.4 10E3/UL (ref 0–1.3)
MONOCYTES NFR BLD AUTO: 6 %
NEUTROPHILS # BLD AUTO: 3 10E3/UL (ref 1.6–8.3)
NEUTROPHILS NFR BLD AUTO: 51 %
NONHDLC SERPL-MCNC: 186 MG/DL
NRBC # BLD AUTO: 0 10E3/UL
NRBC BLD AUTO-RTO: 0 /100
PLATELET # BLD AUTO: 312 10E3/UL (ref 150–450)
POTASSIUM SERPL-SCNC: 3.9 MMOL/L (ref 3.4–5.3)
PROT SERPL-MCNC: 8.6 G/DL (ref 6.4–8.3)
RBC # BLD AUTO: 4.96 10E6/UL (ref 3.8–5.2)
SODIUM SERPL-SCNC: 138 MMOL/L (ref 136–145)
TRIGL SERPL-MCNC: 84 MG/DL
WBC # BLD AUTO: 5.9 10E3/UL (ref 4–11)

## 2022-10-18 PROCEDURE — 99214 OFFICE O/P EST MOD 30 MIN: CPT | Performed by: INTERNAL MEDICINE

## 2022-10-18 PROCEDURE — 87536 HIV-1 QUANT&REVRSE TRNSCRPJ: CPT | Mod: 90 | Performed by: PATHOLOGY

## 2022-10-18 PROCEDURE — 36415 COLL VENOUS BLD VENIPUNCTURE: CPT | Performed by: PATHOLOGY

## 2022-10-18 PROCEDURE — 96372 THER/PROPH/DIAG INJ SC/IM: CPT | Performed by: INTERNAL MEDICINE

## 2022-10-18 PROCEDURE — 86359 T CELLS TOTAL COUNT: CPT | Mod: 90 | Performed by: PATHOLOGY

## 2022-10-18 PROCEDURE — 250N000011 HC RX IP 250 OP 636: Performed by: INTERNAL MEDICINE

## 2022-10-18 PROCEDURE — 80053 COMPREHEN METABOLIC PANEL: CPT | Performed by: PATHOLOGY

## 2022-10-18 PROCEDURE — 85025 COMPLETE CBC W/AUTO DIFF WBC: CPT | Performed by: PATHOLOGY

## 2022-10-18 PROCEDURE — 80061 LIPID PANEL: CPT | Mod: 90 | Performed by: PATHOLOGY

## 2022-10-18 PROCEDURE — 86360 T CELL ABSOLUTE COUNT/RATIO: CPT | Mod: 90 | Performed by: PATHOLOGY

## 2022-10-18 PROCEDURE — G0463 HOSPITAL OUTPT CLINIC VISIT: HCPCS

## 2022-10-18 PROCEDURE — 99000 SPECIMEN HANDLING OFFICE-LAB: CPT | Performed by: PATHOLOGY

## 2022-10-18 RX ORDER — MEDROXYPROGESTERONE ACETATE 150 MG/ML
150 INJECTION, SUSPENSION INTRAMUSCULAR ONCE
Status: COMPLETED | OUTPATIENT
Start: 2022-10-18 | End: 2022-10-18

## 2022-10-18 RX ADMIN — MEDROXYPROGESTERONE ACETATE 150 MG: 150 INJECTION, SUSPENSION, EXTENDED RELEASE INTRAMUSCULAR at 16:49

## 2022-10-18 NOTE — LETTER
10/18/2022       RE: Marina Mock  [address]  [place] [address]     Dear Colleague,    Thank you for referring your patient, Marina Mock, to the [facility] INFECTIOUS DISEASE CLINIC [place] at [facility]. Please see a copy of my visit note below.        Infectious Disease Clinic Visit - seen in person      Formal note to follow.      Alfredo Martinez MD, MS  Infectious Disease    During her ID appt today she brought up ongoing difficulties finding and  Scheduling a dental exam compatible with her are. We reviewed online together two options to pursue, [facility] and [facility]. She has my contact info to use in the event of any further details . She expressed agreement with this approach.      Again, thank you for allowing me to participate in the care of your patient.      Sincerely,    Alfredo Martinez MD

## 2022-10-18 NOTE — NURSING NOTE
Chief Complaint   Patient presents with     RECHECK     B20     Blood pressure 122/79, pulse 69, weight 54 kg (119 lb), SpO2 100 %, unknown if currently breastfeeding.    Joon Garcia on 10/18/2022 at 2:17 PM

## 2022-10-18 NOTE — LETTER
Date:October 20, 2022      Patient was self referred, no letter generated. Do not send.        [facility] Health Information

## 2022-10-19 LAB
CD3 CELLS # BLD: 1691 CELLS/UL (ref 603–2990)
CD3 CELLS NFR BLD: 67 % (ref 49–84)
CD3+CD4+ CELLS # BLD: 928 CELLS/UL (ref 441–2156)
CD3+CD4+ CELLS NFR BLD: 37 % (ref 28–63)
CD3+CD4+ CELLS/CD3+CD8+ CLL BLD: 1.31 % (ref 1.4–2.6)
CD3+CD8+ CELLS # BLD: 709 CELLS/UL (ref 125–1312)
CD3+CD8+ CELLS NFR BLD: 28 % (ref 10–40)
HIV1 RNA # PLAS NAA DL=20: NOT DETECTED COPIES/ML
T CELL COMMENT: ABNORMAL

## 2022-10-19 NOTE — PROGRESS NOTES
"    Infectious Disease Clinic Visit - seen in person    Reason for visit:   Infectious Disease Return Visit, planned follow-up for HIV    SUBJECTIVE and Interval History:    Marina is well known to me.  I follow her for her HIV infection. We decided to change medications to Odefsey as of 1/10/2020, and she has done well since then.  She reports easy adherence.  Misses occasionally but overall is able to keep to a daily schedule.  Denies any side effects or concerns.    Last had labs in June 2022.  For Marina, I would like to see HIV monitoring labs q6 months.  Marina prefers to repeat today so she does not need another appt in a couple of months.    Marina did follow up with Endocrinology as planned and they recommended Januvia.   Marina checks her blood glucose and sometimes experiences lower blood glucose levels that are symptomatic as low as 50.   Therefore, she is worried about taking Januvia daily.  She takes it sometimes when she notes that her blood glucose checks are higher.  I did  her that this is the not the way Januvia is designed to work.   She has a next appt with Endocrinology in 1/2023, and I encouraged her to be honest with the provider regarding her concerns so they can develop a different plan. She is strict about sugars, and took out soda \"pop,\" no juice, and she likes to eat almonds if she sees her blood sugar is high.  She also takes black pepper and cinnamon and feels this really is helping her.   She is feeling well otherwise.    Due for DepoProvera but her usual clinic cannot give her an appt in time.  She is asking if we can give her the injection.  She does not want pregnancy right now, but is still considering it for the future.  It is hard for her to stay on schedule for this injection, and she is often not on time for it.     She had a dentist appt scheduled for tomorrow, but it was just canceled because her insurance did not cover.  Still has yet to see a dentist for " her concerns.  She did buy an electric toothbrush and is using expensive toothpaste.  The halitosis went away once she started using these two products.  I told her it was likely the toothbrush that made the difference, and it is probably ok to go back to more affordable toothpaste.       I have reviewed and updated the patient's Past Medical History, Social History, Family History and Medication List.    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    Sadly, her mother  in early2020.  She had cancer and kidney problems, and she  at home in [place]. Marina was feeling very sad about not being able to see her before she .       OBJECTIVE:    Current Outpatient Medications   Medication     blood glucose (NO BRAND SPECIFIED) test strip     blood glucose (NO BRAND SPECIFIED) test strip     blood glucose (ONETOUCH ULTRA) test strip     blood glucose monitoring (NO BRAND SPECIFIED) meter device kit     blood glucose monitoring (ULTRA THIN 30G) lancets     emtricitabine-rilpivirine-tenofovir (ODEFSEY) 200-25-25 MG TABS per tablet     Lancets (ONETOUCH DELICA PLUS SCECBM97G) MISC     Nutritional Supplements (ENSURE ACTIVE PO)     sitagliptin (JANUVIA) 25 MG tablet     thin (NO BRAND SPECIFIED) lancets     No current facility-administered medications for this visit.       No Known Allergies    PHYSICAL EXAMINATION:    VITAL SIGNS: /79 (BP Location: Right arm, Patient Position: Sitting, Cuff Size: Adult Regular)   Pulse 69   Wt 54 kg (119 lb)   SpO2 100%   BMI 22.48 kg/m    GENERAL:   No acute distress    HEENT: No icterus or injection. Oropharynx moist and clear without lesions or exudate.  Some broken teeth but no active bleeding or purulence. No halitosis noted.   NECK: Supple and nontender  LYMPH:  No cervical or axillary lymphadenopathy  LUNGS: Clear to ausculation bilaterally without any increased work of breathing  HEART: Regular rate  and rhythm and no murmur, gallop or rub    ABDOMEN: Normoactive bowel sounds, soft, nontender, nondistended, no hepatosplenomegaly.    EXTREMITIES: Warm and well perfused without clubbing, cyanosis, or edema  SKIN:  No rashes  NEURO:  Awake, alert, no focal neurologic deficits.  PSYCH: Affect normal. Speech fluent and appropriate.        Last labs:  Labs ordered for today.    Reviewed from 6/2022.        ASSESSMENT:     Well controlled HIV     Marina is doing well with Odefsey which is a 3 drug in a single tablet regimen given once daily.  The 3 drugs are TAF= tenofovir alafenamide PLUS emtricitabine PLUS rilpivirine.   I view Marina's case as someone who may become pregnant again without planning.  She often misses the timing with her DepoProvera injection.  When we made the switch, I was purposely avoiding dolutegravir at time of conception, but recently guidelines were updated to reflect more data suggesting that dolutegravir might not be contributing to neural tube defects.  She is a candidate for two drug regimens such as Dovato or Juluca.   We are not using injection Cabenuva in women of childbearing age as of yet.   I discussed all of this with Marina at the 10/18/2022 visit, but she prefers to continue with Odefsey because it has been working well, and she does not have any side effects from this.  If she were to get pregnant, this is considered safe for conception, although the TAF component does not have as much data in pregnant women as the older generation TDF.  We could discuss if she wanted to switch if pregnancy did occur, but I think fine to continue with this regimen now.   HIV monitoring labs ordered for today.   If stable, will repeat again in 6 months with visit with me to follow.    Diabetes and dyslipidemia    I encouraged Marina to keep her appt with Endocrinology in 1/2023 and to be forthcoming about her concerns for hypoglycemia.  A statin is recommended for her as well.  I defer to  Endocrine regarding negotiating for most important medications to take, as Marina has not been enthusiastic about taking any other medications aside from her HIV ART meds.    Contraception    Ordered DepoProvera for her to get today in this clinic because she could not get in at her usual clinic in time.  Encouraged her to reach out to her regular clinic well in advance to schedule next dose.    Dental needs    Asked SW today to help Marina identify a dental clinic that will work with her insurance.       Alfredo Martinez MD, MS  Infectious Disease    Time:  A total of 35 minutes was spent in care of the patient today. 25 minutes were spent in the room with the patient, and an additional 10 minutes werespent on orders, chart review and care coordination.

## 2022-10-19 NOTE — PROGRESS NOTES
During her ID appt today she brought up ongoing difficulties finding and  Scheduling a dental exam compatible with her are. We reviewed online together two options to pursue, [facility] and [facility]. She has my contact info to use in the event of any further details . She expressed agreement with this approach.

## 2022-10-30 NOTE — PROGRESS NOTES
Problem: At Risk for Falls  Goal: # Patient does not fall  Outcome: Outcome Met, Continue evaluating goal progress toward completion  Goal: # Takes action to control fall-related risks  Outcome: Outcome Met, Continue evaluating goal progress toward completion  Goal: # Verbalizes understanding of fall risk/precautions  Description: Document education using the patient education activity  Outcome: Outcome Met, Continue evaluating goal progress toward completion     Problem: Breathing Pattern Ineffective  Goal: Air exchange is effective, demonstrated by Sp02 sat of greater then or = 92% (or as ordered)  Outcome: Outcome Met, Continue evaluating goal progress toward completion  Goal: Respiratory pattern is quiet and regular without report of SOB  Outcome: Outcome Met, Continue evaluating goal progress toward completion  Goal: Breathing pattern demonstrates minimal apnea during sleep with appropriate use of airway pressure support devices  Outcome: Outcome Met, Continue evaluating goal progress toward completion  Goal: Verbalizes/demonstrates effective breathing management strategies  Description: Document education using the patient education activity.   Outcome: Outcome Met, Continue evaluating goal progress toward completion     52368/1     Shayne Loaiza MRN: [id number]  AGE: 67 year old  ADMIT DATE: 10/29/2022    CODE STATUS: Full Resuscitation  ISOLATION STATUS: No active isolations   DIET: One Time Diet Consistent Carb Moderate (45-75 Gm/meal)  Consistent Carb Moderate (45-75 Gm/meal) Diet    ALLERGIES:  Patient has no known allergies.     DX:Pneumonia of both lungs due to infectious organism, unspecified part of lung  (primary encounter diagnosis)     Att: Alejandro Polk MD  PCP: Rich Galloway MD  IP Consult Orders (From admission, onward)                 Start     Ordered    10/29/22 1608  Inpatient consult to Pulmonology  ONE TIME        Provider:  Chris Valles MD    10/29/22 [id number]                  "SUBJECTIVE:  Marina Mock is a 39 year old female who presents today for her annual exam.  HPI: came for \"post partum\" but due for annual and pap  Follows closely with MD for HIV, has appointment in within the next month per patient  [facility], does not want to return to PP:  Has been spotting for the last 3 weeks, no bleeding for the first 2+ months on depo. reluctant to change BC at this time. Fearful of IUD   Due for f/u for GDM    MENSTRUAL HISTORY  ==================  OB History    Para Term  AB Living   4 3 3 0 1 2   SAB TAB Ectopic Multiple Live Births   1 0 0 0 3      # Outcome Date GA Lbr Aden/2nd Weight Sex Delivery Anes PTL Lv   4 Term 19 37w6d / 00:14 3.37 kg (7 lb 6.9 oz) M Vag-Spont  N LULU      Name: Cara      Apgar1: 8  Apgar5: 8   3 Term 16 38w6d 01:22 / 00:41 2.48 kg (5 lb 7.5 oz) F Vag-Spont Local, EPI N LULU      Name: Anel       Apgar1: 8  Apgar5: 9   2 SAB 08/19/15           1 Term 07    F   N LIVE BIRTH      Name: Kori      Obstetric Comments   Hx of gdm      No LMP recorded. Patient has had an injection.   Periods have not returned in a reg manner since birth, no bleeding x 2 months, spotting last 3 wks,  Dysmenorrhea none. Cyclic symptoms include  SEXUAL HISTORY  ==============  Current contraception: depoprovera  Number of partners in last year: 1      CURRENT LIFE STYLE:  ===================  Social History     Socioeconomic History     Marital status:      Spouse name: Chris Roldan     Number of children: Not on file     Years of education: Not on file     Highest education level: Not on file   Occupational History     Not on file   Social Needs     Financial resource strain: Not on file     Food insecurity:     Worry: Not on file     Inability: Not on file     Transportation needs:     Medical: Not on file     Non-medical: Not on file   Tobacco Use     Smoking status: Never Smoker     Smokeless tobacco: "        BP: 134/89  Temp: 98.6 °F (37 °C)  Temp src: Oral  Heart Rate: 60  Resp: 16  SpO2: 95 %  Height: 5' 3\" (160 cm)  Weight: 77.2 kg (170 lb 3.1 oz)   Weight change:      Recent Labs   Lab 10/29/22  1625 10/29/22  2115   GLUCOSE BEDSIDE 97 132*        Creatinine (mg/dL)   Date Value   10/29/2022 1.05   10/24/2022 0.80     PTT (sec)   Date Value   10/29/2022 25     INR (no units)   Date Value   10/29/2022 1.0     WBC (K/mcL)   Date Value   10/30/2022 7.3   10/29/2022 8.2        I/O last 3 completed shifts:  In: 289.8 [I.V.:289.8]  Out: -                          IMPORTANT EVENTS THIS SHIFT:  Patient on IVF up ad Ilda without any issues, went down for CT of the chest. Patient on RA sats about 94-95% for most of the night   On continuous IVF   Asking if he will be able to go home today IMPORTANT EVENTS COMING UP/GOALS (PLEASE INCLUDE WHITE BOARD AND DISCHARGE BOARD UPDATES):       PATIENT SPECIAL NEEDS/ACCOMMODATIONS:    Up ad Ilda                               Never Used   Substance and Sexual Activity     Alcohol use: No     Drug use: No     Sexual activity: Yes     Partners: Male     Birth control/protection: Injection   Lifestyle     Physical activity:     Days per week: Not on file     Minutes per session: Not on file     Stress: Not on file   Relationships     Social connections:     Talks on phone: Not on file     Gets together: Not on file     Attends Zoroastrian service: Not on file     Active member of club or organization: Not on file     Attends meetings of clubs or organizations: Not on file     Relationship status: Not on file     Intimate partner violence:     Fear of current or ex partner: Not on file     Emotionally abused: Not on file     Physically abused: Not on file     Forced sexual activity: Not on file   Other Topics Concern     Parent/sibling w/ CABG, MI or angioplasty before 65F 55M? Not Asked   Social History Narrative    ** Merged History Encounter **     How much exercise per week? Active with 2 yr    How much calcium per day? supplement       How much caffeine per day? none    How much vitamin D per day? supplement    Do you/your family wear seatbelts?  Yes    Do you/your family use safety helmets? n/a    Do you/your family use sunscreen? No    Do you/your family keep firearms in the home? No    Do you/your family have a smoke detector(s)? 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, 2019 Aldo Perez LPN    Chata FINNEYM APRN          SCREENING  ===========  Health Maintenance   Topic Date Due     MICROALBUMIN  1980     DIABETIC FOOT EXAM  1980     MENINGITIS IMMUNIZATION (1 - Risk start before 7 months 4-dose series) 1982     EYE EXAM  04/14/2017     LIPID  12/21/2018     INFLUENZA VACCINE (1) 09/01/2019     A1C  06/02/2020     BMP  06/14/2020     PREVENTIVE CARE VISIT  12/02/2020     TSH W/FREE T4 REFLEX  01/09/2021     PNEUMOCOCCAL IMMUNIZATION 19-64 HIGHEST RISK (3 of 3 - PPSV23) 04/05/2023     HPV  12/02/2024     PAP  12/02/2024      DTAP/TDAP/TD IMMUNIZATION (4 - Td) 07/09/2029     PHQ-2  Completed     IPV IMMUNIZATION  Aged Out     Lab Results   Component Value Date    PAP ASC-US 12/02/2019      History of abnormal Pap smear: No    Lab Results   Component Value Date    CHOL 212 12/21/2017     Lab Results   Component Value Date    HDL 51 12/21/2017     Lab Results   Component Value Date     12/21/2017     Lab Results   Component Value Date    TSH 0.94 01/09/2019       IMMUNIZATIONS  =============  Immunization History   Administered Date(s) Administered     Influenza Vaccine IM > 6 months Valent IIV4 10/12/2017     Pneumo Conj 13-V (2010&after) 03/16/2017     Pneumococcal 23 valent 04/05/2018     TDAP Vaccine (Adacel) 07/09/2019     TDAP Vaccine (Boostrix) 08/15/2016, 04/24/2019     HISTORIES  =========   No Known Allergies  Family History   Problem Relation Age of Onset     Family History Negative Other      Hypertension Father      Hypertension Other      Cancer No family hx of      Diabetes No family hx of      Macular Degeneration No family hx of      Retinal detachment No family hx of      Past Medical History:   Diagnosis Date     Asymptomatic human immunodeficiency virus (HIV) infection status (H) 2016     Gestational diabetes     diet controlled      Malaria     in [place]      Miscarriage      TB (pulmonary tuberculosis)     latent     Past Surgical History:   Procedure Laterality Date     NO HISTORY OF SURGERY       REVIEW OF SYSTEMS  ==================  CONSTITUTIONAL: NEGATIVE for fever, chills  EYES: NEGATIVE for vision changes   RESP: NEGATIVE for significant cough or SOB  CV: NEGATIVE for chest pain, palpitations   GI: NEGATIVE for nausea, abdominal pain, heartburn, or change in bowel habits  : NEGATIVE for frequency, dysuria, or hematuria  MUSCULOSKELETAL: NEGATIVE for significant arthralgias or myalgia  NEURO: NEGATIVE for weakness, dizziness or paresthesias or headache  EXAM  =========  /71   Pulse 92   Ht 1.55  "m (5' 1.02\")   Wt 53.8 kg (118 lb 11.2 oz)   BMI 22.41 kg/m    BMI: Body mass index is 22.41 kg/m .  HENT: nose and mouth without ulcers or lesions  NECK: no adenopathy, no asymmetry, masses, or scars and thyroid normal to palpation  RESP: lungs clear to auscultation - no rales, rhonchi or wheezes  BREAST: normal without masses, tenderness or nipple discharge and no palpable axillary masses or adenopathy  CV: regular rates and rhythm and no murmur, click or rub  ABDOMEN: soft, nontender, without hepatosplenomegaly or masses  NEURO: Normal strength and tone, mentation intact and speech normal  PSYCH: mentation appears normal and affect normal/bright    PELVIC EXAM:  External Genitalia: WNL  Bartholin's/Urethral Meatus/[place]'s (BUS):  WNL/Negative  Bladder:  WNL  Vagina:  Normal mucosa, rugated and   thin homogenous discharge  Cervix:  healthy, multiparous  Uterus: Normal shape, position and consistency, nontender   Adnexae:  Normal without masses or tenderness,bilaterally  Anus/Perineum:  Normal    WET PREP:Not done  Gonorrhea and chlamydia screen obtained: NO      ASSESSMENT  ===========  Annual Gyn exam within normal limits  Depo  (Z00.00) Visit for preventive health examination  (primary encounter diagnosis)    Plan: Pap Smear Exam [] Do Not Remove, Pelvic         and Breast Exam Procedure [], Pap imaged         thin layer screen with HPV - recommended age 30        - 65 years (select HPV order below), HPV High         Risk Types DNA Cervical          (Z86.32) History of diet controlled gestational diabetes mellitus (GDM)    Plan: Hgb A1c, CBC with Platelets       (O99.019) Antepartum anemia    Plan: Hgb A1c, CBC with Platelets       (Z12.4) Screening for malignant neoplasm of cervix    Plan: Pap Smear Exam [] Do Not Remove, Pelvic         and Breast Exam Procedure [], Pap imaged         thin layer screen with HPV - recommended age 30        - 65 years (select HPV order below), HPV High       "   Risk Types DNA Cervical         (Z01.419) Encounter for gynecological examination without abnormal finding    Plan: Pap Smear Exam [] Do Not Remove, Pelvic         and Breast Exam Procedure []      (N92.1) Breakthrough bleeding on depo provera    Plan: medroxyPROGESTERone (DEPO-PROVERA) syringe 150         mg, hCG qual urine POCT            PLAN  ===========        PATIENT EDUCATION  =================  1.  Additional teaching done at this visit included: calcium (1200 mg per day), self breast exam and birth control  2.  Discussed with patient risks/ benefits and treatment options of prescribed medications or other treatment modalities.  3.  RTC in one year for annual exam or with concerns.  4.  encourge f/u w HIV clinic as planned  Discussed BC options, pt will consider another option if she continues to have spotting, pt aware she can return for spotting and consider OCP if bleeding not tolerable    Tiffanie Ho, APRN JAQUELINE

## 2022-11-29 NOTE — TELEPHONE ENCOUNTER
Patient returned my call regarding refill reminder.    Will send out 1 prescription address has been verified.   Michelle will call the patient about her Ensure RX.  It needs a PA    4 month of on time refill.    Follow up date:  4/2/18    Jimbo   Island Pedicle Flap Text: The defect edges were debeveled with a #15 scalpel blade.  Given the location of the defect, shape of the defect and the proximity to free margins an island pedicle advancement flap was deemed most appropriate.  Using a sterile surgical marker, an appropriate advancement flap was drawn incorporating the defect, outlining the appropriate donor tissue and placing the expected incisions within the relaxed skin tension lines where possible.    The area thus outlined was incised deep to adipose tissue with a #15 scalpel blade.  The skin margins were undermined to an appropriate distance in all directions around the primary defect and laterally outward around the island pedicle utilizing iris scissors.  There was minimal undermining beneath the pedicle flap.

## 2023-01-09 DIAGNOSIS — B20 HUMAN IMMUNODEFICIENCY VIRUS (HIV) DISEASE (H): ICD-10-CM

## 2023-01-10 RX ORDER — POLYMYXIN B SULFATE, BACITRACIN ZINC AND NEOMYCIN SULFATE 400; 3.5; 5 [USP'U]/G; MG/G; [USP'U]/G
1 OINTMENT TOPICAL
Qty: 30 TABLET | Refills: 3 | Status: SHIPPED | OUTPATIENT
Start: 2023-01-10 | End: 2023-05-24

## 2023-01-11 ENCOUNTER — TELEPHONE (OUTPATIENT)
Dept: ENDOCRINOLOGY | Facility: CLINIC | Age: 43
End: 2023-01-11

## 2023-01-11 NOTE — TELEPHONE ENCOUNTER
Called patient and left voicemail. Patient has appointment on 1/13/23. Need to collect 14 days of blood sugar readings if patient is using meter, or if patient is using CGM, need to get patients device uploaded to retrieve report for provider to review. Linda Fine on 1/11/2023 at 10:41 AM

## 2023-01-12 ENCOUNTER — TELEPHONE (OUTPATIENT)
Dept: INTERNAL MEDICINE | Facility: CLINIC | Age: 43
End: 2023-01-12

## 2023-01-12 ENCOUNTER — TELEPHONE (OUTPATIENT)
Dept: INFECTIOUS DISEASES | Facility: CLINIC | Age: 43
End: 2023-01-12

## 2023-01-12 NOTE — TELEPHONE ENCOUNTER
Pt on IM-MA nurse only schedule for Depo shot.    Per exclusion/inclusion criteria-pt does NOT meet criteria for depo today.  Last OV with Primary Care 09/29/2020, CTC in chart, detailed message left explaining pt's need to establish care and see PCP prior to receiving depo.    Pt arrived at clinic and explained again she does not meet criteria for nurse only Depo. (no orders, 2.5 years since last primary visit) Pt asking what to do, advised to talk to previous physician or inf disease as she is established with them to see if they will administer medication.    Consulted with Hermila Garcia, RN, clinical supervisor who confirmed pt does not meet criteria for depo-needs Primary appt.        Damaris Chua, CMA

## 2023-01-12 NOTE — TELEPHONE ENCOUNTER
Health Call Center    Phone Message    May a detailed message be left on voicemail: yes     Reason for Call: Other: Per patient went in for a Depo shot today and was told that she does not meet criteria, needs PCP appointment prior to shot. She wants to know if Dr. Martinez can help her get the DEPO asap she is over due?         Action Taken: Other: ID     Travel Screening: Not Applicable

## 2023-01-18 ENCOUNTER — TELEPHONE (OUTPATIENT)
Dept: FAMILY MEDICINE | Facility: CLINIC | Age: 43
End: 2023-01-18

## 2023-01-18 NOTE — TELEPHONE ENCOUNTER
Pt discussed her f/up from Primary Care with J Carlos yesterday, they arranged for her to be seen today at [facility] for depo shot and then Dr Salmon to establish primary care 2/15. She was encouraged to let me know if this does not work out.

## 2023-01-18 NOTE — TELEPHONE ENCOUNTER
Patient did not received her DepoProvera injection which was scheduled today on the MA /Nurse only in [place]. Consulted with Nathalie Santillan, manager who confirmed pt does not meet criteria for depo and needs Primary Care appt. Patient  encouraged to keep her appt to [facility] with Dr. Salmon at [facility] on 2-.     Barbara Arrieta MA

## 2023-01-25 PROBLEM — Z86.32 HISTORY OF DIET CONTROLLED GESTATIONAL DIABETES MELLITUS (GDM): Status: RESOLVED | Noted: 2019-01-09 | Resolved: 2023-01-25

## 2023-02-28 NOTE — PROGRESS NOTES
"CC: Establish Care (Wants to establish care and discuss different BCM; wants period to be lighter. )      SUBJECTIVE: Marina is a 42 year old female who comes in to establish care.     - She is followed by ID for well-controlled HIV infection on Odefsey. Last visit on 10/18/2022. HIV RNA undetectable.   - She is followed by endocrinology for well-controlled type 2 DM. Last visit on 2022. A1c was 6.4%. she takes sitagliptin 25 mg daily.   - She does have dyslipidemia. LDL level on 10/18/2022 was 169. She is not on a statin. Declines at this time.    - She is due for an annual pap smear. History of abnormal pap smears. 2019 - ASCUS positive other HR HPV; colposcopy 2020 normal    She was on Depo for contraception. She was supposed to get the Depo shot in October but then her prescription . She gets irregular menses on Depo. Very light though, used to get a heavy flow prior to Depo. Patient's last menstrual period was 2023 (approximate).       PMH, PSH, allergies, and medications reviewed and updated in Epic. Nonsmoker. No history of migraine, VTE, stroke, bleeding/clotting disorder, liver disease. She does have DM and HLD. No history of breast, uterine, ovarian, or liver cancer.       OBJECTIVE:   /73 (BP Location: Right arm, Patient Position: Sitting, Cuff Size: Adult Regular)   Pulse 76   Temp 98  F (36.7  C) (Skin)   Resp 16   Ht 1.549 m (5' 1\")   Wt 51.3 kg (113 lb)   LMP 2023 (Approximate)   SpO2 100%   Breastfeeding No   BMI 21.35 kg/m    General: Alert and oriented in no acute distress.  Gyn: Normal appearing external genitalia without lesion.  Vaginal mucosa pink and moist.  Cervix visualized and Pap performed.      Urine HCG: negative    ASSESSMENT/PLAN:   Marina was seen today for establish care.    Encounter for general counseling and advice on contraceptive management  Pt was on Depo but didn't like irregular bleeding on Depo. Would like a contraceptive " option that has a more predictable bleeding pattern. A bit nervous about the IUD and declines. Discussed other options including OCP. Discussed risks, benefits, side effects. Discussed increased risk of VTE/stroke given her age and DM/HLD. After discussion of risks and benefits, pt would like to use OCP. Although she has DM, she has no known vascular disease. She is a nonsmoker. No migraine history. Worrisome signs and symptoms were discussed with Marina and she was instructed to return to the clinic for concerning symptoms or to call with questions.   -     HCG qualitative urine; Future  -     norethindrone-ethinyl estradiol (MICROGESTIN 1/20) 1-20 MG-MCG tablet; Take 1 tablet by mouth daily  -     HCG qualitative urine    Encounter for screening mammogram for breast cancer  Discussed breast cancer screening. She will start screening and mammogram ordered.   -     Mammogram - routine screening; Future    Screening for cervical cancer  Pap smear updated today.   -     Gynecologic Cytology (PAP Smear); Future    Human immunodeficiency virus (HIV) disease (H)  Continue follow-up with ID.       Time note ((n3, 30'): The total time (on the date of service) for this service was 30 minutes, including discussion/face-to-face, chart review, interpretation not otherwise reported, documentation, and updating of the computerized record.      Antonette Crisostomo MD  Family Medicine

## 2023-03-01 ENCOUNTER — OFFICE VISIT (OUTPATIENT)
Dept: FAMILY MEDICINE | Facility: CLINIC | Age: 43
End: 2023-03-01

## 2023-03-01 VITALS
DIASTOLIC BLOOD PRESSURE: 73 MMHG | RESPIRATION RATE: 16 BRPM | OXYGEN SATURATION: 100 % | HEIGHT: 61 IN | TEMPERATURE: 98 F | WEIGHT: 113 LBS | BODY MASS INDEX: 21.34 KG/M2 | SYSTOLIC BLOOD PRESSURE: 105 MMHG | HEART RATE: 76 BPM

## 2023-03-01 DIAGNOSIS — Z12.31 ENCOUNTER FOR SCREENING MAMMOGRAM FOR BREAST CANCER: ICD-10-CM

## 2023-03-01 DIAGNOSIS — Z30.09 ENCOUNTER FOR GENERAL COUNSELING AND ADVICE ON CONTRACEPTIVE MANAGEMENT: Primary | ICD-10-CM

## 2023-03-01 DIAGNOSIS — B20 HUMAN IMMUNODEFICIENCY VIRUS (HIV) DISEASE (H): ICD-10-CM

## 2023-03-01 DIAGNOSIS — Z12.4 SCREENING FOR CERVICAL CANCER: ICD-10-CM

## 2023-03-01 PROBLEM — E11.9 DIABETES MELLITUS, TYPE 2 (H): Status: RESOLVED | Noted: 2020-11-08 | Resolved: 2023-03-01

## 2023-03-01 PROBLEM — O99.019 ANTEPARTUM ANEMIA: Status: RESOLVED | Noted: 2019-02-13 | Resolved: 2023-03-01

## 2023-03-01 PROBLEM — E11.9 TYPE 2 DIABETES MELLITUS, WITHOUT LONG-TERM CURRENT USE OF INSULIN (H): Status: ACTIVE | Noted: 2019-12-04

## 2023-03-01 LAB — HCG UR QL: NEGATIVE

## 2023-03-01 PROCEDURE — G0145 SCR C/V CYTO,THINLAYER,RESCR: HCPCS | Performed by: FAMILY MEDICINE

## 2023-03-01 RX ORDER — NORETHINDRONE ACETATE AND ETHINYL ESTRADIOL .02; 1 MG/1; MG/1
1 TABLET ORAL DAILY
Qty: 84 TABLET | Refills: 4 | Status: SHIPPED | OUTPATIENT
Start: 2023-03-01 | End: 2023-03-29

## 2023-03-01 NOTE — NURSING NOTE
"42 year old  Chief Complaint   Patient presents with     Establish Care     Wants to establish care and discuss different BCM; wants period to be lighter.        Blood pressure 105/73, pulse 76, temperature 98  F (36.7  C), temperature source Skin, resp. rate 16, height 1.549 m (5' 1\"), weight 51.3 kg (113 lb), last menstrual period 02/26/2023, SpO2 100 %, not currently breastfeeding. Body mass index is 21.35 kg/m .  Patient Active Problem List   Diagnosis     HIV affecting pregnancy, antepartum     Papanicolaou smear of cervix with low grade squamous intraepithelial lesion (LGSIL)     LTBI (latent tuberculosis infection)     Iron deficiency anemia secondary to inadequate dietary iron intake     Antepartum anemia     Hemoglobin A1c between 7.0% and 9.0% - recommend endocrinology appt     Diabetes mellitus, type 2 (H)       Wt Readings from Last 2 Encounters:   03/01/23 51.3 kg (113 lb)   10/18/22 54 kg (119 lb)     BP Readings from Last 3 Encounters:   03/01/23 105/73   10/18/22 122/79   09/20/22 110/71         Current Outpatient Medications   Medication     blood glucose (NO BRAND SPECIFIED) test strip     blood glucose (NO BRAND SPECIFIED) test strip     blood glucose (ONETOUCH ULTRA) test strip     blood glucose monitoring (NO BRAND SPECIFIED) meter device kit     blood glucose monitoring (ULTRA THIN 30G) lancets     emtricitabine-rilpivirine-tenofovir (ODEFSEY) 200-25-25 MG TABS per tablet     Lancets (ONETOUCH DELICA PLUS [id number]) MISC     sitagliptin (JANUVIA) 25 MG tablet     thin (NO BRAND SPECIFIED) lancets     Nutritional Supplements (ENSURE ACTIVE PO)     No current facility-administered medications for this visit.       Social History     Tobacco Use     Smoking status: Never     Smokeless tobacco: Never   Vaping Use     Vaping Use: Never used   Substance Use Topics     Alcohol use: No     Drug use: No       Health Maintenance Due   Topic Date Due     DIABETIC FOOT EXAM  Never done     ADVANCE CARE " PLANNING  Never done     COVID-19 Vaccine (1) Never done     MENINGITIS IMMUNIZATION (1 - Risk 2-dose series) Never done     EYE EXAM  04/14/2017     YEARLY PREVENTIVE VISIT  12/02/2020     HPV TEST  01/31/2021     PAP  01/31/2021     INFLUENZA VACCINE (1) 09/01/2022     PHQ-2 (once per calendar year)  01/01/2023       Lab Results   Component Value Date    PAP ASC-US 12/02/2019 March 1, 2023 11:11 AM

## 2023-03-07 LAB
BKR LAB AP GYN ADEQUACY: ABNORMAL
BKR LAB AP GYN INTERPRETATION: ABNORMAL
BKR LAB AP HPV REFLEX: ABNORMAL
BKR LAB AP LMP: ABNORMAL
BKR LAB AP PREVIOUS ABNL DX: ABNORMAL
BKR LAB AP PREVIOUS ABNORMAL: ABNORMAL
PATH REPORT.COMMENTS IMP SPEC: ABNORMAL
PATH REPORT.COMMENTS IMP SPEC: ABNORMAL
PATH REPORT.RELEVANT HX SPEC: ABNORMAL

## 2023-03-09 DIAGNOSIS — B20 HUMAN IMMUNODEFICIENCY VIRUS (HIV) DISEASE (H): ICD-10-CM

## 2023-03-09 DIAGNOSIS — R87.810 ASCUS WITH POSITIVE HIGH RISK HPV CERVICAL: Primary | ICD-10-CM

## 2023-03-09 DIAGNOSIS — R87.610 ASCUS WITH POSITIVE HIGH RISK HPV CERVICAL: Primary | ICD-10-CM

## 2023-03-09 LAB
HUMAN PAPILLOMA VIRUS 16 DNA: NEGATIVE
HUMAN PAPILLOMA VIRUS 18 DNA: POSITIVE
HUMAN PAPILLOMA VIRUS FINAL DIAGNOSIS: ABNORMAL
HUMAN PAPILLOMA VIRUS OTHER HR: POSITIVE

## 2023-03-29 ENCOUNTER — OFFICE VISIT (OUTPATIENT)
Dept: OBGYN | Facility: CLINIC | Age: 43
End: 2023-03-29
Payer: MEDICAID

## 2023-03-29 VITALS
HEART RATE: 85 BPM | BODY MASS INDEX: 21.34 KG/M2 | DIASTOLIC BLOOD PRESSURE: 79 MMHG | WEIGHT: 113 LBS | HEIGHT: 61 IN | SYSTOLIC BLOOD PRESSURE: 114 MMHG

## 2023-03-29 DIAGNOSIS — R87.618 PAP SMEAR ABNORMALITY OF CERVIX/HUMAN PAPILLOMAVIRUS (HPV) POSITIVE: ICD-10-CM

## 2023-03-29 DIAGNOSIS — Z23 NEED FOR HPV VACCINE: ICD-10-CM

## 2023-03-29 DIAGNOSIS — R87.611 PAPANICOLAOU SMEAR OF CERVIX WITH ATYPICAL SQUAMOUS CELLS CANNOT EXCLUDE HIGH GRADE SQUAMOUS INTRAEPITHELIAL LESION (ASC-H): Primary | ICD-10-CM

## 2023-03-29 LAB
HCG UR QL: NEGATIVE
INTERNAL QC OK POCT: NORMAL
POCT KIT EXPIRATION DATE: NORMAL
POCT KIT LOT NUMBER: NORMAL

## 2023-03-29 PROCEDURE — 57456 ENDOCERV CURETTAGE W/SCOPE: CPT

## 2023-03-29 PROCEDURE — 88305 TISSUE EXAM BY PATHOLOGIST: CPT | Mod: 26 | Performed by: PATHOLOGY

## 2023-03-29 PROCEDURE — 250N000021 HC RX MED A9270 GY (STAT IND- M) 250

## 2023-03-29 PROCEDURE — 88305 TISSUE EXAM BY PATHOLOGIST: CPT | Mod: TC

## 2023-03-29 PROCEDURE — 90471 IMMUNIZATION ADMIN: CPT

## 2023-03-29 PROCEDURE — 81025 URINE PREGNANCY TEST: CPT

## 2023-03-29 PROCEDURE — 57456 ENDOCERV CURETTAGE W/SCOPE: CPT | Mod: GC | Performed by: STUDENT IN AN ORGANIZED HEALTH CARE EDUCATION/TRAINING PROGRAM

## 2023-03-29 PROCEDURE — 90651 9VHPV VACCINE 2/3 DOSE IM: CPT

## 2023-03-29 ASSESSMENT — PAIN SCALES - GENERAL: PAINLEVEL: NO PAIN (0)

## 2023-03-29 NOTE — PATIENT INSTRUCTIONS
Thank you for trusting us with your care!     If you need to contact us for questions about:  Symptoms, Scheduling & Medical Questions; Non-urgent (2-3 day response) Miranda message, Urgent (needing response today) [phone] (if after 3:30pm next day response)   Prescriptions: Please call your Pharmacy   Billing: Shane [phone] or [facility]:[phone]

## 2023-03-29 NOTE — PROGRESS NOTES
Colposcopy Visit/Procedure Note:    Marina Mock is an 42 year old  with HIV that is well-controlled on antiretroviral therapy who comes in for diagnosis of abnormal pap screen. She has a history of an ASCUS pap with positive other high risk HPV in Dec 2019. She subsequently underwent colposcopy 2020 with normal biopsy and ECC. She did not follow up for recommended Pap and HPV in 1 year but had Pap on 3/1/23 that was again ASCUS with positive HPV 18 and other. She is interested in getting the Gardasil vaccine today.    Menstrual History:  Menstrual History 2019 2019 3/1/2023   LAST MENSTRUAL PERIOD - 2019   Period Cycle (Days) 28 - -   Period Duration (Days) 3-4 - -   Method of Contraception None - -   Menstrual Flow Heavy;Moderate - -   Menstrual Control Thin pad;Maxi pad;Panty liner - -   Dysmenorrhea None - -   Reviewed Today Yes - -       Lab Results   Component Value Date    PAP ASC-US 2019    PAP lsil 2016       Last   Lab Results   Component Value Date    HPV16 Negative 2023    HPV16 Negative 2019     Last   Lab Results   Component Value Date    HPV18 Positive 2023    HPV18 Negative 2019     Last   Lab Results   Component Value Date    HRHPV Positive 2023    HRHPV Positive 2019       Dates/results of previous cervical pathology: 2020 - cervical biopsy at 6 o'clock and endocervical curettings within normal limits.        Dates of previous cervical pathology treatment: No treatment necessary    History   Smoking Status     Never   Smokeless Tobacco     Never       Allergies as of 2023     (No Known Allergies)        Colposcopy Procedure:    Consent:  Details of the colposcopic procedure were reviewed. Risks, benefits of treatment, and alternate forms of evaluation were discussed.  Patient's questions were elicited and answered.   Written consent was obtained and scanned into medical record.     Verification of  "Procedure  Just before the procedure begins, through verbal and active participation of team members, I verified:   Initials   Patient Name Counts include 234 beds at the [facility]   Patient  Counts include 234 beds at the [facility]   Procedure to be performed Counts include 234 beds at the [facility]       OBJECTIVE: /79   Pulse 85   Ht 1.549 m (5' 1\")   Wt 51.3 kg (113 lb)   LMP 2023 (Approximate)   BMI 21.35 kg/m      Pelvic Exam:  EG/BUS: Normal genital architecture without lesions, erythema or abnormal secretions.  Vagina: moist, pink, rugae with creamy, white and odorless secretions  Cervix: Multiparous, and no lesions  Rectum:anus normal    PROCEDURE:    Acetic acid applied to cervix.  Colposcopic exam of the cervix and apex of the vagina was conducted in the usual fashion.     Findings:  SCJ was was seen entirely and the exam satisfactory. There were no visible lesions either microscopically or macroscopically. Endocervical curettage was performed and placed in labeled Formalin Jar.    Assessment: Normal exam without visible pathology    Plan:   Gardisil Vaccine will be given -f/u in two months for repeat vaccination     Biopsies sent to pathology.  Will contact patient with results and recommended follow-up plan. If results normal, will follow up in 1 year for repeat Pap and HPV cotesting.    Patient advised to contact clinic with heavy vaginal bleeding, fever over 101 degrees F, or any other concerns.    Verbalized understanding and agreement with plan.    Loy Lee MD  OB/GYN PGY-1  2023 1:53 PM     Patient was seen by the resident in continuity of care clinic.  I reviewed the patient's presentation and history and was present for the patient's exam and procedure.  The patient's assessment and plan were made jointly.      Marsha Buckner MD            "

## 2023-03-31 LAB
PATH REPORT.COMMENTS IMP SPEC: NORMAL
PATH REPORT.FINAL DX SPEC: NORMAL
PATH REPORT.GROSS SPEC: NORMAL
PATH REPORT.MICROSCOPIC SPEC OTHER STN: NORMAL
PATH REPORT.RELEVANT HX SPEC: NORMAL
PHOTO IMAGE: NORMAL

## 2023-04-04 ENCOUNTER — TELEPHONE (OUTPATIENT)
Dept: INFECTIOUS DISEASES | Facility: CLINIC | Age: 43
End: 2023-04-04
Payer: MEDICAID

## 2023-04-04 NOTE — TELEPHONE ENCOUNTER
Pt phone call related to her inability to learn why her [employer] termed the end of Jan. I have an email pending with [facility] about this, she had premium support that ended. Will followup with her

## 2023-04-09 NOTE — NURSING NOTE
"Chief Complaint   Patient presents with     RECHECK     B20     /67   Pulse 78   Temp 98.4  F (36.9  C) (Oral)   Ht 1.549 m (5' 1\")   Wt 53.9 kg (118 lb 12.8 oz)   SpO2 100%   BMI 22.45 kg/m       Phoenix Jackson MA  "
No previous uterine incision/Yang Pregnancy/Less than or equal to 4 previous vaginal births/No known bleeding disorder/No history of postpartum hemorrhage

## 2023-04-30 ENCOUNTER — HEALTH MAINTENANCE LETTER (OUTPATIENT)
Age: 43
End: 2023-04-30

## 2023-05-24 ENCOUNTER — TELEPHONE (OUTPATIENT)
Dept: INFECTIOUS DISEASES | Facility: CLINIC | Age: 43
End: 2023-05-24
Payer: MEDICAID

## 2023-05-24 DIAGNOSIS — E11.65 TYPE 2 DIABETES MELLITUS WITH HYPERGLYCEMIA, UNSPECIFIED WHETHER LONG TERM INSULIN USE (H): ICD-10-CM

## 2023-05-24 DIAGNOSIS — B20 HUMAN IMMUNODEFICIENCY VIRUS (HIV) DISEASE (H): ICD-10-CM

## 2023-05-24 DIAGNOSIS — E11.9 TYPE 2 DIABETES MELLITUS WITHOUT COMPLICATION, WITHOUT LONG-TERM CURRENT USE OF INSULIN (H): ICD-10-CM

## 2023-05-24 DIAGNOSIS — B20 HUMAN IMMUNODEFICIENCY VIRUS (HIV) DISEASE (H): Primary | ICD-10-CM

## 2023-05-24 RX ORDER — POLYMYXIN B SULFATE, BACITRACIN ZINC AND NEOMYCIN SULFATE 400; 3.5; 5 [USP'U]/G; MG/G; [USP'U]/G
1 OINTMENT TOPICAL
Qty: 30 TABLET | Refills: 0 | Status: SHIPPED | OUTPATIENT
Start: 2023-05-24 | End: 2023-06-19

## 2023-05-24 NOTE — TELEPHONE ENCOUNTER
EP [facility] 5/24 to sched a follow up with Dr. Martinez with labs 1 week prior to follow up.

## 2023-05-26 ENCOUNTER — TELEPHONE (OUTPATIENT)
Dept: INFECTIOUS DISEASES | Facility: CLINIC | Age: 43
End: 2023-05-26
Payer: MEDICAID

## 2023-05-26 NOTE — TELEPHONE ENCOUNTER
EP [facility] 5/26 to sched a follow up with Dr. Martinez with labs 1 week prior to follow up. --2nd attempt.

## 2023-06-19 DIAGNOSIS — B20 HUMAN IMMUNODEFICIENCY VIRUS (HIV) DISEASE (H): ICD-10-CM

## 2023-06-19 RX ORDER — POLYMYXIN B SULFATE, BACITRACIN ZINC AND NEOMYCIN SULFATE 400; 3.5; 5 [USP'U]/G; MG/G; [USP'U]/G
1 OINTMENT TOPICAL
Qty: 30 TABLET | Refills: 0 | Status: SHIPPED | OUTPATIENT
Start: 2023-06-19 | End: 2023-07-26

## 2023-07-18 ENCOUNTER — LAB (OUTPATIENT)
Dept: LAB | Facility: CLINIC | Age: 43
End: 2023-07-18
Attending: INTERNAL MEDICINE
Payer: COMMERCIAL

## 2023-07-18 ENCOUNTER — OFFICE VISIT (OUTPATIENT)
Dept: INFECTIOUS DISEASES | Facility: CLINIC | Age: 43
End: 2023-07-18
Attending: INTERNAL MEDICINE
Payer: COMMERCIAL

## 2023-07-18 ENCOUNTER — TELEPHONE (OUTPATIENT)
Dept: INFECTIOUS DISEASES | Facility: CLINIC | Age: 43
End: 2023-07-18

## 2023-07-18 VITALS
BODY MASS INDEX: 20.31 KG/M2 | HEART RATE: 63 BPM | SYSTOLIC BLOOD PRESSURE: 115 MMHG | OXYGEN SATURATION: 100 % | WEIGHT: 107.5 LBS | DIASTOLIC BLOOD PRESSURE: 78 MMHG

## 2023-07-18 DIAGNOSIS — Z23 NEED FOR HPV VACCINATION: ICD-10-CM

## 2023-07-18 DIAGNOSIS — B20 HUMAN IMMUNODEFICIENCY VIRUS (HIV) DISEASE (H): Primary | ICD-10-CM

## 2023-07-18 DIAGNOSIS — E11.9 TYPE 2 DIABETES MELLITUS WITHOUT COMPLICATION, WITHOUT LONG-TERM CURRENT USE OF INSULIN (H): ICD-10-CM

## 2023-07-18 DIAGNOSIS — B20 HUMAN IMMUNODEFICIENCY VIRUS (HIV) DISEASE (H): ICD-10-CM

## 2023-07-18 LAB
ALBUMIN SERPL BCG-MCNC: 4.4 G/DL (ref 3.5–5.2)
ALBUMIN UR-MCNC: NEGATIVE MG/DL
ALP SERPL-CCNC: 56 U/L (ref 35–104)
ALT SERPL W P-5'-P-CCNC: 20 U/L (ref 0–50)
ANION GAP SERPL CALCULATED.3IONS-SCNC: 9 MMOL/L (ref 7–15)
APPEARANCE UR: CLEAR
AST SERPL W P-5'-P-CCNC: 22 U/L (ref 0–45)
BACTERIA #/AREA URNS HPF: ABNORMAL /HPF
BASOPHILS # BLD AUTO: 0 10E3/UL (ref 0–0.2)
BASOPHILS NFR BLD AUTO: 0 %
BILIRUB SERPL-MCNC: 0.3 MG/DL
BILIRUB UR QL STRIP: NEGATIVE
BUN SERPL-MCNC: 5.4 MG/DL (ref 6–20)
CALCIUM SERPL-MCNC: 9.4 MG/DL (ref 8.6–10)
CD3 CELLS # BLD: 1336 CELLS/UL (ref 603–2990)
CD3 CELLS NFR BLD: 66 % (ref 49–84)
CD3+CD4+ CELLS # BLD: 666 CELLS/UL (ref 441–2156)
CD3+CD4+ CELLS NFR BLD: 33 % (ref 28–63)
CD3+CD4+ CELLS/CD3+CD8+ CLL BLD: 1.05 % (ref 1.4–2.6)
CD3+CD8+ CELLS # BLD: 633 CELLS/UL (ref 125–1312)
CD3+CD8+ CELLS NFR BLD: 31 % (ref 10–40)
CHLORIDE SERPL-SCNC: 102 MMOL/L (ref 98–107)
COLOR UR AUTO: ABNORMAL
CREAT SERPL-MCNC: 0.76 MG/DL (ref 0.51–0.95)
CREAT UR-MCNC: 38.4 MG/DL
DEPRECATED HCO3 PLAS-SCNC: 25 MMOL/L (ref 22–29)
EOSINOPHIL # BLD AUTO: 0.1 10E3/UL (ref 0–0.7)
EOSINOPHIL NFR BLD AUTO: 2 %
ERYTHROCYTE [DISTWIDTH] IN BLOOD BY AUTOMATED COUNT: 14.4 % (ref 10–15)
GFR SERPL CREATININE-BSD FRML MDRD: >90 ML/MIN/1.73M2
GLUCOSE SERPL-MCNC: 88 MG/DL (ref 70–99)
GLUCOSE UR STRIP-MCNC: NEGATIVE MG/DL
HBA1C MFR BLD: 5.6 %
HCT VFR BLD AUTO: 34.1 % (ref 35–47)
HGB BLD-MCNC: 10.8 G/DL (ref 11.7–15.7)
HGB UR QL STRIP: NEGATIVE
IMM GRANULOCYTES # BLD: 0 10E3/UL
IMM GRANULOCYTES NFR BLD: 0 %
KETONES UR STRIP-MCNC: NEGATIVE MG/DL
LEUKOCYTE ESTERASE UR QL STRIP: NEGATIVE
LYMPHOCYTES # BLD AUTO: 1.8 10E3/UL (ref 0.8–5.3)
LYMPHOCYTES NFR BLD AUTO: 41 %
MCH RBC QN AUTO: 23.8 PG (ref 26.5–33)
MCHC RBC AUTO-ENTMCNC: 31.7 G/DL (ref 31.5–36.5)
MCV RBC AUTO: 75 FL (ref 78–100)
MICROALBUMIN UR-MCNC: <12 MG/L
MICROALBUMIN/CREAT UR: NORMAL MG/G{CREAT}
MONOCYTES # BLD AUTO: 0.3 10E3/UL (ref 0–1.3)
MONOCYTES NFR BLD AUTO: 7 %
NEUTROPHILS # BLD AUTO: 2.1 10E3/UL (ref 1.6–8.3)
NEUTROPHILS NFR BLD AUTO: 50 %
NITRATE UR QL: NEGATIVE
NRBC # BLD AUTO: 0 10E3/UL
NRBC BLD AUTO-RTO: 0 /100
PH UR STRIP: 5.5 [PH] (ref 5–7)
PLATELET # BLD AUTO: 224 10E3/UL (ref 150–450)
POTASSIUM SERPL-SCNC: 4 MMOL/L (ref 3.4–5.3)
PROT SERPL-MCNC: 7.9 G/DL (ref 6.4–8.3)
RBC # BLD AUTO: 4.54 10E6/UL (ref 3.8–5.2)
RBC URINE: 1 /HPF
SODIUM SERPL-SCNC: 136 MMOL/L (ref 136–145)
SP GR UR STRIP: 1 (ref 1–1.03)
SQUAMOUS EPITHELIAL: 1 /HPF
T CELL COMMENT: ABNORMAL
UROBILINOGEN UR STRIP-MCNC: NORMAL MG/DL
WBC # BLD AUTO: 4.3 10E3/UL (ref 4–11)
WBC URINE: <1 /HPF

## 2023-07-18 PROCEDURE — 99000 SPECIMEN HANDLING OFFICE-LAB: CPT | Performed by: PATHOLOGY

## 2023-07-18 PROCEDURE — 81001 URINALYSIS AUTO W/SCOPE: CPT | Performed by: PATHOLOGY

## 2023-07-18 PROCEDURE — 83036 HEMOGLOBIN GLYCOSYLATED A1C: CPT | Performed by: INTERNAL MEDICINE

## 2023-07-18 PROCEDURE — 36415 COLL VENOUS BLD VENIPUNCTURE: CPT | Performed by: PATHOLOGY

## 2023-07-18 PROCEDURE — 99214 OFFICE O/P EST MOD 30 MIN: CPT | Mod: GC | Performed by: INTERNAL MEDICINE

## 2023-07-18 PROCEDURE — 250N000021 HC RX MED A9270 GY (STAT IND- M) 250: Performed by: INTERNAL MEDICINE

## 2023-07-18 PROCEDURE — 87536 HIV-1 QUANT&REVRSE TRNSCRPJ: CPT | Performed by: INTERNAL MEDICINE

## 2023-07-18 PROCEDURE — G0463 HOSPITAL OUTPT CLINIC VISIT: HCPCS | Performed by: INTERNAL MEDICINE

## 2023-07-18 PROCEDURE — 80053 COMPREHEN METABOLIC PANEL: CPT | Performed by: PATHOLOGY

## 2023-07-18 PROCEDURE — 90471 IMMUNIZATION ADMIN: CPT | Performed by: INTERNAL MEDICINE

## 2023-07-18 PROCEDURE — 86360 T CELL ABSOLUTE COUNT/RATIO: CPT | Performed by: INTERNAL MEDICINE

## 2023-07-18 PROCEDURE — 90651 9VHPV VACCINE 2/3 DOSE IM: CPT | Performed by: INTERNAL MEDICINE

## 2023-07-18 PROCEDURE — 82570 ASSAY OF URINE CREATININE: CPT | Performed by: INTERNAL MEDICINE

## 2023-07-18 PROCEDURE — 85025 COMPLETE CBC W/AUTO DIFF WBC: CPT | Performed by: PATHOLOGY

## 2023-07-18 RX ADMIN — HUMAN PAPILLOMAVIRUS 9-VALENT VACCINE, RECOMBINANT 0.5 ML: 30; 40; 60; 40; 20; 20; 20; 20; 20 INJECTION, SUSPENSION INTRAMUSCULAR at 15:42

## 2023-07-18 ASSESSMENT — PAIN SCALES - GENERAL: PAINLEVEL: SEVERE PAIN (6)

## 2023-07-18 NOTE — NURSING NOTE
Chief Complaint   Patient presents with     RECHECK     Follow up per pt. B20   /78 (BP Location: Left arm, Patient Position: Sitting, Cuff Size: Adult Regular)   Pulse 63   Wt 48.8 kg (107 lb 8 oz)   SpO2 100%   BMI 20.31 kg/m    Lee Ann Agarwal ICVF    Pt reported:  feroglobin iron

## 2023-07-18 NOTE — TELEPHONE ENCOUNTER
Pt wanting to know 2 things:    1) is the HPV injection she received today the last one she needs to get to finish the series?    2) when does pt need to follow up with a new ID provider?    If you'd like, you can send this message back to me, and I can let the pt know via [employer].

## 2023-07-18 NOTE — PROGRESS NOTES
Infectious Disease Clinic Visit - seen in person    Reason for visit:   Infectious Disease Return Visit, planned follow-up for HIV    SUBJECTIVE and Interval History:    Marina is well known to Dr. Martinez's clinic; followed for her HIV infection. With Dr. Martinez, decided to change medications to Odefsey as of 1/10/2020, and she has done well since then.  She reports easy adherence.  Misses occasionally but overall is able to keep to a daily schedule.  Denies any side effects or concerns.    Last had labs in November 2022.  For Marina, would like to see HIV monitoring labs q6 months - repeat set drawn today (7/18/23) and pending.     In interval since last appointment, had issues with having Depo Provera injection administered via primary care clinic (received November injection here in ID clinic) given she hadn't seen a provider there in >2 years. Met with a new PCP who discussed contraception options with her and prescribed an OCP. Marina notes that she never started taking it as she was concerned about taking another medication and potential side effects. Per provider's note, IUDs were also discussed which patient did not desire. Marina today notes that she doesn't desire any contraception at this time. Isn't actively trying to get pregnant, but notes that if she did, it would be okay. Notes no possibility of pregnancy as of today.     Since being off of the Depo shot, her heavy periods she had prior to starting on them have returned. She has two days of heavy bleeding with a few more days of lighter bleeding each cycle. Also notes some pre-menopausal symptoms, predominantly body aches for a few days prior to starting her period that end when her period ends.     Has had some intermittent back pain, varying between upper back and lower back. None currently at time of evaluation. Has a topical anti-inflammatory medicine that helps with the pain when it comes on. No shooting pain/numbness/tingling into her  legs, no weakness in her legs when she has the pain.     Notes she has not been taking her sitagliptin. Missed her last Endocrinology appointment but knows she has another one coming up (is scheduled for September). Checks her blood sugar intermittently and is usually around 120 or less fasting. Is taking tumeric to try to help with her blood sugars as well.      Has not seen a dentist yet after her most recent appointment was cancelled due to insurance issues. 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 Her  through [employer] has also been more involved in her care recently and was present at the start of her visit today. Per her , has been having some relationship stress and may separate from her . Marina notes this is still up in the air but she feels safe and supported.     I have reviewed and updated the patient's Past Medical History, Social History, Family History and Medication List.     OBJECTIVE:  Current Outpatient Medications   Medication     blood glucose (NO BRAND SPECIFIED) test strip     blood glucose (NO BRAND SPECIFIED) test strip     blood glucose (ONETOUCH ULTRA) test strip     blood glucose monitoring (NO BRAND SPECIFIED) meter device kit     blood glucose monitoring (ULTRA THIN 30G) lancets     emtricitabine-rilpivirine-tenofovir (ODEFSEY) 200-25-25 MG TABS per tablet     Lancets (ONETOUCH DELICA PLUS [id number]) MISC     thin (NO BRAND SPECIFIED) lancets     sitagliptin (JANUVIA) 25 MG tablet     No current facility-administered medications for this visit.     No Known Allergies    PHYSICAL EXAMINATION:    VITAL SIGNS: /78 (BP Location: Left arm, Patient Position: Sitting, Cuff Size: Adult Regular)   Pulse 63   Wt 48.8 kg (107 lb 8 oz)   SpO2 100%   BMI 20.31 kg/m    GENERAL:   No acute distress    HEENT: No icterus or injection. Oropharynx moist and clear  without lesions or exudate.  Some broken teeth but no active bleeding or purulence.   NECK: Supple and nontender, no cervical lymphadenopathy   LUNGS: Clear to ausculation bilaterally without any increased work of breathing  HEART: Regular rate and rhythm and no murmur, gallop or rub    EXTREMITIES: Warm and well perfused, no LE edema   SKIN:  Hyperpigmented, telangiectasia like lesions spread across anterior neck, non-blanchable, not raised; no tenderness to palpation. Not appreciated on posterior neck or other areas of skin   NEURO:  Awake, alert, no focal neurologic deficits.  PSYCH: Affect normal. Speech fluent and appropriate.      Last labs:  Labs from today, 7/18/23:  - Hgb 10.8 (decreased from 11.8 on last check)  - Plt 224   - WBC 4.3     - CMP unremarkable     - HIV viral load pending  - CD4 count pending   - Hgb A1c pending      ASSESSMENT:     Well controlled HIV  Marina is doing well with Odefsey which is a 3 drug in a single tablet regimen given once daily.  The 3 drugs are TAF= tenofovir alafenamide PLUS emtricitabine PLUS rilpivirine.   I view Marina's case as someone who may become pregnant again without planning.  She often misses the timing with her DepoProvera injection.  When we made the switch, (Dr. Martinez) was purposely avoiding dolutegravir at time of conception, but recently guidelines were updated to reflect more data suggesting that dolutegravir might not be contributing to neural tube defects.  She is a candidate for two drug regimens such as Dovato or Juluca.   We are not using injection Cabenuva in women of childbearing age as of yet.   (Dr. Martinez) discussed all of this with Marina at the 10/18/2022 visit, but she prefers to continue with Odefsey because it has been working well, and she does not have any side effects from this.  If she were to get pregnant, this is considered safe for conception, although the TAF component does not have as much data in pregnant women as the older  generation TDF.  We could discuss if she wanted to switch if pregnancy did occur, but I think fine to continue with this regimen now.   HIV monitoring labs ordered for today.   If stable, will repeat again in 6 months.     Diabetes and dyslipidemia  I encouraged Marina to keep her appt with Endocrinology in 9/2023 even though she is not currently taking her sitagliptin.  A statin is recommended for her as well.  I defer to Endocrine regarding negotiating for most important medications to take, as Marina has not been enthusiastic about taking any other medications aside from her HIV ART meds.    Contraception  Patient established care with new PCP in 03/2023 who recommended starting an OCP and stopping Depo shots. Marina never started the OCP due to hesitancy with taking another medication. Doesn't want to go back to the Depo due to side effects. Did discuss IUD with PCP as well which she didn't want to pursue. At this time, doesn't wish to start any birth control. Is not trying to get pregnant, but notes it would be okay if she did get pregnant. Periods have been heavier with some pre-menstrual symptoms since being off depo. Notes no possibility of current pregnancy. Discussed taking pregnancy test early if she misses a period and following up with our clinic right away if it is positive.     Iron deficiency anemia  MCV has been low historically, hemoglobin now slightly low as well. Likely secondary to heavy periods after stopping depo shot; no other noted bleeding. Patient encouraged to take PO iron (notes she has been intermittently). Will repeat CBC in 6 months.    Telengectasia-like, hyperpigmented rash  On anterior neck only, not painful or pruritic; present for past few months. Ddx includes trauma although no suggestive history, vasculitic although not located elsewhere, has not spread/moved, and UA WNL; thrombocytopenia but WNL on CBC today; less likely acanthosis nigricans as not necessarily in skin  folds; liver and kidney function WNL on labs today as well. Given duration and patient overall well appearing, will continue to monitor for now. Return precautions discussed with patient - if is spreading, darkening/worsening, or develops any other associated localized or systemic symptoms, needs to return for re-evaluation/consideration of dermatology referral.     Weight loss  About 12 pounds since evaluation in 10/2022. Patient associates this with stopping Depo Provera injections as they made her gain weight/increased her appetite. Also with new life stressors as above that may be contributing. CMP/CBC WNL and no lymphadenopathy noted. Patient instructed to return for further workup/eval if continues to lose weight now that she has been off of the depo for a few months.     Dental needs  Has not yet seen a dentist, but has new insurance through work and will plan to make an appointment with a dentist soon. No acute concerns. Is also working with her  through [facility] more closely who is helping her with appointments, insurance, etc.     Routine Health Maintenance  - Mammogram ordered by PCP; patient had to cancel first appt due to insurance issues. Is going to call to re-schedule  - Marina aware she needs yearly pap smears; will need repeat around 03/2024   - 2nd HPV vaccine administered in clinic today     Return to clinic in 6 months with labs.     Patient seen and discussed with attending, Dr. Martinez.     Glenna Mcclain MD  Internal Medicine PGY3    Attending Physician Attestation      I, Alfredo Martinez MD, saw this patient with the resident.  I personally examined the patient and reviewed all pertinent vital signs, medications, and laboratory/imaging studies.  I agree with the resident's findings and plan of care as documented in the resident's note which I have edited for clarity.     I have counseled Marina that although her HIV is well controlled, I worry about her well woman care,  contraception, and diabetes care.  I encouraged her to put the same amount of attention to her overall health as she does to her HIV care.    She admits to marital discord and is considering a separation.  She has a rash around her neck that to me is consistent with trauma.  I did ask her directly if this rash was from choking and she answered clearly no, that this did not happen.  It may also be vascular in nature because it looks like broken blood vessels/petechiae.  She does not have other symptoms consistent with a vasculitis.  Not all labs are back yet from today.    I am transitioning to a new job out of state.  Marina would prefer a female HIV physician.    I spent a total of 35 minutes in the direct care of this patient on the day of service.  25 minutes in the room with the patient for history, examination, and counseling.  10 minutes in addition for chart review and care coordination      Alfredo Martinez MD, MS  Infectious Disease - Attending

## 2023-07-18 NOTE — LETTER
7/18/2023       RE: Marina Mock  [address]  [facility] [address]     Dear Colleague,    Thank you for referring your patient, Marina Mock, to the [facility] INFECTIOUS DISEASE CLINIC [place] at [facility]. Please see a copy of my visit note below.    Infectious Disease Clinic Visit - seen in person    Reason for visit:   Infectious Disease Return Visit, planned follow-up for HIV    SUBJECTIVE and Interval History:    Marina is well known to Dr. Martinez's clinic; followed for her HIV infection. With Dr. Martinez, decided to change medications to Odefsey as of 1/10/2020, and she has done well since then.  She reports easy adherence.  Misses occasionally but overall is able to keep to a daily schedule.  Denies any side effects or concerns.    Last had labs in November 2022.  For Marina, would like to see HIV monitoring labs q6 months - repeat set drawn today (7/18/23) and pending.     In interval since last appointment, had issues with having Depo Provera injection administered via primary care clinic (received November injection here in ID clinic) given she hadn't seen a provider there in >2 years. Met with a new PCP who discussed contraception options with her and prescribed an OCP. Marina notes that she never started taking it as she was concerned about taking another medication and potential side effects. Per provider's note, IUDs were also discussed which patient did not desire. Marina today notes that she doesn't desire any contraception at this time. Isn't actively trying to get pregnant, but notes that if she did, it would be okay. Notes no possibility of pregnancy as of today.     Since being off of the Depo shot, her heavy periods she had prior to starting on them have returned. She has two days of heavy bleeding with a few more days of lighter bleeding each cycle. Also notes some pre-menopausal symptoms, predominantly body  aches for a few days prior to starting her period that end when her period ends.     Has had some intermittent back pain, varying between upper back and lower back. None currently at time of evaluation. Has a topical anti-inflammatory medicine that helps with the pain when it comes on. No shooting pain/numbness/tingling into her legs, no weakness in her legs when she has the pain.     Notes she has not been taking her sitagliptin. Missed her last Endocrinology appointment but knows she has another one coming up (is scheduled for September). Checks her blood sugar intermittently and is usually around 120 or less fasting. Is taking tumeric to try to help with her blood sugars as well.      Has not seen a dentist yet after her most recent appointment was cancelled due to insurance issues. 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 Her  through [employer] has also been more involved in her care recently and was present at the start of her visit today. Per her , has been having some relationship stress and may separate from her . Marina notes this is still up in the air but she feels safe and supported.     I have reviewed and updated the patient's Past Medical History, Social History, Family History and Medication List.     OBJECTIVE:  Current Outpatient Medications   Medication    blood glucose (NO BRAND SPECIFIED) test strip    blood glucose (NO BRAND SPECIFIED) test strip    blood glucose (ONETOUCH ULTRA) test strip    blood glucose monitoring (NO BRAND SPECIFIED) meter device kit    blood glucose monitoring (ULTRA THIN 30G) lancets    emtricitabine-rilpivirine-tenofovir (ODEFSEY) 200-25-25 MG TABS per tablet    Lancets (ONETOUCH DELICA PLUS [id number]) MISC    thin (NO BRAND SPECIFIED) lancets    sitagliptin (JANUVIA) 25 MG tablet     No current facility-administered medications for this  visit.     No Known Allergies    PHYSICAL EXAMINATION:    VITAL SIGNS: /78 (BP Location: Left arm, Patient Position: Sitting, Cuff Size: Adult Regular)   Pulse 63   Wt 48.8 kg (107 lb 8 oz)   SpO2 100%   BMI 20.31 kg/m    GENERAL:   No acute distress    HEENT: No icterus or injection. Oropharynx moist and clear without lesions or exudate.  Some broken teeth but no active bleeding or purulence.   NECK: Supple and nontender, no cervical lymphadenopathy   LUNGS: Clear to ausculation bilaterally without any increased work of breathing  HEART: Regular rate and rhythm and no murmur, gallop or rub    EXTREMITIES: Warm and well perfused, no LE edema   SKIN:  Hyperpigmented, telangiectasia like lesions spread across anterior neck, non-blanchable, not raised; no tenderness to palpation. Not appreciated on posterior neck or other areas of skin   NEURO:  Awake, alert, no focal neurologic deficits.  PSYCH: Affect normal. Speech fluent and appropriate.      Last labs:  Labs from today, 7/18/23:  - Hgb 10.8 (decreased from 11.8 on last check)  - Plt 224   - WBC 4.3     - CMP unremarkable     - HIV viral load pending  - CD4 count pending   - Hgb A1c pending      ASSESSMENT:     Well controlled HIV  Marina is doing well with Odefsey which is a 3 drug in a single tablet regimen given once daily.  The 3 drugs are TAF= tenofovir alafenamide PLUS emtricitabine PLUS rilpivirine.   I view Marina's case as someone who may become pregnant again without planning.  She often misses the timing with her DepoProvera injection.  When we made the switch, I was purposely avoiding dolutegravir at time of conception, but recently guidelines were updated to reflect more data suggesting that dolutegravir might not be contributing to neural tube defects.  She is a candidate for two drug regimens such as Dovato or Juluca.   We are not using injection Cabenuva in women of childbearing age as of yet.   I discussed all of this with Marina at  the 10/18/2022 visit, but she prefers to continue with Odefsey because it has been working well, and she does not have any side effects from this.  If she were to get pregnant, this is considered safe for conception, although the TAF component does not have as much data in pregnant women as the older generation TDF.  We could discuss if she wanted to switch if pregnancy did occur, but I think fine to continue with this regimen now.   HIV monitoring labs ordered for today.   If stable, will repeat again in 6 months.     Diabetes and dyslipidemia  I encouraged Marina to keep her appt with Endocrinology in 9/2023 even though she is not currently taking her sitagliptin.  A statin is recommended for her as well.  I defer to Endocrine regarding negotiating for most important medications to take, as Marina has not been enthusiastic about taking any other medications aside from her HIV ART meds.    Contraception  Patient established care with new PCP in 03/2023 who recommended starting an OCP and stopping Depo shots. Marina never started the OCP due to hesitancy with taking another medication. Doesn't want to go back to the Depo due to side effects. Did discuss IUD with PCP as well which she didn't want to pursue. At this time, doesn't wish to start any birth control. Is not trying to get pregnant, but notes it would be okay if she did get pregnant. Periods have been heavier with some pre-menstrual symptoms since being off depo. Notes no possibility of current pregnancy. Discussed taking pregnancy test early if she misses a period and following up with our clinic right away if it is positive.     Iron deficiency anemia  MCV has been low historically, hemoglobin now slightly low as well. Likely secondary to heavy periods after stopping depo shot; no other noted bleeding. Patient encouraged to take PO iron (notes she has been intermittently). Will repeat CBC in 6 months.    Telengectasia-like, hyperpigmented rash  On  anterior neck only, not painful or pruritic; present for past few months. Ddx includes trauma although no suggestive history, vasculitic although not located elsewhere, has not spread/moved, and UA WNL; thrombocytopenia but WNL on CBC today; less likely acanthosis nigricans as not necessarily in skin folds; liver and kidney function WNL on labs today as well. Given duration and patient overall well appearing, will continue to monitor for now. Return precautions discussed with patient - if is spreading, darkening/worsening, or develops any other associated localized or systemic symptoms, needs to return for re-evaluation/consideration of dermatology referral.     Weight loss  About 12 pounds since evaluation in 10/2022. Patient associates this with stopping Depo Provera injections as they made her gain weight/increased her appetite. Also with new life stressors as above that may be contributing. CMP/CBC WNL and no lymphadenopathy noted. Patient instructed to return for further workup/eval if continues to lose weight now that she has been off of the depo for a few months.     Dental needs  Has not yet seen a dentist, but has new insurance through work and will plan to make an appointment with a dentist soon. No acute concerns. Is also working with her  through [facility] more closely who is helping her with appointments, insurance, etc.     Routine Health Maintenance  - Mammogram ordered by PCP; patient had to cancel first appt due to insurance issues. Is going to call to re-schedule  - Marina aware she needs yearly pap smears; will need repeat around 03/2024   - 2nd HPV vaccine administered in clinic today     Return to clinic in 6 months with labs.     Patient seen and discussed with attending, Dr. Martinez.     Glenna Mcclain MD  Internal Medicine PGY3      Alfredo Martinez MD

## 2023-07-19 LAB
HIV1 RNA # PLAS NAA DL=20: <20 COPIES/ML
HIV1 RNA SERPL NAA+PROBE-LOG#: <1.3 {LOG_COPIES}/ML

## 2023-07-26 DIAGNOSIS — B20 HUMAN IMMUNODEFICIENCY VIRUS (HIV) DISEASE (H): ICD-10-CM

## 2023-07-26 RX ORDER — POLYMYXIN B SULFATE, BACITRACIN ZINC AND NEOMYCIN SULFATE 400; 3.5; 5 [USP'U]/G; MG/G; [USP'U]/G
1 OINTMENT TOPICAL
Qty: 30 TABLET | Refills: 5 | Status: SHIPPED | OUTPATIENT
Start: 2023-07-26 | End: 2024-02-19

## 2023-08-22 NOTE — DISCHARGE INSTRUCTIONS

## 2024-02-06 ENCOUNTER — TELEPHONE (OUTPATIENT)
Dept: INFECTIOUS DISEASES | Facility: CLINIC | Age: 44
End: 2024-02-06
Payer: COMMERCIAL

## 2024-02-06 NOTE — TELEPHONE ENCOUNTER
EP called 2/6 to sched a next avail appt with any female B20 ID provider per pt's request. Used to see Dr. Martinez.

## 2024-02-16 ENCOUNTER — OFFICE VISIT (OUTPATIENT)
Dept: URGENT CARE | Facility: URGENT CARE | Age: 44
End: 2024-02-16
Payer: COMMERCIAL

## 2024-02-16 VITALS
TEMPERATURE: 98.3 F | HEART RATE: 83 BPM | SYSTOLIC BLOOD PRESSURE: 116 MMHG | OXYGEN SATURATION: 100 % | DIASTOLIC BLOOD PRESSURE: 80 MMHG | RESPIRATION RATE: 16 BRPM

## 2024-02-16 DIAGNOSIS — R07.89 CHEST TIGHTNESS: ICD-10-CM

## 2024-02-16 DIAGNOSIS — J06.9 UPPER RESPIRATORY TRACT INFECTION, UNSPECIFIED TYPE: Primary | ICD-10-CM

## 2024-02-16 PROCEDURE — 99213 OFFICE O/P EST LOW 20 MIN: CPT | Performed by: FAMILY MEDICINE

## 2024-02-16 RX ORDER — BENZONATATE 100 MG/1
100 CAPSULE ORAL 3 TIMES DAILY PRN
Qty: 30 CAPSULE | Refills: 0 | Status: SHIPPED | OUTPATIENT
Start: 2024-02-16 | End: 2024-07-17

## 2024-02-16 NOTE — PATIENT INSTRUCTIONS
Follow up if  symptoms fail to improve or worsens   Pt understood and agreed with plan     Kaleigh Guzman MD

## 2024-02-16 NOTE — PROGRESS NOTES
Chief Complaint   Patient presents with    Cough     Cough for 1 week        Marina was seen today for cough.    Diagnoses and all orders for this visit:    Upper respiratory tract infection, unspecified type        D/d  Acute right otitis media, Bronchitis-viral, Influenza, Pneumonia, Sinusitis, and Strep pharyngitis    PLAN:  Tylenol, Fluids, Rest, OTC cough suppressant/expectorant, Saline gargles, Saline nasal spray, and Vaporizer  See orders in Epic    SUBJECTIVE:   Marina Mock is a 43 year old female history of type 2 diabetes mellitus, as symptomatic HIV infection status presenting with a chief complaint of stuffy nose and cough - productive.  Onset of symptoms was 7 day(s) ago.  Course of illness is waxing and waning.    Severity moderate  Current and Associated symptoms: cough - productive  Treatment measures tried include Tylenol/Ibuprofen.  Predisposing factors include recent illness .    Past Medical History:   Diagnosis Date    Asymptomatic human immunodeficiency virus (HIV) infection status (H) 2016    Gestational diabetes     diet controlled     Malaria     in [place]     Miscarriage     TB (pulmonary tuberculosis)     latent     Current Outpatient Medications   Medication Sig Dispense Refill    blood glucose (NO BRAND SPECIFIED) test strip Use to test blood sugar 4 times daily or as directed. Any covered brand that works with meter. 400 strip 3    blood glucose (NO BRAND SPECIFIED) test strip Use to test blood sugar 3-4 times per week in the morning. 100 each 3    blood glucose (ONETOUCH ULTRA) test strip USE TO TEST BLOOD SUGAR FOUR TIMES A DAY OR AS DIRECTED 400 strip 3    blood glucose monitoring (NO BRAND SPECIFIED) meter device kit Use to test blood sugar 4 times daily or as directed. Any Covered Brand. 1 kit 1    blood glucose monitoring (ULTRA THIN 30G) lancets 1 each daily Use to test blood sugar 3-4 times per week 100 each 3    emtricitabine-rilpivirine-tenofovir (ODEFSEY) 200-25-25 MG  TABS per tablet Take 1 tablet by mouth daily (with breakfast) 30 tablet 5    Lancets (ONETOUCH DELICA PLUS [id number]) MISC USE WITH LANCETING DEVICE 4 TIMES DAILY. 400 each 0    thin (NO BRAND SPECIFIED) lancets Use with lanceting device. 4x daily. Any covered brand. 400 each 3    sitagliptin (JANUVIA) 25 MG tablet Take 1 tablet (25 mg) by mouth daily (Patient not taking: Reported on 7/18/2023) 90 tablet 3     Social History     Tobacco Use    Smoking status: Never    Smokeless tobacco: Never   Substance Use Topics    Alcohol use: No       ROS:  Review of systems negative except as stated above.    OBJECTIVE:  /80   Pulse 83   Temp 98.3  F (36.8  C) (Tympanic)   Resp 16   SpO2 100%   GENERAL APPEARANCE: healthy, alert and no distress  EYES: EOMI,  PERRL, conjunctiva clear  HENT: ear canals and TM's normal.  Nose and mouth without ulcers, erythema or lesions  NECK: supple, nontender, no lymphadenopathy  RESP: lungs clear to auscultation - no rales, rhonchi or wheezes  CV: regular rates and rhythm, normal S1 S2, no murmur noted  PSYCH: mentation appears normal    Kaleigh Guzman MD

## 2024-02-19 DIAGNOSIS — B20 HUMAN IMMUNODEFICIENCY VIRUS (HIV) DISEASE (H): ICD-10-CM

## 2024-02-19 RX ORDER — POLYMYXIN B SULFATE, BACITRACIN ZINC AND NEOMYCIN SULFATE 400; 3.5; 5 [USP'U]/G; MG/G; [USP'U]/G
1 OINTMENT TOPICAL
Qty: 30 TABLET | Refills: 0 | Status: SHIPPED | OUTPATIENT
Start: 2024-02-19 | End: 2024-03-20

## 2024-02-19 NOTE — TELEPHONE ENCOUNTER
Former Dr. Martinez patient. Appt with Chris on 3/19. Put note on rx that patient needs to keep appt for further refills.     Per [facility] Ambulatory Care Protocol, Pt is due for refill based on last refill date.   Pt has seen provider within the past year, or has appt scheduled with provider.     Med refill script E-sent to pt's pharmacy.    Signed Prescriptions:                        Disp   Refills    emtricitabine-rilpivirine-tenofovir (ODEFS*30 tab*0        Sig: TAKE ONE TABLET BY MOUTH ONCE DAILY WITH BREAKFAST  Authorizing Provider: SETH TAPIA  Ordering User: SIDDHARTH ESTEVES RN  Infectious Disease 02/19/24 3:39 PM

## 2024-02-22 NOTE — CONFIDENTIAL NOTE
DIAGNOSIS: b20   DATE RECEIVED:  03.19.204   NOTES (Gather within 2 years) STATUS DETAILS   OFFICE NOTE from referring provider       OFFICE NOTE from other specialist Internal 07.18.2023 Alfredo Martinez MD     03.01.2023 Antonette Crisostomo MD    DISCHARGE SUMMARY from hospital     DISCHARGE REPORT from the ER     LABS (any labs) Internal    MEDICATION LIST Internal    IMAGING  (NEED IMAGES AND REPORTS)     Osteomyelitis: Foot imaging      Liver Abscess: Abdominal imaging     Other (anything related to diagnoses

## 2024-02-24 ENCOUNTER — HEALTH MAINTENANCE LETTER (OUTPATIENT)
Age: 44
End: 2024-02-24

## 2024-03-19 ENCOUNTER — PRE VISIT (OUTPATIENT)
Dept: INFECTIOUS DISEASES | Facility: CLINIC | Age: 44
End: 2024-03-19
Payer: COMMERCIAL

## 2024-03-20 ENCOUNTER — TELEPHONE (OUTPATIENT)
Dept: INFECTIOUS DISEASES | Facility: CLINIC | Age: 44
End: 2024-03-20
Payer: COMMERCIAL

## 2024-03-20 DIAGNOSIS — B20 HUMAN IMMUNODEFICIENCY VIRUS (HIV) DISEASE (H): ICD-10-CM

## 2024-03-20 DIAGNOSIS — E11.65 TYPE 2 DIABETES MELLITUS WITH HYPERGLYCEMIA, UNSPECIFIED WHETHER LONG TERM INSULIN USE (H): ICD-10-CM

## 2024-03-20 RX ORDER — POLYMYXIN B SULFATE, BACITRACIN ZINC AND NEOMYCIN SULFATE 400; 3.5; 5 [USP'U]/G; MG/G; [USP'U]/G
1 OINTMENT TOPICAL
Qty: 30 TABLET | Refills: 0 | Status: SHIPPED | OUTPATIENT
Start: 2024-03-20 | End: 2024-04-19

## 2024-03-20 NOTE — TELEPHONE ENCOUNTER
Left Voicemail (1st Attempt) and Sent Mychart (1st Attempt) for the patient to call back and schedule the following:    Appointment type: New B20  Provider: Any B20 provider (pt prefers female provider)  Return date: Next available  Specialty phone number: [phone]  Additional appointment(s) needed: Labs about one week prior to appt  Additonal Notes: Per IB note and appt notes on cancelled 3/19 appt, pt prefers female provider. Needs to be set up with new provider as Dr. Perez is going on maternity leave.

## 2024-03-22 ENCOUNTER — TELEPHONE (OUTPATIENT)
Dept: INFECTIOUS DISEASES | Facility: CLINIC | Age: 44
End: 2024-03-22
Payer: COMMERCIAL

## 2024-03-22 NOTE — TELEPHONE ENCOUNTER
ILEANAM 2nd attempt for the patient to call back and schedule the following:    Appointment type: Labs  Provider: Bro  Return date: 4/10/2024  Specialty phone number: [phone]  Additional appointment(s) needed: labs  Additonal Notes: labs to be done 1 week prior to appt with Dr. Crabtree

## 2024-04-19 DIAGNOSIS — B20 HUMAN IMMUNODEFICIENCY VIRUS (HIV) DISEASE (H): ICD-10-CM

## 2024-04-19 RX ORDER — POLYMYXIN B SULFATE, BACITRACIN ZINC AND NEOMYCIN SULFATE 400; 3.5; 5 [USP'U]/G; MG/G; [USP'U]/G
1 OINTMENT TOPICAL
Qty: 30 TABLET | Refills: 0 | Status: SHIPPED | OUTPATIENT
Start: 2024-04-19 | End: 2024-05-20

## 2024-04-19 NOTE — TELEPHONE ENCOUNTER
Clinic Coordinators to call pt-- they need appt for labs and with new B20 provider, orders for labs are in chart.     Refilled meds for 1 month(s).     Per [facility] Ambulatory Care Protocol, Pt is due for refill based on last refill date and/or has seen provider within the past year, or has upcoming appt scheduled with provider.     Medication E-sent to [place], [place] - [address] [place] [address] [phone]  .    Signed Prescriptions:                        Disp   Refills    ODEFSEY 200-25-25 MG TABS per tablet       30 tab*0        Sig: TAKE ONE TABLET BY MOUTH ONCE DAILY WITH BREAKFAST  Authorizing Provider: SETH TAPIA  Ordering User: UMAIR BUSTAMANTE      Orders Placed This Encounter   Procedures    Comprehensive metabolic panel    HIV-1 RNA quantitative    T cell subset profile    CBC with platelets differential         Umair Bustamante RN  Infectious Disease 04/19/24 11:58 AM

## 2024-05-04 ENCOUNTER — HEALTH MAINTENANCE LETTER (OUTPATIENT)
Age: 44
End: 2024-05-04

## 2024-05-08 ENCOUNTER — TELEPHONE (OUTPATIENT)
Dept: INFECTIOUS DISEASES | Facility: CLINIC | Age: 44
End: 2024-05-08

## 2024-05-08 ENCOUNTER — OFFICE VISIT (OUTPATIENT)
Dept: INFECTIOUS DISEASES | Facility: CLINIC | Age: 44
End: 2024-05-08
Attending: STUDENT IN AN ORGANIZED HEALTH CARE EDUCATION/TRAINING PROGRAM
Payer: COMMERCIAL

## 2024-05-08 ENCOUNTER — LAB (OUTPATIENT)
Dept: LAB | Facility: CLINIC | Age: 44
End: 2024-05-08
Payer: COMMERCIAL

## 2024-05-08 VITALS
SYSTOLIC BLOOD PRESSURE: 130 MMHG | BODY MASS INDEX: 21.2 KG/M2 | DIASTOLIC BLOOD PRESSURE: 82 MMHG | WEIGHT: 112.2 LBS | OXYGEN SATURATION: 100 % | HEART RATE: 74 BPM

## 2024-05-08 DIAGNOSIS — L70.0 ACNE VULGARIS: ICD-10-CM

## 2024-05-08 DIAGNOSIS — E11.9 TYPE 2 DIABETES MELLITUS WITHOUT COMPLICATION, WITHOUT LONG-TERM CURRENT USE OF INSULIN (H): ICD-10-CM

## 2024-05-08 DIAGNOSIS — B20 HUMAN IMMUNODEFICIENCY VIRUS (HIV) DISEASE (H): ICD-10-CM

## 2024-05-08 DIAGNOSIS — Z00.00 HEALTHCARE MAINTENANCE: ICD-10-CM

## 2024-05-08 DIAGNOSIS — B20 HUMAN IMMUNODEFICIENCY VIRUS (HIV) DISEASE (H): Primary | ICD-10-CM

## 2024-05-08 DIAGNOSIS — D50.0 CHRONIC BLOOD LOSS ANEMIA: ICD-10-CM

## 2024-05-08 LAB
ALBUMIN SERPL BCG-MCNC: 4.5 G/DL (ref 3.5–5.2)
ALP SERPL-CCNC: 51 U/L (ref 40–150)
ALT SERPL W P-5'-P-CCNC: 12 U/L (ref 0–50)
ANION GAP SERPL CALCULATED.3IONS-SCNC: 12 MMOL/L (ref 7–15)
AST SERPL W P-5'-P-CCNC: 21 U/L (ref 0–45)
BASOPHILS # BLD AUTO: 0 10E3/UL (ref 0–0.2)
BASOPHILS NFR BLD AUTO: 0 %
BILIRUB SERPL-MCNC: 0.3 MG/DL
BUN SERPL-MCNC: 9.2 MG/DL (ref 6–20)
CALCIUM SERPL-MCNC: 9.7 MG/DL (ref 8.6–10)
CHLORIDE SERPL-SCNC: 103 MMOL/L (ref 98–107)
CHOLEST SERPL-MCNC: 202 MG/DL
CREAT SERPL-MCNC: 0.81 MG/DL (ref 0.51–0.95)
DEPRECATED HCO3 PLAS-SCNC: 24 MMOL/L (ref 22–29)
EGFRCR SERPLBLD CKD-EPI 2021: >90 ML/MIN/1.73M2
EOSINOPHIL # BLD AUTO: 0.1 10E3/UL (ref 0–0.7)
EOSINOPHIL NFR BLD AUTO: 3 %
ERYTHROCYTE [DISTWIDTH] IN BLOOD BY AUTOMATED COUNT: 18.2 % (ref 10–15)
GLUCOSE SERPL-MCNC: 77 MG/DL (ref 70–99)
HBA1C MFR BLD: 6 %
HCT VFR BLD AUTO: 31.5 % (ref 35–47)
HDLC SERPL-MCNC: 50 MG/DL
HGB BLD-MCNC: 10.1 G/DL (ref 11.7–15.7)
HOLD SPECIMEN: NORMAL
IMM GRANULOCYTES # BLD: 0 10E3/UL
IMM GRANULOCYTES NFR BLD: 0 %
IRON BINDING CAPACITY (ROCHE): 427 UG/DL (ref 240–430)
IRON SATN MFR SERPL: 10 % (ref 15–46)
IRON SERPL-MCNC: 42 UG/DL (ref 37–145)
LDLC SERPL CALC-MCNC: 141 MG/DL
LYMPHOCYTES # BLD AUTO: 2.1 10E3/UL (ref 0.8–5.3)
LYMPHOCYTES NFR BLD AUTO: 48 %
MCH RBC QN AUTO: 22.4 PG (ref 26.5–33)
MCHC RBC AUTO-ENTMCNC: 32.1 G/DL (ref 31.5–36.5)
MCV RBC AUTO: 70 FL (ref 78–100)
MONOCYTES # BLD AUTO: 0.3 10E3/UL (ref 0–1.3)
MONOCYTES NFR BLD AUTO: 7 %
NEUTROPHILS # BLD AUTO: 1.9 10E3/UL (ref 1.6–8.3)
NEUTROPHILS NFR BLD AUTO: 42 %
NONHDLC SERPL-MCNC: 152 MG/DL
NRBC # BLD AUTO: 0 10E3/UL
NRBC BLD AUTO-RTO: 0 /100
PLATELET # BLD AUTO: 317 10E3/UL (ref 150–450)
POTASSIUM SERPL-SCNC: 3.9 MMOL/L (ref 3.4–5.3)
PROT SERPL-MCNC: 8.1 G/DL (ref 6.4–8.3)
RBC # BLD AUTO: 4.5 10E6/UL (ref 3.8–5.2)
SODIUM SERPL-SCNC: 139 MMOL/L (ref 135–145)
TRIGL SERPL-MCNC: 57 MG/DL
WBC # BLD AUTO: 4.6 10E3/UL (ref 4–11)

## 2024-05-08 PROCEDURE — 83540 ASSAY OF IRON: CPT | Performed by: PATHOLOGY

## 2024-05-08 PROCEDURE — 250N000021 HC RX MED A9270 GY (STAT IND- M) 250: Performed by: STUDENT IN AN ORGANIZED HEALTH CARE EDUCATION/TRAINING PROGRAM

## 2024-05-08 PROCEDURE — 99213 OFFICE O/P EST LOW 20 MIN: CPT | Performed by: STUDENT IN AN ORGANIZED HEALTH CARE EDUCATION/TRAINING PROGRAM

## 2024-05-08 PROCEDURE — 85025 COMPLETE CBC W/AUTO DIFF WBC: CPT | Performed by: PATHOLOGY

## 2024-05-08 PROCEDURE — 90651 9VHPV VACCINE 2/3 DOSE IM: CPT | Performed by: STUDENT IN AN ORGANIZED HEALTH CARE EDUCATION/TRAINING PROGRAM

## 2024-05-08 PROCEDURE — 80061 LIPID PANEL: CPT | Performed by: PATHOLOGY

## 2024-05-08 PROCEDURE — 87536 HIV-1 QUANT&REVRSE TRNSCRPJ: CPT | Performed by: INTERNAL MEDICINE

## 2024-05-08 PROCEDURE — 83550 IRON BINDING TEST: CPT | Performed by: PATHOLOGY

## 2024-05-08 PROCEDURE — 99214 OFFICE O/P EST MOD 30 MIN: CPT | Mod: GC | Performed by: STUDENT IN AN ORGANIZED HEALTH CARE EDUCATION/TRAINING PROGRAM

## 2024-05-08 PROCEDURE — 36415 COLL VENOUS BLD VENIPUNCTURE: CPT | Performed by: PATHOLOGY

## 2024-05-08 PROCEDURE — 99000 SPECIMEN HANDLING OFFICE-LAB: CPT | Performed by: PATHOLOGY

## 2024-05-08 PROCEDURE — 80053 COMPREHEN METABOLIC PANEL: CPT | Performed by: PATHOLOGY

## 2024-05-08 PROCEDURE — 83036 HEMOGLOBIN GLYCOSYLATED A1C: CPT | Performed by: STUDENT IN AN ORGANIZED HEALTH CARE EDUCATION/TRAINING PROGRAM

## 2024-05-08 PROCEDURE — 86359 T CELLS TOTAL COUNT: CPT | Performed by: INTERNAL MEDICINE

## 2024-05-08 PROCEDURE — 90471 IMMUNIZATION ADMIN: CPT | Performed by: STUDENT IN AN ORGANIZED HEALTH CARE EDUCATION/TRAINING PROGRAM

## 2024-05-08 RX ORDER — TRETINOIN 0.1 MG/G
GEL TOPICAL AT BEDTIME
Qty: 45 G | Refills: 1 | Status: SHIPPED | OUTPATIENT
Start: 2024-05-08 | End: 2024-07-17

## 2024-05-08 RX ADMIN — HUMAN PAPILLOMAVIRUS 9-VALENT VACCINE, RECOMBINANT 0.5 ML: 30; 40; 60; 40; 20; 20; 20; 20; 20 INJECTION, SUSPENSION INTRAMUSCULAR at 15:25
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
## 2024-05-08 NOTE — PATIENT INSTRUCTIONS
Sandeep was seen today  at the  Infectious Diseases clinic [facility] for .    The following is a brief outline of the plan as we discussed during the visit:   - KEEP UP THE GOOD WORK    We ordered the following laboratory tests:   - lipids, A1c, blood count, liver studies, viral load and t cells cd4     We will send a letter to you and your primary care provider summarizing our recommendations and the results of any testing performed today. Meanwhile feel free to contact our clinic at any time with questions and clarifications.    Thank you,    Aminata Zelaya MD  Infectious Diseases Fellow     Infectious Diseases Clinic   [facility]     Contact info:  Clinic Coordinator: [phone]  Clinic Fax: [phone]    -------------------------------------------------------------------------------------------------------  Medical Records: [phone]  Financial Counselor (Billing and Insurance Questions): [phone]  Prior Authorizations: [phone]
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
## 2024-05-08 NOTE — TELEPHONE ENCOUNTER
Received call from [facility] informing us that patient is stating she needs an A1C and cholesterol levels drawn. Writer unable to see in last office visit note which labs provider wanted redrawn. Requested lab employee to tell patient that Dr. Zelaya can determine at her 2:30 appointment today if she'd like patient to get those labs done and can always send her back down to the lab. No questions.

## 2024-05-09 LAB
CD3 CELLS # BLD: 1473 CELLS/UL (ref 603–2990)
CD3 CELLS NFR BLD: 68 % (ref 49–84)
CD3+CD4+ CELLS # BLD: 776 CELLS/UL (ref 441–2156)
CD3+CD4+ CELLS NFR BLD: 36 % (ref 28–63)
CD3+CD4+ CELLS/CD3+CD8+ CLL BLD: 1.19 % (ref 1.4–2.6)
CD3+CD8+ CELLS # BLD: 651 CELLS/UL (ref 125–1312)
CD3+CD8+ CELLS NFR BLD: 30 % (ref 10–40)
HIV1 RNA # PLAS NAA DL=20: NOT DETECTED COPIES/ML
T CELL COMMENT: ABNORMAL

## 2024-05-10 ENCOUNTER — TELEPHONE (OUTPATIENT)
Dept: INFECTIOUS DISEASES | Facility: CLINIC | Age: 44
End: 2024-05-10
Payer: COMMERCIAL

## 2024-05-10 NOTE — TELEPHONE ENCOUNTER
EP called 5/10 to sched a 6 month (around 11/8) follow up with Dr. Zelaya per checkout notes from 5/8. Patient said she'll call at a later time to sched.

## 2024-05-11 NOTE — PROGRESS NOTES
[facility] Staff Note: Ms. Mock was seen, examined, and the case was discussed with Dr. Zelaya, ID Fellow -- I agree with her interval history and examination, assessment and plan in this outpatient ID / HIV Progress note. This note reflects my observations and opinions and the plan outlined fully reflects my approach. I have reviewed the available history, radiology, laboratory results, and reports with the Fellow.

## 2024-05-20 DIAGNOSIS — B20 HUMAN IMMUNODEFICIENCY VIRUS (HIV) DISEASE (H): ICD-10-CM

## 2024-05-20 RX ORDER — POLYMYXIN B SULFATE, BACITRACIN ZINC AND NEOMYCIN SULFATE 400; 3.5; 5 [USP'U]/G; MG/G; [USP'U]/G
1 OINTMENT TOPICAL DAILY
Qty: 30 TABLET | Refills: 4 | Status: SHIPPED | OUTPATIENT
Start: 2024-05-20

## 2024-06-25 ENCOUNTER — OFFICE VISIT (OUTPATIENT)
Dept: INTERNAL MEDICINE | Facility: CLINIC | Age: 44
End: 2024-06-25
Payer: COMMERCIAL

## 2024-06-25 VITALS
WEIGHT: 116.4 LBS | HEIGHT: 61 IN | SYSTOLIC BLOOD PRESSURE: 110 MMHG | BODY MASS INDEX: 21.98 KG/M2 | DIASTOLIC BLOOD PRESSURE: 71 MMHG | OXYGEN SATURATION: 98 % | HEART RATE: 89 BPM

## 2024-06-25 DIAGNOSIS — N92.6 IRREGULAR MENSES: Primary | ICD-10-CM

## 2024-06-25 DIAGNOSIS — Z32.01 POSITIVE URINE PREGNANCY TEST: ICD-10-CM

## 2024-06-25 DIAGNOSIS — N93.9 VAGINAL BLEEDING: ICD-10-CM

## 2024-06-25 LAB — HCG INTACT+B SERPL-ACNC: 7308 MIU/ML

## 2024-06-25 PROCEDURE — 99213 OFFICE O/P EST LOW 20 MIN: CPT | Performed by: PHYSICIAN ASSISTANT

## 2024-06-25 PROCEDURE — 36415 COLL VENOUS BLD VENIPUNCTURE: CPT | Performed by: PHYSICIAN ASSISTANT

## 2024-06-25 PROCEDURE — 84702 CHORIONIC GONADOTROPIN TEST: CPT | Performed by: PHYSICIAN ASSISTANT

## 2024-06-25 NOTE — PATIENT INSTRUCTIONS
[facility]  [address], [address], [place], [place], [address]  Get Directions  [phone]    [facility] [place]  Same phone number as above

## 2024-06-25 NOTE — PROGRESS NOTES
"  Assessment & Plan     Irregular menses    - HCG quantitative pregnancy; Future    Positive urine pregnancy test    - HCG quantitative pregnancy; Future    Vaginal bleeding    - HCG quantitative pregnancy; Future    Information given for OB gyn providers as well  Reviewed pregnancy- vs possible miscarriage with bleeding that occurred             Subjective   Marina is a 44 year old, presenting for the following health issues:  Pregnancy Test  At home pregnancy test was last week 6/21 Home positive and a second test was positive as well   Her period should have been on the 6/4 and spotted for the week but that was not regular and so pt took test    No current birth control.    Patient denies any pelvic pain. Bleeding has stopped.  HIV + - well managed with routine visits with infectious disease with the [facility]             HPI                   Objective    /71   Pulse 89   Ht 1.549 m (5' 1\")   Wt 52.8 kg (116 lb 6.4 oz)   LMP 06/14/2024   SpO2 98%   BMI 21.99 kg/m    Body mass index is 21.99 kg/m .  Physical Exam   GENERAL: alert and no distress  PSYCH: mentation appears normal, affect normal/bright            Signed Electronically by: Adela Serrano PA-C    "

## 2024-07-14 ENCOUNTER — NURSE TRIAGE (OUTPATIENT)
Dept: NURSING | Facility: CLINIC | Age: 44
End: 2024-07-14
Payer: COMMERCIAL

## 2024-07-14 NOTE — TELEPHONE ENCOUNTER
OB Triage Call    Is patient's OB/Midwife with the [facility] or [facility]? [facility]- Proceed with triage     Reason for call: episode of spotting today    Assessment:   Pregnant has brown mucous discharge when she wipes  About 9 weeks along  3 weeks ago had an episode of bleeding but resolved quickly    Feeling shakey as well - indicates she just drank cold water  No abdominal pain- indicates last night she had a slight cramping but was very mild and brief  Not feeling weak or lightheaded  Denies shoulder pain    Plan: Advised to continue homecare at this time    Patient plans to deliver at  N/A    Patient's primary OB Provider is appointment upcoming-  [facility].      Per protocol recommendations Patient to follow home care recommendations.       Is patient's delivering hospital on divert? Does not apply due to Patient given home care instructions      Unknown    Estimated Date of Delivery: Data Unavailable        OB History    Para Term  AB Living   4 3 3 0 1 2   SAB IAB Ectopic Multiple Live Births   1 0 0 0 3      # Outcome Date GA Lbr Aden/2nd Weight Sex Type Anes PTL Lv   4 Term 19 37w6d / 00:14 3.37 kg (7 lb 6.9 oz) M Vag-Spont  N LULU      Name: Fredinend      Apgar1: 8  Apgar5: 8   3 Term 16 38w6d 01:22 / 00:41 2.48 kg (5 lb 7.5 oz) F Vag-Spont Local, EPI N LULU      Name: Anel       Apgar1: 8  Apgar5: 9   2 SAB 08/19/15           1 Term 07    F   N LIVE BIRTH      Name: Kori      Obstetric Comments   Hx of gdm       Lab Results   Component Value Date    GBS Negative 2019          Eliane Mace, RN   Reason for Disposition   SPOTTING (single or brief episode)    Additional Information   Negative: Shock suspected (e.g., cold/pale/clammy skin, too weak to stand, low BP, rapid pulse)   Negative: Difficult to awaken or acting confused (e.g., disoriented, slurred speech)   Negative: Passed out (i.e., lost consciousness, collapsed and was not  "responding)   Negative: Sounds like a life-threatening emergency to the triager   Negative: [1] Vaginal bleeding AND [2] pregnant 20 or more weeks   Negative: Not pregnant or pregnancy status unknown   Negative: SEVERE abdominal pain   Negative: SEVERE vaginal bleeding (e.g., soaking 2 pads per hour or large blood clots and present 2 or more hours)   Negative: SEVERE dizziness (e.g., unable to stand, requires support to walk, feels like passing out)   Negative: [1] MODERATE vaginal bleeding (e.g., soaking 1 pad per hour; clots) AND [2] present > 6 hours   Negative: [1] MODERATE vaginal bleeding (e.g., soaking 1 pad per hour; clots) AND [2] pregnant > 12 weeks   Negative: Passed tissue (e.g., gray-white)   Negative: Shoulder pain   Negative: Pale skin (pallor) of new-onset or worsening   Negative: Patient sounds very sick or weak to the triager   Negative: [1] Constant abdominal pain AND [2] present > 2 hours   Negative: Fever 100.4 F (38.0 C) or higher   Negative: [1] Intermittent lower abdominal pain (e.g., cramping) AND [2] present > 24 hours   Negative: Prior history of \"ectopic pregnancy\" or previous tubal surgery (e.g., tubal ligation)   Negative: Pain or burning with passing urine (urination)   Negative: Using heparin (e.g., Lovenox) or other strong blood thinner, or known bleeding disorder (e.g., thrombocytopenia)   Negative: MODERATE vaginal bleeding (e.g., soaking 1 pad per hour; clots)   Negative: Has IUD   Negative: MILD vaginal bleeding (i.e., less than 1 pad / hour; less than patient's usual menstrual bleeding; not just spotting)   Negative: SPOTTING lasts > 48 hours or spotting happens more than once in a week   Negative: Unusual vaginal discharge (e.g., bad smelling, yellow, green, or foamy-white)   Negative: Not feeling pregnant any longer (e.g., breast tenderness or nausea has disappeared)   Negative: SPOTTING after sexual intercourse (single or brief episode)   Negative: SPOTTING after a pelvic " examination (single or brief episode)    Protocols used: Pregnancy - Vaginal Bleeding Less Than 20 Weeks EGA-A-AH

## 2024-07-17 ENCOUNTER — TELEPHONE (OUTPATIENT)
Facility: CLINIC | Age: 44
End: 2024-07-17

## 2024-07-17 ENCOUNTER — VIRTUAL VISIT (OUTPATIENT)
Dept: OBGYN | Facility: CLINIC | Age: 44
End: 2024-07-17
Payer: COMMERCIAL

## 2024-07-17 ENCOUNTER — VIRTUAL VISIT (OUTPATIENT)
Dept: PHARMACY | Facility: CLINIC | Age: 44
End: 2024-07-17
Payer: COMMERCIAL

## 2024-07-17 DIAGNOSIS — Z78.9 TAKES DIETARY SUPPLEMENTS: ICD-10-CM

## 2024-07-17 DIAGNOSIS — Z13.79 GENETIC SCREENING: ICD-10-CM

## 2024-07-17 DIAGNOSIS — O09.91 SUPERVISION OF HIGH RISK PREGNANCY IN FIRST TRIMESTER: ICD-10-CM

## 2024-07-17 DIAGNOSIS — E11.9 TYPE 2 DIABETES MELLITUS WITHOUT COMPLICATION, WITHOUT LONG-TERM CURRENT USE OF INSULIN (H): ICD-10-CM

## 2024-07-17 DIAGNOSIS — B20 HUMAN IMMUNODEFICIENCY VIRUS (HIV) DISEASE (H): Primary | ICD-10-CM

## 2024-07-17 DIAGNOSIS — O36.80X0 PREGNANCY WITH INCONCLUSIVE FETAL VIABILITY: ICD-10-CM

## 2024-07-17 DIAGNOSIS — O09.529 SUPERVISION OF HIGH-RISK PREGNANCY OF ELDERLY MULTIGRAVIDA: Primary | ICD-10-CM

## 2024-07-17 PROCEDURE — 99605 MTMS BY PHARM NP 15 MIN: CPT | Mod: 95 | Performed by: PHARMACIST

## 2024-07-17 PROCEDURE — 99207 PR NO CHARGE NURSE ONLY: CPT | Mod: 93

## 2024-07-17 RX ORDER — VITAMIN B COMPLEX
1 TABLET ORAL DAILY
COMMUNITY

## 2024-07-17 RX ORDER — ZINC GLUCONATE 50 MG
50 TABLET ORAL DAILY
COMMUNITY

## 2024-07-17 RX ORDER — MULTIVIT-MIN/IRON/FOLIC ACID/K 18-600-40
1 CAPSULE ORAL DAILY
COMMUNITY

## 2024-07-17 NOTE — PATIENT INSTRUCTIONS
"Recommendations from today's MTM visit:                                                      Continue Odefsey once daily with a meal for now  Recheck HIV RNA at upcoming OB/GYN appointment     Follow-up: 3 months, sooner if needed    It was great speaking with you today.  I value your experience and would be very thankful for your time in providing feedback in our clinic survey. In the next few days, you may receive an email or text message from [employer] with a link to a survey related to your  clinical pharmacist.\"     To schedule another MTM appointment, please call the clinic directly or you may call the MTM scheduling line at [phone] or toll-free at [phone].     My Clinical Pharmacist's contact information:                                                      Please feel free to contact me with any questions or concerns you have.      Roxana Martines, PharmD, [facility]  Medication Therapy Management Pharmacist     "

## 2024-07-17 NOTE — PATIENT INSTRUCTIONS
Learning About Pregnancy  Your Care Instructions     Your health in the early weeks of your pregnancy is particularly important for your baby's health. Take good care of yourself. Anything you do that harms your body can also harm your baby.  Make sure to go to all of your doctor appointments. Regular checkups will help keep you and your baby healthy.  How can you care for yourself at home?  Diet    Choose healthy foods like fruits, vegetables, whole grains, lean proteins, and healthy fats.     Choose foods that are good sources of calcium, iron, and folate. You can try dairy products, dark leafy greens, fortified orange juice and cereals, almonds, broccoli, dried fruit, and beans.     Do not skip meals or go for many hours without eating. If you are nauseated, try to eat a small, healthy snack every 2 to 3 hours.     Avoid fish that are high in mercury. These include shark, swordfish, sangeeta mackerel, marlin, orange roughy, and bigeye tuna, as well as tilefish from the [facility].     It's okay to eat up to 8 to 12 ounces a week of fish that are low in mercury or up to 4 ounces a week of fish that have medium levels of mercury. Some fish that are low in mercury are salmon, shrimp, canned light tuna, cod, and tilapia. Some fish that have medium levels of mercury are halibut and white albacore tuna.     Drink plenty of fluids. If you have kidney, heart, or liver disease and have to limit fluids, talk with your doctor before you increase the amount of fluids you drink.     Limit caffeine to about 200 to 300 mg per day. On average, a cup of brewed coffee has around 80 to 100 mg of caffeine.     Do not drink alcohol, such as beer, wine, or hard liquor.     Take a multivitamin that contains at least 400 micrograms (mcg) of folic acid to help prevent birth defects. Fortified cereal and whole wheat bread are good additional sources of folic acid.     Increase the calcium in your diet. Try to drink a quart of skim milk  each day. You may also take calcium supplements and choose foods such as cheese and yogurt.   Lifestyle    Make sure you go to your follow-up appointments.     Get plenty of rest. You may be unusually tired while you are pregnant.     Get at least 30 minutes of exercise on most days of the week. Walking is a good choice. If you have not exercised in the past, start out slowly. Take several short walks each day.     Do not smoke. If you need help quitting, talk to your doctor about stop-smoking programs. These can increase your chances of quitting for good.     Do not touch cat feces or litter boxes. Also, wash your hands after you handle raw meat, and fully cook all meat before you eat it. Wear gloves when you work in the yard or garden, and wash your hands well when you are done. Cat feces, raw or undercooked meat, and contaminated dirt can cause an infection that may harm your baby or lead to a miscarriage.     Avoid things that can make your body too hot and may be harmful to your baby, such as a hot tub or sauna. Or talk with your doctor before doing anything that raises your body temperature. Your doctor can tell you if it's safe.     Avoid chemical fumes, paint fumes, or poisons.     Do not use illegal drugs, marijuana, or alcohol.   Medicines    Review all of your medicines with your doctor. Some of your routine medicines may need to be changed to protect your baby.     Use acetaminophen (Tylenol) to relieve minor problems, such as a mild headache or backache or a mild fever with cold symptoms. Do not use nonsteroidal anti-inflammatory drugs (NSAIDs), such as ibuprofen (Advil, Motrin) or naproxen (Aleve), unless your doctor says it is okay.     Do not take two or more pain medicines at the same time unless the doctor told you to. Many pain medicines have acetaminophen, which is Tylenol. Too much acetaminophen (Tylenol) can be harmful.     Take your medicines exactly as prescribed. Call your doctor if you  "think you are having a problem with your medicine.   To manage morning sickness    Keep food in your stomach, but not too much at once. Try eating five or six small meals a day instead of three large meals.     For nausea when you wake up, eat a small snack, such as a couple of crackers or pretzels, before rising. Allow a few minutes for your stomach to settle before you slowly get up.     Try to avoid smells and foods that make you feel nauseated. High-fat or greasy foods, milk, and coffee may make nausea worse. Some foods that may be easier to tolerate include cold, spicy, sour, and salty foods.     Drink enough fluids. Water and other caffeine-free drinks are good choices.     Take your prenatal vitamins at night on a full stomach.     Try foods and drinks made with lorraine. Lorraine may help with nausea.     Get lots of rest. Morning sickness may be worse when you are tired.     Talk to your doctor about over-the-counter products, such as vitamin B6 or doxylamine, to help relieve symptoms.     Try a P6 acupressure wrist band. These anti-nausea wristbands help some people.   Follow-up care is a key part of your treatment and safety. Be sure to make and go to all appointments, and call your doctor if you are having problems. It's also a good idea to know your test results and keep a list of the medicines you take.  Where can you learn more?  Go to https://www.[employer]eemia.net/patiented  Enter E868 in the search box to learn more about \"Learning About Pregnancy.\"  Current as of: July 10, 2023               Content Version: 14.0    6562-0000 [employer].   Care instructions adapted under license by your healthcare professional. If you have questions about a medical condition or this instruction, always ask your healthcare professional. [employer] disclaims any warranty or liability for your use of this information.      Weeks 6 to 10 of Your Pregnancy: Care Instructions  During these weeks of " "pregnancy, your body goes through many changes. You may start to feel different, both in your body and your emotions. Each pregnancy is different, so there's no \"right\" way to feel. These early weeks are a time to make healthy choices for you and your pregnancy.    Take a daily prenatal vitamin. Choose one with folic acid in it.    Avoid alcohol, tobacco, and drugs (including marijuana). If you need help quitting, talk to your doctor.    Drink plenty of liquids.  Be sure to drink enough water. And limit sodas, other sweetened drinks, and caffeine.     Choose foods that are good sources of calcium, iron, and folate.  You can try dairy products, dark leafy greens, fortified orange juice and cereals, almonds, broccoli, dried fruit, and beans.     Avoid foods that may be harmful.  Don't eat raw meat, deli meat, raw seafood, or raw eggs. Avoid soft cheese and unpasteurized dairy, like Brie and blue cheese. And don't eat fish that contains a lot of mercury, like shark and swordfish.     Don't touch cassandra litter or cat poop.  They can cause an infection that could be harmful during pregnancy.     Avoid things that can make your body too hot.  For example, avoid hot tubs and saunas.     Soothe morning sickness.  Try eating 5 or 6 small meals a day, getting some fresh air, or using flor to control symptoms.     Ask your doctor about flu and COVID-19 shots.  Getting them can help protect against infection.   Follow-up care is a key part of your treatment and safety. Be sure to make and go to all appointments, and call your doctor if you are having problems. It's also a good idea to know your test results and keep a list of the medicines you take.  Where can you learn more?  Go to https://www.[employer].net/patiented  Enter G112 in the search box to learn more about \"Weeks 6 to 10 of Your Pregnancy: Care Instructions.\"  Current as of: July 10, 2023               Content Version: 14.0    3729-3841 [employer]. "   Care instructions adapted under license by your healthcare professional. If you have questions about a medical condition or this instruction, always ask your healthcare professional. [employer] disclaims any warranty or liability for your use of this information.         Managing Morning Sickness (01:55)  Your health professional recommends that you watch this short online health video.  Learn tips for dealing with morning sickness, no matter what time of day you have it.  Purpose:  Gives tips for managing morning sickness, including eating small low-fat meals and avoiding caffeine and spicy food.  Goal:  The user will learn tips for dealing with morning sickness during pregnancy.     How to watch the video    Scan the QR code   OR Visit the website    [email or web address]   Current as of: July 10, 2023               Content Version: 14.0    3431-9109 [employer]   Care instructions adapted under license by your healthcare professional. If you have questions about a medical condition or this instruction, always ask your healthcare professional. [employer] disclaims any warranty or liability for your use of this information.      Pregnancy and Heartburn: Care Instructions  Overview     Heartburn is a common problem during pregnancy.  Heartburn happens when stomach acid backs up into the tube that carries food to the stomach. This tube is called the esophagus. Early in pregnancy, heartburn is caused by hormone changes that slow down digestion. Later on, it's also caused by the large uterus pushing up on the stomach.  Even though you can't fix the cause, there are things you can do to get relief. Treating heartburn during pregnancy focuses first on making lifestyle changes, like changing what and how you eat, and on taking medicines.  Heartburn usually improves or goes away after childbirth.  Follow-up care is a key part of your treatment and safety. Be sure to make  "and go to all appointments, and call your doctor if you are having problems. It's also a good idea to know your test results and keep a list of the medicines you take.  How can you care for yourself at home?  Eat small, frequent meals.  Avoid foods that make your symptoms worse, such as chocolate, peppermint, and spicy foods. Avoid drinks with caffeine, such as coffee, tea, and sodas.  Avoid bending over or lying down after meals.  Take a short walk after you eat.  If heartburn is a problem at night, do not eat for 2 hours before bedtime.  Take antacids like Mylanta, Maalox, Rolaids, or Tums. Do not take antacids that have sodium bicarbonate, magnesium trisilicate, or aspirin. Be careful when you take over-the-counter antacid medicines. Many of these medicines have aspirin in them. While you are pregnant, do not take aspirin or medicines that contain aspirin unless your doctor says it is okay.  If you're not getting relief, talk to your doctor. You may be able to take a stronger acid-reducing medicine.  When should you call for help?   Call your doctor now or seek immediate medical care if:    You have new or worse belly pain.     You are vomiting.   Watch closely for changes in your health, and be sure to contact your doctor if:    You have new or worse symptoms of reflux.     You are losing weight.     You have trouble or pain swallowing.     You do not get better as expected.   Where can you learn more?  Go to https://www.[employer].net/patiented  Enter U946 in the search box to learn more about \"Pregnancy and Heartburn: Care Instructions.\"  Current as of: July 10, 2023               Content Version: 14.0    5557-4170 [employer].   Care instructions adapted under license by your healthcare professional. If you have questions about a medical condition or this instruction, always ask your healthcare professional. [employer] disclaims any warranty or liability for your use of this " information.      Constipation: Care Instructions  Overview     Constipation means that you have a hard time passing stools (bowel movements). People pass stools from 3 times a day to once every 3 days. What is normal for you may be different. Constipation may occur with pain in the rectum and cramping. The pain may get worse when you try to pass stools. Sometimes there are small amounts of bright red blood on toilet paper or the surface of stools. This is because of enlarged veins near the rectum (hemorrhoids).  A few changes in your diet and lifestyle may help you avoid ongoing constipation. Your doctor may also prescribe medicine to help loosen your stool.  Some medicines can cause constipation. These include pain medicines and antidepressants. Tell your doctor about all the medicines you take. Your doctor may want to make a medicine change to ease your symptoms.  Follow-up care is a key part of your treatment and safety. Be sure to make and go to all appointments, and call your doctor if you are having problems. It's also a good idea to know your test results and keep a list of the medicines you take.  How can you care for yourself at home?  Drink plenty of fluids. If you have kidney, heart, or liver disease and have to limit fluids, talk with your doctor before you increase the amount of fluids you drink.  Include high-fiber foods in your diet each day. These include fruits, vegetables, beans, and whole grains.  Get at least 30 minutes of exercise on most days of the week. Walking is a good choice. You also may want to do other activities, such as running, swimming, cycling, or playing tennis or team sports.  Take a fiber supplement, such as Citrucel or Metamucil, every day. Read and follow all instructions on the label.  Schedule time each day for a bowel movement. A daily routine may help. Take your time having a bowel movement, but don't sit for more than 10 minutes at a time. And don't strain too  "much.  Support your feet with a small step stool when you sit on the toilet. This helps flex your hips and places your pelvis in a squatting position.  Your doctor may recommend an over-the-counter laxative to relieve your constipation. Examples are Milk of Magnesia and MiraLax. Read and follow all instructions on the label. Do not use laxatives on a long-term basis.  When should you call for help?   Call your doctor now or seek immediate medical care if:    You have new or worse belly pain.     You have new or worse nausea or vomiting.     You have blood in your stools.   Watch closely for changes in your health, and be sure to contact your doctor if:    Your constipation is getting worse.     You do not get better as expected.   Where can you learn more?  Go to https://www.[employer].net/patiented  Enter P343 in the search box to learn more about \"Constipation: Care Instructions.\"  Current as of: October 19, 2023               Content Version: 14.0    2252-0228 [employer].   Care instructions adapted under license by your healthcare professional. If you have questions about a medical condition or this instruction, always ask your healthcare professional. [employer] disclaims any warranty or liability for your use of this information.      Learning About High-Iron Foods  What foods are high in iron?     The foods you eat contain nutrients, such as vitamins and minerals. Iron is a nutrient. Your body needs the right amount to stay healthy and work as it should. You can use the list below to help you make choices about which foods to eat.  Here are some foods that contain iron. They have 1 to 2 milligrams of iron per serving.  Fruits  Figs (dried), 5 figs  Vegetables  Asparagus (canned), 6 ramos  Armando, beet, Swiss chard, or turnip greens, 1 cup  Dried peas, cooked,   cup  Seaweed, spirulina (dried),   cup  Spinach, (cooked)   cup or (raw) 1 cup  Grains  Cereals, fortified with iron, 1 " "cup  Grits (instant, cooked), fortified with iron,   cup  Meats and other protein foods  Beans (kidney, lima, navy, white), canned or cooked,   cup  Beef or lamb, 3 oz  Chicken giblets, 3 oz  Chickpeas (garbanzo beans),   cup  Liver of beef, lamb, or pork, 3 oz  Oysters (cooked), 3 oz  Sardines (canned), 3 oz  Soybeans (boiled),   cup  Tofu (firm),   cup  Work with your doctor to find out how much of this nutrient you need. Depending on your health, you may need more or less of it in your diet.  Where can you learn more?  Go to https://www.[employer].net/patiented  Enter R005 in the search box to learn more about \"Learning About High-Iron Foods.\"  Current as of: September 20, 2023               Content Version: 14.0    5624-5969 [employer].   Care instructions adapted under license by your healthcare professional. If you have questions about a medical condition or this instruction, always ask your healthcare professional. [employer] disclaims any warranty or liability for your use of this information.      Rubella (Bruneian Measles): Care Instructions  Overview  Rubella, also called Bruneian measles or 3-day measles, is a disease caused by a virus. It spreads by coughs, sneezes, and close contact. Rubella usually is mild and does not cause long-term problems. But if you are pregnant and get it, you can give the disease to your unborn baby. This can cause serious birth defects.  While you have rubella, you may get a rash and a mild fever, and the lymph glands in your neck may swell. Older children often have a fever, eye pain, a sore throat, and body aches. You can relieve most symptoms with care at home. Avoid being around others, especially pregnant people, until your rash has been gone for at least 4 days. People who have not had this disease before or have not had the vaccine have the greatest chance of getting the virus.  Follow-up care is a key part of your treatment and safety. Be sure " "to make and go to all appointments, and call your doctor if you are having problems. It's also a good idea to know your test results and keep a list of the medicines you take.  How can you care for yourself at home?  Drink plenty of fluids. If you have kidney, heart, or liver disease and have to limit fluids, talk with your doctor before you increase the amount of fluids you drink.  Get plenty of rest to help your body heal.  Take an over-the-counter pain medicine, such as acetaminophen (Tylenol), ibuprofen (Advil, Motrin), or naproxen (Aleve), to reduce fever and discomfort. Read and follow all instructions on the label. Do not give aspirin to anyone younger than 20. It has been linked to Reye syndrome, a serious illness.  Do not take two or more pain medicines at the same time unless the doctor told you to. Many pain medicines have acetaminophen, which is Tylenol. Too much acetaminophen (Tylenol) can be harmful.  Try not to scratch the rash. Put cold, wet cloths on the rash to reduce itching.  Do not smoke. Smoking can make your symptoms worse. If you need help quitting, talk to your doctor about stop-smoking programs and medicines. These can increase your chances of quitting for good.  Avoid contact with people who have never had rubella and who have not been immunized.  When should you call for help?   Call your doctor now or seek immediate medical care if:    You have a fever with a stiff neck or a severe headache.     You are sensitive to light or feel very sleepy or confused.   Watch closely for changes in your health, and be sure to contact your doctor if:    You do not get better as expected.   Where can you learn more?  Go to https://www.Spock.net/patiented  Enter B812 in the search box to learn more about \"Rubella (Surinamese Measles): Care Instructions.\"  Current as of: June 12, 2023               Content Version: 14.0    4526-3286 [employer]wise, Incorporated.   Care instructions adapted under license by " your healthcare professional. If you have questions about a medical condition or this instruction, always ask your healthcare professional. Healthwise, [facility] disclaims any warranty or liability for your use of this information.      Gonorrhea and Chlamydia: About These Tests  What is it?  These tests use a sample of urine or other body fluid to look for the bacteria that cause these sexually transmitted infections (STIs). The fluid sample can come from the cervix, vagina, rectum, throat, or eyes.  Why is this test done?  These tests may be done to:  Find out if symptoms are caused by gonorrhea or chlamydia.  Check people who are at high risk of being infected with gonorrhea or chlamydia.  Retest people several months after they have been treated for gonorrhea or chlamydia.  Check for infection in your  if you had a gonorrhea or chlamydia infection at the time of delivery.  How can you prepare for the test?  If you are going to have a urine test, do not urinate for at least 1 hour before the test.  If you think you may have chlamydia or gonorrhea, don't have sexual intercourse until you get your test results. And you may want to have tests for other STIs, such as HIV.  How is the test done?  For a direct sample, a swab is used to collect body fluid from the cervix, vagina, rectum, throat, or eyes. Your doctor may collect the sample. Or you may be given instructions on how to collect your own sample.  For a urine sample, you will collect the urine that comes out when you first start to urinate. Don't wipe the genital area clean before you urinate.  How long does the test take?  The test will take a few minutes.  What happens after the test?  You will be able to go home right away.  You can go back to your usual activities right away.  If you do have an infection, don't have sexual intercourse for 7 days after you start treatment. And your sex partner(s) should also be treated.  Follow-up care is a key  "part of your treatment and safety. Be sure to make and go to all appointments, and call your doctor if you are having problems. It's also a good idea to keep a list of the medicines you take. Ask your doctor when you can expect to have your test results.  Where can you learn more?  Go to https://www.[employer].net/patiented  Enter K976 in the search box to learn more about \"Gonorrhea and Chlamydia: About These Tests.\"  Current as of: November 27, 2023               Content Version: 14.0    2304-7925 [employer].   Care instructions adapted under license by your healthcare professional. If you have questions about a medical condition or this instruction, always ask your healthcare professional. [employer] disclaims any warranty or liability for your use of this information.      Trichomoniasis: About This Test  What is it?     This test uses a sample of urine or other body fluid to look for the tiny parasite that causes trichomoniasis (also called trich). The fluid sample can come from the vagina, cervix, or urethra. Your doctor may choose to use one or more of many available tests.  Why is it done?  A trich test may be done to:  Find out if symptoms are caused by trich.  Check people who are at high risk for being infected with trich.  Check after treatment to make sure that the infection is gone.  How do you prepare for the test?  If you are going to have a urine test, do not urinate for at least 1 hour before the test.  How is the test done?  For a direct sample, a swab is used to collect body fluid from the cervix, vagina, or urethra. Your doctor may collect the sample. Or you may be given instructions on how to collect your own sample.  For a urine sample, you will collect the urine that comes out when you first start to urinate. Don't wipe the area clean before you urinate.  How long does the test take?  It will take a few minutes to collect a sample.  What happens after the " test?  You can go home right away.  You can go back to your usual activities right away.  You may get the test results the same day or several days later. It depends on the test used.  If you do have an infection, don't have sexual intercourse for 7 days after you start treatment. Your sex partner or partners should also be treated.  Follow-up care is a key part of your treatment and safety. Be sure to make and go to all appointments, and call your doctor if you are having problems. Ask your doctor when you can expect to have your test results.  Current as of: November 27, 2023               Content Version: 14.0    [id number] [employer].   Care instructions adapted under license by your healthcare professional. If you have questions about a medical condition or this instruction, always ask your healthcare professional. [employer] disclaims any warranty or liability for your use of this information.      HIV Testing: Care Instructions  Overview  You can get tested for the human immunodeficiency virus (HIV). Most doctors use a blood test to check for HIV antibodies and antigens in your blood. It may also check for the genetic material (RNA) of HIV. Some tests use saliva to check for HIV antibodies. But these aren't as accurate. For example, they may give a false result if you've just been infected.  What do the results mean?    Normal (negative)    No HIV antibodies, antigens, or RNA were found.  You may need more testing. It can make sure your test results are correct.    Uncertain (indeterminate)    Test results didn't clearly show if you have an HIV infection.  HIV antibodies or antigens may not have formed yet.  Some other type of antibody or antigen may have affected the results.  You will need another test to be sure.    Abnormal (positive)    HIV antibodies, antigens, or RNA were found.  If you haven't had an RNA test yet, one will be done. If it's positive, you have HIV.  If your  "test result is positive, your doctor will talk to you. You will discuss starting treatment.  Follow-up care is a key part of your treatment and safety. Be sure to make and go to all appointments, and call your doctor if you are having problems. It's also a good idea to know your test results and keep a list of the medicines you take.  Where can you learn more?  Go to https://www.[employer].net/patiented  Enter T792 in the search box to learn more about \"HIV Testing: Care Instructions.\"  Current as of: June 12, 2023               Content Version: 14.0    0627-1095 [employer].   Care instructions adapted under license by your healthcare professional. If you have questions about a medical condition or this instruction, always ask your healthcare professional. [employer] disclaims any warranty or liability for your use of this information.      Hepatitis C Virus Tests: About These Tests  What are they?     Hepatitis C virus tests are blood tests that check for substances in the blood that show whether you have hepatitis C now or had it in the past. The tests can also tell you what type of hepatitis C you have and how severe the disease is. This can help your doctor with treatment.  If the tests show that you have long-term hepatitis C, you need to take steps to prevent spreading the disease.  Why are these tests done?  You may need these tests if:  You have symptoms of hepatitis.  You may have been exposed to the virus. You are more likely to have been exposed to the virus if you inject drugs or are exposed to body fluids (such as if you are a health care worker).  You've had other tests that show you have liver problems.  You are 18 to 79 years old.  You have an HIV infection.  The tests also are done to help your doctor decide about your treatment and see how well it works.  How do you prepare for the test?  In general, there's nothing you have to do before this test, unless your doctor " "tells you to.  How is the test done?  A health professional uses a needle to take a blood sample, usually from the arm.  What happens after these tests?  You will probably be able to go home right away.  You can go back to your usual activities right away.  Follow-up care is a key part of your treatment and safety. Be sure to make and go to all appointments, and call your doctor if you are having problems. It's also a good idea to keep a list of the medicines you take. Ask your doctor when you can expect to have your test results.  Where can you learn more?  Go to [email or web address]/patiented  Enter W551 in the search box to learn more about \"Hepatitis C Virus Tests: About These Tests.\"  Current as of: June 12, 2023               Content Version: 14.0    2413-0558 [employer].   Care instructions adapted under license by your healthcare professional. If you have questions about a medical condition or this instruction, always ask your healthcare professional. [employer] disclaims any warranty or liability for your use of this information.      Learning About Fetal Ultrasound Results  What is a fetal ultrasound?     Fetal ultrasound is a test that lets your doctor see an image of your baby. Your doctor learns information about your baby from this picture. You may find out, for example, if you are having a boy or a girl. But the main reason you have this test is to get information about your baby's health.  (You may hear your baby called a fetus. This is a common medical term for a baby that's growing in the mother's uterus.)  What kind of information can you learn from this test?  The findings of an ultrasound fall into two categories, normal and abnormal.  Normal  The fetus is the right size for its age.  The placenta is the expected size and does not cover the cervix.  There is enough amniotic fluid in the uterus.  No birth defects can be seen.  Abnormal  The fetus is small or " large for its age.  The placenta covers the cervix.  There is too much or too little amniotic fluid in the uterus.  The fetus may have a birth defect.  What does an abnormal result mean?  Abnormal seems to imply that something is wrong with your baby. But what it means is that the test has shown something the doctor wants to take a closer look at.  And that's what happens next. Your doctor will talk to you about what further test or tests you may need.  What do the results mean?  Some of the things your doctor may see on an abnormal ultrasound include:  Echogenic bowel.  The bowel looks very bright on the screen. This could mean that there's blood in the bowel. Or it could mean that something is blocking the small bowel.  Increased nuchal translucency.  The ultrasound measures the thickness at the back of the baby's neck. An increase in thickness is sometimes an early sign of Down syndrome.  Increased or decreased amniotic fluid.  The doctor will look for a reason for the level of amniotic fluid and will watch the pregnancy closely as it progresses.  Large ventricles.  Ventricles in the brain look larger than they should. Your doctor may take a closer look at the brain.  Renal pyelectasis/hydronephrosis.  The ultrasound measures the fluid around the kidney. If there is more fluid than expected, there is a chance of urinary tract or kidney problems.  Short long bones.  The ultrasound measures certain arm and leg bones. A long bone (humerus or femur) that is shorter than average could be a sign of Down syndrome.  Subchorionic hemorrhage.  An ultrasound can show bleeding under one of the membranes that surrounds the fetus. Some women don't have symptoms of bleeding. The ultrasound can find this problem when women are not bleeding from their vagina. Women who have this condition have a slightly higher chance of miscarriage.  What do you do now?  Take a deep breath, and let it out. Keep in mind that an abnormal finding  "on an ultrasound, after it's coupled with more information, may:  Turn out to be nothing.  Turn out to be something mild that won't affect the baby.  Turn out to be something more serious. But if this happens, early diagnosis helps you and your doctor plan treatment options sooner rather than later.  Your medical team is there for you. So are your family and friends. Ask questions, and get the help and support you need.  Follow-up care is a key part of your treatment and safety. Be sure to make and go to all appointments, and call your doctor if you are having problems. It's also a good idea to know your test results and keep a list of the medicines you take.  Where can you learn more?  Go to https://www.[employer].net/patiented  Enter K451 in the search box to learn more about \"Learning About Fetal Ultrasound Results.\"  Current as of: July 10, 2023               Content Version: 14.0    4180-7413 [employer].   Care instructions adapted under license by your healthcare professional. If you have questions about a medical condition or this instruction, always ask your healthcare professional. [employer] disclaims any warranty or liability for your use of this information.      Learning About Prenatal Visits  Overview     Regular prenatal visits are very important during any pregnancy. These quick office visits may seem simple and routine. But they can help you have a safe and healthy pregnancy. Your doctor is watching for problems that can only be found through regular checkups. The visits also give you and your doctor time to build a good relationship.  After your first visit, you will most likely start on a schedule of monthly visits. In your third trimester, the visits will get more frequent. Based on your health, your age, and if you've had a normal, full-term pregnancy before, your doctor may want to see you more or less often.  At different times in your pregnancy, you will have exams " and tests. Some are routine. Others are done only when there is a chance of a problem. Everything healthy you do for your body helps you have a healthy pregnancy. Rest when you need it. Eat well, drink plenty of water, and exercise regularly.  What happens during a prenatal visit?  You will have blood pressure checks, along with urine tests. You also may have blood tests. If you need to go to the bathroom while waiting for the doctor, tell the nurse. You will be given a sample cup so your urine can be tested.  You will be weighed and have your belly measured.  Your doctor may listen to the fetal heartbeat with a special device.  At about 24 weeks, and possibly earlier in your pregnancy, your doctor will check your blood sugar (glucose tolerance test) for diabetes that can occur during pregnancy. This is gestational diabetes, which can be harmful.  You will have tests to check for infections that could harm your . These include group B streptococcus and hepatitis B.  Your doctor may do ultrasounds to check for problems. This also checks the position of the fetus. An ultrasound uses sound waves to produce a picture of the fetus.  You may get your vaccines updated.  Your doctor may ask you questions to check for signs of anxiety or depression. Tell your doctor if you feel sad, anxious, or hopeless for more than a few days.  You may have other tests at any time during your pregnancy.  Use your visits to discuss with your doctor any concerns you have.  How can you care for yourself at home?  Get plenty of rest.  Try to exercise every day, if your doctor says it is okay. If you have not exercised in the past, start out slowly. For example, you can take short walks each day.  Choose healthy foods, such as fruits, vegetables, whole grains, lean proteins, low-fat dairy, and healthy fats.  Drink plenty of fluids. Cut down on drinks with caffeine, such as coffee, tea, and cola. If you have kidney, heart, or liver  "disease and have to limit fluids, talk with your doctor before you increase the amount of fluids you drink.  Try to avoid chemical fumes, paint fumes, and poisons.  If you smoke, vape, or use alcohol, marijuana, or other drugs, quit or cut back as much as you can. Talk to your doctor if you need help quitting.  Review all of your medicines, including over-the-counter medicines and supplements, with your doctor. Some of your routine medicines may need to be changed. Do not stop or start taking any medicines without talking to your doctor first.  Follow-up care is a key part of your treatment and safety. Be sure to make and go to all appointments, and call your doctor if you are having problems. It's also a good idea to know your test results and keep a list of the medicines you take.  Where can you learn more?  Go to https://www.[employer].net/patiented  Enter J502 in the search box to learn more about \"Learning About Prenatal Visits.\"  Current as of: July 10, 2023               Content Version: 14.0    8313-8042 [employer].   Care instructions adapted under license by your healthcare professional. If you have questions about a medical condition or this instruction, always ask your healthcare professional. [employer] disclaims any warranty or liability for your use of this information.      Intimate Partner Violence: Care Instructions  Overview     If you want to save this information but don't think it is safe to take it home, see if a trusted friend can keep it for you. Plan ahead. Know who you can call for help, and memorize the phone number.   Be careful online too. Your online activity may be seen by others. Do not use your personal computer or device to read about this topic. Use a safe computer, such as one at work, a friend's home, or a library.    Intimate partner violence--a type of domestic abuse--is different from an argument now and then. It is a pattern of abuse that one " person may use to control another person's behavior. It may start with threats and name-calling. Then, it may lead to more serious acts, like pushing and slapping. The abuse also may occur in other areas. For example, the abuser may withhold money or spend a partner's money without their knowledge.  Abuse can cause serious harm. You are more likely to have a long-term health problem from the injuries and stress of living in a violent relationship. People who are sexually abused by their partners have more sexually transmitted infections and unplanned pregnancies. Anyone who is abused also faces emotional pain. Anyone can be abused in relationships. In some relationships, both people use abusive behavior.  If you are pregnant, abuse can cause problems such as poor weight gain, infections, and bleeding. Abuse during this time may increase your baby's risk of low birth weight, premature birth, and death.  Follow-up care is a key part of your treatment and safety. Be sure to make and go to all appointments, and call your doctor if you are having problems. It's also a good idea to know your test results and keep a list of the medicines you take.  How can you care for yourself at home?  If you do not have a safe place to stay, discuss this with your doctor before you leave.  Have a plan for where to go, how to leave your home, and where to stay in case of an emergency. Do not tell your partner about your plan. Contact:  The National Domestic Violence Hotline toll-free at 1-720.967.4991. They can help you find resources in your area.  Your local police department, hospital, or clinic for information about shelters and safe homes near you.  Talk to a trusted friend or neighbor, a counselor, or a tod leader. Do not feel that you have to hide what happened.  Teach your children how to call for help in an emergency.  Be alert to warning signs, such as threats, heavy alcohol use, or drug use. This can help you avoid  "danger.  If you can, make sure that there are no guns or other weapons in your home.  When should you call for help?   Call 911 anytime you think you may need emergency care. For example, call if:    You or someone else has just been abused.     You think you or someone else is in danger of being abused.   Watch closely for changes in your health, and be sure to contact your doctor if you have any problems.  Where can you learn more?  Go to https://www.[employer].net/patiented  Enter G282 in the search box to learn more about \"Intimate Partner Violence: Care Instructions.\"  Current as of: June 24, 2023               Content Version: 14.0    9447-6130 [employer].   Care instructions adapted under license by your healthcare professional. If you have questions about a medical condition or this instruction, always ask your healthcare professional. [employer] disclaims any warranty or liability for your use of this information.      Intimate Partner Violence Safety Instructions: Care Instructions  Overview     If you want to save this information but don't think it is safe to take it home, see if a trusted friend can keep it for you. Plan ahead. Know who you can call for help, and memorize the phone number.   Be careful online too. Your online activity may be seen by others. Do not use your personal computer or device to read about this topic. Use a safe computer, such as one at work, a friend's home, or a library.    When you are abused by a spouse or partner, you can take actions to protect yourself and your children.  You can increase your safety whether you decide to stay with your spouse or partner or you decide to leave. You may want to make a safety plan and pack a bag ahead of time. This will help you leave quickly when you decide to. Remember, you cannot change your partner's actions, but you can find help for you and your children. No one deserves to be abused.  Follow-up care is a " key part of your treatment and safety. Be sure to make and go to all appointments, and call your doctor if you are having problems. It's also a good idea to know your test results and keep a list of the medicines you take.  How can you care for yourself at home?  Make a plan for your safety   If you decide to stay with your abusive spouse or partner, you can do the following to increase your safety:  Decide what works best to keep you safe in an emergency.  Know who you can call to help you in an emergency.  Decide if you will call the police if you get hurt again. If you can, agree on a signal with your children or neighbor to call the police for you if you need help. You can flash lights or hang something out of a window.  Choose a safe place to go for a short time if you need to leave home. Memorize the address and phone number.  Learn escape routes out of your home in case you need to leave in a hurry. Teach your children different ways to get out of your home quickly if they need to.  If you can, hide or lock up things that can be used as weapons (guns, knives, hammers).  Learn the number of a domestic violence shelter. Talk to the people there about how they can help.  Find out about other community resources that can help you.  Take pictures of bruises or other injuries if you can. You can also take pictures of things your abuser has broken.  Teach your children that violence is never okay. Tell them that they do not deserve to be hurt.  Pack a bag   Prepare a bag with things you will need if you leave suddenly. Leave it with a friend, a relative, or someone else you trust. You could include the following items in the bag:  A set of keys to your home and car.  Emergency phone numbers and addresses.  Money such as cash or checks. You can also ask a friend, a relative, or someone else you trust to hold money for you.  Copies of legal documents such as house and car titles or rent receipts, birth certificates,  "Social Security card, voter registration, marriage and 's licenses, and your children's health records.  Personal items you would need for a few days, such as clothes, a toothbrush, toothpaste, and any medicines you or your children need.  A favorite toy or book for your child or children.  Diapers and bottles, if you have very young children.  Pictures that show signs of abuse and violence. You may also add pictures of your abuser.  If you leave   If you decide to leave, you can take the following steps:  Go to the emergency room at a hospital if you have been hurt.  Think about asking the police to be with you as you leave. They can protect you as you leave your home.  If you decide to leave secretly, remember that activities can be tracked. Your abuser may still have access to your cell phone, email, and credit cards. It may be possible for these to be traced. Always be aware of your surroundings.  If this is an emergency, do not worry about gathering up anything. Just leave--your safety is most important.  If your abuser moves out, change the locks on the doors. If you have a security system, change the access code.  When should you call for help?   Call 911 anytime you think you may need emergency care. For example, call if:    You or someone else has just been abused.     You think you or someone else is in danger of being abused.   Watch closely for changes in your health, and be sure to contact your doctor if you have any problems.  Where can you learn more?  Go to https://www.[employer].net/patiented  Enter A752 in the search box to learn more about \"Intimate Partner Violence Safety Instructions: Care Instructions.\"  Current as of: June 24, 2023               Content Version: 14.0    7266-4785 [employer].   Care instructions adapted under license by your healthcare professional. If you have questions about a medical condition or this instruction, always ask your healthcare professional. " [facility], [facility] disclaims any warranty or liability for your use of this information.      Learning About Intimate Partner Violence  What is intimate partner violence?  Intimate partner violence is a type of domestic abuse. It's threatening, emotionally harmful, or violent behavior in a personal relationship. It can happen between past or current partners or spouses. In some relationships both people abuse each other. One partner may be more abusive. Or the abuse may be equal.  Abuse can affect people of any ethnic group, race, or Gnosticist. It can affect teens, adults, or the elderly. And it can happen to people of any sexual orientation, gender, or social status.  Abusers use fear, bullying, and threats to control their partners. They may control what their partners do. They may control where their partners go or who they see. They may act jealous, controlling, or possessive. These early signs of abuse may happen soon after the start of the relationship. Sometimes it can be hard to notice abuse at first. But after the relationship becomes more serious, the abuse may get worse.  If you are being abused in your relationship, it's important to get help. The abuse is not your fault. You don't have to face it alone.  Be careful  It may not be safe to take home domestic abuse information like this handout. Some people ask a trusted friend to keep it for them. It's also important to plan ahead and to memorize the phone number of places you can go for help. If you are concerned about your safety, do not use your computer, smartphone, or tablet to read about domestic abuse.   What are the types of intimate partner violence?  Abuse can happen in different ways. Each type can happen on its own or in combination with others.  Emotional abuse  Emotional abuse is a pattern of threats, insults, or controlling behavior. It includes verbal abuse. It goes beyond healthy disagreements in a relationship. It's a sign of an  unhealthy relationship.  Do you feel threatened, intimidated, or controlled?  Does your partner:  Threaten your children, other family members, or pets?  Use jokes meant to embarrass or shame you?  Call you names?  Tell you that you are a bad parent?  Threaten to take away your children?  Threaten to have you or your family members deported?  Control your access to money or other basic needs?  Control what you do, who you see or talk to, or where you go?  Another form of emotional abuse is denying that it is happening. Or the abuser may act like the abuse is no big deal or is your fault.  Sexual abuse  With sexual abuse, abusers may try to convince or force you to have sex. They may force you into sex acts you're not comfortable with. Or they may sexually assault you. Sexual abuse can happen even if you are in a committed relationship.  Physical abuse  Physical abuse means that a partner hits, kicks, or does something else to physically hurt you. Physical abuse that starts with a slap might lead to kicking, shoving, and choking over time. The abuser may also threaten to hurt or kill you.  Stalking  Stalking means that an abuser gives you attention that you do not want and that causes you fear. Examples of stalking include:  Following you.  Showing up at places where the abuser isn't invited, such as at your work or school.  Constantly calling or texting you.  What problems can  to?  Intimate partner violence can be very dangerous. It can cause serious, repeated injury. It can even lead to death.  All forms of abuse can cause long-term health problems from the stress of a violent relationship. Verbal abuse can lead to sexual and physical abuse.  Abuse causes:  Emotional pain.  Depression.  Anxiety.  Post-traumatic stress.  Sexual abuse can lead to sexually transmitted infections (such as HIV/AIDS) and unplanned pregnancy.  Pregnancy can be a very dangerous time for people in abusive relationships. Abuse can  "cause or increase the risk of problems during pregnancy. These include low weight gain, anemia, infections, and bleeding. Abuse may also increase your baby's risk of low birth weight, premature birth, and death.  It can be hard for some victims of abuse to ask for help or to leave their relationship. You may feel scared, stuck, or not sure what steps to take. But it's important not to ignore abuse. Talking to someone you trust could be the first step to ending the abuse and taking care of your own health and happiness again. There are resources available that can help keep you safe.  Where can you get help?  Talk to a trusted friend. Find a local advocacy group, or talk to your doctor about the abuse.  Contact the National Domestic Violence Hotline at 5-203-419-VOOI (1-574.425.5500) for more safety tips. They can guide you to groups in your area that can help. Or go to the National Coalition Against Domestic Violence website at www.theMyCheck.org to learn more.  Domestic violence groups or a counselor in your area can help you make a safety plan for yourself and your children.  When to call for help  Call 911 anytime you think you may need emergency care. For example, call if:  You think that you or someone you know is in danger of being abused.  You have been hurt and can't have someone safely take you to emergency care.  You have just been abused.  A family member has just been abused.  Where can you learn more?  Go to https://www.Travelmenu.net/patiented  Enter S665 in the search box to learn more about \"Learning About Intimate Partner Violence.\"  Current as of: June 24, 2023               Content Version: 14.0    7924-7550 [employer].   Care instructions adapted under license by your healthcare professional. If you have questions about a medical condition or this instruction, always ask your healthcare professional. [employer] disclaims any warranty or liability for your use of this " information.      Vaginal Bleeding During Pregnancy: Care Instructions  Overview     It's common to have some vaginal spotting when you are pregnant. In some cases, the bleeding isn't serious. And there aren't any more problems with the pregnancy.  But sometimes bleeding is a sign of a more serious problem. This is more common if the bleeding is heavy or painful. Examples of more serious problems include miscarriage, an ectopic pregnancy, and a problem with the placenta.  You may have to see your doctor again to be sure everything is okay. You may also need more tests to find the cause of the bleeding.  Home treatment may be all you need. But it depends on what is causing the bleeding. Be sure to tell your doctor if you have any new symptoms or if your symptoms get worse.  The doctor has checked you carefully, but problems can develop later. If you notice any problems or new symptoms, get medical treatment right away.  Follow-up care is a key part of your treatment and safety. Be sure to make and go to all appointments, and call your doctor if you are having problems. It's also a good idea to know your test results and keep a list of the medicines you take.  How can you care for yourself at home?  If your doctor prescribed medicines, take them exactly as directed. Call your doctor if you think you are having a problem with your medicine.  Do not have vaginal sex until your doctor says it's okay.  Do not put anything in your vagina until your doctor says it's okay.  Ask your doctor about other activities you can or can't do.  Get a lot of rest. Being pregnant can make you tired.  Do not use nonsteroidal anti-inflammatory drugs (NSAIDs), such as ibuprofen (Advil, Motrin), naproxen (Aleve), or aspirin, unless your doctor says it is okay.  When should you call for help?   Call 911 anytime you think you may need emergency care. For example, call if:    You passed out (lost consciousness).     You have severe vaginal  "bleeding. This means you are soaking through a pad each hour for 2 or more hours.     You have sudden, severe pain in your belly or pelvis.   Call your doctor now or seek immediate medical care if:    You have new or worse vaginal bleeding.     You are dizzy or lightheaded, or you feel like you may faint.     You have pain in your belly, pelvis, or lower back.     You think that you are in labor.     You have a sudden release of fluid from your vagina.     You've been having regular contractions for an hour. This means that you've had at least 8 contractions within 1 hour or at least 4 contractions within 20 minutes, even after you change your position and drink fluids.     You notice that your baby has stopped moving or is moving much less than normal.   Watch closely for changes in your health, and be sure to contact your doctor if you have any problems.  Where can you learn more?  Go to https://www.eSpark.TELOS/patiented  Enter N829 in the search box to learn more about \"Vaginal Bleeding During Pregnancy: Care Instructions.\"  Current as of: July 10, 2023               Content Version: 14.0    8918-3264 [employer].   Care instructions adapted under license by your healthcare professional. If you have questions about a medical condition or this instruction, always ask your healthcare professional. [employer] disclaims any warranty or liability for your use of this information.      Weeks 10 to 14 of Your Pregnancy: Care Instructions  It's now possible to hear the fetus's heartbeat with a special ultrasound device. And the fetus's organs are developing.    Decide about tests to check for birth defects. Think about your age, your chance of passing on a family disease, your need to know about any problems, and what you might do after you have the test results.    It's okay to exercise. Try activities such as walking or swimming. Check with your doctor before starting a new program.    You " "may feel more tired than usual.  Taking naps during the day may help.     You may feel emotional.  It might help to talk to someone.     You may have headaches.  Try lying down and putting a cool cloth over your forehead.     You can use acetaminophen (Tylenol) for pain relief.  Don't take any anti-inflammatory medicines (such as Advil, Motrin, Aleve), unless your doctor says it's okay.     You may feel a fullness or aching in your lower belly.  This can feel like the kind of cramps you might get before a period. A back rub may help.     You may need to urinate more.  Your growing uterus and changing hormones can affect your bladder.     You may feel sick to your stomach (morning sickness).  Try avoiding food and smells that make you feel sick.     Your breasts may feel different.  They may feel tender or get bigger. Your nipples may get darker. Try a bra that gives you good support.     Avoid alcohol, tobacco, and drugs (including marijuana).  If you need help quitting, talk to your doctor.     Take a daily prenatal vitamin.  Choose one with folic acid.   Follow-up care is a key part of your treatment and safety. Be sure to make and go to all appointments, and call your doctor if you are having problems. It's also a good idea to know your test results and keep a list of the medicines you take.  Where can you learn more?  Go to https://www.Verdiem.net/patiented  Enter E090 in the search box to learn more about \"Weeks 10 to 14 of Your Pregnancy: Care Instructions.\"  Current as of: July 10, 2023               Content Version: 14.0    4556-1325 [employer].   Care instructions adapted under license by your healthcare professional. If you have questions about a medical condition or this instruction, always ask your healthcare professional. [employer] disclaims any warranty or liability for your use of this information.        Nutrition During Pregnancy: Care Instructions  Overview   "   Healthy eating when you are pregnant is important for you and your baby. It can help you feel well and have a successful pregnancy and delivery. During pregnancy your nutrition needs increase. Even if you have excellent eating habits, your doctor may recommend a multivitamin to make sure you get enough iron and folic acid.  You may wonder how much weight you should gain. In general, if you were at a healthy weight before you became pregnant, then you should gain between 25 and 35 pounds. If you were overweight before pregnancy, then you'll likely be advised to gain 15 to 25 pounds. If you were underweight before pregnancy, then you'll probably be advised to gain 28 to 40 pounds. Your doctor will work with you to set a weight goal that is right for you. Gaining a healthy amount of weight helps you have a healthy baby.  Follow-up care is a key part of your treatment and safety. Be sure to make and go to all appointments, and call your doctor if you are having problems. It's also a good idea to know your test results and keep a list of the medicines you take.  How can you care for yourself at home?  Eat plenty of fruits and vegetables. Include a variety of orange, yellow, and leafy dark-green vegetables every day.  Choose whole-grain bread, cereal, and pasta. Good choices include whole wheat bread, whole wheat pasta, brown rice, and oatmeal.  Get 4 or more servings of milk and milk products each day. Good choices include nonfat or low-fat milk, yogurt, and cheese. If you cannot eat milk products, you can get calcium from calcium-fortified products such as orange juice, soy milk, and tofu. Other non-milk sources of calcium include leafy green vegetables, such as broccoli, kale, mustard greens, turnip greens, bok sandhya, and brussels sprouts.  If you eat meat, pick lower-fat types. Good choices include lean cuts of meat and chicken or turkey without the skin.  Avoid fish that are high in mercury. These include shark,  "swordfish, sangeeta mackerel, marlin, orange roughy, and bigeye tuna, as well as tilefish from the [facility].  It's okay to eat up to 8 to 12 ounces a week of fish that are low in mercury or up to 4 ounces a week of fish that have medium levels of mercury. Some fish that are low in mercury are salmon, shrimp, canned light tuna, cod, and tilapia. Some fish that have medium levels of mercury are halibut and white albacore tuna.  For more advice about eating fish, you can visit the U.S. Food and Drug Administration (FDA) or U.S. Environmental Protection Agency (EPA) website.  Heat lunch meats (such as turkey, ham, or bologna) to 165 F before you eat them. This reduces your risk of getting sick from a kind of bacteria that can be found in lunch meats.  Do not eat unpasteurized soft cheeses, such as brie, feta, fresh mozzarella, and blue cheese. They have a bacteria that could harm your baby.  Limit caffeine to about 200 to 300 mg per day. On average, a cup of brewed coffee has around 80 to 100 mg of caffeine.  Do not drink any alcohol. No amount of alcohol has been found to be safe during pregnancy.  Do not diet or try to lose weight. For example, do not follow a low-carbohydrate diet. If you are overweight at the start of your pregnancy, your doctor will work with you to manage your weight gain.  Tell your doctor about all vitamins and supplements you take.  When should you call for help?  Watch closely for changes in your health, and be sure to contact your doctor if you have any problems.  Where can you learn more?  Go to https://www.Charitas.net/patiented  Enter Y785 in the search box to learn more about \"Nutrition During Pregnancy: Care Instructions.\"  Current as of: September 20, 2023               Content Version: 14.0    0823-5618 [employer].   Care instructions adapted under license by your healthcare professional. If you have questions about a medical condition or this instruction, always ask " your healthcare professional. [employer]wise, [employer] disclaims any warranty or liability for your use of this information.      Exercise During Pregnancy: Care Instructions  Overview     Exercise is good for you during a healthy pregnancy. It can relieve back pain, swelling, and other discomforts. It also prepares your muscles for childbirth. And exercise can improve your energy level and help you sleep better.  If your doctor advises it, get more exercise. For example, walking is a good choice. Bit by bit, increase the amount you walk every day. Try for at least 30 minutes on most days of the week. You could also try a prenatal exercise class. But if you do not already exercise, be sure to talk with your doctor before you start a new exercise program. Doctors do not recommend contact sports during pregnancy.  Follow-up care is a key part of your treatment and safety. Be sure to make and go to all appointments, and call your doctor if you are having problems. It's also a good idea to know your test results and keep a list of the medicines you take.  How can you care for yourself at home?  Talk with your doctor about the right kind of exercise for each stage of pregnancy.  Listen to your body to know if your exercise is at a safe level.  Do not become overheated while you exercise. High body temperature can be harmful. Avoid activities that can make your body too hot.  If you feel tired, take it easy. You might walk instead of run.  If you are used to strenuous exercise, ask your doctor how to know when it's time to slow down.  If you exercised before getting pregnant, you should be able to keep up your routine early in your pregnancy. Later in your pregnancy, you may want to switch to more gentle activities.  Drink plenty of fluids before, during, and after exercise.  Avoid contact sports, such as soccer and basketball. Also avoid risky activities. These include scuba diving, horseback riding, and exercising at a  "high altitude (above 6,000 feet). If you live in a place with a high altitude, talk to your doctor about how you can exercise safely.  Do not get overtired while you exercise. You should be able to talk while you work out.  After your fourth month of pregnancy, avoid exercises that require you to lie flat on your back on a hard surface. These include sit-ups and some yoga poses.  Get plenty of rest. You may be very tired while you are pregnant.  Where can you learn more?  Go to https://www.[employer]Star Group.net/patiented  Enter S801 in the search box to learn more about \"Exercise During Pregnancy: Care Instructions.\"  Current as of: July 10, 2023               Content Version: 14.0    8668-0208 [employer].   Care instructions adapted under license by your healthcare professional. If you have questions about a medical condition or this instruction, always ask your healthcare professional. [employer] disclaims any warranty or liability for your use of this information.      Learning About Pregnancy and Obesity  How does your weight affect your pregnancy?     The basics of prenatal care are the same for everyone, regardless of size. You'll get what you need to have a healthy baby.  But your size can make a difference in a few things. You and your doctor will have to watch your pregnancy weight. Your weight may affect your labor and delivery.  You may have some extra doctor visits and tests. And you may have some tests earlier in your pregnancy. You'll need to pay close attention to things like blood pressure and the chance of getting gestational diabetes. (This is a type of diabetes that sometimes happens during pregnancy.) And close attention will be given to your developing baby.  Work with your doctor to get the care you need. Go to all your doctor visits, and follow your doctor's advice about what to do and what to avoid during pregnancy.  How much weight gain is healthy?  If you are very " "overweight (obese), experts recommend that you gain between 11 and 20 pounds. Your doctor will work with you to set a weight goal that's right for you. In some cases, your doctor may recommend that you not gain any weight.  How much extra food do you need to eat?  Although you may joke that you're \"eating for two\" during pregnancy, you don't need to eat twice as much food. How much you can eat depends on:  Your height.  How much you weigh when you get pregnant.  How active you are.  If you're carrying more than one fetus (multiple pregnancy).  In the first trimester, you'll probably need the same amount of calories as you did before you were pregnant. In general, in your second trimester, you need to eat about 340 extra calories a day. In your third trimester, you need to eat about 450 extra calories a day.  What can you do to have a healthy pregnancy?  The best things you can do for you and your baby are to eat healthy foods, get regular exercise, avoid alcohol and smoking, and go to your doctor visits.  Eat a variety of foods from all the food groups. Make sure to get enough calcium and folic acid.  You may want to work with a dietitian to help you plan healthy meals to get the right amount of calories for you.  If you didn't exercise much before you got pregnant, talk to your doctor about how you can slowly get more active. Your doctor may want to set up an exercise program with you.  Where can you learn more?  Go to https://www.Airseed.net/patiented  Enter B644 in the search box to learn more about \"Learning About Pregnancy and Obesity.\"  Current as of: July 10, 2023               Content Version: 14.0    9820-1204 [employer].   Care instructions adapted under license by your healthcare professional. If you have questions about a medical condition or this instruction, always ask your healthcare professional. [employer] disclaims any warranty or liability for your use of this " information.

## 2024-07-17 NOTE — TELEPHONE ENCOUNTER
Called patient to discuss. She reports she is ~9 weeks pregnant. She is hoping to discuss safety of current HIV regiment with team to ensure no changes need to be made. Patient hoping for soonest avail. Scheduled with MAUDE Schmidt this afternoon. Will route to Roxana and Dr Zelaya as FYI.

## 2024-07-17 NOTE — Clinical Note
Jamey Coates, new pregnancy. Please see note and let me know if you'd like to change antiretroviral therapy and when you'd like to follow-up with her. Thanks! Roxana Martines, PharmD, [facility] Medication Therapy Management Pharmacist

## 2024-07-17 NOTE — PROGRESS NOTES
"Medication Therapy Management (MTM) Encounter    ASSESSMENT:                            Medication Adherence/Access: No issues identified    HIV:   Per the NIH HIV guidelines, Odefsey is an alternative regimen option for people with HIV who are pregnant. It's okay to continue during pregnancy if already on antiretroviral therapy and stable at the time of becoming pregnant.     Rilpivirine (RPV) pharmacokinetics are highly variable during pregnancy. RPV AUC and trough concentrations are 20% to 50% lower in pregnancy than postpartum, specifically during the second and third trimesters.  Although most pregnant women exceeded target exposure, those with detectable viral loads had lower RPV troughs. No such increase in the risk of birth defects has been observed with RPV.    TAF AUC is lower in pregnancy, depending on the dose and concomitant ARV, but overall exposures are adequate. pharmacokinetics of FTC are not significantly altered in pregnancy.    Based on this, could continue Odefsey with closer HIV RNA monitoring (ex: monthly) during second and third trimesters. If she develops heartburn and would like to treat it, recommend reaching out to check for drug interactions first. Could also switch to Tivicay and Descovy while pregnant which is now first-line for pregnancy. Take supplements at least 6 hours before or 4 hours after Tivicay if she does this. Will discuss with infectious disease provider.     Type 2 diabetes:   Follow ob/gyn plan of care    Supplements:   No interactions with current meds. Recommend against soursop tea since it's rated \"possibly unsafe\" on natural medicine database.       PLAN:                            Continue Odefsey once daily with a meal for now  Recheck HIV RNA at upcoming OB/GYN appointment     Follow-up: 3 months, sooner if needed    SUBJECTIVE/OBJECTIVE:                          Marina Mock is a 44 year old female called for an initial visit.     Reason for visit: " antiretroviral therapy in pregnancy question    Allergies/ADRs: Reviewed in chart  Past Medical History: Reviewed in chart  Tobacco: She reports that she has never smoked. She has never used smokeless tobacco.  Alcohol: no  Has two kids. Had HIV during both pregnancies.   Medication Adherence/Access: no issues reported      HIV:   Odefsey once daily with a meal. No side effects. Currently pregnant. She developed heartburn with previous pregnancies.     Previous regimens:   Raltegravir plus Truvada during first pregnancy in 2016. Switched to Tivicay plus Descovy to reduce pill burden after delivery. Then switched back to Isentress plus Descovy or Truvada followed by Odefsey. No side effects with any regimen.  Diagnosis: 2016 during pregnancy     Phenotype: no known mutations  HIV VL: not detected 5/8/24  CD4:776 on 5/8/24  Treponema Ab: recheck ordered    Type 2 diabetes:   Managed with diet. No current medications.     Hemoglobin A1C   Date Value Ref Range Status   05/08/2024 6.0 (H) <5.7 % Final     Comment:     Normal <5.7%   Prediabetes 5.7-6.4%    Diabetes 6.5% or higher     Note: Adopted from ADA consensus guidelines.   02/03/2021 7.2 (H) 0 - 5.6 % Final     Comment:     Normal <5.7% Prediabetes 5.7-6.4%  Diabetes 6.5% or higher - adopted from ADA   consensus guidelines.         Supplements:   Prenatal vitamin   Also taking zinc, vitaminC, vitaminD, and cod liver oil. Wonders if she can drink Soursop tea.  No reported issues at this time.     Today's Vitals: LMP 05/14/2024   ----------------    I spent 16 minutes with this patient today. All changes were made via collaborative practice agreement with Dr. Zelaya. A copy of the visit note was provided to the patient's provider(s).    A summary of these recommendations was sent via [employer].      Telemedicine Visit Details  Type of service:  Telephone visit  Start Time:  2:30 pm  End Time:  2:46 pm     Medication Therapy Recommendations  No medication therapy  recommendations to display

## 2024-07-17 NOTE — PROGRESS NOTES
SUBJECTIVE:     HPI:    This is a 44 year old female patient,  who presents for her first obstetrical visit.    MARLENE: 2025, by Last Menstrual Period.  She is 9w1d weeks.  Her cycles are regular.  Her last menstrual period was normal.   Since her LMP, she has experienced  fatigue, vaginal bleeding, and constipation).   She denies nausea, emesis, abdominal pain, headache, loss of appetite, vaginal discharge, and abdominal pain/cramping.    Additional History: -AMA, HIV positive managed by ID and Odefsey- undetected RNA quant in May  - TypeII DM diet/oral meds controlled - but did stop her oral and instructed to contact PCP on this! HgbA1C 6.0 in May  -latent TB hx  - x3 - 1st child  in [facility] at 8 mo old of infection; daughter and son at home - both HIV negative - pt had HIV and Type II DM during both pregnancies  -RN encouraged to inform her ID and PCP of new pregnancy which she will  -patient delivered at [place] w  2 but really wants to deliver at [facility].  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      She has a history of    - Type II DM w both    Since her last LMP she denies use of alcohol, tobacco and street drugs.    Past medical, surgical, social and family history were reviewed and updated in EPIC.    Current  Outpatient Medications   Medication Sig Dispense Refill    blood glucose (NO BRAND SPECIFIED) test strip Use to test blood sugar 4 times daily or as directed. Any covered brand that works with meter. 400 strip 3    blood glucose (NO BRAND SPECIFIED) test strip Use to test blood sugar 3-4 times per week in the morning. 100 each 3    blood glucose (ONETOUCH ULTRA) test strip USE TO TEST BLOOD SUGAR FOUR TIMES A DAY OR AS DIRECTED 400 strip 3    blood glucose monitoring (NO BRAND SPECIFIED) meter device kit Use to test blood sugar 4 times daily or as directed. Any Covered Brand. 1 kit 1    blood glucose monitoring (ULTRA THIN 30G) lancets 1 each daily Use to test blood sugar 3-4 times per week 100 each 3    emtricitabine-rilpivirine-tenofovir (ODEFSEY) 200-25-25 MG TABS per tablet Take 1 tablet by mouth daily 30 tablet 4    Lancets (ONETOUCH DELICA PLUS [id number]) MISC USE WITH LANCETING DEVICE 4 TIMES DAILY. 400 each 0    Prenatal Vit-Fe Fumarate-FA (PRENATAL VITAMIN PO) Take 1 tablet by mouth daily      thin (NO BRAND SPECIFIED) lancets Use with lanceting device. 4x daily. Any covered brand. 400 each 3    sitagliptin (JANUVIA) 25 MG tablet Take 1 tablet (25 mg) by mouth daily (Patient not taking: Reported on 5/8/2024) 90 tablet 3     No current facility-administered medications for this visit.       ROS:   12 point review of systems negative other than symptoms noted below or in the HPI.  Constitutional: Fatigue  Gastrointestinal: Constipation  Genitourinary: Spotting    Confirmed patient desires delivery at [facility] with the [facility] provider.    Nurse phone visit completed. Prenatal book and folder (containing standard educational hand-outs and brochures) will be given at next visit to patient. Information in folder reviewed over the phone. Questions answered. Brochure given on optional screening available to assess chromosomal anomalies. Pt desires NIPS. Pt advised to call the  clinic if she has any questions or concerns related to her pregnancy. Prenatal labs future ordered. New prenatal visit scheduled on 7/22/2024 with Dr Betts.  Sharon Escobedo RN on 7/17/2024 at 11:08 AM      Lab Results   Component Value Date    PAP  HPV ASC-US  HPV Positive - other HR AND HPV18 03/01/2023 03/01/2023       PHQ-9 score:        7/15/2024     3:33 PM   PHQ   PHQ-9 Total Score 6   Q9: Thoughts of better off dead/self-harm past 2 weeks Not at all         2/4/2019     2:47 PM 1/31/2020     1:26 PM 7/15/2024     3:35 PM   MARILU-7 SCORE   Total Score   0 (minimal anxiety)   Total Score 0 0 0     Patient supplied answers from flow sheet for:  Prenatal OB Questionnaire.  Past Medical History  Have you ever recieved care for your mental health? : No  Have you ever been in a major accident or suffered serious trauma?: No  Within the last year, has anyone hit, slapped, kicked or otherwise hurt you?: No  In the last year, has anyone forced you to have sex when you didn't want to?: No    Past Medical History 2   Have you ever received a blood transfusion?: No  Would you accept a blood transfusion if was medically recommended?: (!) No  Does anyone in your home smoke?: No   Is your blood type Rh negative?: Unknown  Have you ever ?: (!) Yes  Have you been hospitalized for a nonsurgical reason excluding normal delivery?: Unknown  Have you ever had an abnormal pap smear?: (!) Yes    Past Medical History (Continued)  Do you have a history of abnormalities of the uterus?: No  Did your mother take ATIF or any other hormones when she was pregnant with you?: Unknown  Do you have any other problems we have not asked about which you feel may be important to this pregnancy?: No

## 2024-07-17 NOTE — TELEPHONE ENCOUNTER
[facility] Call Center    Phone Message    May a detailed message be left on voicemail: yes     Reason for Call: Other: Pt call to inform care team and provider that pt is currently pregnant. She stated she would like to connect with care team regarding any medication and follow-up apt. Please advise. Thank you      Action Taken: Other: ID    Travel Screening: Not Applicable     Date of Service:

## 2024-07-18 NOTE — TELEPHONE ENCOUNTER
Aminata Zelaya MD Cooper, Beatrice G RN; [facility] Infectious Disease Adult [facility] Rn15 hours ago (5:27 PM)     AL  Can you please fit her onto my schedule for the next 2 weeks?  Thanks  aminata       Made an appt 7/24 with Dr. Zelaya and called patient left information on VM per patient request.    Also sent [employer] message to let her know that appt was scheduled.       Umair Bustamante RN  Infectious Disease on 7/18/2024 at 9:01 AM

## 2024-07-22 ENCOUNTER — ANCILLARY PROCEDURE (OUTPATIENT)
Dept: ULTRASOUND IMAGING | Facility: CLINIC | Age: 44
End: 2024-07-22
Payer: COMMERCIAL

## 2024-07-22 ENCOUNTER — PRENATAL OFFICE VISIT (OUTPATIENT)
Dept: OBGYN | Facility: CLINIC | Age: 44
End: 2024-07-22
Payer: COMMERCIAL

## 2024-07-22 VITALS
WEIGHT: 117.8 LBS | HEIGHT: 61 IN | DIASTOLIC BLOOD PRESSURE: 62 MMHG | BODY MASS INDEX: 22.24 KG/M2 | SYSTOLIC BLOOD PRESSURE: 119 MMHG

## 2024-07-22 DIAGNOSIS — O36.80X0 PREGNANCY WITH INCONCLUSIVE FETAL VIABILITY: ICD-10-CM

## 2024-07-22 DIAGNOSIS — O36.80X0 PREGNANCY WITH INCONCLUSIVE FETAL VIABILITY, SINGLE OR UNSPECIFIED FETUS: Primary | ICD-10-CM

## 2024-07-22 DIAGNOSIS — O09.91 SUPERVISION OF HIGH RISK PREGNANCY IN FIRST TRIMESTER: ICD-10-CM

## 2024-07-22 PROCEDURE — 76801 OB US < 14 WKS SINGLE FETUS: CPT | Performed by: OBSTETRICS & GYNECOLOGY

## 2024-07-22 PROCEDURE — 99203 OFFICE O/P NEW LOW 30 MIN: CPT | Performed by: OBSTETRICS & GYNECOLOGY

## 2024-07-22 NOTE — PROGRESS NOTES
SUBJECTIVE:      HPI:     This is a 44 year old female patient,  who presents for her first obstetrical visit.     MARLENE: 2025, by Last Menstrual Period.  On  patient is 9w1d at time of virtual nurse visit.  Her cycles are regular.  Her last menstrual period was normal.   Since her LMP, she has experienced  fatigue, vaginal bleeding, and constipation).   She denies nausea, emesis, abdominal pain, headache, loss of appetite, vaginal discharge, and abdominal pain/cramping.     Additional History: -AMA, HIV positive managed by ID and Odefsey- undetected RNA quant in May  - TypeII DM diet/oral meds controlled - but did stop her oral and instructed to contact PCP on this! HgbA1C 6.0 in May. Follow with Dr. Sheikh  -latent TB hx  - x3 - 1st child  in [facility] at 8 mo old of infection; daughter and son at home - both HIV negative - pt had HIV and Type II DM during both pregnancies  -RN encouraged to inform her ID and PCP of new pregnancy which she will  -patient delivered at [place] w 1st 2 but really wants to deliver at [facility].  -has been dealing w extreme constipation--Has stopped since Nurse visit.  -on/off brown vaginal spotting; no pain or cramping    Has been having on and off spotting last couple weeks.  Last week no spotting.         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        She has a history of    - Type II DM w both     Since her last LMP she denies use of alcohol, tobacco and street drugs.     Past medical, surgical, social and family history were reviewed and updated in EPIC.     Current Facility-Administered Medications          Current Outpatient Medications   Medication Sig Dispense Refill    blood glucose (NO BRAND SPECIFIED) test strip Use to test blood sugar 4 times daily or as directed. Any covered brand that works with meter. 400 strip 3    blood glucose (NO BRAND SPECIFIED) test strip Use to test blood sugar 3-4 times per week in the morning. 100 each 3    blood glucose (ONETOUCH ULTRA) test strip USE TO TEST BLOOD SUGAR FOUR TIMES A DAY OR AS DIRECTED 400 strip 3    blood glucose monitoring (NO BRAND SPECIFIED) meter device kit Use to test blood sugar 4 times daily or as directed. Any Covered Brand. 1 kit 1    blood glucose monitoring (ULTRA THIN 30G) lancets 1 each daily Use to test blood sugar 3-4 times per week 100 each 3    emtricitabine-rilpivirine-tenofovir (ODEFSEY) 200-25-25 MG TABS per tablet Take 1 tablet by mouth daily 30 tablet 4    Lancets (ONETOUCH DELICA PLUS [id number]) MISC USE WITH LANCETING DEVICE 4 TIMES DAILY. 400 each 0    Prenatal Vit-Fe Fumarate-FA (PRENATAL VITAMIN PO) Take 1 tablet by mouth daily        thin (NO BRAND SPECIFIED) lancets Use with lanceting device. 4x daily. Any covered brand. 400 each 3    sitagliptin (JANUVIA) 25 MG tablet Take 1 tablet (25 mg) by mouth daily (Patient not taking: Reported on 2024) 90 tablet 3      No current facility-administered medications for this visit.            ROS:   12 point review of systems negative other than symptoms noted below or in the HPI.  Constitutional: Fatigue  Gastrointestinal: Constipation  Genitourinary: Spotting     Confirmed patient desires delivery at [facility] with the [facility] provider.     Nurse phone visit completed. Prenatal book and  folder (containing standard educational hand-outs and brochures) will be given at next visit to patient. Information in folder reviewed over the phone. Questions answered. Brochure given on optional screening available to assess chromosomal anomalies. Pt desires NIPS. Pt advised to call the clinic if she has any questions or concerns related to her pregnancy. Prenatal labs future ordered. New prenatal visit scheduled on 7/22/2024 with Dr Betts.  Sharon Escobedo RN on 7/17/2024 at 11:08 AM              Lab Results   Component Value Date     PAP/ HPV ASC-US; HPV: +other HR +HPV18 (not 16) 03/01/2023         PHQ-9 score:         7/15/2024     3:33 PM   PHQ   PHQ-9 Total Score 6   Q9: Thoughts of better off dead/self-harm past 2 weeks Not at all           2/4/2019     2:47 PM 1/31/2020     1:26 PM 7/15/2024     3:35 PM   MARILU-7 SCORE   Total Score     0 (minimal anxiety)   Total Score 0 0 0      Patient supplied answers from flow sheet for:  Prenatal OB Questionnaire.  Past Medical History  Have you ever recieved care for your mental health? : No  Have you ever been in a major accident or suffered serious trauma?: No  Within the last year, has anyone hit, slapped, kicked or otherwise hurt you?: No  In the last year, has anyone forced you to have sex when you didn't want to?: No     Past Medical History 2   Have you ever received a blood transfusion?: No  Would you accept a blood transfusion if was medically recommended?: (!) No  Does anyone in your home smoke?: No   Is your blood type Rh negative?: Unknown  Have you ever ?: (!) Yes  Have you been hospitalized for a nonsurgical reason excluding normal delivery?: Unknown  Have you ever had an abnormal pap smear?: (!) Yes     Past Medical History (Continued)  Do you have a history of abnormalities of the uterus?: No  Did your mother take ATIF or any other hormones when she was pregnant with you?: Unknown  Do you have any other problems we have not asked about which  "you feel may be important to this pregnancy?: No          OBJECTIVE:     EXAM:  /62   Ht 1.549 m (5' 1\")   Wt 53.4 kg (117 lb 12.8 oz)   LMP 2024   BMI 22.26 kg/m   Body mass index is 22.26 kg/m .    GENERAL: alert and no distress  EYES: Eyes grossly normal to inspection, PERRL and conjunctivae and sclerae normal  NECK: no adenopathy, no asymmetry, masses, or scars  RESP: lungs clear to auscultation - no rales, rhonchi or wheezes  CV: regular rate and rhythm, normal S1 S2, no S3 or S4, no murmur, click or rub, no peripheral edema  MS: no gross musculoskeletal defects noted, no edema  SKIN: no suspicious lesions or rashes  NEURO: Normal strength and tone, mentation intact and speech normal  PSYCH: mentation appears normal, affect normal/bright    ASSESSMENT/PLAN:       ICD-10-CM    1. Pregnancy with inconclusive fetal viability, single or unspecified fetus  O36.80X0 US OB < 14 Weeks Single          44 year old , with early pregnancy vs. Missed ab    PLAN/PATIENT INSTRUCTIONS:    We discussed bleeding in early pregnancy.  Additionally we reviewed US from today. GS measuring closer to 6 weeks vs. 9 weeks. Patient cycle/period were irregular, so it is possible conception occurred later than she thought. We reviewed that she is still early and that this pregnancy could be viable, but could also end in miscarriage.  Miscarriage precautions were reviewed with patient.  We discussed when to contact clinic vs when to go to ER.  We discussed that if she bleeds through more than 2 pads an hour for more than 2 hours I would recommend ER evaluation.  I would like her to follow-up in 2 weeks to re-evaluate the pregnancy.  I have recommended continuing with her prenatal vitamin.  We discussed that she does not need to limit her activities. Patient is in agreement with plan.      Encourage patient to keep appointment with ID in the event this pregnancy is viable.   Will need early MFM referral given high risk " nature of this pregnancy.    Norma Betts MD

## 2024-07-25 ENCOUNTER — TELEPHONE (OUTPATIENT)
Dept: INFECTIOUS DISEASES | Facility: CLINIC | Age: 44
End: 2024-07-25
Payer: COMMERCIAL

## 2024-07-25 NOTE — TELEPHONE ENCOUNTER
EP LVM 7/25 to resched 7/24 follow up with Dr. Zelaya, appt was cancelled by pt.       ----- Message from Aminata Zelaya sent at 7/24/2024  7:24 PM CDT -----  Regarding: reschedule  Hello,     Looks like this patient cancelled today due to no transportation. Can we work on rescheduling her and see if can help with transportation at all?     Thank you!   Aminata

## 2024-07-30 LAB
ABO/RH(D): NORMAL
ANTIBODY SCREEN: NEGATIVE
SPECIMEN EXPIRATION DATE: NORMAL

## 2024-07-31 ENCOUNTER — LAB (OUTPATIENT)
Dept: LAB | Facility: CLINIC | Age: 44
End: 2024-07-31
Payer: COMMERCIAL

## 2024-07-31 ENCOUNTER — OFFICE VISIT (OUTPATIENT)
Dept: INFECTIOUS DISEASES | Facility: CLINIC | Age: 44
End: 2024-07-31
Attending: STUDENT IN AN ORGANIZED HEALTH CARE EDUCATION/TRAINING PROGRAM
Payer: COMMERCIAL

## 2024-07-31 VITALS
TEMPERATURE: 98.7 F | HEART RATE: 109 BPM | WEIGHT: 118.1 LBS | HEIGHT: 61 IN | SYSTOLIC BLOOD PRESSURE: 135 MMHG | BODY MASS INDEX: 22.3 KG/M2 | DIASTOLIC BLOOD PRESSURE: 81 MMHG

## 2024-07-31 DIAGNOSIS — B20 HUMAN IMMUNODEFICIENCY VIRUS (HIV) DISEASE (H): Primary | ICD-10-CM

## 2024-07-31 DIAGNOSIS — B20 HUMAN IMMUNODEFICIENCY VIRUS (HIV) DISEASE (H): ICD-10-CM

## 2024-07-31 DIAGNOSIS — Z13.79 GENETIC SCREENING: ICD-10-CM

## 2024-07-31 DIAGNOSIS — L70.0 ACNE VULGARIS: ICD-10-CM

## 2024-07-31 DIAGNOSIS — O09.529 SUPERVISION OF HIGH-RISK PREGNANCY OF ELDERLY MULTIGRAVIDA: ICD-10-CM

## 2024-07-31 DIAGNOSIS — Z3A.08 8 WEEKS GESTATION OF PREGNANCY: ICD-10-CM

## 2024-07-31 DIAGNOSIS — O09.91 SUPERVISION OF HIGH RISK PREGNANCY IN FIRST TRIMESTER: ICD-10-CM

## 2024-07-31 LAB
ALBUMIN UR-MCNC: NEGATIVE MG/DL
APPEARANCE UR: CLEAR
BILIRUB UR QL STRIP: NEGATIVE
COLOR UR AUTO: NORMAL
ERYTHROCYTE [DISTWIDTH] IN BLOOD BY AUTOMATED COUNT: 17.3 % (ref 10–15)
GLUCOSE UR STRIP-MCNC: NEGATIVE MG/DL
HCT VFR BLD AUTO: 31.9 % (ref 35–47)
HGB BLD-MCNC: 10.5 G/DL (ref 11.7–15.7)
HGB UR QL STRIP: NEGATIVE
KETONES UR STRIP-MCNC: NEGATIVE MG/DL
LEUKOCYTE ESTERASE UR QL STRIP: NEGATIVE
MCH RBC QN AUTO: 23.4 PG (ref 26.5–33)
MCHC RBC AUTO-ENTMCNC: 32.9 G/DL (ref 31.5–36.5)
MCV RBC AUTO: 71 FL (ref 78–100)
NITRATE UR QL: NEGATIVE
PH UR STRIP: 6.5 [PH] (ref 5–7)
PLATELET # BLD AUTO: 294 10E3/UL (ref 150–450)
RBC # BLD AUTO: 4.49 10E6/UL (ref 3.8–5.2)
SP GR UR STRIP: 1.01 (ref 1–1.03)
UROBILINOGEN UR STRIP-MCNC: NORMAL MG/DL
WBC # BLD AUTO: 4.9 10E3/UL (ref 4–11)

## 2024-07-31 PROCEDURE — 86803 HEPATITIS C AB TEST: CPT | Performed by: OBSTETRICS & GYNECOLOGY

## 2024-07-31 PROCEDURE — 81003 URINALYSIS AUTO W/O SCOPE: CPT | Performed by: PATHOLOGY

## 2024-07-31 PROCEDURE — G2211 COMPLEX E/M VISIT ADD ON: HCPCS | Performed by: STUDENT IN AN ORGANIZED HEALTH CARE EDUCATION/TRAINING PROGRAM

## 2024-07-31 PROCEDURE — 87389 HIV-1 AG W/HIV-1&-2 AB AG IA: CPT | Performed by: OBSTETRICS & GYNECOLOGY

## 2024-07-31 PROCEDURE — 85027 COMPLETE CBC AUTOMATED: CPT | Performed by: PATHOLOGY

## 2024-07-31 PROCEDURE — 87340 HEPATITIS B SURFACE AG IA: CPT | Performed by: OBSTETRICS & GYNECOLOGY

## 2024-07-31 PROCEDURE — 99214 OFFICE O/P EST MOD 30 MIN: CPT | Mod: GC | Performed by: STUDENT IN AN ORGANIZED HEALTH CARE EDUCATION/TRAINING PROGRAM

## 2024-07-31 PROCEDURE — 86762 RUBELLA ANTIBODY: CPT | Performed by: OBSTETRICS & GYNECOLOGY

## 2024-07-31 PROCEDURE — 86780 TREPONEMA PALLIDUM: CPT | Performed by: OBSTETRICS & GYNECOLOGY

## 2024-07-31 PROCEDURE — 87086 URINE CULTURE/COLONY COUNT: CPT | Performed by: OBSTETRICS & GYNECOLOGY

## 2024-07-31 PROCEDURE — 36415 COLL VENOUS BLD VENIPUNCTURE: CPT | Performed by: PATHOLOGY

## 2024-07-31 PROCEDURE — 99000 SPECIMEN HANDLING OFFICE-LAB: CPT | Performed by: PATHOLOGY

## 2024-07-31 PROCEDURE — 86900 BLOOD TYPING SEROLOGIC ABO: CPT

## 2024-07-31 PROCEDURE — 99213 OFFICE O/P EST LOW 20 MIN: CPT | Performed by: STUDENT IN AN ORGANIZED HEALTH CARE EDUCATION/TRAINING PROGRAM

## 2024-07-31 ASSESSMENT — PAIN SCALES - GENERAL: PAINLEVEL: MODERATE PAIN (5)

## 2024-07-31 NOTE — PATIENT INSTRUCTIONS
Marina was seen today at the  Infectious Diseases clinic [facility] for follow up.    The following is a brief outline of the plan as we discussed during the visit:   - continue Odyssey  - we will monitor viral load closely in pregnancy, we may need to switch to doletegravir + descovey if noting increased viral load in 2nd to third trimester.    - will touch base in September about linking to [facility]      We ordered the following laboratory tests:   - HIV viral load    We will send a letter to you and your primary care provider summarizing our recommendations and the results of any testing performed today. Meanwhile feel free to contact our clinic at any time with questions and clarifications.    Thank you,    Aminata Zelaya MD  Infectious Diseases Fellow     Infectious Diseases Clinic   [facility]     Contact info:  Clinic Coordinator: [phone]  Clinic Fax: [phone]    -------------------------------------------------------------------------------------------------------  Medical Records: [phone]  Financial Counselor (Billing and Insurance Questions): [phone]  Prior Authorizations: [phone]
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
## 2024-07-31 NOTE — NURSING NOTE
"Chief Complaint   Patient presents with    RECHECK     Follow up with B20     /81   Pulse 109   Temp 98.7  F (37.1  C) (Oral)   Ht 1.549 m (5' 1\")   Wt 53.6 kg (118 lb 1.6 oz)   LMP 05/14/2024   BMI 22.31 kg/m    Susannah Hammer, Lead CMA  7/31/2024 1:06 PM    "
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
## 2024-08-01 ENCOUNTER — TELEPHONE (OUTPATIENT)
Dept: INFECTIOUS DISEASES | Facility: CLINIC | Age: 44
End: 2024-08-01
Payer: COMMERCIAL

## 2024-08-01 LAB
BACTERIA UR CULT: NO GROWTH
HBV SURFACE AG SERPL QL IA: NONREACTIVE
HCV AB SERPL QL IA: NONREACTIVE
HIV 1+2 AB+HIV1 P24 AG SERPL QL IA: REACTIVE
RUBV IGG SERPL QL IA: 16 INDEX
RUBV IGG SERPL QL IA: POSITIVE
T PALLIDUM AB SER QL: NONREACTIVE

## 2024-08-03 NOTE — PROGRESS NOTES
[facility] Staff Note: Ms. Mock was seen, examined, and the case was discussed with Dr. Zelaya, ID Fellow -- I agree with her interval history and examination, assessment and plan in this outpatient ID / HIV Progress note. This note reflects my observations and opinions and the plan outlined fully reflects my approach. I have reviewed the available history, radiology, laboratory results, and reports with the Fellow.

## 2024-08-05 ENCOUNTER — OFFICE VISIT (OUTPATIENT)
Dept: OBGYN | Facility: CLINIC | Age: 44
End: 2024-08-05
Attending: OBSTETRICS & GYNECOLOGY
Payer: COMMERCIAL

## 2024-08-05 ENCOUNTER — ANCILLARY PROCEDURE (OUTPATIENT)
Dept: ULTRASOUND IMAGING | Facility: CLINIC | Age: 44
End: 2024-08-05
Attending: OBSTETRICS & GYNECOLOGY
Payer: COMMERCIAL

## 2024-08-05 VITALS
HEIGHT: 61 IN | DIASTOLIC BLOOD PRESSURE: 72 MMHG | BODY MASS INDEX: 22.2 KG/M2 | WEIGHT: 117.6 LBS | SYSTOLIC BLOOD PRESSURE: 116 MMHG

## 2024-08-05 DIAGNOSIS — O36.80X0 PREGNANCY WITH INCONCLUSIVE FETAL VIABILITY, SINGLE OR UNSPECIFIED FETUS: ICD-10-CM

## 2024-08-05 DIAGNOSIS — O36.80X0 PREGNANCY WITH INCONCLUSIVE FETAL VIABILITY, SINGLE OR UNSPECIFIED FETUS: Primary | ICD-10-CM

## 2024-08-05 PROCEDURE — 76801 OB US < 14 WKS SINGLE FETUS: CPT

## 2024-08-05 PROCEDURE — 99213 OFFICE O/P EST LOW 20 MIN: CPT | Performed by: OBSTETRICS & GYNECOLOGY

## 2024-08-05 NOTE — PROGRESS NOTES
SUBJECTIVE:                                                   Marina Mock is a 44 year old female who presents to clinic today for the following health issue(s):  Patient presents with:  Viablity Check    HPI:  Patient here for viability check  She denies any vaginal bleeding.  Still reporting pregnancy symptoms such as nausea and fatigue.    US done today is still pending.  Preliminary images show gestational sac measuring 2.6 cm. No fetal pole or yolk sac identified.  Patient is very sad about this finding as this was a desired pregnancy.    Patient's last menstrual period was 05/14/2024..         Today's PHQ-2 Score:       6/25/2024     4:19 PM   PHQ-2 ( 1999 [employer])   Q1: Little interest or pleasure in doing things 0   Q2: Feeling down, depressed or hopeless 0   PHQ-2 Score 0   Q1: Little interest or pleasure in doing things Not at all   Q2: Feeling down, depressed or hopeless Not at all   PHQ-2 Score 0     Today's PHQ-9 Score:       7/15/2024     3:33 PM   PHQ-9 SCORE   PHQ-9 Total Score MyChart 6 (Mild depression)   PHQ-9 Total Score 6     Today's MARILU-7 Score:       7/15/2024     3:35 PM   MARILU-7 SCORE   Total Score 0 (minimal anxiety)   Total Score 0       Problem list and histories reviewed & adjusted, as indicated.  Additional history: as documented.    Patient Active Problem List   Diagnosis    Human immunodeficiency virus (HIV) disease (H)    Papanicolaou smear of cervix with low grade squamous intraepithelial lesion (LGSIL)    LTBI (latent tuberculosis infection)    Iron deficiency anemia secondary to inadequate dietary iron intake    Type 2 diabetes mellitus, without long-term current use of insulin (H)    Supervision of high-risk pregnancy of elderly multigravida     Past Surgical History:   Procedure Laterality Date    NO HISTORY OF SURGERY        Social History     Tobacco Use    Smoking status: Never    Smokeless tobacco: Never   Substance Use Topics    Alcohol use: No      Problem (# of  "Occurrences) Relation (Name,Age of Onset)    Diabetes (1) Mother    Hypertension (2) Father, Other    Family History Negative (1) Other    Coronary Artery Disease (1) Father           Negative family history of: Cancer, Macular Degeneration, Retinal detachment              Current Outpatient Medications   Medication Sig Dispense Refill    Ascorbic Acid (VITAMIN C) 500 MG CAPS Take 1 tablet by mouth daily      blood glucose (NO BRAND SPECIFIED) test strip Use to test blood sugar 4 times daily or as directed. Any covered brand that works with meter. 400 strip 3    blood glucose monitoring (NO BRAND SPECIFIED) meter device kit Use to test blood sugar 4 times daily or as directed. Any Covered Brand. 1 kit 1    cod liver oil CAPS capsule Take 1 capsule by mouth daily      emtricitabine-rilpivirine-tenofovir (ODEFSEY) 200-25-25 MG TABS per tablet Take 1 tablet by mouth daily 30 tablet 4    Prenatal Vit-Fe Fumarate-FA (PRENATAL VITAMIN PO) Take 1 tablet by mouth daily      thin (NO BRAND SPECIFIED) lancets Use with lanceting device. 4x daily. Any covered brand. 400 each 3    Vitamin D3 (VITAMIN D, CHOLECALCIFEROL,) 25 mcg (1000 units) tablet Take 1 tablet by mouth daily      zinc gluconate 50 MG tablet Take 50 mg by mouth daily       No current facility-administered medications for this visit.     No Known Allergies    ROS:  12 point review of systems negative other than symptoms noted below or in the HPI.      OBJECTIVE:     /72   Ht 1.549 m (5' 1\")   Wt 53.3 kg (117 lb 9.6 oz)   LMP 05/14/2024   BMI 22.22 kg/m    Body mass index is 22.22 kg/m .    Exam:  Constitutional:  Appearance: Well nourished, well developed alert, in no acute distress  Psychiatric:  Mentation appears normal and affect normal/bright.     In-Clinic Test Results:  No results found for this or any previous visit (from the past 24 hour(s)).    ASSESSMENT/PLAN:                                                        ICD-10-CM    1. Pregnancy " with inconclusive fetal viability, single or unspecified fetus  O36.80X0 US OB <14 Weeks w Transvaginal Single          Discussed US results today consistent with non-viable pregnancy.   Discussed options for management.   Patient prefers expectant management at this time and would like to repeat US in 2 weeks to confirm that pregnancy is not viable.  We also discussed both medical and surgical management. Patient states that if still non-viable at next US or if miscarriage does not occur on its own she prefers medication management. Information on both medical and surgical management options given to patient today.  Follow-up in 2 weeks or sooner if needed.    Norma Betts MD  [facility]

## 2024-08-10 ENCOUNTER — NURSE TRIAGE (OUTPATIENT)
Dept: NURSING | Facility: CLINIC | Age: 44
End: 2024-08-10
Payer: COMMERCIAL

## 2024-08-10 NOTE — TELEPHONE ENCOUNTER
"  OB Triage Call      Is patient's OB/Midwife with the formerly [facility] or [facility]? [facility]- Proceed with triage     Reason for call: Vaginal bleeding    Assessment: Brown bleeding earlier, was \"all over toilet paper\" and wiped again and there is just a small amount. Is having some left shoulder pain but has had since before she knew she was pregnant, and no changes.  She has had US establishing fetus is in uterus.  Denies abdominal pain, cramping.    Plan: See provider within three days.  She has a follow-up US scheduled.    Patient plans to deliver at      Patient's primary OB Provider is Dr. Betts.      Per protocol recommendations Patient to schedule follow up appointment within three days.      Is patient's delivering hospital on divert? Does not apply due to Patient to schedule appointment       12w4d    Estimated Date of Delivery: 2025        OB History    Para Term  AB Living   5 3 3 0 1 2   SAB IAB Ectopic Multiple Live Births   1 0 0 0 3      # Outcome Date GA Lbr Aden/2nd Weight Sex Type Anes PTL Lv   5 Current            4 Term 19 37w6d / 00:14 3.37 kg (7 lb 6.9 oz) M Vag-Spont  N LULU      Name: Cara      Apgar1: 8  Apgar5: 8   3 Term 16 38w6d 01:22 / 00:41 2.48 kg (5 lb 7.5 oz) F Vag-Spont Local, EPI N LULU      Name: Anel       Apgar1: 8  Apgar5: 9   2 SAB 08/19/15           1 Term 07    F   N LIVE BIRTH      Name: Kori      Obstetric Comments   Hx of gdm       Lab Results   Component Value Date    GBS Negative 2019          Drea Conde, RN   Reason for Disposition   MILD vaginal bleeding (i.e., less than 1 pad / hour; less than patient's usual menstrual bleeding; not just spotting)    Additional Information   Negative: Shock suspected (e.g., cold/pale/clammy skin, too weak to stand, low BP, rapid pulse)   Negative: Difficult to awaken or acting confused (e.g., disoriented, slurred speech)   Negative: Passed out (i.e., lost " "consciousness, collapsed and was not responding)   Negative: Sounds like a life-threatening emergency to the triager   Negative: [1] Vaginal bleeding AND [2] pregnant 20 or more weeks   Negative: Not pregnant or pregnancy status unknown   Negative: SEVERE abdominal pain   Negative: SEVERE vaginal bleeding (e.g., soaking 2 pads per hour or large blood clots and present 2 or more hours)   Negative: SEVERE dizziness (e.g., unable to stand, requires support to walk, feels like passing out)   Negative: [1] MODERATE vaginal bleeding (e.g., soaking 1 pad per hour; clots) AND [2] present > 6 hours   Negative: [1] MODERATE vaginal bleeding (e.g., soaking 1 pad per hour; clots) AND [2] pregnant > 12 weeks   Negative: Passed tissue (e.g., gray-white)   Negative: Shoulder pain     Patient has had shoulder pain her whole pregnancy, and has had US establishing baby is in the uterus.   Negative: Pale skin (pallor) of new-onset or worsening   Negative: Patient sounds very sick or weak to the triager   Negative: [1] Constant abdominal pain AND [2] present > 2 hours   Negative: Fever 100.4 F (38.0 C) or higher   Negative: [1] Intermittent lower abdominal pain (e.g., cramping) AND [2] present > 24 hours   Negative: Prior history of \"ectopic pregnancy\" or previous tubal surgery (e.g., tubal ligation)   Negative: Pain or burning with passing urine (urination)   Negative: Using heparin (e.g., Lovenox) or other strong blood thinner, or known bleeding disorder (e.g., thrombocytopenia)   Negative: MODERATE vaginal bleeding (e.g., soaking 1 pad per hour; clots)   Negative: Has IUD    Protocols used: Pregnancy - Vaginal Bleeding Less Than 20 Weeks EGA-A-AH    "

## 2024-08-12 ENCOUNTER — TELEPHONE (OUTPATIENT)
Dept: OBGYN | Facility: CLINIC | Age: 44
End: 2024-08-12
Payer: COMMERCIAL

## 2024-08-12 DIAGNOSIS — O03.9 MISCARRIAGE: Primary | ICD-10-CM

## 2024-08-12 RX ORDER — OXYCODONE HYDROCHLORIDE 5 MG/1
5 TABLET ORAL EVERY 6 HOURS PRN
Qty: 10 TABLET | Refills: 0 | Status: SHIPPED | OUTPATIENT
Start: 2024-08-12 | End: 2024-08-15

## 2024-08-12 RX ORDER — ONDANSETRON 4 MG/1
4 TABLET, FILM COATED ORAL EVERY 8 HOURS PRN
Qty: 10 TABLET | Refills: 0 | Status: SHIPPED | OUTPATIENT
Start: 2024-08-12

## 2024-08-12 NOTE — TELEPHONE ENCOUNTER
Continued spotting, brown to red in color. Mild cramping.   Last US/OV was discussed probable non viable pregnancy.    Consulted with Dr. Betts-ginit scheduled is appropriate  Meds pended for Marina if needed.     Pt is aware of plan as well as s/s that would warrant evaluation in the Emergency room.    Marcelle Penn RN on 8/12/2024 at 10:49 AM

## 2024-08-12 NOTE — TELEPHONE ENCOUNTER
Caller reporting the following red-flag symptom(s): Pt noticed some spotting yesterday and is bleeding more today. Pt states that it is brownish blood and she has been cramping today.    Per the system red-flag symptom policy, patient was instructed to:  speak with a Registered Nurse    Action:  Message sent to the clinic, writer unable to reach triage nurses

## 2024-08-13 NOTE — PROGRESS NOTES
SUBJECTIVE:                                                   Marina Mock is a 44 year old female who presents to clinic today for the following health issue(s):  Patient presents with:  Miscarriage    HPI:  Patient was in ER late last night with vaginal bleeding and miscarriage. She states that she is still having some bleeding, but I has slowed. She is very tearful about the miscarriage.  She was noted to be anemic. She denies any dizziness.  US done in ER showed suspected clot in lower uterine segment.  Repeat US this afternoon showed, smaller clot within the cervix.    Patient's last menstrual period was 2024..     Patient is sexually active, .  Using none for contraception.   STD testing offered?  Declined   reports that she has never smoked. She has never used smokeless tobacco.        Health maintenance updated:  yes    Today's PHQ-2 Score:       2024     4:19 PM   PHQ-2 (  [employer])   Q1: Little interest or pleasure in doing things 0   Q2: Feeling down, depressed or hopeless 0   PHQ-2 Score 0   Q1: Little interest or pleasure in doing things Not at all   Q2: Feeling down, depressed or hopeless Not at all   PHQ-2 Score 0     Today's PHQ-9 Score:       7/15/2024     3:33 PM   PHQ-9 SCORE   PHQ-9 Total Score MyChart 6 (Mild depression)   PHQ-9 Total Score 6     Today's MARILU-7 Score:       7/15/2024     3:35 PM   MARILU-7 SCORE   Total Score 0 (minimal anxiety)   Total Score 0       Problem list and histories reviewed & adjusted, as indicated.  Additional history: as documented.    Patient Active Problem List   Diagnosis    Human immunodeficiency virus (HIV) disease (H)    Papanicolaou smear of cervix with low grade squamous intraepithelial lesion (LGSIL)    LTBI (latent tuberculosis infection)    Iron deficiency anemia secondary to inadequate dietary iron intake    Type 2 diabetes mellitus, without long-term current use of insulin (H)    Supervision of high-risk pregnancy of elderly  multigravida    Anemia due to acute blood loss     Past Surgical History:   Procedure Laterality Date    NO HISTORY OF SURGERY        Social History     Tobacco Use    Smoking status: Never    Smokeless tobacco: Never   Substance Use Topics    Alcohol use: No      Problem (# of Occurrences) Relation (Name,Age of Onset)    Diabetes (1) Mother    Hypertension (2) Father, Other    Family History Negative (1) Other    Coronary Artery Disease (1) Father           Negative family history of: Cancer, Macular Degeneration, Retinal detachment              Current Outpatient Medications   Medication Sig Dispense Refill    Ascorbic Acid (VITAMIN C) 500 MG CAPS Take 1 tablet by mouth daily      blood glucose (NO BRAND SPECIFIED) test strip Use to test blood sugar 4 times daily or as directed. Any covered brand that works with meter. 400 strip 3    blood glucose monitoring (NO BRAND SPECIFIED) meter device kit Use to test blood sugar 4 times daily or as directed. Any Covered Brand. 1 kit 1    cod liver oil CAPS capsule Take 1 capsule by mouth daily      doxycycline hyclate (VIBRAMYCIN) 100 MG capsule Take 1 capsule (100 mg) by mouth 2 times daily for 7 days 14 capsule 0    emtricitabine-rilpivirine-tenofovir (ODEFSEY) 200-25-25 MG TABS per tablet Take 1 tablet by mouth daily 30 tablet 4    methylergonovine (METHERGINE) 0.2 MG tablet Take 1 tablet (200 mcg) by mouth every 6 hours for 3 doses 3 tablet 0    ondansetron (ZOFRAN) 4 MG tablet Take 1 tablet (4 mg) by mouth every 8 hours as needed for nausea 10 tablet 0    thin (NO BRAND SPECIFIED) lancets Use with lanceting device. 4x daily. Any covered brand. 400 each 3    Vitamin D3 (VITAMIN D, CHOLECALCIFEROL,) 25 mcg (1000 units) tablet Take 1 tablet by mouth daily      zinc gluconate 50 MG tablet Take 50 mg by mouth daily       No current facility-administered medications for this visit.     No Known Allergies          OBJECTIVE:     /60   Pulse 106   Temp 98.2  F (36.8  " C)   Ht 1.549 m (5' 1\")   Wt 52.8 kg (116 lb 6.4 oz)   LMP 05/14/2024   SpO2 100%   Breastfeeding Unknown   BMI 21.99 kg/m    Body mass index is 21.99 kg/m .    Exam:  Constitutional:  Appearance: Well nourished, well developed alert, in no acute distress  Psychiatric:  Mentation appears normal and affect normal/bright.  Pelvic Exam:  External Genitalia:     Normal appearance for age, no discharge present, no tenderness present, no inflammatory lesions present, color normal  Vagina:     Normal vaginal vault without central or paravaginal defects, Blood/Clot noted in vaginal vault  Bladder:     Nontender to palpation  Urethra:   Urethral Body:  Urethra palpation normal, urethra structural support normal   Urethral Meatus:  No erythema or lesions present  Cervix:     Appearance healthy, no lesions present, nontender to palpation, Clot noted in cervix.  This was grasped with ring and gently twisted off. Scant bleeding noted.  Perineum:     Perineum within normal limits, no evidence of trauma, no rashes or skin lesions present  Anus:     Anus within normal limits, no hemorrhoids present  Inguinal Lymph Nodes:     No lymphadenopathy present  Pubic Hair:     Normal pubic hair distribution for age  Genitalia and Groin:     No rashes present, no lesions present, no areas of discoloration, no masses present     In-Clinic Test Results:  Results for orders placed or performed in visit on 08/19/24 (from the past 24 hour(s))   US OB <14 Weeks w Transvaginal Single    Narrative    Table formatting from the original result was not included.     US OB <14 Weeks w Transvaginal Single  Order #: [id number] Accession #: [id number]  Study Notes     Spurling, Brittnee on 8/19/2024 11:58 AM      Obstetrical Ultrasound Report  OB U/S  < 14 Weeks -  Transvaginal  [facility]  Referring Provider: Norma Betts MD   Sonographer: Brittnee Spurling, RDMS  Indication:  Viability check      Dating (mm/dd/yyyy):   LMP: " 05/14/2024            EDC:  02/18/2025            GA by LMP:           13w6d     Current Scan On:  8/19/2024            The calculation of the gestational age by current scan was based on CRL.     Biometry:  Endo Stripe (If no IUP)    22.4 mm                                                  Findings: Gestational sac not seen within endometrium, There is a clot   within the cervix measuring 3.9 x 3.2 x 4.2 cm     Maternal Structures:  Cervix: The cervix appears long and closed.  Right Ovary: Not visualized   Left Ovary: Wnl  Technique:Transvaginal Imaging performed  Impression:       3.9 cm clot seen within the cervix.  This has decreased in size from exam   for ER earlier today.  No intrauterine sac noted    Norma Betts MD          ASSESSMENT/PLAN:                                                        ICD-10-CM    1. Miscarriage  O03.9 doxycycline hyclate (VIBRAMYCIN) 100 MG capsule     methylergonovine (METHERGINE) 0.2 MG tablet      2. Anemia due to acute blood loss  D62           There are no Patient Instructions on file for this visit.    Discussed miscarriage with patient. She is very sad.  She is feeling better than she did when she was in ER.  Discussed will take likely 4-6 weeks before menstrual cycle resumes.  Recommend Doxycycline 100mg BID due to recent miscarriage  Additionally since there is still some clot, recommend Methergine, to help with any residual bleeding.  Reviewed ER precautions  Follow-up as needed with any concerns.    Norma Betts MD  [facility] [place]    (35 minutes was spent on the date of the encounter doing chart review, review of outside records, review and interpretation of pertinent test results, history and exam, documentation, patient counseling, and further activities as noted above.)

## 2024-08-19 ENCOUNTER — OFFICE VISIT (OUTPATIENT)
Dept: OBGYN | Facility: CLINIC | Age: 44
End: 2024-08-19
Attending: OBSTETRICS & GYNECOLOGY
Payer: COMMERCIAL

## 2024-08-19 ENCOUNTER — HOSPITAL ENCOUNTER (EMERGENCY)
Facility: CLINIC | Age: 44
Discharge: HOME | End: 2024-08-19
Attending: EMERGENCY MEDICINE | Admitting: EMERGENCY MEDICINE
Payer: COMMERCIAL

## 2024-08-19 ENCOUNTER — APPOINTMENT (OUTPATIENT)
Dept: ULTRASOUND IMAGING | Facility: CLINIC | Age: 44
End: 2024-08-19
Attending: EMERGENCY MEDICINE
Payer: COMMERCIAL

## 2024-08-19 ENCOUNTER — ANCILLARY PROCEDURE (OUTPATIENT)
Dept: ULTRASOUND IMAGING | Facility: CLINIC | Age: 44
End: 2024-08-19
Attending: OBSTETRICS & GYNECOLOGY
Payer: COMMERCIAL

## 2024-08-19 VITALS
OXYGEN SATURATION: 100 % | HEIGHT: 61 IN | WEIGHT: 116.4 LBS | HEART RATE: 106 BPM | TEMPERATURE: 98.2 F | SYSTOLIC BLOOD PRESSURE: 114 MMHG | BODY MASS INDEX: 21.98 KG/M2 | DIASTOLIC BLOOD PRESSURE: 60 MMHG

## 2024-08-19 VITALS
RESPIRATION RATE: 16 BRPM | DIASTOLIC BLOOD PRESSURE: 65 MMHG | SYSTOLIC BLOOD PRESSURE: 111 MMHG | OXYGEN SATURATION: 100 % | HEART RATE: 87 BPM | TEMPERATURE: 98.9 F

## 2024-08-19 DIAGNOSIS — O03.9 MISCARRIAGE: Primary | ICD-10-CM

## 2024-08-19 DIAGNOSIS — O36.80X0 PREGNANCY WITH INCONCLUSIVE FETAL VIABILITY, SINGLE OR UNSPECIFIED FETUS: ICD-10-CM

## 2024-08-19 DIAGNOSIS — O03.2 INCOMPLETE MISCARRIAGE WITH BLOOD CLOT: ICD-10-CM

## 2024-08-19 DIAGNOSIS — D62 ANEMIA DUE TO BLOOD LOSS, ACUTE: ICD-10-CM

## 2024-08-19 DIAGNOSIS — D62 ANEMIA DUE TO ACUTE BLOOD LOSS: ICD-10-CM

## 2024-08-19 LAB
ABO/RH(D): NORMAL
ALBUMIN SERPL BCG-MCNC: 3.8 G/DL (ref 3.5–5.2)
ALP SERPL-CCNC: 47 U/L (ref 40–150)
ALT SERPL W P-5'-P-CCNC: 9 U/L (ref 0–50)
ANION GAP SERPL CALCULATED.3IONS-SCNC: 13 MMOL/L (ref 7–15)
ANTIBODY SCREEN: NEGATIVE
AST SERPL W P-5'-P-CCNC: 13 U/L (ref 0–45)
BASOPHILS # BLD AUTO: 0 10E3/UL (ref 0–0.2)
BASOPHILS NFR BLD AUTO: 0 %
BILIRUB SERPL-MCNC: <0.2 MG/DL
BUN SERPL-MCNC: 6 MG/DL (ref 6–20)
CALCIUM SERPL-MCNC: 8.3 MG/DL (ref 8.8–10.4)
CHLORIDE SERPL-SCNC: 102 MMOL/L (ref 98–107)
CREAT SERPL-MCNC: 0.69 MG/DL (ref 0.51–0.95)
EGFRCR SERPLBLD CKD-EPI 2021: >90 ML/MIN/1.73M2
EOSINOPHIL # BLD AUTO: 0.1 10E3/UL (ref 0–0.7)
EOSINOPHIL NFR BLD AUTO: 1 %
ERYTHROCYTE [DISTWIDTH] IN BLOOD BY AUTOMATED COUNT: 16.6 % (ref 10–15)
GLUCOSE SERPL-MCNC: 127 MG/DL (ref 70–99)
HCG INTACT+B SERPL-ACNC: ABNORMAL MIU/ML
HCO3 SERPL-SCNC: 20 MMOL/L (ref 22–29)
HCT VFR BLD AUTO: 24.9 % (ref 35–47)
HGB BLD-MCNC: 7.9 G/DL (ref 11.7–15.7)
HGB BLD-MCNC: 7.9 G/DL (ref 11.7–15.7)
HGB BLD-MCNC: 8.2 G/DL (ref 11.7–15.7)
HOLD SPECIMEN: NORMAL
IMM GRANULOCYTES # BLD: 0 10E3/UL
IMM GRANULOCYTES NFR BLD: 0 %
LYMPHOCYTES # BLD AUTO: 2.1 10E3/UL (ref 0.8–5.3)
LYMPHOCYTES NFR BLD AUTO: 21 %
MCH RBC QN AUTO: 24.7 PG (ref 26.5–33)
MCHC RBC AUTO-ENTMCNC: 32.9 G/DL (ref 31.5–36.5)
MCV RBC AUTO: 75 FL (ref 78–100)
MONOCYTES # BLD AUTO: 0.6 10E3/UL (ref 0–1.3)
MONOCYTES NFR BLD AUTO: 6 %
NEUTROPHILS # BLD AUTO: 7.1 10E3/UL (ref 1.6–8.3)
NEUTROPHILS NFR BLD AUTO: 72 %
NRBC # BLD AUTO: 0 10E3/UL
NRBC BLD AUTO-RTO: 0 /100
PLATELET # BLD AUTO: 208 10E3/UL (ref 150–450)
POTASSIUM SERPL-SCNC: 3.9 MMOL/L (ref 3.4–5.3)
PROT SERPL-MCNC: 6.6 G/DL (ref 6.4–8.3)
RBC # BLD AUTO: 3.32 10E6/UL (ref 3.8–5.2)
SODIUM SERPL-SCNC: 135 MMOL/L (ref 135–145)
SPECIMEN EXPIRATION DATE: NORMAL
WBC # BLD AUTO: 9.9 10E3/UL (ref 4–11)

## 2024-08-19 PROCEDURE — 99459 PELVIC EXAMINATION: CPT | Performed by: OBSTETRICS & GYNECOLOGY

## 2024-08-19 PROCEDURE — 250N000011 HC RX IP 250 OP 636: Performed by: EMERGENCY MEDICINE

## 2024-08-19 PROCEDURE — 84702 CHORIONIC GONADOTROPIN TEST: CPT | Performed by: EMERGENCY MEDICINE

## 2024-08-19 PROCEDURE — 76817 TRANSVAGINAL US OBSTETRIC: CPT | Performed by: OBSTETRICS & GYNECOLOGY

## 2024-08-19 PROCEDURE — 85018 HEMOGLOBIN: CPT | Performed by: EMERGENCY MEDICINE

## 2024-08-19 PROCEDURE — 86900 BLOOD TYPING SEROLOGIC ABO: CPT | Performed by: EMERGENCY MEDICINE

## 2024-08-19 PROCEDURE — 80053 COMPREHEN METABOLIC PANEL: CPT | Performed by: EMERGENCY MEDICINE

## 2024-08-19 PROCEDURE — 36415 COLL VENOUS BLD VENIPUNCTURE: CPT | Performed by: EMERGENCY MEDICINE

## 2024-08-19 PROCEDURE — 76801 OB US < 14 WKS SINGLE FETUS: CPT

## 2024-08-19 PROCEDURE — 99214 OFFICE O/P EST MOD 30 MIN: CPT | Performed by: OBSTETRICS & GYNECOLOGY

## 2024-08-19 PROCEDURE — 85025 COMPLETE CBC W/AUTO DIFF WBC: CPT | Performed by: EMERGENCY MEDICINE

## 2024-08-19 PROCEDURE — 96374 THER/PROPH/DIAG INJ IV PUSH: CPT

## 2024-08-19 PROCEDURE — 99285 EMERGENCY DEPT VISIT HI MDM: CPT | Mod: 25

## 2024-08-19 RX ORDER — HEPARIN SODIUM (PORCINE) LOCK FLUSH IV SOLN 100 UNIT/ML 100 UNIT/ML
5 SOLUTION INTRAVENOUS
OUTPATIENT
Start: 2024-08-26

## 2024-08-19 RX ORDER — HYDROMORPHONE HYDROCHLORIDE 1 MG/ML
0.5 INJECTION, SOLUTION INTRAMUSCULAR; INTRAVENOUS; SUBCUTANEOUS ONCE
Status: COMPLETED | OUTPATIENT
Start: 2024-08-19 | End: 2024-08-19

## 2024-08-19 RX ORDER — EPINEPHRINE 1 MG/ML
0.3 INJECTION, SOLUTION, CONCENTRATE INTRAVENOUS EVERY 5 MIN PRN
OUTPATIENT
Start: 2024-08-26

## 2024-08-19 RX ORDER — METHYLERGONOVINE MALEATE 0.2 MG/1
0.2 TABLET ORAL EVERY 6 HOURS
Qty: 3 TABLET | Refills: 0 | Status: SHIPPED | OUTPATIENT
Start: 2024-08-19 | End: 2024-08-20

## 2024-08-19 RX ORDER — ALBUTEROL SULFATE 90 UG/1
1-2 AEROSOL, METERED RESPIRATORY (INHALATION)
Start: 2024-08-26

## 2024-08-19 RX ORDER — MEPERIDINE HYDROCHLORIDE 25 MG/ML
25 INJECTION INTRAMUSCULAR; INTRAVENOUS; SUBCUTANEOUS EVERY 30 MIN PRN
OUTPATIENT
Start: 2024-08-26

## 2024-08-19 RX ORDER — HEPARIN SODIUM,PORCINE 10 UNIT/ML
5-20 VIAL (ML) INTRAVENOUS DAILY PRN
OUTPATIENT
Start: 2024-08-26

## 2024-08-19 RX ORDER — METHYLPREDNISOLONE SODIUM SUCCINATE 125 MG/2ML
125 INJECTION, POWDER, LYOPHILIZED, FOR SOLUTION INTRAMUSCULAR; INTRAVENOUS
Start: 2024-08-26

## 2024-08-19 RX ORDER — DIPHENHYDRAMINE HYDROCHLORIDE 50 MG/ML
50 INJECTION INTRAMUSCULAR; INTRAVENOUS
Start: 2024-08-26

## 2024-08-19 RX ORDER — ALBUTEROL SULFATE 0.83 MG/ML
2.5 SOLUTION RESPIRATORY (INHALATION)
OUTPATIENT
Start: 2024-08-26

## 2024-08-19 RX ORDER — DOXYCYCLINE 100 MG/1
100 CAPSULE ORAL 2 TIMES DAILY
Qty: 14 CAPSULE | Refills: 0 | Status: SHIPPED | OUTPATIENT
Start: 2024-08-19 | End: 2024-08-26

## 2024-08-19 RX ADMIN — HYDROMORPHONE HYDROCHLORIDE 0.5 MG: 1 INJECTION, SOLUTION INTRAMUSCULAR; INTRAVENOUS; SUBCUTANEOUS at 03:13

## 2024-08-19 ASSESSMENT — COLUMBIA-SUICIDE SEVERITY RATING SCALE - C-SSRS
1. IN THE PAST MONTH, HAVE YOU WISHED YOU WERE DEAD OR WISHED YOU COULD GO TO SLEEP AND NOT WAKE UP?: NO
6. HAVE YOU EVER DONE ANYTHING, STARTED TO DO ANYTHING, OR PREPARED TO DO ANYTHING TO END YOUR LIFE?: NO
2. HAVE YOU ACTUALLY HAD ANY THOUGHTS OF KILLING YOURSELF IN THE PAST MONTH?: NO

## 2024-08-19 ASSESSMENT — ACTIVITIES OF DAILY LIVING (ADL)
ADLS_ACUITY_SCORE: 35

## 2024-08-19 NOTE — ED PROVIDER NOTES
Emergency Department Note      History of Present Illness     Chief Complaint   Vaginal Bleeding    HPI   Marina Mock is a 44 year old female 13w6d pregnant with a history of type 2 diabetes and HIV who presents to the ER for vaginal bleeding. Patient reports mild vaginal bleeding starting on the 12th and getting more severe in the last few days. She states that she was dizzy today and had the pain come back around 2100. Patient recalls passing two large clots soon after and had heavy bleeding since then. Marina has taken a pain pill. She was diagnosed recently with false pregnancy (on chart review this was a blighted ovum) and was scheduled to meet with her OB/Gyn later today about medications to pass it.  Took oxycodone before coming in.    Independent Historian   None    Review of External Notes   I reviewed the OB/GYN note from 8/5/24.     Past Medical History     Medical History and Problem List   HIV  Gestational diabetes  Malaria  Miscarriage  TB   Type 2 diabetes     Medications   Emtricitabine   Ondansetron   Prenatal   Odefsey  Physical Exam     Patient Vitals for the past 24 hrs:   BP Temp Temp src Pulse Resp SpO2   08/19/24 0015 111/65 98.9  F (37.2  C) Temporal 87 16 100 %       Lying Orthostatic BP  Lying Orthostatic BP: 134/71  Lying Orthostatic Pulse: 100 bpm  Sitting Orthostatic BP  Sitting Orthostatic BP: 127/71  Sitting Orthostatic Pulse: 95 bpm  Standing Orthostatic BP  Standing Orthostatic BP: 125/62  Standing Orthostatic Pulse: 100 bpm     Physical Exam    Resp:               Non-labored  Neuro:             Alert and cooperative  MSkel:             Moving all extremities  Skin:                No rash  Pelvic:              Blood clot visible at introitus, dried blood on legs, gush of blood when placing speculum into vagina, multiple clots removed.  No tissue removed that appears consistent with POC.  Os visualized with clot appearing deep to wear I can get.  Slow bleeding from os.        Diagnostics     Lab Results   Labs Ordered and Resulted from Time of ED Arrival to Time of ED Departure   COMPREHENSIVE METABOLIC PANEL - Abnormal       Result Value    Sodium 135      Potassium 3.9      Carbon Dioxide (CO2) 20 (*)     Anion Gap 13      Urea Nitrogen 6.0      Creatinine 0.69      GFR Estimate >90      Calcium 8.3 (*)     Chloride 102      Glucose 127 (*)     Alkaline Phosphatase 47      AST 13      ALT 9      Protein Total 6.6      Albumin 3.8      Bilirubin Total <0.2     HCG QUANTITATIVE PREGNANCY - Abnormal    hCG Quantitative 16,776 (*)    CBC WITH PLATELETS AND DIFFERENTIAL - Abnormal    WBC Count 9.9      RBC Count 3.32 (*)     Hemoglobin 8.2 (*)     Hematocrit 24.9 (*)     MCV 75 (*)     MCH 24.7 (*)     MCHC 32.9      RDW 16.6 (*)     Platelet Count 208      % Neutrophils 72      % Lymphocytes 21      % Monocytes 6      % Eosinophils 1      % Basophils 0      % Immature Granulocytes 0      NRBCs per 100 WBC 0      Absolute Neutrophils 7.1      Absolute Lymphocytes 2.1      Absolute Monocytes 0.6      Absolute Eosinophils 0.1      Absolute Basophils 0.0      Absolute Immature Granulocytes 0.0      Absolute NRBCs 0.0     HEMOGLOBIN - Abnormal    Hemoglobin 7.9 (*)    HEMOGLOBIN - Abnormal    Hemoglobin 7.9 (*)    TYPE AND SCREEN, ADULT    ABO/RH(D) O POS      Antibody Screen Negative      SPECIMEN EXPIRATION DATE [id number]     ABO/RH TYPE AND SCREEN       Imaging   US OB < 14 Weeks Single   Final Result   IMPRESSION:    1.  Complex material in the lower uterine segment likely related to blood products. Retained products not excluded. Serial beta hCG and short-term follow-up to confirm resolution recommended.              Independent Interpretation   None    ED Course      Medications Administered   Medications   HYDROmorphone (PF) (DILAUDID) injection 0.5 mg (0.5 mg Intravenous $Given 8/19/24 9516)       Discussion of Management   None    ED Course   ED Course as of 08/19/24 4282    Mon Aug 19, 2024   0033 I obtained the history and examined the patient as noted above.    0038 I preformed the pelvic exam with a chaperone.    0312 I rechecked patient and explained findings and plan of care. Patient reports that her bleeding had stopped after the pelvic exam but started again at ultrasound when they were pushing on her abdomin.    0335 I spoke with Dr. Calles, OB/GYN, regarding the patient's presentation, findings, and plan of care.     0339 I rechecked patient and explained findings and plan of care.         Additional Documentation  None    Medical Decision Making / Diagnosis     CMS Diagnoses: None    MIPS       None    MDM   Pelvic exam performed as soon as supplies and personnel available as POC within the os can cause heavy bleeding which improves with removal.  However only clots removed with no POC noted.  Hemoglobin drop of 2 from 7/31/24.  Recheck 7.9 which included 1L IV fluids in between, essentially stable but given drop, will check again after US.  HCG up from prior but that was weeks ago so not a useful comparison.  US images reviewed prior to read, heterogenous material within uterus.  Third hemoglobin stable at 7.9.  Still above transfusion threshold.  Discussed with OB/Gyn.  If not getting significant dizziness or vital sign changes with orthostatics, then is okay to follow up in clinic today for reassessment.  However at any point if symptoms are worsening then return to ED.  Patient has been having some dizziness since getting dilaudid, notes pain improved after.  No worsening with orthostatics which were unremarkable as above.  No indication for emergent D&C at this point in time.  Understands plan.    Disposition   The patient was discharged.     Diagnosis     ICD-10-CM    1. Incomplete miscarriage with blood clot  O03.2       2. Anemia due to blood loss, acute  D62            Scribe Disclosure:  Rajan GRIMES, am serving as a scribe at 12:43 AM on 8/19/2024 to document  services personally performed by Emily Barboza MD based on my observations and the provider's statements to me.        Emily Barboza MD  08/19/24 [id number]

## 2024-08-19 NOTE — ED NOTES
Bed: ED22  Expected date:   Expected time:   Means of arrival:   Comments:  543 45F vaginal bleeding, miscarriage

## 2024-08-19 NOTE — ED TRIAGE NOTES
BIBA from outside an UC, as they were closed and pt is having vaginal bleeding. Large clots over the past few days, fist size clots. Pt was diagnosed recently with a false pregnancy. HIV +

## 2024-08-22 ENCOUNTER — APPOINTMENT (OUTPATIENT)
Dept: CT IMAGING | Facility: CLINIC | Age: 44
End: 2024-08-22
Attending: SOCIAL WORKER
Payer: COMMERCIAL

## 2024-08-22 ENCOUNTER — HOSPITAL ENCOUNTER (OUTPATIENT)
Facility: CLINIC | Age: 44
Setting detail: OBSERVATION
Discharge: HOME | End: 2024-08-23
Attending: SOCIAL WORKER | Admitting: STUDENT IN AN ORGANIZED HEALTH CARE EDUCATION/TRAINING PROGRAM
Payer: COMMERCIAL

## 2024-08-22 ENCOUNTER — APPOINTMENT (OUTPATIENT)
Dept: ULTRASOUND IMAGING | Facility: CLINIC | Age: 44
End: 2024-08-22
Attending: SOCIAL WORKER
Payer: COMMERCIAL

## 2024-08-22 DIAGNOSIS — D62 ANEMIA DUE TO BLOOD LOSS, ACUTE: ICD-10-CM

## 2024-08-22 DIAGNOSIS — D62 ANEMIA DUE TO ACUTE BLOOD LOSS: Primary | ICD-10-CM

## 2024-08-22 DIAGNOSIS — O03.4 RETAINED PRODUCTS OF CONCEPTION AFTER MISCARRIAGE: ICD-10-CM

## 2024-08-22 DIAGNOSIS — N71.9 ENDOMETRITIS: ICD-10-CM

## 2024-08-22 DIAGNOSIS — R94.31 PROLONGED QT INTERVAL: ICD-10-CM

## 2024-08-22 DIAGNOSIS — E87.20 LACTIC ACIDOSIS: ICD-10-CM

## 2024-08-22 PROBLEM — D64.9 ANEMIA: Status: ACTIVE | Noted: 2024-08-22

## 2024-08-22 LAB
ABO/RH(D): NORMAL
ALBUMIN SERPL BCG-MCNC: 4.2 G/DL (ref 3.5–5.2)
ALBUMIN UR-MCNC: 10 MG/DL
ALP SERPL-CCNC: 56 U/L (ref 40–150)
ALT SERPL W P-5'-P-CCNC: 17 U/L (ref 0–50)
ANION GAP SERPL CALCULATED.3IONS-SCNC: 13 MMOL/L (ref 7–15)
ANTIBODY SCREEN: NEGATIVE
APPEARANCE UR: CLEAR
AST SERPL W P-5'-P-CCNC: 40 U/L (ref 0–45)
BACTERIA #/AREA URNS HPF: ABNORMAL /HPF
BASE EXCESS BLDV CALC-SCNC: 1 MMOL/L (ref -3–3)
BASOPHILS # BLD AUTO: 0 10E3/UL (ref 0–0.2)
BASOPHILS NFR BLD AUTO: 0 %
BILIRUB SERPL-MCNC: 0.2 MG/DL
BILIRUB UR QL STRIP: NEGATIVE
BLD PROD TYP BPU: NORMAL
BLOOD COMPONENT TYPE: NORMAL
BUN SERPL-MCNC: 4.5 MG/DL (ref 6–20)
CALCIUM SERPL-MCNC: 9.4 MG/DL (ref 8.8–10.4)
CHLORIDE SERPL-SCNC: 99 MMOL/L (ref 98–107)
CODING SYSTEM: NORMAL
COLOR UR AUTO: ABNORMAL
CREAT SERPL-MCNC: 0.69 MG/DL (ref 0.51–0.95)
CROSSMATCH: NORMAL
D DIMER PPP FEU-MCNC: >20 UG/ML FEU (ref 0–0.5)
EGFRCR SERPLBLD CKD-EPI 2021: >90 ML/MIN/1.73M2
EOSINOPHIL # BLD AUTO: 0.1 10E3/UL (ref 0–0.7)
EOSINOPHIL NFR BLD AUTO: 1 %
ERYTHROCYTE [DISTWIDTH] IN BLOOD BY AUTOMATED COUNT: 16.6 % (ref 10–15)
GLUCOSE SERPL-MCNC: 111 MG/DL (ref 70–99)
GLUCOSE UR STRIP-MCNC: NEGATIVE MG/DL
HCG INTACT+B SERPL-ACNC: 9076 MIU/ML
HCG SERPL QL: POSITIVE
HCO3 BLDV-SCNC: 26 MMOL/L (ref 21–28)
HCO3 SERPL-SCNC: 23 MMOL/L (ref 22–29)
HCT VFR BLD AUTO: 22.5 % (ref 35–47)
HGB BLD-MCNC: 6.6 G/DL (ref 11.7–15.7)
HGB BLD-MCNC: 7.4 G/DL (ref 11.7–15.7)
HGB UR QL STRIP: ABNORMAL
IMM GRANULOCYTES # BLD: 0.1 10E3/UL
IMM GRANULOCYTES NFR BLD: 1 %
ISSUE DATE AND TIME: NORMAL
KETONES UR STRIP-MCNC: NEGATIVE MG/DL
LACTATE BLD-SCNC: 3.1 MMOL/L
LACTATE SERPL-SCNC: 3.5 MMOL/L (ref 0.7–2)
LEUKOCYTE ESTERASE UR QL STRIP: ABNORMAL
LYMPHOCYTES # BLD AUTO: 1.1 10E3/UL (ref 0.8–5.3)
LYMPHOCYTES NFR BLD AUTO: 10 %
MAGNESIUM SERPL-MCNC: 1.7 MG/DL (ref 1.7–2.3)
MCH RBC QN AUTO: 24.4 PG (ref 26.5–33)
MCHC RBC AUTO-ENTMCNC: 32.9 G/DL (ref 31.5–36.5)
MCV RBC AUTO: 74 FL (ref 78–100)
MONOCYTES # BLD AUTO: 0.3 10E3/UL (ref 0–1.3)
MONOCYTES NFR BLD AUTO: 3 %
MUCOUS THREADS #/AREA URNS LPF: PRESENT /LPF
NEUTROPHILS # BLD AUTO: 9 10E3/UL (ref 1.6–8.3)
NEUTROPHILS NFR BLD AUTO: 85 %
NITRATE UR QL: NEGATIVE
NRBC # BLD AUTO: 0 10E3/UL
NRBC BLD AUTO-RTO: 0 /100
PCO2 BLDV: 40 MM HG (ref 40–50)
PH BLDV: 7.42 [PH] (ref 7.32–7.43)
PH UR STRIP: 6 [PH] (ref 5–7)
PLATELET # BLD AUTO: 222 10E3/UL (ref 150–450)
PO2 BLDV: 32 MM HG (ref 25–47)
POTASSIUM SERPL-SCNC: 4.1 MMOL/L (ref 3.4–5.3)
PROT SERPL-MCNC: 7.8 G/DL (ref 6.4–8.3)
RBC # BLD AUTO: 3.03 10E6/UL (ref 3.8–5.2)
RBC URINE: >182 /HPF
SAO2 % BLDV: 62 % (ref 70–75)
SODIUM SERPL-SCNC: 135 MMOL/L (ref 135–145)
SP GR UR STRIP: 1 (ref 1–1.03)
SPECIMEN EXPIRATION DATE: NORMAL
SQUAMOUS EPITHELIAL: <1 /HPF
UNIT ABO/RH: NORMAL
UNIT NUMBER: NORMAL
UNIT STATUS: NORMAL
UNIT TYPE ISBT: 5100
UROBILINOGEN UR STRIP-MCNC: NORMAL MG/DL
WBC # BLD AUTO: 10.6 10E3/UL (ref 4–11)
WBC URINE: 139 /HPF

## 2024-08-22 PROCEDURE — 83735 ASSAY OF MAGNESIUM: CPT | Performed by: SOCIAL WORKER

## 2024-08-22 PROCEDURE — P9016 RBC LEUKOCYTES REDUCED: HCPCS | Performed by: SOCIAL WORKER

## 2024-08-22 PROCEDURE — 36415 COLL VENOUS BLD VENIPUNCTURE: CPT

## 2024-08-22 PROCEDURE — 85025 COMPLETE CBC W/AUTO DIFF WBC: CPT | Performed by: SOCIAL WORKER

## 2024-08-22 PROCEDURE — G0378 HOSPITAL OBSERVATION PER HR: HCPCS

## 2024-08-22 PROCEDURE — 71275 CT ANGIOGRAPHY CHEST: CPT

## 2024-08-22 PROCEDURE — 57500 BIOPSY OF CERVIX: CPT

## 2024-08-22 PROCEDURE — 85027 COMPLETE CBC AUTOMATED: CPT | Mod: 91 | Performed by: STUDENT IN AN ORGANIZED HEALTH CARE EDUCATION/TRAINING PROGRAM

## 2024-08-22 PROCEDURE — 93005 ELECTROCARDIOGRAM TRACING: CPT | Mod: 76,59

## 2024-08-22 PROCEDURE — 87040 BLOOD CULTURE FOR BACTERIA: CPT | Performed by: SOCIAL WORKER

## 2024-08-22 PROCEDURE — 84702 CHORIONIC GONADOTROPIN TEST: CPT | Performed by: SOCIAL WORKER

## 2024-08-22 PROCEDURE — 86923 COMPATIBILITY TEST ELECTRIC: CPT | Performed by: SOCIAL WORKER

## 2024-08-22 PROCEDURE — 84703 CHORIONIC GONADOTROPIN ASSAY: CPT | Performed by: SOCIAL WORKER

## 2024-08-22 PROCEDURE — 250N000013 HC RX MED GY IP 250 OP 250 PS 637: Performed by: SOCIAL WORKER

## 2024-08-22 PROCEDURE — 250N000011 HC RX IP 250 OP 636: Performed by: SOCIAL WORKER

## 2024-08-22 PROCEDURE — 99223 1ST HOSP IP/OBS HIGH 75: CPT | Performed by: HOSPITALIST

## 2024-08-22 PROCEDURE — 85018 HEMOGLOBIN: CPT | Performed by: SOCIAL WORKER

## 2024-08-22 PROCEDURE — 99291 CRITICAL CARE FIRST HOUR: CPT | Mod: 25

## 2024-08-22 PROCEDURE — 250N000011 HC RX IP 250 OP 636: Performed by: STUDENT IN AN ORGANIZED HEALTH CARE EDUCATION/TRAINING PROGRAM

## 2024-08-22 PROCEDURE — 250N000009 HC RX 250: Performed by: SOCIAL WORKER

## 2024-08-22 PROCEDURE — 86923 COMPATIBILITY TEST ELECTRIC: CPT | Performed by: OBSTETRICS & GYNECOLOGY

## 2024-08-22 PROCEDURE — 82803 BLOOD GASES ANY COMBINATION: CPT

## 2024-08-22 PROCEDURE — 96367 TX/PROPH/DG ADDL SEQ IV INF: CPT

## 2024-08-22 PROCEDURE — 81001 URINALYSIS AUTO W/SCOPE: CPT | Performed by: SOCIAL WORKER

## 2024-08-22 PROCEDURE — 36415 COLL VENOUS BLD VENIPUNCTURE: CPT | Performed by: SOCIAL WORKER

## 2024-08-22 PROCEDURE — 96361 HYDRATE IV INFUSION ADD-ON: CPT | Mod: 59

## 2024-08-22 PROCEDURE — 87086 URINE CULTURE/COLONY COUNT: CPT | Performed by: SOCIAL WORKER

## 2024-08-22 PROCEDURE — 83605 ASSAY OF LACTIC ACID: CPT

## 2024-08-22 PROCEDURE — 258N000003 HC RX IP 258 OP 636: Performed by: SOCIAL WORKER

## 2024-08-22 PROCEDURE — 96365 THER/PROPH/DIAG IV INF INIT: CPT

## 2024-08-22 PROCEDURE — 36430 TRANSFUSION BLD/BLD COMPNT: CPT

## 2024-08-22 PROCEDURE — 96375 TX/PRO/DX INJ NEW DRUG ADDON: CPT

## 2024-08-22 PROCEDURE — 76856 US EXAM PELVIC COMPLETE: CPT

## 2024-08-22 PROCEDURE — 80053 COMPREHEN METABOLIC PANEL: CPT | Performed by: SOCIAL WORKER

## 2024-08-22 PROCEDURE — 88305 TISSUE EXAM BY PATHOLOGIST: CPT | Mod: TC | Performed by: STUDENT IN AN ORGANIZED HEALTH CARE EDUCATION/TRAINING PROGRAM

## 2024-08-22 PROCEDURE — 93005 ELECTROCARDIOGRAM TRACING: CPT | Mod: 59

## 2024-08-22 PROCEDURE — 85379 FIBRIN DEGRADATION QUANT: CPT | Performed by: SOCIAL WORKER

## 2024-08-22 PROCEDURE — 86900 BLOOD TYPING SEROLOGIC ABO: CPT | Performed by: SOCIAL WORKER

## 2024-08-22 RX ORDER — LIDOCAINE 40 MG/G
CREAM TOPICAL
Status: DISCONTINUED | OUTPATIENT
Start: 2024-08-22 | End: 2024-08-23 | Stop reason: HOSPADM

## 2024-08-22 RX ORDER — ONDANSETRON 2 MG/ML
4 INJECTION INTRAMUSCULAR; INTRAVENOUS ONCE
Status: COMPLETED | OUTPATIENT
Start: 2024-08-22 | End: 2024-08-22

## 2024-08-22 RX ORDER — OXYCODONE HYDROCHLORIDE 5 MG/1
5 TABLET ORAL EVERY 4 HOURS PRN
Status: DISCONTINUED | OUTPATIENT
Start: 2024-08-22 | End: 2024-08-23 | Stop reason: HOSPADM

## 2024-08-22 RX ORDER — AMOXICILLIN 250 MG
1 CAPSULE ORAL 2 TIMES DAILY
Status: DISCONTINUED | OUTPATIENT
Start: 2024-08-23 | End: 2024-08-23 | Stop reason: HOSPADM

## 2024-08-22 RX ORDER — OXYCODONE HYDROCHLORIDE 5 MG/1
5 TABLET ORAL ONCE
Status: COMPLETED | OUTPATIENT
Start: 2024-08-22 | End: 2024-08-22

## 2024-08-22 RX ORDER — HYDRALAZINE HYDROCHLORIDE 20 MG/ML
10 INJECTION INTRAMUSCULAR; INTRAVENOUS EVERY 4 HOURS PRN
Status: DISCONTINUED | OUTPATIENT
Start: 2024-08-22 | End: 2024-08-23 | Stop reason: HOSPADM

## 2024-08-22 RX ORDER — ACETAMINOPHEN 650 MG/1
650 SUPPOSITORY RECTAL EVERY 4 HOURS PRN
Status: DISCONTINUED | OUTPATIENT
Start: 2024-08-22 | End: 2024-08-23 | Stop reason: HOSPADM

## 2024-08-22 RX ORDER — DOXYCYCLINE 100 MG/1
100 CAPSULE ORAL 2 TIMES DAILY
Status: DISCONTINUED | OUTPATIENT
Start: 2024-08-23 | End: 2024-08-23 | Stop reason: HOSPADM

## 2024-08-22 RX ORDER — AMPICILLIN 2 G/1
2 INJECTION, POWDER, FOR SOLUTION INTRAVENOUS ONCE
Status: COMPLETED | OUTPATIENT
Start: 2024-08-22 | End: 2024-08-23

## 2024-08-22 RX ORDER — AMOXICILLIN 250 MG
1 CAPSULE ORAL 2 TIMES DAILY PRN
Status: DISCONTINUED | OUTPATIENT
Start: 2024-08-22 | End: 2024-08-23 | Stop reason: HOSPADM

## 2024-08-22 RX ORDER — CLINDAMYCIN PHOSPHATE 900 MG/50ML
900 INJECTION, SOLUTION INTRAVENOUS ONCE
Status: COMPLETED | OUTPATIENT
Start: 2024-08-22 | End: 2024-08-22

## 2024-08-22 RX ORDER — AMOXICILLIN 250 MG
2 CAPSULE ORAL 2 TIMES DAILY PRN
Status: DISCONTINUED | OUTPATIENT
Start: 2024-08-22 | End: 2024-08-23 | Stop reason: HOSPADM

## 2024-08-22 RX ORDER — IBUPROFEN 600 MG/1
600 TABLET, FILM COATED ORAL EVERY 6 HOURS PRN
Status: DISCONTINUED | OUTPATIENT
Start: 2024-08-22 | End: 2024-08-23 | Stop reason: HOSPADM

## 2024-08-22 RX ORDER — ONDANSETRON 4 MG/1
4 TABLET, ORALLY DISINTEGRATING ORAL EVERY 6 HOURS PRN
Status: DISCONTINUED | OUTPATIENT
Start: 2024-08-22 | End: 2024-08-23 | Stop reason: HOSPADM

## 2024-08-22 RX ORDER — IOPAMIDOL 755 MG/ML
59 INJECTION, SOLUTION INTRAVASCULAR ONCE
Status: COMPLETED | OUTPATIENT
Start: 2024-08-22 | End: 2024-08-22

## 2024-08-22 RX ORDER — METHYLERGONOVINE MALEATE 0.2 MG/1
0.2 TABLET ORAL EVERY 6 HOURS
COMMUNITY

## 2024-08-22 RX ORDER — AMOXICILLIN 250 MG
2 CAPSULE ORAL 2 TIMES DAILY
Status: DISCONTINUED | OUTPATIENT
Start: 2024-08-23 | End: 2024-08-23 | Stop reason: HOSPADM

## 2024-08-22 RX ORDER — ACETAMINOPHEN 325 MG/1
650 TABLET ORAL EVERY 4 HOURS PRN
Status: DISCONTINUED | OUTPATIENT
Start: 2024-08-22 | End: 2024-08-23 | Stop reason: HOSPADM

## 2024-08-22 RX ORDER — ONDANSETRON 2 MG/ML
4 INJECTION INTRAMUSCULAR; INTRAVENOUS EVERY 6 HOURS PRN
Status: DISCONTINUED | OUTPATIENT
Start: 2024-08-22 | End: 2024-08-23 | Stop reason: HOSPADM

## 2024-08-22 RX ORDER — FENTANYL CITRATE 50 UG/ML
50 INJECTION, SOLUTION INTRAMUSCULAR; INTRAVENOUS ONCE
Status: COMPLETED | OUTPATIENT
Start: 2024-08-22 | End: 2024-08-22

## 2024-08-22 RX ORDER — HYDRALAZINE HYDROCHLORIDE 10 MG/1
10 TABLET, FILM COATED ORAL EVERY 4 HOURS PRN
Status: DISCONTINUED | OUTPATIENT
Start: 2024-08-22 | End: 2024-08-23 | Stop reason: HOSPADM

## 2024-08-22 RX ORDER — SODIUM CHLORIDE, SODIUM LACTATE, POTASSIUM CHLORIDE, CALCIUM CHLORIDE 600; 310; 30; 20 MG/100ML; MG/100ML; MG/100ML; MG/100ML
INJECTION, SOLUTION INTRAVENOUS CONTINUOUS
Status: DISCONTINUED | OUTPATIENT
Start: 2024-08-23 | End: 2024-08-23 | Stop reason: HOSPADM

## 2024-08-22 RX ADMIN — FENTANYL CITRATE 50 MCG: 50 INJECTION, SOLUTION INTRAMUSCULAR; INTRAVENOUS at 22:33

## 2024-08-22 RX ADMIN — OXYCODONE HYDROCHLORIDE 5 MG: 5 TABLET ORAL at 16:49

## 2024-08-22 RX ADMIN — ONDANSETRON 4 MG: 2 INJECTION INTRAMUSCULAR; INTRAVENOUS at 17:24

## 2024-08-22 RX ADMIN — IOPAMIDOL 59 ML: 755 INJECTION, SOLUTION INTRAVENOUS at 19:14

## 2024-08-22 RX ADMIN — CLINDAMYCIN PHOSPHATE 900 MG: 900 INJECTION, SOLUTION INTRAVENOUS at 19:27

## 2024-08-22 RX ADMIN — SODIUM CHLORIDE 83 ML: 9 INJECTION, SOLUTION INTRAVENOUS at 19:13

## 2024-08-22 RX ADMIN — SODIUM CHLORIDE 1000 ML: 9 INJECTION, SOLUTION INTRAVENOUS at 17:25

## 2024-08-22 RX ADMIN — GENTAMICIN SULFATE 260 MG: 40 INJECTION, SOLUTION INTRAMUSCULAR; INTRAVENOUS at 20:59

## 2024-08-22 ASSESSMENT — COLUMBIA-SUICIDE SEVERITY RATING SCALE - C-SSRS
1. IN THE PAST MONTH, HAVE YOU WISHED YOU WERE DEAD OR WISHED YOU COULD GO TO SLEEP AND NOT WAKE UP?: NO
2. HAVE YOU ACTUALLY HAD ANY THOUGHTS OF KILLING YOURSELF IN THE PAST MONTH?: NO
6. HAVE YOU EVER DONE ANYTHING, STARTED TO DO ANYTHING, OR PREPARED TO DO ANYTHING TO END YOUR LIFE?: NO

## 2024-08-22 ASSESSMENT — ACTIVITIES OF DAILY LIVING (ADL)
ADLS_ACUITY_SCORE: 35

## 2024-08-22 NOTE — ED PROVIDER NOTES
Emergency Department Note      History of Present Illness     Chief Complaint   Abdominal Pain      HPI   Marina Mock is a 44 year old female, , with a history of HIV (adherent to medications, reports undetectable viral load and normal CD4 count),  presenting to the emergency department for evaluation of abdominal pain. The patient was recently seen three days ago by her OB provider in the setting of miscarriage and was prescribed Methergine and doxycycline.. She reports that she was unable to fill her prescriptions until yesterday and so took her first dose of doxycycline today.  Her bleeding has decreased so she was not sure if she should take the Methergine, did take a dose and then after that noted that she had onset of lower abdominal pain accompanied by nausea lower extremity weakness.  She notes that it has been uncomfortable to urinate and painful however denies any frequency or burning sensation with urinating.  She denies any chest pain, cough, shortness of breath.  Denies any malodorous vaginal discharge.    Independent Historian   None    Review of External Notes   I reviewed a note from the patient's emergency department visit from 2024 when she was seen for vaginal bleeding. During this visit she had an ultrasound which showed retained products of conception however her hemoglobin was stable at therefore felt it was possible for her to follow-up outpatient.    These results were discussed with her OB.  I also reviewed a note from the patient's office from 2024 when the patient was started on Doxycycline and Methergine. The patient also had a repeat ultrasound during this visit which showed a clot in her cervix that was removed.      Past Medical History     Medical History and Problem List   HIV  Diabetes mellitus   Malaria  Miscarriage  Pulmonary tuberculosis     Medications   Methergine   Doxycycline   Zofran  Andrewsefsey   Vibramycin     Surgical History   None    Physical Exam  "    Patient Vitals for the past 24 hrs:   BP Temp Temp src Pulse Resp SpO2 Height Weight   08/22/24 2330 -- -- -- 120 21 100 % -- --   08/22/24 2315 124/67 -- -- 112 17 100 % -- --   08/22/24 2300 122/67 -- -- 113 16 100 % -- --   08/22/24 2245 125/64 -- -- (!) 129 16 100 % -- --   08/22/24 2230 125/68 -- -- (!) 126 16 100 % -- --   08/22/24 2225 126/72 98.6  F (37  C) Oral (!) 135 20 100 % -- --   08/22/24 2215 128/68 -- -- (!) 130 21 100 % 1.549 m (5' 1\") 52.8 kg (116 lb 6.5 oz)   08/22/24 2212 119/73 98.8  F (37.1  C) Oral (!) 138 28 -- -- --   08/22/24 2200 119/73 -- -- (!) 138 22 100 % -- --   08/22/24 2145 122/65 -- -- (!) 126 19 100 % -- --   08/22/24 2130 119/67 -- -- (!) 134 23 100 % -- --   08/22/24 2115 119/59 -- -- (!) 129 23 100 % -- --   08/22/24 2100 121/65 -- -- (!) 129 13 100 % -- --   08/22/24 2015 111/64 -- -- (!) 130 24 100 % -- --   08/22/24 2000 121/69 -- -- (!) 131 24 96 % -- --   08/22/24 1945 121/71 -- -- (!) 152 28 100 % -- --   08/22/24 1930 120/69 -- -- (!) 134 13 100 % -- --   08/22/24 1925 122/67 -- -- (!) 138 24 100 % -- --   08/22/24 1923 122/67 -- -- (!) 138 16 100 % -- --   08/22/24 1900 131/72 -- -- (!) 137 21 100 % -- --   08/22/24 1845 113/66 -- -- (!) 140 22 100 % -- --   08/22/24 1840 -- 99  F (37.2  C) Oral -- -- -- -- --   08/22/24 1815 124/76 -- -- (!) 129 20 100 % -- --   08/22/24 1800 -- -- -- (!) 130 22 100 % -- --   08/22/24 1745 138/72 -- -- (!) 131 19 100 % -- --   08/22/24 1730 137/74 -- -- (!) 149 16 100 % -- --   08/22/24 1552 111/47 100  F (37.8  C) Temporal 119 18 100 % -- --     Physical Exam  General: Overall stable and nontoxic appearing  HEENT: Conjunctivae clear, no scleral icterus, mucous membranes moist  Neuro: Extension flexion equal intact bilaterally, dorsi and plantarflexion intact and equal bilaterally  Able to stand although does seem to be somewhat shaky when she does so  Denies any numbness or changes to sensation in the lower extremities  CV: " Regular rate, regular rhythm, radial and DP pulses equal  Respiratory: No signs of respiratory distress, lungs clear to auscultation bilaterally   Abdomen: TTP in the bilateral lower quadrants   No CVA tenderness bilaterally  MSK: No tenderness to palpation over the CTL spine midline    Diagnostics     Lab Results   Labs Ordered and Resulted from Time of ED Arrival to Time of ED Departure   COMPREHENSIVE METABOLIC PANEL - Abnormal       Result Value    Sodium 135      Potassium 4.1      Carbon Dioxide (CO2) 23      Anion Gap 13      Urea Nitrogen 4.5 (*)     Creatinine 0.69      GFR Estimate >90      Calcium 9.4      Chloride 99      Glucose 111 (*)     Alkaline Phosphatase 56      AST 40      ALT 17      Protein Total 7.8      Albumin 4.2      Bilirubin Total 0.2     ROUTINE UA WITH MICROSCOPIC REFLEX TO CULTURE - Abnormal    Color Urine Straw      Appearance Urine Clear      Glucose Urine Negative      Bilirubin Urine Negative      Ketones Urine Negative      Specific Gravity Urine 1.005      Blood Urine Large (*)     pH Urine 6.0      Protein Albumin Urine 10 (*)     Urobilinogen Urine Normal      Nitrite Urine Negative      Leukocyte Esterase Urine Small (*)     Bacteria Urine Few (*)     Mucus Urine Present (*)     RBC Urine >182 (*)     WBC Urine 139 (*)     Squamous Epithelials Urine <1     CBC WITH PLATELETS AND DIFFERENTIAL - Abnormal    WBC Count 10.6      RBC Count 3.03 (*)     Hemoglobin 7.4 (*)     Hematocrit 22.5 (*)     MCV 74 (*)     MCH 24.4 (*)     MCHC 32.9      RDW 16.6 (*)     Platelet Count 222      % Neutrophils 85      % Lymphocytes 10      % Monocytes 3      % Eosinophils 1      % Basophils 0      % Immature Granulocytes 1      NRBCs per 100 WBC 0      Absolute Neutrophils 9.0 (*)     Absolute Lymphocytes 1.1      Absolute Monocytes 0.3      Absolute Eosinophils 0.1      Absolute Basophils 0.0      Absolute Immature Granulocytes 0.1      Absolute NRBCs 0.0     ISTAT GASES LACTATE VENOUS POCT  - Abnormal    Lactic Acid POCT 3.1 (*)     Bicarbonate Venous POCT 26      O2 Sat, Venous POCT 62 (*)     pCO2 Venous POCT 40      pH Venous POCT 7.42      pO2 Venous POCT 32      Base Excess/Deficit (+/-) POCT 1.0     D DIMER QUANTITATIVE - Abnormal    D-Dimer Quantitative >20.00 (*)    LACTIC ACID WHOLE BLOOD - Abnormal    Lactic Acid 3.5 (*)    HEMOGLOBIN - Abnormal    Hemoglobin 6.6 (*)    HCG QUALITATIVE PREGNANCY - Abnormal    hCG Serum Qualitative Positive (*)    HCG QUANTITATIVE PREGNANCY - Abnormal    hCG Quantitative 9,076 (*)    MAGNESIUM - Normal    Magnesium 1.7     ISTAT GASES LACTATE VENOUS POCT   TYPE AND SCREEN, ADULT    ABO/RH(D) O POS      Antibody Screen Negative      SPECIMEN EXPIRATION DATE [id number]     PREPARE RED BLOOD CELLS (UNIT)    Blood Component Type Red Blood Cells      Product Code E7669I36      Unit Status Transfused      Unit Number [id number]      CROSSMATCH Compatible      CODING SYSTEM [id number]      ISSUE DATE AND TIME [id number]      UNIT ABO/RH O+      UNIT TYPE ISBT 5100     PREPARE RED BLOOD CELLS (UNIT)   SURGICAL PATHOLOGY EXAM   URINE CULTURE   BLOOD CULTURE   TRANSFUSE RED BLOOD CELLS (UNIT)   ABO/RH TYPE AND SCREEN       Imaging   US Pelvis Complete without Transvaginal   Final Result   IMPRESSION:   1.  Persistent large volume heterogeneous, hyperechoic material within the endocervical canal, likely blood products. While no flow is seen on this exam there was some questionable internal enhancement on same day CT, which can be seen with retained    products of conception. Recommend correlation with beta-hCG level.               CT Chest (PE) Abdomen Pelvis w Contrast   Final Result   IMPRESSION:   1.  No acute pulmonary embolism. The left upper lobe pulmonary arteries are diminutive and poorly evaluated which may be secondary to left upper lobe volume loss.   2.  Heterogeneous intermediate and hyperdense material within the cervix could represent incomplete   and/or blood products.   3.  0.7 x 0.6 cm left upper lobe nodular opacity is indeterminate. Recommend follow-up CT chest in 3-6 months.   4.  Few clustered nodular opacities in the medial left lower lobe could be infectious/inflammatory.   5.  Mild mural thickening of the upper esophagus. Consider upper endoscopy if not previously performed.            EKG #1  1720  Sinus tachycardia versus flutter with constant block, aVR elevation and depressions throughout all other leads, rate 161  QRS 60 QTc 448    EKG #2  Repeat EKG 1852 shows sinus tachycardia with improved ST depressions throughout and did increased elevation in aVR, long QT and short CA  Rate 137 CA 72 QRS 62 QTc 543      Independent Interpretation   None    ED Course      Medications Administered   Medications   doxycycline hyclate (VIBRAMYCIN) capsule 100 mg (has no administration in time range)   emtricitabine-rilpivirine-tenofovir (ODEFSEY) 200-25-25 MG per tablet 1 tablet (has no administration in time range)   senna-docusate (SENOKOT-S/PERICOLACE) 8.6-50 MG per tablet 1 tablet (has no administration in time range)     Or   senna-docusate (SENOKOT-S/PERICOLACE) 8.6-50 MG per tablet 2 tablet (has no administration in time range)   ondansetron (ZOFRAN ODT) ODT tab 4 mg (has no administration in time range)     Or   ondansetron (ZOFRAN) injection 4 mg (has no administration in time range)   lidocaine 1 % 0.1-1 mL (has no administration in time range)   lidocaine (LMX4) cream (has no administration in time range)   sodium chloride (PF) 0.9% PF flush 3 mL (has no administration in time range)   sodium chloride (PF) 0.9% PF flush 3 mL (has no administration in time range)   lactated ringers infusion (has no administration in time range)   hydrALAZINE (APRESOLINE) tablet 10 mg (has no administration in time range)     Or   hydrALAZINE (APRESOLINE) injection 10 mg (has no administration in time range)   acetaminophen (TYLENOL) tablet 650 mg (has  no administration in time range)     Or   acetaminophen (TYLENOL) Suppository 650 mg (has no administration in time range)   ibuprofen (ADVIL/MOTRIN) tablet 600 mg (has no administration in time range)   oxyCODONE IR (ROXICODONE) half-tab 2.5 mg (has no administration in time range)   oxyCODONE (ROXICODONE) tablet 5 mg (has no administration in time range)   senna-docusate (SENOKOT-S/PERICOLACE) 8.6-50 MG per tablet 1 tablet (has no administration in time range)     Or   senna-docusate (SENOKOT-S/PERICOLACE) 8.6-50 MG per tablet 2 tablet (has no administration in time range)   ampicillin (OMNIPEN) 2 g vial to attach to  mL bag (has no administration in time range)   sodium chloride 0.9% BOLUS 1,000 mL (has no administration in time range)   sodium chloride 0.9% BOLUS 1,000 mL (0 mLs Intravenous Stopped 8/22/24 1900)   ondansetron (ZOFRAN) injection 4 mg (4 mg Intravenous $Given 8/22/24 1724)   oxyCODONE (ROXICODONE) tablet 5 mg (5 mg Oral $Given 8/22/24 1649)   gentamicin (GARAMYCIN) 260 mg in sodium chloride 0.9 % 50 mL intermittent infusion (0 mg Intravenous Stopped 8/22/24 2201)   clindamycin (CLEOCIN) 900 mg in 50 mL D5W intermittent infusion (0 mg Intravenous Stopped 8/22/24 2018)   iopamidol (ISOVUE-370) solution 59 mL (59 mLs Intravenous $Given 8/22/24 1914)   Saline flush (83 mLs Intravenous $Given 8/22/24 1913)   fentaNYL (PF) (SUBLIMAZE) injection 50 mcg (50 mcg Intravenous $Given 8/22/24 2233)       Procedures   Procedures     Discussion of Management   Admitting Hospitalist, Dr. Teran  OB/GYN, Dr. Calles  OB/GYN, Dr. Del Real     ED Course   ED Course as of 08/23/24 0003   Thu Aug 22, 2024   1606 I obtained history and examined the patient as noted above.    1733 I reevaluated the patient.     1738 Patient had elevated HR to the 160s after going ot the bathroom. EKG obtained showed sinus tachycardia with elevation in avR and depressions in other leads. BP remains stable, I spoke with her and she  states she has not had this in the past before. No history of blood clots and not on hormone medication.    1828 I spoke to the patient regarding consent for a blood transfusion.   1855 I spoke to MIKA Mas, regarding the patient.    2125 I spoke to Dr. Teran, hospitalist, regarding admitting the patient to the hospital.    2131 I spoke to MIKA Steven, regarding the patient.    2135 Hgb 6.6 will transfuse    2140 I spoke to MIKA Mas, regarding the patient.          Additional Documentation  None    Medical Decision Making / Diagnosis     CMS Diagnoses: The patient has signs of Severe Sepsis        If one the following conditions is present, a 30 mL/kg bolus is recommended as part of the 6 hour bundle (IBW can be used for BMI >30, or document refusal/contraindication):      1.   Initial hypotension  defined as 2 bps < 90 or map < 65 in the 6hrs before or 3hrs after time zero.     2.  Lactate >4.      The patient has signs of Severe Sepsis as evidenced by:    1. 2 SIRS criteria, AND  2. Suspected infection, AND   3. Organ dysfunction: Lactic Acidosis with value >2.0    Time severe sepsis diagnosis confirmed: 1727 08/22/24 as this was the time when Lactate resulted, and the level was > 2.0    3 Hour Severe Sepsis Bundle Completion:    1. Initial Lactic Acid Result:   Recent Labs   Lab Test 08/22/24 2020 08/22/24  1727   LACT 3.5* 3.1*     2. Blood Cultures before Antibiotics: Yes  Note: Due to a national blood culture bottle shortage, reduced blood cultures may have been drawn on this patient.  3. Broad Spectrum Antibiotics Administered:  yes       Anti-infectives (From admission through now)      Start     Dose/Rate Route Frequency Ordered Stop    08/23/24 0000  doxycycline hyclate (VIBRAMYCIN) capsule 100 mg        Note to Pharmacy: PTA Sig:Take 1 capsule (100 mg) by mouth 2 times daily for 7 days      100 mg Oral 2 TIMES DAILY 08/22/24 2355      08/22/24 2350  ampicillin (OMNIPEN) 2 g  vial to attach to  mL bag         2 g  over 15 Minutes Intravenous ONCE 08/22/24 2346 08/22/24 2320  emtricitabine-rilpivirine-tenofovir (ODEFSEY) 200-25-25 MG per tablet 1 tablet         1 tablet Oral DAILY WITH SUPPER 08/22/24 2316 08/22/24 1930  gentamicin (GARAMYCIN) 260 mg in sodium chloride 0.9 % 50 mL intermittent infusion         5 mg/kg × 52.8 kg  over 60 Minutes Intravenous ONCE 08/22/24 1900 08/22/24 2201 08/22/24 1910  clindamycin (CLEOCIN) 900 mg in 50 mL D5W intermittent infusion         900 mg  100 mL/hr over 30 Minutes Intravenous ONCE 08/22/24 1900 08/22/24 2018            4. Is initial hypotension present?     No (IV fluid bolus NOT required). IV Fluid volume administered: 1000cc                    Severe Sepsis reassessment:  1. Repeat Lactic Acid Level within 6 hours of time zero: 3.5  2. MAP>65 after initial IVF bolus, will continue to monitor fluid status and vital signs         MIPS     CT for PE was ordered because the patient had an abnormal d-dimer.    SHWETHA Mock is a 44 year old female who presents the emergency department with a chief complaint of abdominal pain weakness in her legs after taking a dose of Methergine in the setting of improved bleeding following miscarriage for which she saw her OB/GYN 2 days previously.  On examination, patient appeared significantly dry.  She had intact strength in the lower extremities and was able to stand and ambulate in the emergency department.  Patient reported only spotting today not soaking any pads.  After patient was brought back to the core of ER, she became abruptly tachycardic to the 160s which appeared to be sinus.  This improved with fluids and a repeat EKG this again looked to be sinus however did show short HI and prolonged QT, no evidence of QRS widening or delta wave to suggest WPW.  Given the sinus tachycardia, D-dimer was sent which was significantly elevated, this patient had a CT PE study performed  which did not show any signs of a blood clot.  Patient did have some clustered opacities on CT of the lungs, however she denied any recent cough, shortness of breath to suggest that this would be infectious in etiology. Repeat hemoglobin showed drop to 6.6 although had [id number] fluids, thus transfused one unit.   With the abdominal pain, recent miscarriage, borderline fever, elevated lactate, will treat for endometritis. Discussed with Ob/Gyn Dr. Calles who agreed with this. Ultrasound demonstrated likely retained productions of conception which were removed at bedside by Ob/Gyn.  Patient's pain had improved however given the persistent tachycardia, elevated lactate, discussed for admission first with Dr Teran of hospitalist service who felt the patient will be better served on OB/GYN team.  Discussed with Dr. Calles again of Ob/Gyn who kindly accepted for admission.     Disposition   The patient was admitted to the hospital under the care of Dr. Calles.       Diagnosis     ICD-10-CM    1. Retained products of conception after miscarriage  O03.4       2. Lactic acidosis  E87.20       3. Anemia due to blood loss, acute  D62       4. Endometritis  N71.9       5. Prolonged QT interval  R94.31            Discharge Medications   New Prescriptions    No medications on file         Scribe Disclosure:  I, Loy Bautista, am serving as a scribe at 4:27 PM on 8/22/2024 to document services personally performed by Yasemin Glynn MD, based on my observations and the provider's statements to me.        Yasemin Glynn MD  08/23/24 0008

## 2024-08-22 NOTE — ED TRIAGE NOTES
[facility]  ED Arrival Note    Arrives through triage via EMS. ABC's intact. A &O X4. . Pt arrives with c/o lower abdominal and back pain accompanied by bilateral leg weakness. Diagnosed with a miscarriage recently, taking Methergine.       Visitors during triage: None      Triage Interventions: N/A    Ambulatory: Yes    Meets Stroke Criteria?: No    Meets Trauma Criteria?: No    Shock Index (HR/SBP): <0.8, for provider reference    Directed to: Triage Lobby    Pronouns: she/her       Triage Assessment (Adult)       Row Name 08/22/24 1553          Triage Assessment    Airway WDL WDL        Respiratory WDL    Respiratory WDL WDL        Skin Circulation/Temperature WDL    Skin Circulation/Temperature WDL WDL        Cardiac WDL    Cardiac WDL X        Peripheral/Neurovascular WDL    Peripheral Neurovascular WDL WDL        Cognitive/Neuro/Behavioral WDL    Cognitive/Neuro/Behavioral WDL WDL

## 2024-08-22 NOTE — LETTER
[facility] EMERGENCY DEPT  [address] [address]  [phone]      2024    Marina Mock  [address]   [place] [address]  [phone] (home)     : 1980      To Whom it may concern:    Marina Mock was seen in our Emergency Department on 2024. She remained hospitalized until 2024. She should be excused for these days.    For the next 1 days she should not work.     Sincerely,    Isiah Lyn MD

## 2024-08-22 NOTE — ED TRIAGE NOTES
Pt present via EMS, per EMS pt called due to abd pain. Recently had miscarriage took clotting agent.     Prescribed oxy     Reports vaginal bleeding has slowed     VSS    Pt also presents with increased weakness

## 2024-08-23 VITALS
TEMPERATURE: 98.7 F | SYSTOLIC BLOOD PRESSURE: 110 MMHG | BODY MASS INDEX: 21.98 KG/M2 | OXYGEN SATURATION: 100 % | HEART RATE: 94 BPM | HEIGHT: 61 IN | RESPIRATION RATE: 21 BRPM | DIASTOLIC BLOOD PRESSURE: 78 MMHG | WEIGHT: 116.4 LBS

## 2024-08-23 LAB
BACTERIA UR CULT: NO GROWTH
BASOPHILS # BLD AUTO: 0 10E3/UL (ref 0–0.2)
BASOPHILS NFR BLD AUTO: 0 %
BLD PROD TYP BPU: NORMAL
BLOOD COMPONENT TYPE: NORMAL
CODING SYSTEM: NORMAL
CROSSMATCH: NORMAL
EOSINOPHIL # BLD AUTO: 0.1 10E3/UL (ref 0–0.7)
EOSINOPHIL NFR BLD AUTO: 1 %
ERYTHROCYTE [DISTWIDTH] IN BLOOD BY AUTOMATED COUNT: 16.5 % (ref 10–15)
ERYTHROCYTE [DISTWIDTH] IN BLOOD BY AUTOMATED COUNT: 17.1 % (ref 10–15)
HCT VFR BLD AUTO: 19.8 % (ref 35–47)
HCT VFR BLD AUTO: 21.8 % (ref 35–47)
HGB BLD-MCNC: 6.6 G/DL (ref 11.7–15.7)
HGB BLD-MCNC: 7.1 G/DL (ref 11.7–15.7)
IMM GRANULOCYTES # BLD: 0 10E3/UL
IMM GRANULOCYTES NFR BLD: 0 %
ISSUE DATE AND TIME: NORMAL
LACTATE SERPL-SCNC: 1.8 MMOL/L (ref 0.7–2)
LYMPHOCYTES # BLD AUTO: 1.7 10E3/UL (ref 0.8–5.3)
LYMPHOCYTES NFR BLD AUTO: 20 %
MCH RBC QN AUTO: 24.7 PG (ref 26.5–33)
MCH RBC QN AUTO: 25 PG (ref 26.5–33)
MCHC RBC AUTO-ENTMCNC: 32.6 G/DL (ref 31.5–36.5)
MCHC RBC AUTO-ENTMCNC: 33.3 G/DL (ref 31.5–36.5)
MCV RBC AUTO: 74 FL (ref 78–100)
MCV RBC AUTO: 77 FL (ref 78–100)
MONOCYTES # BLD AUTO: 0.4 10E3/UL (ref 0–1.3)
MONOCYTES NFR BLD AUTO: 5 %
NEUTROPHILS # BLD AUTO: 6.2 10E3/UL (ref 1.6–8.3)
NEUTROPHILS NFR BLD AUTO: 74 %
NRBC # BLD AUTO: 0 10E3/UL
NRBC BLD AUTO-RTO: 0 /100
PLATELET # BLD AUTO: 218 10E3/UL (ref 150–450)
PLATELET # BLD AUTO: 247 10E3/UL (ref 150–450)
RBC # BLD AUTO: 2.67 10E6/UL (ref 3.8–5.2)
RBC # BLD AUTO: 2.84 10E6/UL (ref 3.8–5.2)
UNIT ABO/RH: NORMAL
UNIT NUMBER: NORMAL
UNIT STATUS: NORMAL
UNIT TYPE ISBT: 5100
WBC # BLD AUTO: 11.9 10E3/UL (ref 4–11)
WBC # BLD AUTO: 8.4 10E3/UL (ref 4–11)

## 2024-08-23 PROCEDURE — 36415 COLL VENOUS BLD VENIPUNCTURE: CPT | Performed by: STUDENT IN AN ORGANIZED HEALTH CARE EDUCATION/TRAINING PROGRAM

## 2024-08-23 PROCEDURE — 258N000003 HC RX IP 258 OP 636: Performed by: STUDENT IN AN ORGANIZED HEALTH CARE EDUCATION/TRAINING PROGRAM

## 2024-08-23 PROCEDURE — 99232 SBSQ HOSP IP/OBS MODERATE 35: CPT | Performed by: STUDENT IN AN ORGANIZED HEALTH CARE EDUCATION/TRAINING PROGRAM

## 2024-08-23 PROCEDURE — P9016 RBC LEUKOCYTES REDUCED: HCPCS | Performed by: OBSTETRICS & GYNECOLOGY

## 2024-08-23 PROCEDURE — 96366 THER/PROPH/DIAG IV INF ADDON: CPT

## 2024-08-23 PROCEDURE — G0378 HOSPITAL OBSERVATION PER HR: HCPCS

## 2024-08-23 PROCEDURE — 36430 TRANSFUSION BLD/BLD COMPNT: CPT | Mod: 59

## 2024-08-23 PROCEDURE — 250N000013 HC RX MED GY IP 250 OP 250 PS 637: Performed by: STUDENT IN AN ORGANIZED HEALTH CARE EDUCATION/TRAINING PROGRAM

## 2024-08-23 PROCEDURE — 83605 ASSAY OF LACTIC ACID: CPT | Performed by: HOSPITALIST

## 2024-08-23 PROCEDURE — 96368 THER/DIAG CONCURRENT INF: CPT

## 2024-08-23 PROCEDURE — 85004 AUTOMATED DIFF WBC COUNT: CPT | Performed by: STUDENT IN AN ORGANIZED HEALTH CARE EDUCATION/TRAINING PROGRAM

## 2024-08-23 PROCEDURE — 250N000011 HC RX IP 250 OP 636: Performed by: HOSPITALIST

## 2024-08-23 PROCEDURE — 250N000013 HC RX MED GY IP 250 OP 250 PS 637: Performed by: HOSPITALIST

## 2024-08-23 RX ORDER — NALOXONE HYDROCHLORIDE 0.4 MG/ML
0.2 INJECTION, SOLUTION INTRAMUSCULAR; INTRAVENOUS; SUBCUTANEOUS
Status: DISCONTINUED | OUTPATIENT
Start: 2024-08-23 | End: 2024-08-23 | Stop reason: HOSPADM

## 2024-08-23 RX ORDER — NALOXONE HYDROCHLORIDE 0.4 MG/ML
0.4 INJECTION, SOLUTION INTRAMUSCULAR; INTRAVENOUS; SUBCUTANEOUS
Status: DISCONTINUED | OUTPATIENT
Start: 2024-08-23 | End: 2024-08-23 | Stop reason: HOSPADM

## 2024-08-23 RX ORDER — SUCRALFATE ORAL 1 G/10ML
1 SUSPENSION ORAL
Status: DISCONTINUED | OUTPATIENT
Start: 2024-08-23 | End: 2024-08-23 | Stop reason: HOSPADM

## 2024-08-23 RX ADMIN — SUCRALFATE 1 G: 1 SUSPENSION ORAL at 08:23

## 2024-08-23 RX ADMIN — DOXYCYCLINE HYCLATE 100 MG: 100 CAPSULE ORAL at 00:43

## 2024-08-23 RX ADMIN — SODIUM CHLORIDE, POTASSIUM CHLORIDE, SODIUM LACTATE AND CALCIUM CHLORIDE: 600; 310; 30; 20 INJECTION, SOLUTION INTRAVENOUS at 11:04

## 2024-08-23 RX ADMIN — SUCRALFATE 1 G: 1 SUSPENSION ORAL at 12:43

## 2024-08-23 RX ADMIN — AMPICILLIN SODIUM 2 G: 2 INJECTION, POWDER, FOR SOLUTION INTRAMUSCULAR; INTRAVENOUS at 02:33

## 2024-08-23 RX ADMIN — SODIUM CHLORIDE, POTASSIUM CHLORIDE, SODIUM LACTATE AND CALCIUM CHLORIDE: 600; 310; 30; 20 INJECTION, SOLUTION INTRAVENOUS at 00:44

## 2024-08-23 RX ADMIN — DOXYCYCLINE HYCLATE 100 MG: 100 CAPSULE ORAL at 08:27

## 2024-08-23 ASSESSMENT — ACTIVITIES OF DAILY LIVING (ADL)
ADLS_ACUITY_SCORE: 35

## 2024-08-23 NOTE — CONSULTS
[facility]    Hospitalist Consultation    Date of Admission:  2024  Date of Consult (When I saw the patient): 24    Assessment & Plan   Marina Mock is a markedly pleasant 44 year old woman with HIV who was admitted on 2024 for evaluation of vaginal bleeding and abdominal pain. I was asked by Dr. Calles of OB GYN to evaluate this patient for ongoing elevated lactate and tachycardia.    Elevated Lactate and tachycardia: Sepsis due to acute endometritis suspected. Ms. Mock reportedly developed vaginal bleeding several days ago and was found to have miscarriage. Today she took a dose of Methergine, and developed acute abdominal pain, and presented to our ED for further evaluation.      In our ED, she has tachycardia, without fever (Tmax 100.0), hypotension, or hypoxia. WBC and PLT are normal. Lactate is 3.1. HGB is 7.4, dropping in the ED to 6.6. CMP is unremarkable. HCG and D-Dimer are both elevated. CT PE of chest, abdomen and pelvis shows no acute pulmonary embolism: heterogeneous intermediate and hyperdense material within the cervix is noted. Pelvic US shows persistent large volume heterogeneous, hyperechoic material within the endocervical canal, likely blood products.     Retained products of conception are suspected. OB GYN has evaluated her and at the bedside tonight, retained products of conception have been removed. She has been given one time doses of Clindamycin and gentamicin, one liter of IV fluid, and ordered now for one unit packed red cell blood transfusion. Her symptoms have resolved and she is feeling well. Lactate repeated and has increased to 3.5. she is admitted to Observation status and we are consulted for further evaluation.    Overall, elevated Lactate and tachycardia are suspected due to sepsis due to acute endometritis. Septic  is possible. Both elevated Lactate and tachycardia can also be due to hypovolemia due to acute blood loss  anemia.      -- I have ordered one time dose of empiric IV Ampicillin, and further IV NS fluid bolus. Will repeat Lactate when the bolus and blood transfusion have been completed. If it continues to rise, will resume all three antibiotics and discuss further with OB GYN. Otherwise, will defer further antibiotic doses to the primary team in AM    -- Further management of pain, anemia, and disposition will be as per the primary team    HIV: her night time ART has been ordered by OB GYN tonight    Incidental CT findings: A 0.7 x 0.6 cm left upper lobe nodular opacity is indeterminate. Recommend follow-up CT chest in 3-6 months by Radiology.. A few clustered nodular opacities in the medial left lower lobe are also seen. Mild mural thickening of the upper esophagus is noted.       -- Sucralfate ordered empirically while she is here    I have spent 55 minutes on the date of service in interview and examination of this patient, as well as lab and imaging review and ordering, medication administration, coordination of care, and other activities as per this note.    DVT Prophylaxis: Defer to primary service  Code Status: No Order    Disposition: Expected discharge as per OB GYN    Ben Paniagua MD, MD    Chief Complaint   Abdominal pain    History is obtained from the patient    History of Present Illness   Marina Mock is a markedly pleasant 44 year old woman who presents with acute onset of sharp diffuse abdominal pain today. She says that the pain started soon after she took Methergine prescribed by her OB GYN physician for a miscarriage that has been causing several days of vaginal bleeding. After she took the medication and the pain came on, she presented to our ED for further evaluation. I am seeing her now after she has had D and C, and she says she feels better. The pain has resolved. She feels generally weak but denies further bleeding, or any nausea, vomiting, dyspnea, fever, chills, or sweats, or any  other acute complaints.    In the ED,   08/22 1552 111/47 100  F (37.8  C) 119 18 100 %     Pulse has been as high as 135 in the ED.    CBC and CMP were notable for HGB 7.4, Glucose 111, otherwise were within the normal reference range. WBC was 10.6. PLT was 222. Lactate was 3.1. VBG showed 7.42/40. HCG was 9076. D-Dimer was greater than 20. UA showed 139 WBC, greater than 182 RBC. Blood and urine cultures were sent.    OBGYN has examined and admitted her from the ED, and removed products of conception at the bedside.     She was given one liter IV fluid and IV Clindamycin and Gentamycin. Lactate repeated and denise to 3.5. HGB dropped to 6.6. One unit packed red cells was ordered and is currently infusing.    Recent Results (from the past 24 hour(s))   CT Chest (PE) Abdomen Pelvis w Contrast    Narrative    EXAM: CT CHEST PE ABDOMEN PELVIS W CONTRAST  LOCATION: [facility]  DATE: 8/22/2024    INDICATION: Elevated d dimer, tachycardic, recently had methergine in the setting of miscarriage  COMPARISON: None.  TECHNIQUE: CT chest pulmonary angiogram and routine CT abdomen pelvis with IV contrast. Arterial phase through the chest and venous phase through the abdomen and pelvis. Multiplanar reformats and MIP reconstructions were performed. Dose reduction   techniques were used.   CONTRAST: 59mL ISOVUE 370    FINDINGS:  ANGIOGRAM CHEST: Normal caliber central pulmonary arteries. The left upper lobe pulmonary arteries are diminutive and poorly evaluated, which may be secondary to left upper lobe volume loss. No filling defect is seen within the remainder of the pulmonary   arteries. Thoracic aorta is negative for dissection. No CT evidence of right heart strain.     LUNGS AND PLEURA: Patent central airways. No focal consolidation or pleural effusion. Few cysts and/or emphysema within the left lower lobe. Left upper lobe volume loss. Few areas of mucous plugging in the left upper lobe. 0.7 x 0.6 cm  nodular opacity in   the left upper lobe (series 6, image 57). Few clustered nodular opacities in the medial left lower lobe (series 7, image 33 for example). Scattered calcified granulomas.    MEDIASTINUM/AXILLAE: Heart size is normal. No thoracic lymphadenopathy. Mild circumferential mural thickening of the upper esophagus (series 4, image 41 for example).    CORONARY ARTERY CALCIFICATION: None.    HEPATOBILIARY: Normal.    PANCREAS: Normal.    SPLEEN: Normal.    ADRENAL GLANDS: Normal.    KIDNEYS/BLADDER: No hydronephrosis. Moderately distended urinary bladder.    BOWEL: No bowel obstruction. Colonic diverticulosis without acute diverticulitis. Appendicolith at the appendiceal base (series 12, image 133) without periappendiceal stranding or other features to suggest acute appendicitis. Trace ascites. No   pneumoperitoneum.    LYMPH NODES: Normal.    VASCULATURE: Normal caliber abdominal aorta without significant atherosclerotic plaque.    PELVIC ORGANS: Heterogeneous intermediate and hyperdense material within the cervix.    MUSCULOSKELETAL: No destructive osseous lesion. 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      Impression    IMPRESSION:  1.  No acute pulmonary embolism. The left upper lobe pulmonary arteries are diminutive and poorly evaluated which may be secondary to left upper lobe volume loss.  2.  Heterogeneous intermediate and hyperdense material within the cervix could represent incomplete  and/or blood products.  3.  0.7 x 0.6 cm left upper lobe nodular opacity is indeterminate. Recommend follow-up CT chest in 3-6 months.  4.  Few clustered nodular opacities in the medial left lower lobe could be infectious/inflammatory.  5.  Mild mural thickening of the upper esophagus. Consider upper endoscopy if not previously performed.   US Pelvis Complete without Transvaginal    Narrative    EXAM: US PELVIC TRANSABDOMINAL  LOCATION: [facility]  DATE: 8/22/2024    INDICATION: Patient with worsened abdominal pain s p miscarriage, borderline febrile  COMPARISON: CT 8/22/2024, pelvic ultrasound 8/19/2024  TECHNIQUE: Transabdominal scans were performed.    FINDINGS:    UTERUS: 11.7 x 5.7 x 5.3 cm. Normal in size and position with no masses.    ENDOMETRIUM: 3 mm at the level of the fundus. Normal smooth endometrium superiorly with heterogeneous, hyperechoic material within the endocervical canal, measuring up to 3.6 cm in diameter, similar to prior exam. No definite internal flow on color   Doppler images, although there was some suggestion of internal enhancement on same day CT.    RIGHT OVARY: 2.7 x 2.2 x 1.5 cm. Normal.     LEFT OVARY: 2.3 x 1.8 x 0.9 cm. Normal.    No significant free fluid.      Impression    IMPRESSION:  1.  Persistent large volume heterogeneous, hyperechoic material within the endocervical canal, likely blood products. While no flow is seen on this exam there was some questionable internal enhancement on same day CT, which can be seen with retained   products of conception. Recommend correlation with beta-hCG level.             Past Medical History    I have reviewed this patient's medical history and updated it with pertinent information if needed.   Past Medical History:   Diagnosis Date    Asymptomatic human immunodeficiency virus (HIV) infection status (H) 2016    Gestational diabetes     diet controlled     Malaria     in [place]     Miscarriage     TB (pulmonary tuberculosis)     latent       Past Surgical History   Past surgical history reviewed with no previous surgeries identified.    Prior to Admission Medications   Prior to Admission Medications   Prescriptions Last Dose Informant Patient Reported? Taking?   Ascorbic Acid (VITAMIN C) 500 MG CAPS 8/21/2024 at pm  Yes Yes   Sig: Take 1 tablet by mouth daily   Vitamin D3 (VITAMIN D, CHOLECALCIFEROL,) 25 mcg (1000 units) tablet 8/21/2024 at pm  Yes Yes   Sig:  Take 1 tablet by mouth daily   blood glucose (NO BRAND SPECIFIED) test strip   No No   Sig: Use to test blood sugar 4 times daily or as directed. Any covered brand that works with meter.   blood glucose monitoring (NO BRAND SPECIFIED) meter device kit   No No   Sig: Use to test blood sugar 4 times daily or as directed. Any Covered Brand.   cod liver oil CAPS capsule 8/21/2024  Yes Yes   Sig: Take 1 capsule by mouth daily   doxycycline hyclate (VIBRAMYCIN) 100 MG capsule 8/21/2024 at x1  No Yes   Sig: Take 1 capsule (100 mg) by mouth 2 times daily for 7 days   emtricitabine-rilpivirine-tenofovir (ODEFSEY) 200-25-25 MG TABS per tablet 8/21/2024 at pm  No Yes   Sig: Take 1 tablet by mouth daily   methylergonovine (METHERGINE) 0.2 MG tablet 8/22/2024 at 1:57 PM  Yes Yes   Sig: Take 0.2 mg by mouth every 6 hours. For 3 doses   ondansetron (ZOFRAN) 4 MG tablet prn  No Yes   Sig: Take 1 tablet (4 mg) by mouth every 8 hours as needed for nausea   thin (NO BRAND SPECIFIED) lancets   No No   Sig: Use with lanceting device. 4x daily. Any covered brand.   zinc gluconate 50 MG tablet 8/21/2024 at pm  Yes Yes   Sig: Take 50 mg by mouth daily      Facility-Administered Medications: None     Allergies   No Known Allergies    Social History   I have reviewed this patient's social history and updated it with pertinent information if needed. Marina GRIFFIN Nish  reports that she has never smoked. She has never used smokeless tobacco. She reports that she does not drink alcohol and does not use drugs.    Family History   I have reviewed this patient's family history and updated it with pertinent information if needed.   Family History   Problem Relation Age of Onset    Diabetes Mother     Coronary Artery Disease Father     Hypertension Father     Family History Negative Other     Hypertension Other     Cancer No family hx of     Macular Degeneration No family hx of     Retinal detachment No family hx of        Review of Systems   The 10  point Review of Systems is negative other than noted in the HPI or here.     Vital Signs with Ranges  Temp:  [98.6  F (37  C)-100  F (37.8  C)] 98.6  F (37  C)  Pulse:  [119-152] 129  Resp:  [13-28] 16  BP: (111-138)/(47-76) 125/64  SpO2:  [96 %-100 %] 100 %  116 lbs 6.45 oz    Constitutional: Alert and oriented to person, place and time; no apparent distress; appears pale  Respiratory: lungs clear to auscultation bilaterally  Cardiovascular: regular S1 S2 no murmurs rubs or gallops  GI: abdomen soft, minimally tender non distended bowel sounds positive  Musculoskeletal: no clubbing, cyanosis or edema  Neurologic: extra-ocular muscles intact; moves all four extremities  Psychiatric: appropriate affect, insight and judgment    Data   -Data reviewed today: All pertinent laboratory and imaging results from this encounter were reviewed. I personally reviewed the abdominal CT image(s) showing blood products in the cervix .  Recent Labs   Lab 08/22/24  2059 08/22/24  1705 08/19/24  0312 08/19/24  0122 08/19/24  0017   WBC  --  10.6  --   --  9.9   HGB 6.6* 7.4* 7.9*   < > 8.2*   MCV  --  74*  --   --  75*   PLT  --  222  --   --  208   NA  --  135  --   --  135   POTASSIUM  --  4.1  --   --  3.9   CHLORIDE  --  99  --   --  102   CO2  --  23  --   --  20*   BUN  --  4.5*  --   --  6.0   CR  --  0.69  --   --  0.69   ANIONGAP  --  13  --   --  13   JANICE  --  9.4  --   --  8.3*   GLC  --  111*  --   --  127*   ALBUMIN  --  4.2  --   --  3.8   PROTTOTAL  --  7.8  --   --  6.6   BILITOTAL  --  0.2  --   --  <0.2   ALKPHOS  --  56  --   --  47   ALT  --  17  --   --  9   AST  --  40  --   --  13    < > = values in this interval not displayed.

## 2024-08-23 NOTE — H&P
Gynecology Consult Note    Patient Summary:  Marina Mock is a 44 year old female s/p incomplete AB, severe anemia, adominal pain    HPI: Marina Mock presented to ER yesterday with acute onset abdominal pain after taking PO methergine for management of bleeding related to recent incomplete . She underwent removal of residual POC yesterday evening by Dr. Calles.   Additionally she was noted to have severe anemia.    This AM she states her pain is essentially gone and bleeding is minimal.  She tolerated recevigin 1 unit of blood overnight due to Hgb of 6.6, This AM her hemoglobin has only increased to 7.1   She denies fevers, chills, SOB, chest pain, tachycardia.   Feeling fatigued and weak but denies dizziness or lightheadedness.     ROS: Negative except as stated in HPI     PMH:   Past Medical History:   Diagnosis Date    Asymptomatic human immunodeficiency virus (HIV) infection status (H) 2016    Gestational diabetes     diet controlled     Malaria     in [place]     Miscarriage     TB (pulmonary tuberculosis)     latent       PSHx:   Past Surgical History:   Procedure Laterality Date    NO HISTORY OF SURGERY         Allergies:    No Known Allergies    Social History:   Social History     Socioeconomic History    Marital status:      Spouse name: Chris Roldan    Number of children: Not on file    Years of education: Not on file    Highest education level: Not on file   Occupational History    Not on file   Tobacco Use    Smoking status: Never    Smokeless tobacco: Never   Vaping Use    Vaping status: Never Used   Substance and Sexual Activity    Alcohol use: No    Drug use: No    Sexual activity: Yes     Partners: Male   Other Topics Concern    Parent/sibling w/ CABG, MI or angioplasty before 65F 55M? Not Asked   Social History Narrative    ** Merged History Encounter **     How much exercise per week? Active with 2 yr    How much calcium per day? supplement       How much caffeine per day?  none    How much vitamin D per day? supplement    Do you/your family wear seatbelts?  Yes    Do you/your family use safety helmets? n/a    Do you/your family use sunscreen? No    Do you/your family keep firearms in the home? No    Do you/your family have a smoke detector(s)? 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          Social Determinants of Health     Financial Resource Strain: Not on file   Food Insecurity: Not on file   Transportation Needs: Not on file   Physical Activity: Not on file   Stress: Not on file   Social Connections: Not on file   Interpersonal Safety: Not on file   Housing Stability: Not on file     Social History     Socioeconomic History    Marital status:      Spouse name: Chris Roldan   Tobacco Use    Smoking status: Never    Smokeless tobacco: Never   Vaping Use    Vaping status: Never Used   Substance and Sexual Activity    Alcohol use: No    Drug use: No    Sexual activity: Yes     Partners: Male   Social History Narrative    ** Merged History Encounter **     How much exercise per week? Active with 2 yr    How much calcium per day? supplement       How much caffeine per day? none    How much vitamin D per day? supplement    Do you/your family wear seatbelts?  Yes    Do you/your family use safety helmets? n/a    Do you/your family use sunscreen? No    Do you/your family keep firearms in the home? No    Do you/your family have a smoke detector(s)? 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            Physical Exam:   Vitals:    08/23/24 0800 08/23/24 0830 08/23/24 0900 08/23/24 0930   BP: 104/69 106/66 111/71 104/65   Pulse: 90 98 101 91   Resp: 19 20 21 21   Temp:       TempSrc:       SpO2: 99% 100% 100% 100%   Weight:       Height:          Gen: Well appearing in NAD  CV: Heart RRR  Pulm: Breathing comfortably on RA  Extremities: non-tender, no erythema; trace edema    Recent Labs   Lab 08/23/24  1031 08/22/24 2059  24  1705 24  0312 24  0122   HGB 7.1* 6.6*  6.6* 7.4* 7.9* 7.9*         A&P:   Marina Mock is a 44 year old  with retained products of conception, concern and concern for possible endometritis vs septic ab due to tachycardia and elevated lactate. Source control obtained with removal of products.     - Suspected endometritis: S/p 1 dose of clinda and gent. Will hold on further IV abx. Continue home doxycycline. With tissue removed, anticipate patient will clinically improve. Tissue sent to pathology.   - Elevated lactate: IV fluids, recheck ordered per protocol, hospitalist consult placed. Lactate improved this AM  - Tachycardia: suspect component of infection and anemia, appreciate hospitalist guidance   - Acute blood loss anemia: s/p 1u PRBC, symptomatically stable, no ongoing bleeding. Recheck only increased to 7.1, recommend one further unit prior to discharge.    - Regular diet, PO pain meds, stool softener, home meds ordered  - DVT ppx: SCDs    -Plan follow-up with clinic in 1 week    Norma Betts MD

## 2024-08-23 NOTE — ED NOTES
[facility]  ED Nurse Handoff Report    ED Chief complaint: Abdominal Pain      ED Diagnosis:   Final diagnoses:   None       Code Status: Full Code    Allergies: No Known Allergies    Patient Story: Pt reports lower abdominal, back pain and leg weakness since recent miscarriage. Taking methergine.     Focused Assessment:  Pt is alert and oriented, pleasant and cooperative. Pain improved after receiving oxycontin. Has been tachy with avg rate in the 130s. Dimer was >20 and is in CT now then going to US.    Results for orders placed or performed during the hospital encounter of 08/22/24   Comprehensive metabolic panel   Result Value Ref Range    Sodium 135 135 - 145 mmol/L    Potassium 4.1 3.4 - 5.3 mmol/L    Carbon Dioxide (CO2) 23 22 - 29 mmol/L    Anion Gap 13 7 - 15 mmol/L    Urea Nitrogen 4.5 (L) 6.0 - 20.0 mg/dL    Creatinine 0.69 0.51 - 0.95 mg/dL    GFR Estimate >90 >60 mL/min/1.73m2    Calcium 9.4 8.8 - 10.4 mg/dL    Chloride 99 98 - 107 mmol/L    Glucose 111 (H) 70 - 99 mg/dL    Alkaline Phosphatase 56 40 - 150 U/L    AST 40 0 - 45 U/L    ALT 17 0 - 50 U/L    Protein Total 7.8 6.4 - 8.3 g/dL    Albumin 4.2 3.5 - 5.2 g/dL    Bilirubin Total 0.2 <=1.2 mg/dL   UA with Microscopic reflex to Culture    Specimen: Urine, Clean Catch   Result Value Ref Range    Color Urine Straw Colorless, Straw, Light Yellow, Yellow    Appearance Urine Clear Clear    Glucose Urine Negative Negative mg/dL    Bilirubin Urine Negative Negative    Ketones Urine Negative Negative mg/dL    Specific Gravity Urine 1.005 1.003 - 1.035    Blood Urine Large (A) Negative    pH Urine 6.0 5.0 - 7.0    Protein Albumin Urine 10 (A) Negative mg/dL    Urobilinogen Urine Normal Normal, 2.0 mg/dL    Nitrite Urine Negative Negative    Leukocyte Esterase Urine Small (A) Negative    Bacteria Urine Few (A) None Seen /HPF    Mucus Urine Present (A) None Seen /LPF    RBC Urine >182 (H) <=2 /HPF    WBC Urine 139 (H) <=5 /HPF    Squamous  Epithelials Urine <1 <=1 /HPF   CBC with platelets and differential   Result Value Ref Range    WBC Count 10.6 4.0 - 11.0 10e3/uL    RBC Count 3.03 (L) 3.80 - 5.20 10e6/uL    Hemoglobin 7.4 (L) 11.7 - 15.7 g/dL    Hematocrit 22.5 (L) 35.0 - 47.0 %    MCV 74 (L) 78 - 100 fL    MCH 24.4 (L) 26.5 - 33.0 pg    MCHC 32.9 31.5 - 36.5 g/dL    RDW 16.6 (H) 10.0 - 15.0 %    Platelet Count 222 150 - 450 10e3/uL    % Neutrophils 85 %    % Lymphocytes 10 %    % Monocytes 3 %    % Eosinophils 1 %    % Basophils 0 %    % Immature Granulocytes 1 %    NRBCs per 100 WBC 0 <1 /100    Absolute Neutrophils 9.0 (H) 1.6 - 8.3 10e3/uL    Absolute Lymphocytes 1.1 0.8 - 5.3 10e3/uL    Absolute Monocytes 0.3 0.0 - 1.3 10e3/uL    Absolute Eosinophils 0.1 0.0 - 0.7 10e3/uL    Absolute Basophils 0.0 0.0 - 0.2 10e3/uL    Absolute Immature Granulocytes 0.1 <=0.4 10e3/uL    Absolute NRBCs 0.0 10e3/uL   iStat Gases (lactate) venous, POCT   Result Value Ref Range    Lactic Acid POCT 3.1 (H) <=2.0 mmol/L    Bicarbonate Venous POCT 26 21 - 28 mmol/L    O2 Sat, Venous POCT 62 (L) 70 - 75 %    pCO2 Venous POCT 40 40 - 50 mm Hg    pH Venous POCT 7.42 7.32 - 7.43    pO2 Venous POCT 32 25 - 47 mm Hg    Base Excess/Deficit (+/-) POCT 1.0 -3.0 - 3.0 mmol/L   D dimer quantitative   Result Value Ref Range    D-Dimer Quantitative >20.00 (HH) 0.00 - 0.50 ug/mL FEU   Adult Type and Screen   Result Value Ref Range    ABO/RH(D) O POS     Antibody Screen Negative Negative    SPECIMEN EXPIRATION DATE [id number]          Treatments and/or interventions provided: EKG, labs, ct, us, medications, fluids, monitoring    Patient's response to treatments and/or interventions: Tolerating interventions well    To be done/followed up on inpatient unit:  Continue plan of care    Does this patient have any cognitive concerns?:  none     Activity level - Baseline/Home:  Independent  Activity Level - Current:   Stand with Assist    Patient's Preferred language:  English   Needed?: No    Isolation: None  Infection: Not Applicable  Patient tested for COVID 19 prior to admission: NO  Bariatric?: No    Vital Signs:   Vitals:    08/22/24 1815 08/22/24 1840 08/22/24 1845 08/22/24 1900   BP: 124/76  113/66 131/72   Pulse: (!) 129  (!) 140 (!) 137   Resp: 20  22 21   Temp:  99  F (37.2  C)     TempSrc:  Oral     SpO2: 100%  100% 100%       Cardiac Rhythm:     Was the PSS-3 completed:   Yes  What interventions are required if any?               Family Comments: Spouse and children at bedside  OBS brochure/video discussed/provided to patient/family: No              Name of person given brochure if not patient: n/a              Relationship to patient: n/a    For the majority of the shift this patient's behavior was Green.   Behavioral interventions performed were rounding.    ED NURSE PHONE NUMBER: [phone]

## 2024-08-23 NOTE — H&P
Gynecology Consult Note    Patient Summary:  Marina Mock is a 44 year old female seen at the request of ER.      HPI: Marina Mock is a 44 year old  who presented to ER with acute onset abdominal pain after taking PO methergine for management of bleeding related to recent incomplete . Upon writer's assessment she states her pain and bleeding have improved. Denies fevers, chills, SOB, chest pain, tachycardia. Feeling fatigued and weak but denies dizziness or lightheadedness.     ROS: Negative except as stated in HPI     PMH:   Past Medical History:   Diagnosis Date    Asymptomatic human immunodeficiency virus (HIV) infection status (H) 2016    Gestational diabetes     diet controlled     Malaria     in [place]     Miscarriage     TB (pulmonary tuberculosis)     latent       PSHx:   Past Surgical History:   Procedure Laterality Date    NO HISTORY OF SURGERY         Medications:   Current Facility-Administered Medications   Medication Dose Route Frequency Provider Last Rate Last Admin    fentaNYL (PF) (SUBLIMAZE) injection 50 mcg  50 mcg Intravenous Once Francine Calles MD         Current Outpatient Medications   Medication Sig Dispense Refill    Ascorbic Acid (VITAMIN C) 500 MG CAPS Take 1 tablet by mouth daily      cod liver oil CAPS capsule Take 1 capsule by mouth daily      doxycycline hyclate (VIBRAMYCIN) 100 MG capsule Take 1 capsule (100 mg) by mouth 2 times daily for 7 days 14 capsule 0    emtricitabine-rilpivirine-tenofovir (ODEFSEY) 200-25-25 MG TABS per tablet Take 1 tablet by mouth daily 30 tablet 4    methylergonovine (METHERGINE) 0.2 MG tablet Take 0.2 mg by mouth every 6 hours. For 3 doses      ondansetron (ZOFRAN) 4 MG tablet Take 1 tablet (4 mg) by mouth every 8 hours as needed for nausea 10 tablet 0    Vitamin D3 (VITAMIN D, CHOLECALCIFEROL,) 25 mcg (1000 units) tablet Take 1 tablet by mouth daily      zinc gluconate 50 MG tablet Take 50 mg by mouth daily      blood glucose (NO  BRAND SPECIFIED) test strip Use to test blood sugar 4 times daily or as directed. Any covered brand that works with meter. 400 strip 3    blood glucose monitoring (NO BRAND SPECIFIED) meter device kit Use to test blood sugar 4 times daily or as directed. Any Covered Brand. 1 kit 1    thin (NO BRAND SPECIFIED) lancets Use with lanceting device. 4x daily. Any covered brand. 400 each 3     Current Facility-Administered Medications   Medication Dose Route Frequency Provider Last Rate Last Admin    fentaNYL (PF) (SUBLIMAZE) injection 50 mcg  50 mcg Intravenous Once Francine Calles MD         Current Outpatient Medications   Medication Sig Dispense Refill    Ascorbic Acid (VITAMIN C) 500 MG CAPS Take 1 tablet by mouth daily      cod liver oil CAPS capsule Take 1 capsule by mouth daily      doxycycline hyclate (VIBRAMYCIN) 100 MG capsule Take 1 capsule (100 mg) by mouth 2 times daily for 7 days 14 capsule 0    emtricitabine-rilpivirine-tenofovir (ODEFSEY) 200-25-25 MG TABS per tablet Take 1 tablet by mouth daily 30 tablet 4    methylergonovine (METHERGINE) 0.2 MG tablet Take 0.2 mg by mouth every 6 hours. For 3 doses      ondansetron (ZOFRAN) 4 MG tablet Take 1 tablet (4 mg) by mouth every 8 hours as needed for nausea 10 tablet 0    Vitamin D3 (VITAMIN D, CHOLECALCIFEROL,) 25 mcg (1000 units) tablet Take 1 tablet by mouth daily      zinc gluconate 50 MG tablet Take 50 mg by mouth daily      blood glucose (NO BRAND SPECIFIED) test strip Use to test blood sugar 4 times daily or as directed. Any covered brand that works with meter. 400 strip 3    blood glucose monitoring (NO BRAND SPECIFIED) meter device kit Use to test blood sugar 4 times daily or as directed. Any Covered Brand. 1 kit 1    thin (NO BRAND SPECIFIED) lancets Use with lanceting device. 4x daily. Any covered brand. 400 each 3       Allergies:    No Known Allergies    Social History:   Social History     Socioeconomic History    Marital status:       Spouse name: Chris Roldan    Number of children: Not on file    Years of education: Not on file    Highest education level: Not on file   Occupational History    Not on file   Tobacco Use    Smoking status: Never    Smokeless tobacco: Never   Vaping Use    Vaping status: Never Used   Substance and Sexual Activity    Alcohol use: No    Drug use: No    Sexual activity: Yes     Partners: Male   Other Topics Concern    Parent/sibling w/ CABG, MI or angioplasty before 65F 55M? Not Asked   Social History Narrative    ** Merged History Encounter **     How much exercise per week? Active with 2 yr    How much calcium per day? supplement       How much caffeine per day? none    How much vitamin D per day? supplement    Do you/your family wear seatbelts?  Yes    Do you/your family use safety helmets? n/a    Do you/your family use sunscreen? No    Do you/your family keep firearms in the home? No    Do you/your family have a smoke detector(s)? 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, 2019 Aldo Perez LPN    Chata FINNEYM APRN          Social Determinants of Health     Financial Resource Strain: Not on file   Food Insecurity: Not on file   Transportation Needs: Not on file   Physical Activity: Not on file   Stress: Not on file   Social Connections: Not on file   Interpersonal Safety: Not on file   Housing Stability: Not on file     Social History     Socioeconomic History    Marital status:      Spouse name: Chris Roldan   Tobacco Use    Smoking status: Never    Smokeless tobacco: Never   Vaping Use    Vaping status: Never Used   Substance and Sexual Activity    Alcohol use: No    Drug use: No    Sexual activity: Yes     Partners: Male   Social History Narrative    ** Merged History Encounter **     How much exercise per week? Active with 2 yr    How much calcium per day? supplement       How much caffeine per day? none    How much vitamin D per day? supplement    Do you/your family wear seatbelts?  Yes    Do you/your family use safety  "helmets? n/a    Do you/your family use sunscreen? No    Do you/your family keep firearms in the home? No    Do you/your family have a smoke detector(s)? 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, 2019 Aldo Perez LPN    Chata Painting CNM APRN            Physical Exam:   Vitals:    08/22/24 2200 08/22/24 2212 08/22/24 2215 08/22/24 2225   BP: 119/73 119/73 128/68 126/72   Pulse: (!) 138 (!) 138 (!) 130 (!) 135   Resp: 22 28 21 20   Temp:  98.8  F (37.1  C)  98.6  F (37  C)   TempSrc:  Oral  Oral   SpO2: 100%  100% 100%   Weight:   52.8 kg (116 lb 6.5 oz)    Height:   1.549 m (5' 1\")       Gen: Well appearing in NAD  CV: Tachycardic   Pulm: Breathing comfortably on RA  Pelvis: mild old blood in vagina, cervix is dilated 2cm with clot present. See procedure note below.   Extremities: non-tender, no erythema; trace edema    Labs:  Results for orders placed or performed during the hospital encounter of 08/22/24 (from the past 24 hour(s))   CBC with platelets differential    Narrative    The following orders were created for panel order CBC with platelets differential.  Procedure                               Abnormality         Status                     ---------                               -----------         ------                     CBC with platelets and d...[[id number]]  Abnormal            Final result                 Please view results for these tests on the individual orders.   Comprehensive metabolic panel   Result Value Ref Range    Sodium 135 135 - 145 mmol/L    Potassium 4.1 3.4 - 5.3 mmol/L    Carbon Dioxide (CO2) 23 22 - 29 mmol/L    Anion Gap 13 7 - 15 mmol/L    Urea Nitrogen 4.5 (L) 6.0 - 20.0 mg/dL    Creatinine 0.69 0.51 - 0.95 mg/dL    GFR Estimate >90 >60 mL/min/1.73m2    Calcium 9.4 8.8 - 10.4 mg/dL    Chloride 99 98 - 107 mmol/L    Glucose 111 (H) 70 - 99 mg/dL    Alkaline Phosphatase 56 40 - 150 U/L    AST 40 0 - 45 U/L    ALT 17 0 - 50 U/L    Protein Total 7.8 6.4 - 8.3 g/dL    Albumin 4.2 3.5 - 5.2 g/dL    " Bilirubin Total 0.2 <=1.2 mg/dL   ABO/Rh type and screen    Narrative    The following orders were created for panel order ABO/Rh type and screen.  Procedure                               Abnormality         Status                     ---------                               -----------         ------                     Adult Type and Screen[[id number]]                            Final result                 Please view results for these tests on the individual orders.   CBC with platelets and differential   Result Value Ref Range    WBC Count 10.6 4.0 - 11.0 10e3/uL    RBC Count 3.03 (L) 3.80 - 5.20 10e6/uL    Hemoglobin 7.4 (L) 11.7 - 15.7 g/dL    Hematocrit 22.5 (L) 35.0 - 47.0 %    MCV 74 (L) 78 - 100 fL    MCH 24.4 (L) 26.5 - 33.0 pg    MCHC 32.9 31.5 - 36.5 g/dL    RDW 16.6 (H) 10.0 - 15.0 %    Platelet Count 222 150 - 450 10e3/uL    % Neutrophils 85 %    % Lymphocytes 10 %    % Monocytes 3 %    % Eosinophils 1 %    % Basophils 0 %    % Immature Granulocytes 1 %    NRBCs per 100 WBC 0 <1 /100    Absolute Neutrophils 9.0 (H) 1.6 - 8.3 10e3/uL    Absolute Lymphocytes 1.1 0.8 - 5.3 10e3/uL    Absolute Monocytes 0.3 0.0 - 1.3 10e3/uL    Absolute Eosinophils 0.1 0.0 - 0.7 10e3/uL    Absolute Basophils 0.0 0.0 - 0.2 10e3/uL    Absolute Immature Granulocytes 0.1 <=0.4 10e3/uL    Absolute NRBCs 0.0 10e3/uL   Adult Type and Screen   Result Value Ref Range    ABO/RH(D) O POS     Antibody Screen Negative Negative    SPECIMEN EXPIRATION DATE [id number]    Magnesium   Result Value Ref Range    Magnesium 1.7 1.7 - 2.3 mg/dL   HCG quantitative pregnancy (blood)   Result Value Ref Range    hCG Quantitative 9,076 (H) <5 mIU/mL   UA with Microscopic reflex to Culture    Specimen: Urine, Clean Catch   Result Value Ref Range    Color Urine Straw Colorless, Straw, Light Yellow, Yellow    Appearance Urine Clear Clear    Glucose Urine Negative Negative mg/dL    Bilirubin Urine Negative Negative    Ketones Urine Negative Negative  mg/dL    Specific Gravity Urine 1.005 1.003 - 1.035    Blood Urine Large (A) Negative    pH Urine 6.0 5.0 - 7.0    Protein Albumin Urine 10 (A) Negative mg/dL    Urobilinogen Urine Normal Normal, 2.0 mg/dL    Nitrite Urine Negative Negative    Leukocyte Esterase Urine Small (A) Negative    Bacteria Urine Few (A) None Seen /HPF    Mucus Urine Present (A) None Seen /LPF    RBC Urine >182 (H) <=2 /HPF    WBC Urine 139 (H) <=5 /HPF    Squamous Epithelials Urine <1 <=1 /HPF    Narrative    Urine Culture ordered based on laboratory criteria   iStat Gases (lactate) venous, POCT   Result Value Ref Range    Lactic Acid POCT 3.1 (H) <=2.0 mmol/L    Bicarbonate Venous POCT 26 21 - 28 mmol/L    O2 Sat, Venous POCT 62 (L) 70 - 75 %    pCO2 Venous POCT 40 40 - 50 mm Hg    pH Venous POCT 7.42 7.32 - 7.43    pO2 Venous POCT 32 25 - 47 mm Hg    Base Excess/Deficit (+/-) POCT 1.0 -3.0 - 3.0 mmol/L   D dimer quantitative   Result Value Ref Range    D-Dimer Quantitative >20.00 (HH) 0.00 - 0.50 ug/mL FEU    Narrative    This D-dimer assay is intended for use in conjunction with a clinical pretest probability assessment model to exclude pulmonary embolism (PE) and deep venous thrombosis (DVT) in outpatients suspected of PE or DVT. The cut-off value is 0.50 ug/mL FEU.   CT Chest (PE) Abdomen Pelvis w Contrast    Narrative    EXAM: CT CHEST PE ABDOMEN PELVIS W CONTRAST  LOCATION: [facility]  DATE: 8/22/2024    INDICATION: Elevated d dimer, tachycardic, recently had methergine in the setting of miscarriage  COMPARISON: None.  TECHNIQUE: CT chest pulmonary angiogram and routine CT abdomen pelvis with IV contrast. Arterial phase through the chest and venous phase through the abdomen and pelvis. Multiplanar reformats and MIP reconstructions were performed. Dose reduction   techniques were used.   CONTRAST: 59mL ISOVUE 370    FINDINGS:  ANGIOGRAM CHEST: Normal caliber central pulmonary arteries. The left upper lobe  pulmonary arteries are diminutive and poorly evaluated, which may be secondary to left upper lobe volume loss. No filling defect is seen within the remainder of the pulmonary   arteries. Thoracic aorta is negative for dissection. No CT evidence of right heart strain.     LUNGS AND PLEURA: Patent central airways. No focal consolidation or pleural effusion. Few cysts and/or emphysema within the left lower lobe. Left upper lobe volume loss. Few areas of mucous plugging in the left upper lobe. 0.7 x 0.6 cm nodular opacity in   the left upper lobe (series 6, image 57). Few clustered nodular opacities in the medial left lower lobe (series 7, image 33 for example). Scattered calcified granulomas.    MEDIASTINUM/AXILLAE: Heart size is normal. No thoracic lymphadenopathy. Mild circumferential mural thickening of the upper esophagus (series 4, image 41 for example).    CORONARY ARTERY CALCIFICATION: None.    HEPATOBILIARY: Normal.    PANCREAS: Normal.    SPLEEN: Normal.    ADRENAL GLANDS: Normal.    KIDNEYS/BLADDER: No hydronephrosis. Moderately distended urinary bladder.    BOWEL: No bowel obstruction. Colonic diverticulosis without acute diverticulitis. Appendicolith at the appendiceal base (series 12, image 133) without periappendiceal stranding or other features to suggest acute appendicitis. Trace ascites. No   pneumoperitoneum.    LYMPH NODES: Normal.    VASCULATURE: Normal caliber abdominal aorta without significant atherosclerotic plaque.    PELVIC ORGANS: Heterogeneous intermediate and hyperdense material within the cervix.    MUSCULOSKELETAL: No destructive osseous lesion. 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      Impression    IMPRESSION:  1.  No acute pulmonary embolism. The left upper lobe pulmonary arteries are diminutive and poorly evaluated which may be secondary to left upper lobe volume loss.  2.  Heterogeneous intermediate and hyperdense material within the  cervix could represent incomplete  and/or blood products.  3.  0.7 x 0.6 cm left upper lobe nodular opacity is indeterminate. Recommend follow-up CT chest in 3-6 months.  4.  Few clustered nodular opacities in the medial left lower lobe could be infectious/inflammatory.  5.  Mild mural thickening of the upper esophagus. Consider upper endoscopy if not previously performed.   Lactic acid whole blood   Result Value Ref Range    Lactic Acid 3.5 (H) 0.7 - 2.0 mmol/L   US Pelvis Complete without Transvaginal    Narrative    EXAM: US PELVIC TRANSABDOMINAL  LOCATION: [facility]  DATE: 2024    INDICATION: Patient with worsened abdominal pain s p miscarriage, borderline febrile  COMPARISON: CT 2024, pelvic ultrasound 2024  TECHNIQUE: Transabdominal scans were performed.    FINDINGS:    UTERUS: 11.7 x 5.7 x 5.3 cm. Normal in size and position with no masses.    ENDOMETRIUM: 3 mm at the level of the fundus. Normal smooth endometrium superiorly with heterogeneous, hyperechoic material within the endocervical canal, measuring up to 3.6 cm in diameter, similar to prior exam. No definite internal flow on color   Doppler images, although there was some suggestion of internal enhancement on same day CT.    RIGHT OVARY: 2.7 x 2.2 x 1.5 cm. Normal.     LEFT OVARY: 2.3 x 1.8 x 0.9 cm. Normal.    No significant free fluid.      Impression    IMPRESSION:  1.  Persistent large volume heterogeneous, hyperechoic material within the endocervical canal, likely blood products. While no flow is seen on this exam there was some questionable internal enhancement on same day CT, which can be seen with retained   products of conception. Recommend correlation with beta-hCG level.         Hemoglobin   Result Value Ref Range    Hemoglobin 6.6 (LL) 11.7 - 15.7 g/dL   Prepare red blood cells (unit)   Result Value Ref Range    Blood Component Type Red Blood Cells     Product Code [id number]     Unit Status  Transfused     Unit Number [id number]     CROSSMATCH Compatible     CODING SYSTEM [id number]     ISSUE DATE AND TIME [id number]     UNIT ABO/RH O+     UNIT TYPE ISBT 5100    HCG QUALitative pregnancy (blood)   Result Value Ref Range    hCG Serum Qualitative Positive (A) Negative           A&P:   Marina Mock is a 44 year old  with retained products of conception, concern and concern for possible endometritis vs septic ab due to tachycardia and elevated lactate. Source control obtained with removal of products. Admit to observation due to elevated lactate and persistent tachycardia.    - Suspected endometritis: S/p 1 dose of clinda and gent. Will hold on further IV abx. Continue home doxycycline. With tissue removed, anticipate patient will clinically improve. Tissue sent to pathology.   - Elevated lactate: IV fluids, recheck ordered per protocol, hospitalist consult placed.  - Tachycardia: suspect component of infection and anemia, appreciate hospitalist guidance   - Acute blood loss anemia: s/p 1u PRBC, symptomatically stable, no ongoing bleeding. Recheck ordered in AM.    - Regular diet, PO pain meds, stool softener, home meds ordered  - DVT ppx: SCDs      Procedure note:     Removal of products of conception    The risks, benefits, and alternatives to removal of tissue were discussed. IV Fentanyl administered. The patient was positioned in a lithotomy position with legs in stirrups. Exam findings as above. The cervix was swabbed with betadine x 3. Using a ring forceps, the cervix was probed and some clot was removed. Using the polyp forceps, a large piece of tissue, approx 3cm, consistent with the appearance of products of conception was removed. The tissue sample was placed in container to sent to pathology.  Bleeding was minimal and the patient tolerated the procedure without any complications.      Francine Calles MD, MHS  2024

## 2024-08-23 NOTE — ED NOTES
[facility]  ED Nurse Handoff Report    ED Chief complaint: Abdominal Pain      ED Diagnosis:   Final diagnoses:   None       Code Status: Full Code    Allergies: No Known Allergies    Patient Story: Pt arrives with c/o vaginal bleeding and abdominal pain s/p incomplete . Pt reports taking PO methergine PTA.   Focused Assessment:  Denies SOB, fever, chills, chest pain. Tachycardic in ED. Pain managed successfully with oxycodone.     Treatments and/or interventions provided: PIV; IVF; abx; labs; 1 unit(s) PRBCs.   Labs Ordered and Resulted from Time of ED Arrival to Time of ED Departure   COMPREHENSIVE METABOLIC PANEL - Abnormal       Result Value    Sodium 135      Potassium 4.1      Carbon Dioxide (CO2) 23      Anion Gap 13      Urea Nitrogen 4.5 (*)     Creatinine 0.69      GFR Estimate >90      Calcium 9.4      Chloride 99      Glucose 111 (*)     Alkaline Phosphatase 56      AST 40      ALT 17      Protein Total 7.8      Albumin 4.2      Bilirubin Total 0.2     ROUTINE UA WITH MICROSCOPIC REFLEX TO CULTURE - Abnormal    Color Urine Straw      Appearance Urine Clear      Glucose Urine Negative      Bilirubin Urine Negative      Ketones Urine Negative      Specific Gravity Urine 1.005      Blood Urine Large (*)     pH Urine 6.0      Protein Albumin Urine 10 (*)     Urobilinogen Urine Normal      Nitrite Urine Negative      Leukocyte Esterase Urine Small (*)     Bacteria Urine Few (*)     Mucus Urine Present (*)     RBC Urine >182 (*)     WBC Urine 139 (*)     Squamous Epithelials Urine <1     CBC WITH PLATELETS AND DIFFERENTIAL - Abnormal    WBC Count 10.6      RBC Count 3.03 (*)     Hemoglobin 7.4 (*)     Hematocrit 22.5 (*)     MCV 74 (*)     MCH 24.4 (*)     MCHC 32.9      RDW 16.6 (*)     Platelet Count 222      % Neutrophils 85      % Lymphocytes 10      % Monocytes 3      % Eosinophils 1      % Basophils 0      % Immature Granulocytes 1      NRBCs per 100 WBC 0      Absolute  Neutrophils 9.0 (*)     Absolute Lymphocytes 1.1      Absolute Monocytes 0.3      Absolute Eosinophils 0.1      Absolute Basophils 0.0      Absolute Immature Granulocytes 0.1      Absolute NRBCs 0.0     ISTAT GASES LACTATE VENOUS POCT - Abnormal    Lactic Acid POCT 3.1 (*)     Bicarbonate Venous POCT 26      O2 Sat, Venous POCT 62 (*)     pCO2 Venous POCT 40      pH Venous POCT 7.42      pO2 Venous POCT 32      Base Excess/Deficit (+/-) POCT 1.0     D DIMER QUANTITATIVE - Abnormal    D-Dimer Quantitative >20.00 (*)    LACTIC ACID WHOLE BLOOD - Abnormal    Lactic Acid 3.5 (*)    HEMOGLOBIN - Abnormal    Hemoglobin 6.6 (*)    HCG QUALITATIVE PREGNANCY - Abnormal    hCG Serum Qualitative Positive (*)    HCG QUANTITATIVE PREGNANCY - Abnormal    hCG Quantitative 9,076 (*)    MAGNESIUM - Normal    Magnesium 1.7     ISTAT GASES LACTATE VENOUS POCT   TYPE AND SCREEN, ADULT    ABO/RH(D) O POS      Antibody Screen Negative      SPECIMEN EXPIRATION DATE [id number]     PREPARE RED BLOOD CELLS (UNIT)    Blood Component Type Red Blood Cells      Product Code [id number]      Unit Status Transfused      Unit Number [id number]      CROSSMATCH Compatible      CODING SYSTEM [id number]      ISSUE DATE AND TIME [id number]      UNIT ABO/RH O+      UNIT TYPE ISBT 5100     PREPARE RED BLOOD CELLS (UNIT)   SURGICAL PATHOLOGY EXAM   URINE CULTURE   BLOOD CULTURE   TRANSFUSE RED BLOOD CELLS (UNIT)   ABO/RH TYPE AND SCREEN      Patient's response to treatments and/or interventions: Tolerated    To be done/followed up on inpatient unit:  Observation; med admin    Does this patient have any cognitive concerns?:  N/A    Activity level - Baseline/Home:  Independent  Activity Level - Current:   Stand with Assist    Patient's Preferred language: English   Needed?: No    Isolation: None  Infection: Not Applicable  Patient tested for COVID 19 prior to admission: NO  Bariatric?: No    Vital Signs:   Vitals:    08/22/24 2245 08/22/24  2300 08/22/24 2315 08/22/24 2330   BP: 125/64 122/67 124/67    Pulse: (!) 129 113 112 120   Resp: 16 16 17 21   Temp:       TempSrc:       SpO2: 100% 100% 100% 100%   Weight:       Height:           Cardiac Rhythm: ST    Was the PSS-3 completed:   Yes  What interventions are required if any?               Family Comments: N/A  OBS brochure/video discussed/provided to patient/family: Yes              Name of person given brochure if not patient:               Relationship to patient:     For the majority of the shift this patient's behavior was Green.   Behavioral interventions performed were .    ED NURSE PHONE NUMBER: *[phone]

## 2024-08-23 NOTE — PROGRESS NOTES
[facility]    Medicine Progress Note - Hospitalist Service    Date of Admission:  8/22/2024    Assessment & Plan   Marina Mock is a markedly pleasant 44 year old woman with HIV who was admitted on 8/22/2024 for evaluation of vaginal bleeding and abdominal pain. Hospitalists consulted by Dr. Calles of OB GYN to evaluate this patient for ongoing elevated lactate and tachycardia. Her lactate and vitals improved after removal of retained products of conception by Dr. Calles on 8/22. She did received 1u PRBC on 8/22 for hgb of 6.6.      Elevated Lactate and tachycardia, resolved  Sepsis due to acute endometritis, improved  Acute blood loss anemia  Ms. Mock reportedly developed vaginal bleeding several days ago and was found to have miscarriage. On 8/22, she took a dose of Methergine, and developed acute abdominal pain, and presented to our ED for further evaluation.       On presentation, she was tachycardic, without fever (Tmax 100.0), hypotension, or hypoxia. WBC and PLT are normal. Lactate is 3.1. HGB is 7.4, dropped in the ED to 6.6. CMP is unremarkable. HCG and D-Dimer are both elevated. CT PE of chest, abdomen and pelvis shows no acute pulmonary embolism: heterogeneous intermediate and hyperdense material within the cervix is noted. Pelvic US shows persistent large volume heterogeneous, hyperechoic material within the endocervical canal, likely blood products.      Retained products of conception was suspected. OB GYN evaluated her and at the bedside 8/22, retained products of conception have been removed. She has been given one time doses of Clindamycin and gentamicin, one liter of IV fluid, and ordered now for one unit packed red cell blood transfusion. Her symptoms have resolved and she is feeling well. Lactate repeated and has increased to 3.5. She was admitted to Observation status and we are consulted for further evaluation.     Lactate and tachycardia resolved overnight from  8/22-8/23. On 8/23, she is feeling well. She is independent in her room, eating, drinking, urinating, and passing gas. She feels ready to leave the hospital.    -recheck cbc ordered  -OBGYN recommending continued doxycycline for antibiosis  -if hgb is improved, she would be medically ready for discharge.   -final dispo per OBGYN     HIV: Continue PTA ART.     Incidental CT findings: A 0.7 x 0.6 cm left upper lobe nodular opacity is indeterminate. Recommend follow-up CT chest in 3-6 months by Radiology.. A few clustered nodular opacities in the medial left lower lobe are also seen. Mild mural thickening of the upper esophagus is noted.        -- Sucralfate ordered empirically while she is here       Observation Goals: -diagnostic tests and consults completed and resulted, -vital signs normal or at patient baseline, -adequate pain control on oral analgesics, -tolerating oral antibiotics or has plans for home infusion setup, -infection is improving, -returns to baseline functional status, Nurse to notify provider when observation goals have been met and patient is ready for discharge.  Diet: Regular Diet Adult    DVT Prophylaxis: Pneumatic Compression Devices  Avila Catheter: Not present  Lines: None     Cardiac Monitoring: None  Code Status: Full Code      Clinically Significant Risk Factors Present on Admission                     # Anemia: based on hgb <11                        Disposition Plan     Medically Ready for Discharge: Anticipated Today             Isiah Lyn MD  Hospitalist Service  [facility]  Securely message with [employer] (more info)  Text page via [employer] Paging/Directory   ______________________________________________________________________    Interval History   Feels well. Independent. Eating, drinking. Voiding. Eager to discharge and feels ready for that.     Physical Exam   Vital Signs: Temp: 98.6  F (37  C) Temp src: Oral BP: 104/65 Pulse: 91   Resp: 21 SpO2: 100  % O2 Device: None (Room air)    Weight: 116 lbs 6.45 oz    Constitutional: Awake, alert, cooperative, no apparent distress  Respiratory: Clear to auscultation bilaterally, no crackles or wheezing  Cardiovascular: Regular rate and rhythm, normal S1 and S2, and no murmur noted  GI: Normal bowel sounds, soft, non-distended, non-tender  Skin/Integumen: No rashes, no cyanosis, no edema  Other:       Medical Decision Making       38 MINUTES SPENT BY ME on the date of service doing chart review, history, exam, documentation & further activities per the note.      Data   ------------------------- PAST 24 HR DATA REVIEWED -----------------------------------------------    I have personally reviewed the following data over the past 24 hrs:    11.9 (H)  \   6.6 (LL); 6.6 (LL)   / 247     135 99 4.5 (L) /  111 (H)   4.1 23 0.69 \     ALT: 17 AST: 40 AP: 56 TBILI: 0.2   ALB: 4.2 TOT PROTEIN: 7.8 LIPASE: N/A     Procal: N/A CRP: N/A Lactic Acid: 1.8       INR:  N/A PTT:  N/A   D-dimer:  >20.00 (HH) Fibrinogen:  N/A       Imaging results reviewed over the past 24 hrs:   Recent Results (from the past 24 hour(s))   CT Chest (PE) Abdomen Pelvis w Contrast    Narrative    EXAM: CT CHEST PE ABDOMEN PELVIS W CONTRAST  LOCATION: [facility]  DATE: 8/22/2024    INDICATION: Elevated d dimer, tachycardic, recently had methergine in the setting of miscarriage  COMPARISON: None.  TECHNIQUE: CT chest pulmonary angiogram and routine CT abdomen pelvis with IV contrast. Arterial phase through the chest and venous phase through the abdomen and pelvis. Multiplanar reformats and MIP reconstructions were performed. Dose reduction   techniques were used.   CONTRAST: 59mL ISOVUE 370    FINDINGS:  ANGIOGRAM CHEST: Normal caliber central pulmonary arteries. The left upper lobe pulmonary arteries are diminutive and poorly evaluated, which may be secondary to left upper lobe volume loss. No filling defect is seen within the  remainder of the pulmonary   arteries. Thoracic aorta is negative for dissection. No CT evidence of right heart strain.     LUNGS AND PLEURA: Patent central airways. No focal consolidation or pleural effusion. Few cysts and/or emphysema within the left lower lobe. Left upper lobe volume loss. Few areas of mucous plugging in the left upper lobe. 0.7 x 0.6 cm nodular opacity in   the left upper lobe (series 6, image 57). Few clustered nodular opacities in the medial left lower lobe (series 7, image 33 for example). Scattered calcified granulomas.    MEDIASTINUM/AXILLAE: Heart size is normal. No thoracic lymphadenopathy. Mild circumferential mural thickening of the upper esophagus (series 4, image 41 for example).    CORONARY ARTERY CALCIFICATION: None.    HEPATOBILIARY: Normal.    PANCREAS: Normal.    SPLEEN: Normal.    ADRENAL GLANDS: Normal.    KIDNEYS/BLADDER: No hydronephrosis. Moderately distended urinary bladder.    BOWEL: No bowel obstruction. Colonic diverticulosis without acute diverticulitis. Appendicolith at the appendiceal base (series 12, image 133) without periappendiceal stranding or other features to suggest acute appendicitis. Trace ascites. No   pneumoperitoneum.    LYMPH NODES: Normal.    VASCULATURE: Normal caliber abdominal aorta without significant atherosclerotic plaque.    PELVIC ORGANS: Heterogeneous intermediate and hyperdense material within the cervix.    MUSCULOSKELETAL: No destructive osseous lesion. 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      Impression    IMPRESSION:  1.  No acute pulmonary embolism. The left upper lobe pulmonary arteries are diminutive and poorly evaluated which may be secondary to left upper lobe volume loss.  2.  Heterogeneous intermediate and hyperdense material within the cervix could represent incomplete  and/or blood products.  3.  0.7 x 0.6 cm left upper lobe nodular opacity is indeterminate. Recommend  follow-up CT chest in 3-6 months.  4.  Few clustered nodular opacities in the medial left lower lobe could be infectious/inflammatory.  5.  Mild mural thickening of the upper esophagus. Consider upper endoscopy if not previously performed.   US Pelvis Complete without Transvaginal    Narrative    EXAM: US PELVIC TRANSABDOMINAL  LOCATION: [facility]  DATE: 8/22/2024    INDICATION: Patient with worsened abdominal pain s p miscarriage, borderline febrile  COMPARISON: CT 8/22/2024, pelvic ultrasound 8/19/2024  TECHNIQUE: Transabdominal scans were performed.    FINDINGS:    UTERUS: 11.7 x 5.7 x 5.3 cm. Normal in size and position with no masses.    ENDOMETRIUM: 3 mm at the level of the fundus. Normal smooth endometrium superiorly with heterogeneous, hyperechoic material within the endocervical canal, measuring up to 3.6 cm in diameter, similar to prior exam. No definite internal flow on color   Doppler images, although there was some suggestion of internal enhancement on same day CT.    RIGHT OVARY: 2.7 x 2.2 x 1.5 cm. Normal.     LEFT OVARY: 2.3 x 1.8 x 0.9 cm. Normal.    No significant free fluid.      Impression    IMPRESSION:  1.  Persistent large volume heterogeneous, hyperechoic material within the endocervical canal, likely blood products. While no flow is seen on this exam there was some questionable internal enhancement on same day CT, which can be seen with retained   products of conception. Recommend correlation with beta-hCG level.

## 2024-08-23 NOTE — PHARMACY-ADMISSION MEDICATION HISTORY
Pharmacy Intern Admission Medication History    Admission medication history is complete. The information provided in this note is only as accurate as the sources available at the time of the update.    Information Source(s): Patient, Family member, and CareEverywhere/[employer] via in-person    Pertinent Information:   -Pt stated having cramps after taking her METHYLERGONOVINE tablet..  -Per [employer], Oxycodone 5 mg tab was prescribed on 08/12/24, but pt stated not taking.  -Started doxycycline on 08/21/24 for 7 days and has not take her pm dose yet.    Changes made to PTA medication list:  Added:   METHYLERGONOVINE MALEATE 0.2 MG Tab   Deleted: None  Changed: None    Allergies reviewed with patient and updates made in EHR: yes    Medication History Completed By: Jaden Diaz 8/22/2024 8:38 PM    PTA Med List   Medication Sig Last Dose    Ascorbic Acid (VITAMIN C) 500 MG CAPS Take 1 tablet by mouth daily 8/21/2024 at pm    cod liver oil CAPS capsule Take 1 capsule by mouth daily 8/21/2024    doxycycline hyclate (VIBRAMYCIN) 100 MG capsule Take 1 capsule (100 mg) by mouth 2 times daily for 7 days 8/22/2024 at am    emtricitabine-rilpivirine-tenofovir (ODEFSEY) 200-25-25 MG TABS per tablet Take 1 tablet by mouth daily 8/21/2024 at pm    methylergonovine (METHERGINE) 0.2 MG tablet Take 0.2 mg by mouth every 6 hours. 8/22/2024 at 1:50 PM    ondansetron (ZOFRAN) 4 MG tablet Take 1 tablet (4 mg) by mouth every 8 hours as needed for nausea prn    Vitamin D3 (VITAMIN D, CHOLECALCIFEROL,) 25 mcg (1000 units) tablet Take 1 tablet by mouth daily 8/21/2024 at pm    zinc gluconate 50 MG tablet Take 50 mg by mouth daily 8/21/2024 at pm

## 2024-08-26 ENCOUNTER — TELEPHONE (OUTPATIENT)
Dept: OBGYN | Facility: CLINIC | Age: 44
End: 2024-08-26
Payer: COMMERCIAL

## 2024-08-26 NOTE — TELEPHONE ENCOUNTER
Type of Paperwork received:  FMLA     Date Rcvd:  8/26/2024    Rcvd From (Company name): [facility]    Provider:  Dr. Betts    Placed on Provider Cart Date:  8/26/2024

## 2024-08-26 NOTE — TELEPHONE ENCOUNTER
[facility] Call Center    Phone Message    May a detailed message be left on voicemail: yes     Reason for Call: Form or Letter   Type or form/letter needing completion: Leave of absence form   Provider: Norma Betts MD  Date form needed: ASAP  Once completed: Patient will  at .    pt stated she sent via my chart. Also, pt looking to get leave extended as she's still feeling weak.    Pt wondering if we received forms ((sent Friday) and said she's supposed to send back today or tomorrow    Please call patient. Thank you    Action Taken: Message routed to:  Other: [facility]    Travel Screening: Not Applicable

## 2024-08-26 NOTE — TELEPHONE ENCOUNTER
Called and spoke to pt    Informed her we did not receive any forms  Informed pt she can upload forms via [employer] and once we receive forms can get process started  Pt verbalized understanding    Teodora Marshall RN on 8/26/2024 at 1:10 PM  [facility] Triage

## 2024-08-27 LAB
BACTERIA BLD CULT: NO GROWTH
PATH REPORT.COMMENTS IMP SPEC: NORMAL
PATH REPORT.COMMENTS IMP SPEC: NORMAL
PATH REPORT.FINAL DX SPEC: NORMAL
PATH REPORT.GROSS SPEC: NORMAL
PATH REPORT.MICROSCOPIC SPEC OTHER STN: NORMAL
PATH REPORT.RELEVANT HX SPEC: NORMAL
PHOTO IMAGE: NORMAL

## 2024-08-27 PROCEDURE — 88305 TISSUE EXAM BY PATHOLOGIST: CPT | Mod: 26 | Performed by: PATHOLOGY

## 2024-09-25 ENCOUNTER — PATIENT OUTREACH (OUTPATIENT)
Dept: INFECTIOUS DISEASES | Facility: CLINIC | Age: 44
End: 2024-09-25
Payer: COMMERCIAL

## 2024-09-25 NOTE — TELEPHONE ENCOUNTER
Social Work - Telephone  [facility]  Data: 2024  Patient Name: Marina Mock  Goes By: Marina    /Age: 1980 (44 year old)      Reason for Referral: Missed Appointment     Intervention: Writer called Patient to check in and assess for barriers to care. Writer reviewed chart and saw that transportation was noted as a potential barrier in a recent note. Voicemail left.   Plan:  will await patient's return phone call and provide assistance at that time.    *Patient called back shortly after voicemail was left. Patient plans to reschedule.       CHEIKH Quiroz, [facility]  Infectious Disease

## 2024-09-26 ENCOUNTER — TELEPHONE (OUTPATIENT)
Dept: INFECTIOUS DISEASES | Facility: CLINIC | Age: 44
End: 2024-09-26
Payer: COMMERCIAL

## 2024-09-26 NOTE — TELEPHONE ENCOUNTER
EP LVM 9/26 to resched 9/25 follow up with Dr. Zelaya; no show. Appt can be in January or February 2025 (or sooner if pt wants) per provider.       ----- Message from Candis TAYLOR sent at 9/25/2024  3:47 PM CDT -----  Regarding: FW: reschedule    ----- Message -----  From: Aminata Zelaya MD  Sent: 9/25/2024   3:08 PM CDT  To: [facility]  Subject: reschedule                                       Hello,     This patient was unable to make the appointment today however, she recently refilled her ART. I think if we schedule her for sometime in Jan or Feb, sooner if she wants that would be best.     Thank you  Aminata Zelaya

## 2024-10-15 DIAGNOSIS — B20 HUMAN IMMUNODEFICIENCY VIRUS (HIV) DISEASE (H): ICD-10-CM

## 2024-10-15 RX ORDER — POLYMYXIN B SULFATE, BACITRACIN ZINC AND NEOMYCIN SULFATE 400; 3.5; 5 [USP'U]/G; MG/G; [USP'U]/G
1 OINTMENT TOPICAL DAILY
Qty: 30 TABLET | Refills: 0 | Status: SHIPPED | OUTPATIENT
Start: 2024-10-15 | End: 2024-11-01

## 2024-10-15 NOTE — TELEPHONE ENCOUNTER
"Medication Refill                                                     Refill request receive for:  emtricitabine-rilpivirine-tenofovir (ODEFSEY) 200-25-25 MG TABS per tablet     Last refill: 05/20/24    Last Office Visit 7/31/2024  Future appt scheduled? Yes: 11/06/24     POC for medication if indicated from last note including duration of treatment if applicable: - continue Odefsey      RECENT LABS/VITALS                                                        Lab Results   Component Value Date    AST 40 08/22/2024    AST 23 02/03/2021     Lab Results   Component Value Date    ALT 17 08/22/2024    ALT 35 02/03/2021     Creatinine   Date Value Ref Range Status   08/22/2024 0.69 0.51 - 0.95 mg/dL Final   02/03/2021 0.76 0.52 - 1.04 mg/dL Final   ]  Alkaline Phosphatase   Date/Time Value Ref Range Status   08/22/2024 05:05 PM 56 40 - 150 U/L Final   02/03/2021 11:49 AM 66 40 - 150 U/L Final     No results found for: \"LABAPCBCDIFF\"                    "

## 2024-11-01 DIAGNOSIS — B20 HUMAN IMMUNODEFICIENCY VIRUS (HIV) DISEASE (H): ICD-10-CM

## 2024-11-01 RX ORDER — POLYMYXIN B SULFATE, BACITRACIN ZINC AND NEOMYCIN SULFATE 400; 3.5; 5 [USP'U]/G; MG/G; [USP'U]/G
1 OINTMENT TOPICAL DAILY
Qty: 30 TABLET | Refills: 0 | Status: SHIPPED | OUTPATIENT
Start: 2024-11-01

## 2024-11-01 NOTE — TELEPHONE ENCOUNTER
"Medication Refill                                                     Refill request receive for:  emtricitabine-rilpivirine-tenofovir (ODEFSEY) 200-25-25 MG TABS per tablet      Last refill: 10/15/2024     Last Office Visit 6/27/2024  Future appt scheduled? Yes: 11/06/2024      POC for medication if indicated from last note including duration of treatment if applicable: \"Continue Odefsey  Labs today : HIV Viral load, then every 1 - 2 months while pregnant, more closely in end of second to third trimester  Follow up 2 months via video.\"    "

## 2024-11-24 ENCOUNTER — HEALTH MAINTENANCE LETTER (OUTPATIENT)
Age: 44
End: 2024-11-24

## 2024-11-29 DIAGNOSIS — B20 HUMAN IMMUNODEFICIENCY VIRUS (HIV) DISEASE (H): ICD-10-CM

## 2024-12-02 RX ORDER — POLYMYXIN B SULFATE, BACITRACIN ZINC AND NEOMYCIN SULFATE 400; 3.5; 5 [USP'U]/G; MG/G; [USP'U]/G
1 OINTMENT TOPICAL DAILY
Qty: 30 TABLET | Refills: 0 | Status: SHIPPED | OUTPATIENT
Start: 2024-12-02

## 2024-12-02 NOTE — TELEPHONE ENCOUNTER
"Mediation refill protocol failed: Odefsey    Reason: Overdue Labs     [facility]   HIV copies not detected  Infectious disease IS Dr Zelaya    Action: Routed to Provider for further review and scheduling staff    speciality provider within that speciality Dr. Zelaya    Plan of care from last visit:   \"Continue Odefsey\"  Patient has no showed appointments with outreach from clinic coordinators and Social Work. Will forward to clinic coordinators to schedule appointment and labs sometime in Jan/Feb per note below from Dr. Zelaya. Will ask provider if labs are needed in the meantime.      "

## 2024-12-30 DIAGNOSIS — B20 HUMAN IMMUNODEFICIENCY VIRUS (HIV) DISEASE (H): ICD-10-CM

## 2024-12-30 RX ORDER — POLYMYXIN B SULFATE, BACITRACIN ZINC AND NEOMYCIN SULFATE 400; 3.5; 5 [USP'U]/G; MG/G; [USP'U]/G
1 OINTMENT TOPICAL DAILY
Qty: 30 TABLET | Refills: 0 | Status: SHIPPED | OUTPATIENT
Start: 2024-12-30

## 2025-02-05 DIAGNOSIS — B20 HUMAN IMMUNODEFICIENCY VIRUS (HIV) DISEASE (H): ICD-10-CM

## 2025-02-05 RX ORDER — POLYMYXIN B SULFATE, BACITRACIN ZINC AND NEOMYCIN SULFATE 400; 3.5; 5 [USP'U]/G; MG/G; [USP'U]/G
1 OINTMENT TOPICAL DAILY
Qty: 30 TABLET | Refills: 0 | Status: SHIPPED | OUTPATIENT
Start: 2025-02-05

## 2025-03-21 ENCOUNTER — DOCUMENTATION ONLY (OUTPATIENT)
Dept: LAB | Facility: CLINIC | Age: 45
End: 2025-03-21

## 2025-03-21 NOTE — PROGRESS NOTES
Marina Mock has an upcoming lab appointment:    Future Appointments   Date Time Provider Department Center   3/26/2025  4:00 PM [facility]   4/2/2025  4:00 PM Aminata Zelaya MD [facility]     Patient is scheduled for the following lab(s):   Scheduling and Arrival Information   Scheduling Notes: Dr. Zelaya labs.   Made On:  Change Notes:  Change Notes: 3/14/2025 3:16 PM  3/14/2025 3:17 PM  3/21/2025 2:14 PM By:  By:  By: LANA SNOW, KAROLINA STOVER       There is no order available. Please review and place either future orders or HMPO (Review of Health Maintenance Protocol Orders), as appropriate.    Health Maintenance Due   Topic    ANNUAL REVIEW OF HM ORDERS     MICROALBUMIN     A1C      If no labs are needed at this time please have the Care Team contact patient regarding this information and cancel lab appointment. Thank you.     Aundrea DOMINGUEZ

## 2025-03-24 ENCOUNTER — TELEPHONE (OUTPATIENT)
Dept: INFECTIOUS DISEASES | Facility: CLINIC | Age: 45
End: 2025-03-24
Payer: COMMERCIAL

## 2025-03-24 DIAGNOSIS — B20 HUMAN IMMUNODEFICIENCY VIRUS (HIV) DISEASE (H): Primary | ICD-10-CM

## 2025-03-24 NOTE — TELEPHONE ENCOUNTER
EP LVM 3/24 to let pt know about 4/2 follow up with Dr. Zelaya is cancelled; provider not avail and moved to 4/9. And to call ID if new day and time doesn't work.

## 2025-03-26 ENCOUNTER — LAB (OUTPATIENT)
Dept: LAB | Facility: CLINIC | Age: 45
End: 2025-03-26
Payer: COMMERCIAL

## 2025-03-26 DIAGNOSIS — B20 HUMAN IMMUNODEFICIENCY VIRUS (HIV) DISEASE (H): ICD-10-CM

## 2025-03-26 LAB
ALBUMIN SERPL BCG-MCNC: 4.4 G/DL (ref 3.5–5.2)
ALP SERPL-CCNC: 61 U/L (ref 40–150)
ALT SERPL W P-5'-P-CCNC: 28 U/L (ref 0–50)
ANION GAP SERPL CALCULATED.3IONS-SCNC: 13 MMOL/L (ref 7–15)
AST SERPL W P-5'-P-CCNC: 30 U/L (ref 0–45)
BASOPHILS # BLD AUTO: 0 10E3/UL (ref 0–0.2)
BASOPHILS NFR BLD AUTO: 0 %
BILIRUB SERPL-MCNC: 0.2 MG/DL
BUN SERPL-MCNC: 12 MG/DL (ref 6–20)
CALCIUM SERPL-MCNC: 9.5 MG/DL (ref 8.8–10.4)
CHLORIDE SERPL-SCNC: 101 MMOL/L (ref 98–107)
CREAT SERPL-MCNC: 0.68 MG/DL (ref 0.51–0.95)
EGFRCR SERPLBLD CKD-EPI 2021: >90 ML/MIN/1.73M2
EOSINOPHIL # BLD AUTO: 0.1 10E3/UL (ref 0–0.7)
EOSINOPHIL NFR BLD AUTO: 1 %
ERYTHROCYTE [DISTWIDTH] IN BLOOD BY AUTOMATED COUNT: 17.4 % (ref 10–15)
GLUCOSE SERPL-MCNC: 91 MG/DL (ref 70–99)
HCO3 SERPL-SCNC: 22 MMOL/L (ref 22–29)
HCT VFR BLD AUTO: 27.1 % (ref 35–47)
HGB BLD-MCNC: 8.5 G/DL (ref 11.7–15.7)
IMM GRANULOCYTES # BLD: 0 10E3/UL
IMM GRANULOCYTES NFR BLD: 0 %
LYMPHOCYTES # BLD AUTO: 2.6 10E3/UL (ref 0.8–5.3)
LYMPHOCYTES NFR BLD AUTO: 44 %
MCH RBC QN AUTO: 21 PG (ref 26.5–33)
MCHC RBC AUTO-ENTMCNC: 31.4 G/DL (ref 31.5–36.5)
MCV RBC AUTO: 67 FL (ref 78–100)
MONOCYTES # BLD AUTO: 0.5 10E3/UL (ref 0–1.3)
MONOCYTES NFR BLD AUTO: 9 %
NEUTROPHILS # BLD AUTO: 2.7 10E3/UL (ref 1.6–8.3)
NEUTROPHILS NFR BLD AUTO: 45 %
NRBC # BLD AUTO: 0 10E3/UL
NRBC BLD AUTO-RTO: 0 /100
PLAT MORPH BLD: ABNORMAL
PLATELET # BLD AUTO: 275 10E3/UL (ref 150–450)
POTASSIUM SERPL-SCNC: 3.8 MMOL/L (ref 3.4–5.3)
PROT SERPL-MCNC: 8 G/DL (ref 6.4–8.3)
RBC # BLD AUTO: 4.05 10E6/UL (ref 3.8–5.2)
RBC MORPH BLD: ABNORMAL
SODIUM SERPL-SCNC: 136 MMOL/L (ref 135–145)
TARGETS BLD QL SMEAR: ABNORMAL
WBC # BLD AUTO: 6 10E3/UL (ref 4–11)

## 2025-03-26 PROCEDURE — 36415 COLL VENOUS BLD VENIPUNCTURE: CPT

## 2025-03-26 PROCEDURE — 87536 HIV-1 QUANT&REVRSE TRNSCRPJ: CPT

## 2025-03-26 PROCEDURE — 86360 T CELL ABSOLUTE COUNT/RATIO: CPT

## 2025-03-26 PROCEDURE — 85025 COMPLETE CBC W/AUTO DIFF WBC: CPT

## 2025-03-26 PROCEDURE — 86359 T CELLS TOTAL COUNT: CPT

## 2025-03-26 PROCEDURE — 80053 COMPREHEN METABOLIC PANEL: CPT

## 2025-03-27 LAB
CD3 CELLS # BLD: 1873 CELLS/UL (ref 603–2990)
CD3 CELLS NFR BLD: 66 % (ref 49–84)
CD3+CD4+ CELLS # BLD: 1037 CELLS/UL (ref 441–2156)
CD3+CD4+ CELLS NFR BLD: 36 % (ref 28–63)
CD3+CD4+ CELLS/CD3+CD8+ CLL BLD: 1.31 % (ref 1.4–2.6)
CD3+CD8+ CELLS # BLD: 793 CELLS/UL (ref 125–1312)
CD3+CD8+ CELLS NFR BLD: 28 % (ref 10–40)
HIV1 RNA # PLAS NAA DL=20: 35 COPIES/ML
HIV1 RNA SERPL NAA+PROBE-LOG#: 1.5 {LOG_COPIES}/ML
T CELL COMMENT: ABNORMAL

## 2025-04-09 ENCOUNTER — OFFICE VISIT (OUTPATIENT)
Dept: INFECTIOUS DISEASES | Facility: CLINIC | Age: 45
End: 2025-04-09
Attending: STUDENT IN AN ORGANIZED HEALTH CARE EDUCATION/TRAINING PROGRAM
Payer: COMMERCIAL

## 2025-04-09 VITALS
DIASTOLIC BLOOD PRESSURE: 85 MMHG | SYSTOLIC BLOOD PRESSURE: 134 MMHG | WEIGHT: 124.4 LBS | HEART RATE: 103 BPM | RESPIRATION RATE: 16 BRPM | BODY MASS INDEX: 23.49 KG/M2 | HEIGHT: 61 IN | OXYGEN SATURATION: 99 %

## 2025-04-09 DIAGNOSIS — B20 HUMAN IMMUNODEFICIENCY VIRUS (HIV) DISEASE (H): Primary | ICD-10-CM

## 2025-04-09 DIAGNOSIS — D50.8 IRON DEFICIENCY ANEMIA SECONDARY TO INADEQUATE DIETARY IRON INTAKE: ICD-10-CM

## 2025-04-09 PROCEDURE — G0009 ADMIN PNEUMOCOCCAL VACCINE: HCPCS | Performed by: STUDENT IN AN ORGANIZED HEALTH CARE EDUCATION/TRAINING PROGRAM

## 2025-04-09 PROCEDURE — 250N000011 HC RX IP 250 OP 636: Performed by: STUDENT IN AN ORGANIZED HEALTH CARE EDUCATION/TRAINING PROGRAM

## 2025-04-09 PROCEDURE — 90677 PCV20 VACCINE IM: CPT | Performed by: STUDENT IN AN ORGANIZED HEALTH CARE EDUCATION/TRAINING PROGRAM

## 2025-04-09 PROCEDURE — 99213 OFFICE O/P EST LOW 20 MIN: CPT | Performed by: STUDENT IN AN ORGANIZED HEALTH CARE EDUCATION/TRAINING PROGRAM

## 2025-04-09 RX ORDER — FERROUS SULFATE 325(65) MG
325 TABLET ORAL
Qty: 30 TABLET | Refills: 3 | Status: SHIPPED | OUTPATIENT
Start: 2025-04-09

## 2025-04-09 RX ADMIN — PNEUMOCOCCAL 20-VALENT CONJUGATE VACCINE 0.5 ML
2.2; 2.2; 2.2; 2.2; 2.2; 2.2; 2.2; 2.2; 2.2; 2.2; 2.2; 2.2; 2.2; 2.2; 2.2; 2.2; 4.4; 2.2; 2.2; 2.2 INJECTION, SUSPENSION INTRAMUSCULAR at 13:51

## 2025-04-09 ASSESSMENT — PAIN SCALES - GENERAL: PAINLEVEL_OUTOF10: NO PAIN (0)

## 2025-04-09 NOTE — Clinical Note
4/9/2025       RE: Marina Mock  [address]  [place] [address]     Dear Colleague,    Thank you for referring your patient, Marina Mock, to the [facility]. Please see a copy of my visit note below.    No notes on file    Again, thank you for allowing me to participate in the care of your patient.      Sincerely,    Aminata Zelaya MD

## 2025-04-09 NOTE — NURSING NOTE
"Chief Complaint   Patient presents with    Blood Infection     Follow up visit     Kelvin Beaulieu, CMA CMA at 1:05 PM on 4/9/2025   /85   Pulse 103   Resp 16   Ht 1.549 m (5' 1\")   Wt 56.4 kg (124 lb 6.4 oz)   SpO2 99%   BMI 23.51 kg/m      "
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
## 2025-04-09 NOTE — PROGRESS NOTES
ID Staff Attestation  Attending Physician Attestation:  I personally reviewed the vitals, labs, microbiologic data, imaging, and interdisciplinary notes.This note by Dr. Aminata Zelaya - ID fellow reflects my observations and opinions, and the assessment and recommendations reflect my approach.    Indio Sebastian MD  Division of Infectious Diseases and International Medicine  P: [phone]  Date of Service (when I saw the patient): 04/09/25

## 2025-04-09 NOTE — PATIENT INSTRUCTIONS
Keep up the good work. Labs look good.   Continue Odefsey   Start iron daily   Recheck hemoglobin in 4 - 6 months.       We will send a letter to you and your primary care provider summarizing our recommendations and the results of any testing performed today. Meanwhile feel free to contact our clinic at any time with questions and clarifications.    Thank you,    Aminata Zelaya MD    Infectious Diseases Clinic   [facility]     Contact info:  Clinic Coordinator: [phone]  Clinic Fax: [phone]
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
## 2025-04-30 ENCOUNTER — OFFICE VISIT (OUTPATIENT)
Dept: URGENT CARE | Facility: URGENT CARE | Age: 45
End: 2025-04-30
Payer: COMMERCIAL

## 2025-04-30 VITALS
HEART RATE: 79 BPM | DIASTOLIC BLOOD PRESSURE: 85 MMHG | RESPIRATION RATE: 16 BRPM | BODY MASS INDEX: 23.05 KG/M2 | OXYGEN SATURATION: 100 % | WEIGHT: 122 LBS | TEMPERATURE: 98.5 F | SYSTOLIC BLOOD PRESSURE: 135 MMHG

## 2025-04-30 DIAGNOSIS — J30.2 SEASONAL ALLERGIES: ICD-10-CM

## 2025-04-30 DIAGNOSIS — K11.20 SALIVARY GLAND INFECTION: Primary | ICD-10-CM

## 2025-04-30 PROCEDURE — 99213 OFFICE O/P EST LOW 20 MIN: CPT | Performed by: PHYSICIAN ASSISTANT

## 2025-04-30 PROCEDURE — 3079F DIAST BP 80-89 MM HG: CPT | Performed by: PHYSICIAN ASSISTANT

## 2025-04-30 PROCEDURE — 3075F SYST BP GE 130 - 139MM HG: CPT | Performed by: PHYSICIAN ASSISTANT

## 2025-04-30 RX ORDER — FLUTICASONE PROPIONATE 50 MCG
SPRAY, SUSPENSION (ML) NASAL
Qty: 18.2 ML | Refills: 1 | Status: SHIPPED | OUTPATIENT
Start: 2025-04-30

## 2025-04-30 RX ORDER — DICLOXACILLIN SODIUM 500 MG/1
500 CAPSULE ORAL 4 TIMES DAILY
Qty: 40 CAPSULE | Refills: 0 | Status: SHIPPED | OUTPATIENT
Start: 2025-04-30 | End: 2025-05-10

## 2025-04-30 NOTE — PATIENT INSTRUCTIONS
"(K11.20) Salivary gland infection  (primary encounter diagnosis)  Comment:   Plan: dicloxacillin (DYNAPEN) 500 MG capsule        1 pill four tiems a day for 10 days,      Take lemon or lemon drops a few times a day to help your salivary gland to secrete, do this until the swelling goes down.    (J30.2) Seasonal allergies  Comment:   Plan: fluticasone (FLONASE) 50 MCG/ACT nasal spray        2 sprays each nostril TWICE a day today then once at bedtime for the next 3 weeks.      Salt water gargles  Saline nasal spray (\"ocean mist\") 2 sprays each nostril as needed throughout the day to help flush the nasal passages out and moisturize them to prevent nasal bleeding.    Follow-up with primary doctor should symptoms persist or worsen.      "

## 2025-04-30 NOTE — PROGRESS NOTES
Urgent Care Clinic Visit    Chief Complaint   Patient presents with    Ear Problem     1 week, both ears itchy now painful, swelling down jaw with pain, teeth pain               4/30/2025    10:03 AM   Additional Questions   Roomed by flip jenkins

## 2025-04-30 NOTE — LETTER
April 30, 2025      Marina Mock  [address]  [facility] [id number]        To Whom It May Concern:    Marina Mock was seen in our clinic today.      Sincerely,      Phuong NANCE, PABooC      Electronically signed

## 2025-04-30 NOTE — PROGRESS NOTES
"Patient presents with:  Ear Problem: 1 week, both ears itchy now painful, swelling down jaw with pain, teeth pain    (K11.20) Salivary gland infection  (primary encounter diagnosis)  Comment:   Plan: dicloxacillin (DYNAPEN) 500 MG capsule        1 pill four tiems a day for 10 days,      Take lemon or lemon drops a few times a day to help your salivary gland to secrete, do this until the swelling goes down.    (J30.2) Seasonal allergies  Comment:   Plan: fluticasone (FLONASE) 50 MCG/ACT nasal spray        2 sprays each nostril TWICE a day today then once at bedtime for the next 3 weeks.      Salt water gargles  Saline nasal spray (\"ocean mist\") 2 sprays each nostril as needed throughout the day to help flush the nasal passages out and moisturize them to prevent nasal bleeding.    Follow-up with primary doctor should symptoms persist or worsen.    At the end of the encounter, I discussed results, diagnosis, medications. Discussed red flags for immediate return to clinic/ER, as well as indications for follow up if no improvement. Patient understood and agreed to plan. Patient was stable for discharge             SUBJECTIVE:   Marina Mock is a 44 year old female who presents today with bilateral ear itching for one week, with runny nose and congestion with some sneezing.  This morning developed swelling under her chin which is tender.  The teeth on her lower jaw have been hurting since this morning.        Patient Active Problem List   Diagnosis    Human immunodeficiency virus (HIV) disease (H)    Papanicolaou smear of cervix with low grade squamous intraepithelial lesion (LGSIL)    LTBI (latent tuberculosis infection)    Iron deficiency anemia secondary to inadequate dietary iron intake    Type 2 diabetes mellitus, without long-term current use of insulin (H)    Supervision of high-risk pregnancy of elderly multigravida    Anemia due to acute blood loss    Endometritis    Anemia    Retained products of conception " after miscarriage         Past Medical History:   Diagnosis Date    Asymptomatic human immunodeficiency virus (HIV) infection status (H) 2016    Gestational diabetes     diet controlled     Malaria     in [place]     Miscarriage     TB (pulmonary tuberculosis)     latent         Current Outpatient Medications   Medication Sig Dispense Refill    Multiple Vitamins-Iron (DAILY-TARIQ/IRON/BETA-CAROTENE) TABS TAKE 1 TABLET BY MOUTH DAILY. (Patient not taking: Reported on 10/19/2020) 30 tablet 7     Social History     Tobacco Use    Smoking status: Never Smoker    Smokeless tobacco: Never Used   Substance Use Topics    Alcohol use: Not on file     Family History   Problem Relation Age of Onset    Diabetes Mother     Diabetes Father          ROS:    10 point ROS of systems including Constitutional, Eyes, Respiratory, Cardiovascular, Gastroenterology, Genitourinary, Integumentary, Muscularskeletal, Psychiatric ,neurological were all negative except for pertinent positives noted in my HPI       OBJECTIVE:  /85 (BP Location: Left arm, Patient Position: Sitting, Cuff Size: Adult Regular)   Pulse 79   Temp 98.5  F (36.9  C) (Tympanic)   Resp 16   Wt 55.3 kg (122 lb)   LMP 04/28/2025   SpO2 100%   Breastfeeding No   BMI 23.05 kg/m    Physical Exam:  GENERAL APPEARANCE: healthy, alert and no distress  EYES: EOMI,  PERRL, conjunctiva clear  HENT: ear canals and TM's normal.  Nose and mouth without ulcers, erythema or lesions  NECK: supple, nontender, no lymphadenopathy  RESP: lungs clear to auscultation - no rales, rhonchi or wheezes  CV: regular rates and rhythm, normal S1 S2, no murmur noted  SKIN: no suspicious lesions or rashes

## 2025-05-17 ENCOUNTER — HEALTH MAINTENANCE LETTER (OUTPATIENT)
Age: 45
End: 2025-05-17

## 2025-06-07 ENCOUNTER — HEALTH MAINTENANCE LETTER (OUTPATIENT)
Age: 45
End: 2025-06-07